# Patient Record
Sex: FEMALE | Race: WHITE | NOT HISPANIC OR LATINO | Employment: FULL TIME | URBAN - METROPOLITAN AREA
[De-identification: names, ages, dates, MRNs, and addresses within clinical notes are randomized per-mention and may not be internally consistent; named-entity substitution may affect disease eponyms.]

---

## 2017-01-11 ENCOUNTER — TELEPHONE (OUTPATIENT)
Dept: RHEUMATOLOGY | Facility: CLINIC | Age: 52
End: 2017-01-11

## 2017-01-11 ENCOUNTER — PATIENT MESSAGE (OUTPATIENT)
Dept: RHEUMATOLOGY | Facility: CLINIC | Age: 52
End: 2017-01-11

## 2017-01-11 DIAGNOSIS — R53.82 CHRONIC FATIGUE: ICD-10-CM

## 2017-01-11 DIAGNOSIS — M25.50 ARTHRALGIA, UNSPECIFIED JOINT: Primary | ICD-10-CM

## 2017-01-11 DIAGNOSIS — M79.10 MYALGIA: ICD-10-CM

## 2017-01-12 ENCOUNTER — LAB VISIT (OUTPATIENT)
Dept: LAB | Facility: HOSPITAL | Age: 52
End: 2017-01-12
Attending: INTERNAL MEDICINE
Payer: COMMERCIAL

## 2017-01-12 DIAGNOSIS — M25.50 ARTHRALGIA, UNSPECIFIED JOINT: ICD-10-CM

## 2017-01-12 DIAGNOSIS — R53.82 CHRONIC FATIGUE: ICD-10-CM

## 2017-01-12 DIAGNOSIS — M79.10 MYALGIA: ICD-10-CM

## 2017-01-12 LAB
CRP SERPL-MCNC: 0.6 MG/L
ERYTHROCYTE [SEDIMENTATION RATE] IN BLOOD BY WESTERGREN METHOD: 5 MM/HR

## 2017-01-12 PROCEDURE — 85651 RBC SED RATE NONAUTOMATED: CPT

## 2017-01-12 PROCEDURE — 36415 COLL VENOUS BLD VENIPUNCTURE: CPT

## 2017-01-12 PROCEDURE — 86140 C-REACTIVE PROTEIN: CPT

## 2017-03-02 ENCOUNTER — PATIENT MESSAGE (OUTPATIENT)
Dept: RHEUMATOLOGY | Facility: CLINIC | Age: 52
End: 2017-03-02

## 2017-03-02 ENCOUNTER — TELEPHONE (OUTPATIENT)
Dept: RHEUMATOLOGY | Facility: CLINIC | Age: 52
End: 2017-03-02

## 2017-03-02 DIAGNOSIS — M79.604 PAIN IN BOTH LOWER EXTREMITIES: Primary | ICD-10-CM

## 2017-03-02 DIAGNOSIS — M79.605 PAIN IN BOTH LOWER EXTREMITIES: Primary | ICD-10-CM

## 2017-03-02 NOTE — TELEPHONE ENCOUNTER
----- Message from Rosemary Mora MD sent at 3/2/2017 12:07 PM CST -----  Ordered EMG.  Please schedule  Thanks SS

## 2017-03-02 NOTE — TELEPHONE ENCOUNTER
Left message on [facility] Scheduling voicemail to contact pt to schedule. Contacted [facility], advised that Dr. Childers does the EMG. Will contact her nurse. Scheduled pt for 04/05/2017 and placed on cancellation list for anything sooner on one of her days off, a Wed or Friday. Pt will also contact billing to find out her copay cost. No further questions.

## 2017-03-16 ENCOUNTER — TELEPHONE (OUTPATIENT)
Dept: RHEUMATOLOGY | Facility: CLINIC | Age: 52
End: 2017-03-16

## 2017-03-16 ENCOUNTER — PATIENT MESSAGE (OUTPATIENT)
Dept: RHEUMATOLOGY | Facility: CLINIC | Age: 52
End: 2017-03-16

## 2017-03-16 NOTE — TELEPHONE ENCOUNTER
----- Message from Becka Wilson sent at 3/16/2017 11:58 AM CDT -----  Pl;ease call patient in regards to rescheduling [facility], [phone] (home)

## 2017-04-26 ENCOUNTER — PROCEDURE VISIT (OUTPATIENT)
Dept: PHYSICAL MEDICINE AND REHAB | Facility: CLINIC | Age: 52
End: 2017-04-26
Payer: COMMERCIAL

## 2017-04-26 DIAGNOSIS — M79.604 PAIN IN BOTH LOWER EXTREMITIES: ICD-10-CM

## 2017-04-26 DIAGNOSIS — M79.605 PAIN IN BOTH LOWER EXTREMITIES: ICD-10-CM

## 2017-04-26 PROCEDURE — 95886 MUSC TEST DONE W/N TEST COMP: CPT | Mod: S$GLB,,, | Performed by: PHYSICAL MEDICINE & REHABILITATION

## 2017-04-26 PROCEDURE — 95910 NRV CNDJ TEST 7-8 STUDIES: CPT | Mod: S$GLB,,, | Performed by: PHYSICAL MEDICINE & REHABILITATION

## 2017-04-26 NOTE — PROCEDURES
Procedures     [facility]  Joan Barney D.O  Physical Medicine and Rehab  Phone: [phone]   Fax: [phone]        Full Name: Lashell Valladares Gender: Female  Patient ID: [id number] YOB: 1965      Visit Date: 4/26/2017 14:15  Age: 51 Years 11 Months Old      Left sided hip pain. Radiates to above the knee.  Also, complains of aching of legs in back of calf.    PE  Excrcuciate tenderness left gluteus medius insertion/greater trochanter  MMT 5/5  Sensation to light touch intact  Reflexes symmetric 2+  No focal musc atrophy  Unlevelled iliac crests, left hip higher  +scoliotic curvature   Unlevlled shoulders  Sensory NCS      Nerve / Sites Rec. Site Onset Lat Peak Lat Ref. NP Amp Ref. PP Amp Ref. Segments Distance Velocity     ms ms ms µV µV µV µV  cm m/s   R Sural - Ankle (Calf)      Calf Ankle NR NR ?4.20 NR ?5.0 NR ?5.0 Calf - Ankle 14 NR   L Sural - Ankle (Calf)      Calf Ankle 2.92 3.39 ?4.20 2.3 ?5.0 5.5 ?5.0 Calf - Ankle 14 48   R Superficial peroneal - Ankle      Lat leg Ankle NR NR ?4.40 NR ?5.0 NR ?5.0 Lat leg - Ankle 14 NR   L Superficial peroneal - Ankle      Lat leg Ankle NR NR ?4.40 NR ?5.0 NR ?5.0 Lat leg - Ankle 14 NR       Motor NCS      Nerve / Sites Muscle Latency Ref. Amplitude Ref. Amp % Duration Segments Distance Lat Diff Velocity Ref.     ms ms mV mV % ms  cm ms m/s m/s   R Peroneal - EDB      Ankle EDB 4.38 ?6.20 4.2 ?2.0 100 6.41 Ankle - EDB 8         Fib head EDB 10.73  3.0  70.3 6.61 Fib head - Ankle 28 6.35 44 ?39      Pop fossa EDB 12.24  1.3  29.9 6.72 Pop fossa - Fib head 10 1.51 66 ?39   L Peroneal - EDB      Ankle EDB 3.85 ?6.20 6.8 ?2.0 100 6.35 Ankle - EDB 8         Fib head EDB 10.21  5.5  80.8 6.51 Fib head - Ankle 29 6.35 46 ?39      Pop fossa EDB 11.46  4.9  71.5 6.51 Pop fossa - Fib head 10 1.25 80 ?39   R Tibial - AH      Ankle AH 4.64 ?6.00 12.0 ?3.0 100 5.89 Ankle - AH 8         Pop fossa AH 12.71  0.7  5.76 6.30 Pop fossa - Ankle 34 8.07 42 ?39   L  Tibial - AH      Ankle AH 4.17 ?6.00 6.4 ?3.0 100 6.09 Ankle - AH 8         Pop fossa AH 12.50  0.6  9.82 5.89 Pop fossa - Ankle 32 8.33 38 ?39       F  Wave      Nerve Fmin Ref.    ms ms   R Tibial - AH 53.07 ?58.00   L Tibial - AH 51.56 ?58.00       EMG      EMG Summary Table     Spontaneous MUAP Recruitment   Muscle IA Fib PSW Fasc H.F. Amp Dur. PPP Pattern   R. Biceps femoris (short head) N None None None None N N N N   L. Biceps femoris (short head) N None None None None N N N N   L. Rectus femoris N None None None None N N N N   R. Rectus femoris N None None None None N N N N   L. Tensor fasciae latae N None None None None N N N N   R. Tensor fasciae latae N None None None None N N N N   L. Gastrocnemius (Lateral head) N None None None None N N N N   R. Gastrocnemius (Lateral head) N None None None None N N N N   L. Tibialis anterior N None None None None N N N N   R. Tibialis anterior N None None None None N N N N   L. Vastus lateralis N None None None None N N N N   R. Vastus lateralis N None None None None N N N N   L. Lumbar paraspinals (up) N None None None None N N N N   R. Lumbar paraspinals (up) N None None None None N N N N   L. Lumbar paraspinals (mid) N None None None None N N N N   R. Lumbar paraspinals (mid) N None None None None N N N N       Summary    The motor conduction test was performed on 4 nerve(s). The results were normal in 3 nerve(s): R Peroneal - EDB, L Peroneal - EDB, R Tibial - AH. Results outside the specified normal range were found in 1 nerve(s), as follows:   In the L Tibial - AH study  o the take off velocity result was reduced for Pop fossa - Ankle segment    The sensory conduction test had results outside of the specified normal range in all 4 of the tested nerves:   In the R Sural - Ankle (Calf) study  o the response was considered absent for Calf stimulation   In the L Sural - Ankle (Calf) study  o the peak amplitude result was reduced for Calf stimulation   In the R  Superficial peroneal - Ankle study  o the response was considered absent for Lat leg stimulation   In the L Superficial peroneal - Ankle study  o the response was considered absent for Lat leg stimulation    The F wave study was normal in all 2 of the tested nerves: R Tibial - AH, L Tibial - AH.    The needle EMG study was normal in all 16 tested muscles: R. Biceps femoris (short head), L. Biceps femoris (short head), L. Rectus femoris, R. Rectus femoris, L. Tensor fasciae latae, R. Tensor fasciae latae, L. Gastrocnemius (Lateral head), R. Gastrocnemius (Lateral head), L. Tibialis anterior, R. Tibialis anterior, L. Vastus lateralis, R. Vastus lateralis, L. Lumbar paraspinals (up), R. Lumbar paraspinals (up), L. Lumbar paraspinals (mid), R. Lumbar paraspinals (mid).          Conclusion:   Mild sensorimotor axonal neuropathy          Discussion:    No electrophysiologic evidence of a lumbar radiculopathy, myopathy or plexopathy.   She does exhibit left trochanteric pain syndrome. This is likely due to her scoliosis leading chronic pelvic misalignment. Recommend evaluation for orthotics with a possible heel lift as well as physical therapy specifically geared for pelvic realignment and strengthening of hip stabilizers and core.                ____________________________  Joan Barney D.O.

## 2017-05-01 ENCOUNTER — PATIENT MESSAGE (OUTPATIENT)
Dept: RHEUMATOLOGY | Facility: CLINIC | Age: 52
End: 2017-05-01

## 2017-05-08 ENCOUNTER — LAB VISIT (OUTPATIENT)
Dept: LAB | Facility: HOSPITAL | Age: 52
End: 2017-05-08
Attending: INTERNAL MEDICINE
Payer: COMMERCIAL

## 2017-05-08 ENCOUNTER — OFFICE VISIT (OUTPATIENT)
Dept: RHEUMATOLOGY | Facility: CLINIC | Age: 52
End: 2017-05-08
Payer: COMMERCIAL

## 2017-05-08 VITALS
HEIGHT: 64 IN | DIASTOLIC BLOOD PRESSURE: 75 MMHG | SYSTOLIC BLOOD PRESSURE: 120 MMHG | HEART RATE: 60 BPM | BODY MASS INDEX: 26.12 KG/M2 | WEIGHT: 153 LBS

## 2017-05-08 DIAGNOSIS — M15.9 PRIMARY OSTEOARTHRITIS INVOLVING MULTIPLE JOINTS: ICD-10-CM

## 2017-05-08 DIAGNOSIS — M19.90 INFLAMMATORY ARTHRITIS: ICD-10-CM

## 2017-05-08 DIAGNOSIS — Z79.1 NSAID LONG-TERM USE: Primary | ICD-10-CM

## 2017-05-08 DIAGNOSIS — Z91.199 NONCOMPLIANCE: ICD-10-CM

## 2017-05-08 DIAGNOSIS — M35.3 PMR (POLYMYALGIA RHEUMATICA): ICD-10-CM

## 2017-05-08 LAB
ALBUMIN SERPL BCP-MCNC: 3.9 G/DL
ALP SERPL-CCNC: 46 U/L
ALT SERPL W/O P-5'-P-CCNC: 22 U/L
ANION GAP SERPL CALC-SCNC: 9 MMOL/L
AST SERPL-CCNC: 29 U/L
BASOPHILS # BLD AUTO: 0 K/UL
BASOPHILS NFR BLD: 0.6 %
BILIRUB SERPL-MCNC: 0.7 MG/DL
BUN SERPL-MCNC: 15 MG/DL
CALCIUM SERPL-MCNC: 9.6 MG/DL
CHLORIDE SERPL-SCNC: 104 MMOL/L
CO2 SERPL-SCNC: 28 MMOL/L
CREAT SERPL-MCNC: 0.8 MG/DL
CRP SERPL-MCNC: 0.4 MG/L
DIFFERENTIAL METHOD: ABNORMAL
EOSINOPHIL # BLD AUTO: 0.2 K/UL
EOSINOPHIL NFR BLD: 3.9 %
ERYTHROCYTE [DISTWIDTH] IN BLOOD BY AUTOMATED COUNT: 13.5 %
ERYTHROCYTE [SEDIMENTATION RATE] IN BLOOD BY WESTERGREN METHOD: 5 MM/HR
EST. GFR  (AFRICAN AMERICAN): >60 ML/MIN/1.73 M^2
EST. GFR  (NON AFRICAN AMERICAN): >60 ML/MIN/1.73 M^2
GLUCOSE SERPL-MCNC: 83 MG/DL
HCT VFR BLD AUTO: 38.4 %
HGB BLD-MCNC: 12.7 G/DL
LYMPHOCYTES # BLD AUTO: 1.7 K/UL
LYMPHOCYTES NFR BLD: 40 %
MCH RBC QN AUTO: 29.5 PG
MCHC RBC AUTO-ENTMCNC: 33.1 %
MCV RBC AUTO: 89 FL
MONOCYTES # BLD AUTO: 0.3 K/UL
MONOCYTES NFR BLD: 7.3 %
NEUTROPHILS # BLD AUTO: 2 K/UL
NEUTROPHILS NFR BLD: 48.2 %
PLATELET # BLD AUTO: 235 K/UL
PMV BLD AUTO: 8 FL
POTASSIUM SERPL-SCNC: 4.5 MMOL/L
PROT SERPL-MCNC: 6.3 G/DL
RBC # BLD AUTO: 4.31 M/UL
SODIUM SERPL-SCNC: 141 MMOL/L
WBC # BLD AUTO: 4.2 K/UL

## 2017-05-08 PROCEDURE — 36415 COLL VENOUS BLD VENIPUNCTURE: CPT

## 2017-05-08 PROCEDURE — 85651 RBC SED RATE NONAUTOMATED: CPT

## 2017-05-08 PROCEDURE — 86140 C-REACTIVE PROTEIN: CPT

## 2017-05-08 PROCEDURE — 80053 COMPREHEN METABOLIC PANEL: CPT

## 2017-05-08 PROCEDURE — 99214 OFFICE O/P EST MOD 30 MIN: CPT | Mod: S$GLB,,, | Performed by: INTERNAL MEDICINE

## 2017-05-08 PROCEDURE — 85025 COMPLETE CBC W/AUTO DIFF WBC: CPT

## 2017-05-08 PROCEDURE — 99999 PR PBB SHADOW E&M-EST. PATIENT-LVL III: CPT | Mod: PBBFAC,,, | Performed by: INTERNAL MEDICINE

## 2017-05-08 PROCEDURE — 3078F DIAST BP <80 MM HG: CPT | Mod: S$GLB,,, | Performed by: INTERNAL MEDICINE

## 2017-05-08 PROCEDURE — 1160F RVW MEDS BY RX/DR IN RCRD: CPT | Mod: S$GLB,,, | Performed by: INTERNAL MEDICINE

## 2017-05-08 PROCEDURE — 3074F SYST BP LT 130 MM HG: CPT | Mod: S$GLB,,, | Performed by: INTERNAL MEDICINE

## 2017-05-08 ASSESSMENT — ROUTINE ASSESSMENT OF PATIENT INDEX DATA (RAPID3)
PATIENT GLOBAL ASSESSMENT SCORE: 3
MDHAQ FUNCTION SCORE: .4
TOTAL RAPID3 SCORE: 2.78
PSYCHOLOGICAL DISTRESS SCORE: 1.1
PAIN SCORE: 4

## 2017-05-08 NOTE — PROGRESS NOTES
"Subjective:       Patient ID: Lashell Valladares is a 52 y.o. female.    Chief Complaint: PMR and inflammatory arthritis  HPI 50y/o female is here for follow up for PMR. She has h/o seronegative RA diagnosed by her primary care physician.  In the past, she may have taken Methotrexate for 1 month(cannot remember the dose/she is not sure if this was the medication) that did not help.  Currently on hydroxychloroquine 200 mg twice daily-restarted in July 2016.  Patient canceled her last appointment with me.  When I asked about eye exam related to Plaquenil use, she got very upset because she could not tell me the name or location of the physician whom she saw for the eye exam.  No new PMR symptoms.  She denies headache, vision changes, jaw claudication  Also on, Gabapentin 100 mg at bedtime helps the back pain        Review of Systems   Constitutional: Negative for fever.   HENT: Negative for facial swelling and hearing loss.    Eyes: Negative for pain and redness.   Respiratory: Negative for cough and shortness of breath.    Cardiovascular:Negative for chest pain.   Gastrointestinal: Negative for blood in stool and abdominal distention.   Genitourinary: Negative for hematuria and genital sores.   Neurological: Negative for tremors and seizures.   Psychiatric/Behavioral: Negative for behavioral problems and agitation.         Objective:     /75 (BP Location: Left arm, Patient Position: Sitting, BP Method: Automatic)  Pulse 60  Ht 5' 4" (1.626 m)  Wt 69.4 kg (153 lb)  BMI 26.26 kg/m2     Physical Exam   Constitutional: She is oriented to person, place, and time and well-developed, well-nourished, and in no distress.   Head: Normocephalic and atraumatic.   Eyes: Pupils are equal, round, no redness of conjunctiva   Neurological: She is alert and oriented to person, place, and time.   Skin: Skin is warm and dry.     Psychiatric: Mood and affect normal.   Musculoskeletal:   No joint effusion      Lab Results "   Component Value Date    WBC 5.30 09/07/2016    HGB 13.8 09/07/2016    HCT 41.1 09/07/2016    MCV 90 09/07/2016     09/07/2016     CMP  Sodium   Date Value Ref Range Status   09/07/2016 139 136 - 145 mmol/L Final     Potassium   Date Value Ref Range Status   09/07/2016 4.3 3.5 - 5.1 mmol/L Final     Chloride   Date Value Ref Range Status   09/07/2016 105 95 - 110 mmol/L Final     CO2   Date Value Ref Range Status   09/07/2016 26 23 - 29 mmol/L Final     Glucose   Date Value Ref Range Status   09/07/2016 79 70 - 110 mg/dL Final     BUN, Bld   Date Value Ref Range Status   09/07/2016 11 6 - 20 mg/dL Final     Creatinine   Date Value Ref Range Status   09/07/2016 0.7 0.5 - 1.4 mg/dL Final     Calcium   Date Value Ref Range Status   09/07/2016 9.1 8.7 - 10.5 mg/dL Final     Total Protein   Date Value Ref Range Status   09/07/2016 6.1 6.0 - 8.4 g/dL Final     Albumin   Date Value Ref Range Status   09/07/2016 3.7 3.5 - 5.2 g/dL Final     Total Bilirubin   Date Value Ref Range Status   09/07/2016 0.8 0.1 - 1.0 mg/dL Final     Comment:     For infants and newborns, interpretation of results should be based  on gestational age, weight and in agreement with clinical  observations.  Premature Infant recommended reference ranges:  Up to 24 hours.............<8.0 mg/dL  Up to 48 hours............<12.0 mg/dL  3-5 days..................<15.0 mg/dL  6-29 days.................<15.0 mg/dL       Alkaline Phosphatase   Date Value Ref Range Status   09/07/2016 49 (L) 55 - 135 U/L Final     AST   Date Value Ref Range Status   09/07/2016 22 10 - 40 U/L Final     ALT   Date Value Ref Range Status   09/07/2016 20 10 - 44 U/L Final     Anion Gap   Date Value Ref Range Status   09/07/2016 8 8 - 16 mmol/L Final     eGFR if    Date Value Ref Range Status   09/07/2016 >60 >60 mL/min/1.73 m^2 Final     eGFR if non    Date Value Ref Range Status   09/07/2016 >60 >60 mL/min/1.73 m^2 Final     Comment:      Calculation used to obtain the estimated glomerular filtration  rate (eGFR) is the CKD-EPI equation. Since race is unknown   in our information system, the eGFR values for   -American and Non--American patients are given   for each creatinine result.       Lab Results   Component Value Date    SEDRATE 5 01/12/2017     Lab Results   Component Value Date    CRP 0.6 01/12/2017   Results for MARCELA SHAY (MRN [id number]) as of 12/20/2016 15:09   Ref. Range 11/17/2015 11:22   Anti-SSA Antibody Latest Ref Range: 0.00 - 19.99 EU 0.76   Anti-SSA Interpretation Latest Ref Range: Negative  Negative   CCP Antibodies Latest Ref Range: <5.0 U/mL <0.5   Rheumatoid Factor Latest Ref Range: 0.0 - 15.0 IU/mL <10.0       Arthritis survey  In the cervical spine there is mild degenerative change that is moderate disk space narrowing C5-C6 and to a lesser degree C4-C5.  There is no abnormal translational motion.    In the knees medial and lateral compartments of the knees appear maintained    Feet and hands and wrists; very slight degenerative change left first metatarsophalangeal joint.  Very slight degenerative change in the hand predominantly at distal interphalangeal joints.  No appreciable juxta-articular erosions  Assessment:   Polymyalgia rheumatica- resolved.  No signs of GCA  Non compliance   Inflammatory arthritis-stable  Long-term use of hydroxychloroquine -  Osteoarthritis of multiple joints  Long-term use of NSAIDs    Plan:    On asking about the last eye exam for Plaquenil toxicity monitoring, she got very upset as she could not remember the name of the ophthalmologist.  She also got upset that I asked if she was taking medications as prescribed (She admitted to not taking medications as prescribed).  I discussed side effects of medications in length and discussed the importance of regular monitoring of toxicity. I tried to explain the reason for not krysta able to refill Plaquenil until she has been  cleared by ophthalmology.  I also counseled her for medication compliance.  She did voice understanding and got very upset and decided to walk out of  the clinic.   RTC prn     -Patient seen face to face for 25 minutes and greater than 50% spent in counseling regarding noncompliance

## 2017-05-08 NOTE — MR AVS SNAPSHOT
[facility] [facility]  [address]  [place] [address]  Phone: [phone]  Fax: [phone]                  Lashell Valladares   2017 11:00 AM   Office Visit    Description:  Female : 1965   Provider:  Rosemary Mora MD   Department:  [facility]           Reason for Visit     Follow-up     Pain                To Do List           Future Appointments        Provider Department Dept Phone    2017 11:00 AM Rosemary Mora MD [facility] [phone]      Goals (5 Years of Data)     None      [facility]     [facility] Care Line -  Assistance  Unless otherwise directed by your provider, please contact [facility], our nurse care line that is available for  assistance.     Registered nurses in the [facility] provide: appointment scheduling, clinical advisement, health education, and other advisory services.  Call: [phone] (toll free)               Medications           Message regarding Medications     Verify the changes and/or additions to your medication regime listed below are the same as discussed with your clinician today.  If any of these changes or additions are incorrect, please notify your healthcare provider.        STOP taking these medications     predniSONE (DELTASONE) 1 MG tablet Take 1 mg by mouth once daily.           Verify that the below list of medications is an accurate representation of the medications you are currently taking.  If none reported, the list may be blank. If incorrect, please contact your healthcare provider. Carry this list with you in case of emergency.           Current Medications     gabapentin (NEURONTIN) 100 MG capsule Take 1 capsule (100 mg total) by mouth every evening.    hydroxychloroquine (PLAQUENIL) 200 mg tablet Take 1 tablet (200 mg total) by mouth 2 (two) times daily.    meloxicam (MOBIC) 15 MG tablet Take 1 tablet (15 mg total) by mouth once daily.    metoprolol  "succinate (TOPROL-XL) 25 MG 24 hr tablet Take 1 tablet (25 mg total) by mouth once daily.    tramadol (ULTRAM) 50 mg tablet Take 1 tablet (50 mg total) by mouth every 8 (eight) hours as needed for Pain.    vitamin D 1000 units Tab Take 2,000 Units by mouth once daily.           Clinical Reference Information           Your Vitals Were     BP Pulse Height Weight BMI    120/75 (BP Location: Left arm, Patient Position: Sitting, BP Method: Automatic) 60 5' 4" (1.626 m) 69.4 kg (153 lb) 26.26 kg/m2      Blood Pressure          Most Recent Value    BP  120/75      Allergies as of 5/8/2017     Aspirin    Penicillins      Immunizations Administered on Date of Encounter - 5/8/2017     None      Language Assistance Services     ATTENTION: Language assistance services are available, free of charge. Please call [phone].      ATENCIÓN: Si jamie chicas, tiene a white disposición servicios gratuitos de asistencia lingüística. Llame al [phone].     CHÚ Ý: N?u b?n nói Ti?ng Vi?t, có các d?ch v? h? tr? ngôn ng? mi?n phí dành cho b?n. G?i s? [phone].         [facility] complies with applicable Federal civil rights laws and does not discriminate on the basis of race, color, national origin, age, disability, or sex.        "

## 2017-05-12 DIAGNOSIS — M19.90 INFLAMMATORY ARTHRITIS: ICD-10-CM

## 2017-05-12 RX ORDER — MELOXICAM 15 MG/1
TABLET ORAL
Qty: 30 TABLET | Refills: 0 | Status: SHIPPED | OUTPATIENT
Start: 2017-05-12 | End: 2017-06-13 | Stop reason: SDUPTHER

## 2017-06-13 DIAGNOSIS — M19.90 INFLAMMATORY ARTHRITIS: ICD-10-CM

## 2017-06-13 RX ORDER — MELOXICAM 15 MG/1
15 TABLET ORAL DAILY
Qty: 30 TABLET | Refills: 2 | Status: SHIPPED | OUTPATIENT
Start: 2017-06-13 | End: 2017-09-01 | Stop reason: SDUPTHER

## 2017-07-10 ENCOUNTER — DOCUMENTATION ONLY (OUTPATIENT)
Dept: RHEUMATOLOGY | Facility: CLINIC | Age: 52
End: 2017-07-10

## 2017-07-13 DIAGNOSIS — M19.90 INFLAMMATORY ARTHRITIS: ICD-10-CM

## 2017-07-13 RX ORDER — HYDROXYCHLOROQUINE SULFATE 200 MG/1
200 TABLET, FILM COATED ORAL 2 TIMES DAILY
Qty: 60 TABLET | OUTPATIENT
Start: 2017-07-13

## 2017-09-01 DIAGNOSIS — M19.90 INFLAMMATORY ARTHRITIS: ICD-10-CM

## 2017-09-06 RX ORDER — MELOXICAM 15 MG/1
TABLET ORAL
Qty: 30 TABLET | Refills: 1 | Status: SHIPPED | OUTPATIENT
Start: 2017-09-06 | End: 2017-09-22 | Stop reason: SDUPTHER

## 2017-11-16 ENCOUNTER — TELEPHONE (OUTPATIENT)
Dept: RHEUMATOLOGY | Facility: CLINIC | Age: 52
End: 2017-11-16

## 2017-11-16 NOTE — TELEPHONE ENCOUNTER
----- Message from Malka Oh sent at 11/16/2017  1:52 PM CST -----  Patient has appointment on December 11th at 4:00/would like to come in earlier same day if possible/please call patient back at [phone] to advise.

## 2017-12-06 PROBLEM — K59.00 CONSTIPATION: Status: ACTIVE | Noted: 2017-12-06

## 2017-12-11 ENCOUNTER — OFFICE VISIT (OUTPATIENT)
Dept: RHEUMATOLOGY | Facility: CLINIC | Age: 52
End: 2017-12-11
Payer: COMMERCIAL

## 2017-12-11 VITALS
SYSTOLIC BLOOD PRESSURE: 140 MMHG | HEIGHT: 64 IN | DIASTOLIC BLOOD PRESSURE: 83 MMHG | WEIGHT: 149.5 LBS | HEART RATE: 67 BPM | BODY MASS INDEX: 25.52 KG/M2

## 2017-12-11 DIAGNOSIS — M35.3 PMR (POLYMYALGIA RHEUMATICA): ICD-10-CM

## 2017-12-11 DIAGNOSIS — M19.90 INFLAMMATORY ARTHRITIS: Primary | ICD-10-CM

## 2017-12-11 DIAGNOSIS — M15.9 PRIMARY OSTEOARTHRITIS INVOLVING MULTIPLE JOINTS: ICD-10-CM

## 2017-12-11 DIAGNOSIS — M25.511 PAIN IN JOINT OF RIGHT SHOULDER: ICD-10-CM

## 2017-12-11 PROCEDURE — 99215 OFFICE O/P EST HI 40 MIN: CPT | Mod: S$GLB,,, | Performed by: INTERNAL MEDICINE

## 2017-12-11 PROCEDURE — 99999 PR PBB SHADOW E&M-EST. PATIENT-LVL III: CPT | Mod: PBBFAC,,, | Performed by: INTERNAL MEDICINE

## 2017-12-11 RX ORDER — PREDNISONE 2.5 MG/1
5 TABLET ORAL DAILY
Qty: 60 TABLET | Refills: 5 | Status: SHIPPED | OUTPATIENT
Start: 2017-12-11 | End: 2018-01-10

## 2017-12-11 RX ORDER — BROMPHENIRAMINE MALEATE, DEXTROMETHORPHAN HBR, PHENYLEPHRINE HCL, DIPHENHYDRAMINE HCL, PHENYLEPHRINE HCL 0.52G
0.52 KIT ORAL DAILY
COMMUNITY
End: 2018-05-28

## 2017-12-11 RX ORDER — MELOXICAM 15 MG/1
15 TABLET ORAL DAILY
Qty: 30 TABLET | Refills: 7 | Status: SHIPPED | OUTPATIENT
Start: 2017-12-11 | End: 2018-01-10 | Stop reason: SDUPTHER

## 2017-12-11 RX ORDER — HYDROXYCHLOROQUINE SULFATE 200 MG/1
200 TABLET, FILM COATED ORAL 2 TIMES DAILY
Qty: 60 TABLET | Refills: 7 | Status: SHIPPED | OUTPATIENT
Start: 2017-12-11 | End: 2018-03-16 | Stop reason: SDUPTHER

## 2017-12-11 RX ORDER — TRAMADOL HYDROCHLORIDE 50 MG/1
50 TABLET ORAL EVERY 8 HOURS PRN
Qty: 90 TABLET | Refills: 3 | Status: SHIPPED | OUTPATIENT
Start: 2017-12-11 | End: 2018-01-25 | Stop reason: SDUPTHER

## 2017-12-11 NOTE — PROGRESS NOTES
Subjective:       Patient ID: Lashell Valladares is a 52 y.o. female.    Chief Complaint: Consult    Hpi: pt was dx Ra and tx with plaquenil, mobic,  And then PMR tx with started with prednisone 20 mg and then weaned.  Patient complains of arthralgias and myalgias for which has been present for a few years. Pain is located in multiple joints, both shoulder(s), both elbow(s), both wrist(s), both MCP(s): 1st, 2nd, 3rd, 4th and 5th, both PIP(s): 1st, 2nd, 3rd, 4th and 5th, both DIP(s): 1st and 2nd, both hip(s), both knee(s) and both MTP(s): 1st, 2nd, 3rd, 4th and 5th, is described as aching, pulsating, shooting and throbbing, and is constant, moderate .  Associated symptoms include: crepitation, decreased range of motion, edema, effusion, tenderness and warmth.   Mouth ulcers, hair loss,               She complains of pain, stiffness and joint swelling. Affected locations include the right wrist, right MCP, left PIP, left ankle, left foot, left toes, left MCP, left wrist, left elbow, left shoulder, right knee, right ankle, right foot, right toes, right hip, right DIP, right PIP, right elbow, right shoulder and neck. Associated symptoms include fatigue, pain at night, pain while resting, myalgias and dry eyes. Pertinent negatives include no dysuria, fever, trouble swallowing or headaches. She complains of morning stiffness lasting less than 30 minutes after awakening.        Review of Systems   Constitutional: Positive for activity change and fatigue. Negative for appetite change, chills, diaphoresis, fever and unexpected weight change.   HENT: Negative for congestion, dental problem, ear discharge, ear pain, facial swelling, mouth sores, nosebleeds, postnasal drip, rhinorrhea, sinus pressure, sneezing, sore throat, tinnitus, trouble swallowing and voice change.    Eyes: Negative for photophobia, pain, discharge, redness and itching.   Respiratory: Negative for apnea, cough, chest tightness, shortness of breath and  "wheezing.    Cardiovascular: Positive for leg swelling. Negative for chest pain and palpitations.   Gastrointestinal: Negative for abdominal distention, abdominal pain, constipation, diarrhea, nausea and vomiting.   Endocrine: Negative for cold intolerance, heat intolerance, polydipsia and polyuria.   Genitourinary: Negative for decreased urine volume, difficulty urinating, dysuria, flank pain, frequency, hematuria and urgency.   Musculoskeletal: Positive for arthralgias, gait problem, joint swelling, myalgias, neck pain, neck stiffness and stiffness. Negative for back pain.   Skin: Negative for pallor, rash and wound.   Allergic/Immunologic: Negative for immunocompromised state.   Neurological: Negative for dizziness, tremors, weakness, numbness and headaches.   Hematological: Negative for adenopathy. Does not bruise/bleed easily.   Psychiatric/Behavioral: Negative for sleep disturbance. The patient is not nervous/anxious.          Objective:   BP (!) 140/83   Pulse 67   Ht 5' 4" (1.626 m)   Wt 67.8 kg (149 lb 7.6 oz)   BMI 25.66 kg/m²      Physical Exam   Vitals reviewed.  Constitutional: She is oriented to person, place, and time. She appears distressed.   HENT:   Head: Normocephalic and atraumatic.   Eyes: EOM are normal. Pupils are equal, round, and reactive to light. Right eye exhibits no discharge. Left eye exhibits no discharge.   Neck: Neck supple. No thyromegaly present.   Cardiovascular: Normal rate, regular rhythm and normal heart sounds.  Exam reveals no gallop and no friction rub.    No murmur heard.  Pulmonary/Chest: Breath sounds normal. She has no wheezes. She has no rales. She exhibits no tenderness.   Abdominal: There is no tenderness. There is no rebound and no guarding.       Right Side Rheumatological Exam     Examination finds the elbow normal.    The patient is tender to palpation of the shoulder, wrist, knee, 1st PIP, 1st MCP, 2nd PIP, 2nd MCP, 3rd PIP, 3rd MCP, 4th PIP, 4th MCP, 5th PIP " and 5th MCP    She has swelling of the 1st PIP, 1st MCP, 2nd PIP, 2nd MCP, 3rd PIP, 3rd MCP, 4th PIP, 4th MCP, 5th PIP and 5th MCP    Shoulder Exam   Tenderness Location: no tenderness    Range of Motion   Active Abduction: abnormal   Adduction: abnormal  Sensation: normal    Knee Exam   Patellofemoral Crepitus: positive  Effusion: positive  Sensation: normal    Hip Exam   Tenderness Location: posterior  Sensation: normal    Elbow/Wrist Exam   Tenderness Location: no tenderness  Sensation: normal    Left Side Rheumatological Exam     The patient is tender to palpation of the shoulder, elbow, wrist, knee, 1st PIP, 1st MCP, 2nd PIP, 2nd MCP, 3rd PIP, 3rd MCP, 4th PIP, 4th MCP, 5th PIP and 5th MCP.    She has swelling of the 1st PIP, 1st MCP, 2nd PIP, 2nd MCP, 3rd PIP, 3rd MCP, 4th PIP, 4th MCP, 5th PIP and 5th MCP    Shoulder Exam   Tenderness Location: no tenderness    Range of Motion   Active Abduction: abnormal   Sensation: normal    Knee Exam     Patellofemoral Crepitus: positive  Effusion: positive  Sensation: normal    Hip Exam   Tenderness Location: posterior  Sensation: normal    Elbow/Wrist Exam   Sensation: normal      Back/Neck Exam   General Inspection   Gait: normal         Lymphadenopathy:     She has no cervical adenopathy.   Neurological: She is alert and oriented to person, place, and time. Gait normal.   Skin: Skin is dry. No rash noted. No erythema. There is pallor.     Psychiatric: Mood and affect normal.   Musculoskeletal: She exhibits tenderness.           Results for orders placed or performed during the hospital encounter of 12/06/17   POCT urine pregnancy   Result Value Ref Range    POC Preg Test, Ur Negative Negative     Acceptable Yes        Assessment:       1. Inflammatory arthritis    2. PMR (polymyalgia rheumatica)    3. Primary osteoarthritis involving multiple joints    4. Pain in joint of right shoulder            Plan:       Lashell was seen today for  consult.    Diagnoses and all orders for this visit:    Inflammatory arthritis  Comments:  seronegative RA  Orders:  -     NOÉ; Future  -     Anti Sm/RNP Antibody; Future  -     Anti-DNA antibody, double-stranded; Future  -     Anti-Histone Antibody; Future  -     Anti-scleroderma antibody; Future  -     Anti-Smith antibody; Future  -     Anti-thyroglobulin antibody; Future  -     TSH; Future  -     Thyroid peroxidase antibody; Future  -     T4, free; Future  -     T3, free; Future  -     Sedimentation rate, manual; Future  -     Rheumatoid factor; Future  -     Sjogrens syndrome-A extractable nuclear antibody; Future  -     Sjogrens syndrome-B extractable nuclear antibody; Future  -     IgE; Future  -     IgG; Future  -     IgG 1, 2, 3, and 4; Future  -     IgM; Future  -     Cyclic citrul peptide antibody, IgG; Future  -     Vitamin D; Future  -     HLA B27 Antigen; Future  -     traMADol (ULTRAM) 50 mg tablet; Take 1 tablet (50 mg total) by mouth every 8 (eight) hours as needed for Pain.  -     hydroxychloroquine (PLAQUENIL) 200 mg tablet; Take 1 tablet (200 mg total) by mouth 2 (two) times daily.  -     meloxicam (MOBIC) 15 MG tablet; Take 1 tablet (15 mg total) by mouth once daily.    PMR (polymyalgia rheumatica)  -     NOÉ; Future  -     Anti Sm/RNP Antibody; Future  -     Anti-DNA antibody, double-stranded; Future  -     Anti-Histone Antibody; Future  -     Anti-scleroderma antibody; Future  -     Anti-Smith antibody; Future  -     Anti-thyroglobulin antibody; Future  -     TSH; Future  -     Thyroid peroxidase antibody; Future  -     T4, free; Future  -     T3, free; Future  -     Sedimentation rate, manual; Future  -     Rheumatoid factor; Future  -     Sjogrens syndrome-A extractable nuclear antibody; Future  -     Sjogrens syndrome-B extractable nuclear antibody; Future  -     IgE; Future  -     IgG; Future  -     IgG 1, 2, 3, and 4; Future  -     IgM; Future  -     Cyclic citrul peptide antibody, IgG;  Future  -     Vitamin D; Future  -     HLA B27 Antigen; Future  -     traMADol (ULTRAM) 50 mg tablet; Take 1 tablet (50 mg total) by mouth every 8 (eight) hours as needed for Pain.    Primary osteoarthritis involving multiple joints  -     NOÉ; Future  -     Anti Sm/RNP Antibody; Future  -     Anti-DNA antibody, double-stranded; Future  -     Anti-Histone Antibody; Future  -     Anti-scleroderma antibody; Future  -     Anti-Smith antibody; Future  -     Anti-thyroglobulin antibody; Future  -     TSH; Future  -     Thyroid peroxidase antibody; Future  -     T4, free; Future  -     T3, free; Future  -     Sedimentation rate, manual; Future  -     Rheumatoid factor; Future  -     Sjogrens syndrome-A extractable nuclear antibody; Future  -     Sjogrens syndrome-B extractable nuclear antibody; Future  -     IgE; Future  -     IgG; Future  -     IgG 1, 2, 3, and 4; Future  -     IgM; Future  -     Cyclic citrul peptide antibody, IgG; Future  -     Vitamin D; Future  -     HLA B27 Antigen; Future  -     traMADol (ULTRAM) 50 mg tablet; Take 1 tablet (50 mg total) by mouth every 8 (eight) hours as needed for Pain.    Pain in joint of right shoulder  -     NOÉ; Future  -     Anti Sm/RNP Antibody; Future  -     Anti-DNA antibody, double-stranded; Future  -     Anti-Histone Antibody; Future  -     Anti-scleroderma antibody; Future  -     Anti-Smith antibody; Future  -     Anti-thyroglobulin antibody; Future  -     TSH; Future  -     Thyroid peroxidase antibody; Future  -     T4, free; Future  -     T3, free; Future  -     Sedimentation rate, manual; Future  -     Rheumatoid factor; Future  -     Sjogrens syndrome-A extractable nuclear antibody; Future  -     Sjogrens syndrome-B extractable nuclear antibody; Future  -     IgE; Future  -     IgG; Future  -     IgG 1, 2, 3, and 4; Future  -     IgM; Future  -     Cyclic citrul peptide antibody, IgG; Future  -     Vitamin D; Future  -     HLA B27 Antigen; Future  -     traMADol  (ULTRAM) 50 mg tablet; Take 1 tablet (50 mg total) by mouth every 8 (eight) hours as needed for Pain.    Other orders  -     predniSONE (DELTASONE) 2.5 MG tablet; Take 2 tablets (5 mg total) by mouth once daily.    We will take over Plaquenil, Mobic and tramadol

## 2017-12-11 NOTE — LETTER
December 11, 2017      [facility]  [address] [place] [address]  Phone: [phone]  Fax: [phone]       Patient: Lashell Valladares   YOB: 1965  Date of Visit: 12/11/2017    To Whom It May Concern:    Hayder Valladares  was at [facility] on 12/11/2017.SHE may return to work/school on 12-11-17 with no restrictions. If you have any questions or concerns, or if I can be of further assistance, please do not hesitate to contact my staff.    Sincerely,    Keyon Seals M.D.

## 2017-12-20 ENCOUNTER — LAB VISIT (OUTPATIENT)
Dept: LAB | Facility: HOSPITAL | Age: 52
End: 2017-12-20
Attending: INTERNAL MEDICINE
Payer: COMMERCIAL

## 2017-12-20 DIAGNOSIS — M15.9 PRIMARY OSTEOARTHRITIS INVOLVING MULTIPLE JOINTS: ICD-10-CM

## 2017-12-20 DIAGNOSIS — M35.3 PMR (POLYMYALGIA RHEUMATICA): ICD-10-CM

## 2017-12-20 DIAGNOSIS — M19.90 INFLAMMATORY ARTHRITIS: ICD-10-CM

## 2017-12-20 DIAGNOSIS — M25.511 PAIN IN JOINT OF RIGHT SHOULDER: ICD-10-CM

## 2017-12-20 LAB
25(OH)D3+25(OH)D2 SERPL-MCNC: 41 NG/ML
CCP AB SER IA-ACNC: <0.5 U/ML
ERYTHROCYTE [SEDIMENTATION RATE] IN BLOOD BY WESTERGREN METHOD: 8 MM/HR
IGE SERPL-ACNC: <35 IU/ML
IGG SERPL-MCNC: 525 MG/DL
IGM SERPL-MCNC: 36 MG/DL
RHEUMATOID FACT SERPL-ACNC: <10 IU/ML
T3FREE SERPL-MCNC: 2 PG/ML
T4 FREE SERPL-MCNC: 0.89 NG/DL
THYROGLOB AB SERPL IA-ACNC: <4 IU/ML
THYROPEROXIDASE IGG SERPL-ACNC: <6 IU/ML
TSH SERPL DL<=0.005 MIU/L-ACNC: 3.33 UIU/ML

## 2017-12-20 PROCEDURE — 86235 NUCLEAR ANTIGEN ANTIBODY: CPT | Mod: 59

## 2017-12-20 PROCEDURE — 82306 VITAMIN D 25 HYDROXY: CPT

## 2017-12-20 PROCEDURE — 86235 NUCLEAR ANTIGEN ANTIBODY: CPT

## 2017-12-20 PROCEDURE — 81374 HLA I TYPING 1 ANTIGEN LR: CPT

## 2017-12-20 PROCEDURE — 84439 ASSAY OF FREE THYROXINE: CPT

## 2017-12-20 PROCEDURE — 84481 FREE ASSAY (FT-3): CPT

## 2017-12-20 PROCEDURE — 82787 IGG 1 2 3 OR 4 EACH: CPT | Mod: 59

## 2017-12-20 PROCEDURE — 86376 MICROSOMAL ANTIBODY EACH: CPT

## 2017-12-20 PROCEDURE — 86200 CCP ANTIBODY: CPT

## 2017-12-20 PROCEDURE — 82785 ASSAY OF IGE: CPT

## 2017-12-20 PROCEDURE — 85651 RBC SED RATE NONAUTOMATED: CPT

## 2017-12-20 PROCEDURE — 86225 DNA ANTIBODY NATIVE: CPT

## 2017-12-20 PROCEDURE — 82784 ASSAY IGA/IGD/IGG/IGM EACH: CPT

## 2017-12-20 PROCEDURE — 36415 COLL VENOUS BLD VENIPUNCTURE: CPT

## 2017-12-20 PROCEDURE — 82784 ASSAY IGA/IGD/IGG/IGM EACH: CPT | Mod: 59

## 2017-12-20 PROCEDURE — 86431 RHEUMATOID FACTOR QUANT: CPT

## 2017-12-20 PROCEDURE — 84443 ASSAY THYROID STIM HORMONE: CPT

## 2017-12-20 PROCEDURE — 86038 ANTINUCLEAR ANTIBODIES: CPT

## 2017-12-20 PROCEDURE — 86800 THYROGLOBULIN ANTIBODY: CPT

## 2017-12-20 PROCEDURE — 83516 IMMUNOASSAY NONANTIBODY: CPT

## 2017-12-21 LAB
ANA SER QL IF: NORMAL
DSDNA AB SER-ACNC: NORMAL [IU]/ML
ENA SCL70 AB SER-ACNC: 8 UNITS
IGG1 SER-MCNC: 310 MG/DL
IGG2 SER-MCNC: 123 MG/DL
IGG3 SER-MCNC: 43 MG/DL
IGG4 SER-MCNC: 12 MG/DL

## 2017-12-23 LAB
ANTI SM ANTIBODY: 0.08 EU
ANTI SM/RNP ANTIBODY: 0 EU
ANTI-SM INTERPRETATION: NEGATIVE
ANTI-SM/RNP INTERPRETATION: NEGATIVE
ANTI-SSB ANTIBODY: 0 EU
ANTI-SSB INTERPRETATION: NEGATIVE
HISTONE IGG SER IA-ACNC: 0.1 UNITS (ref 0–0.9)

## 2017-12-26 LAB
ANTI-SSA ANTIBODY: 0.27 EU
ANTI-SSA INTERPRETATION: NEGATIVE

## 2017-12-27 ENCOUNTER — PATIENT MESSAGE (OUTPATIENT)
Dept: RHEUMATOLOGY | Facility: CLINIC | Age: 52
End: 2017-12-27

## 2017-12-27 LAB
HLA B27 INTERPRETATION: NORMAL
HLA-B27 RELATED AG QL: NEGATIVE
HLA-B27 RELATED AG QL: NORMAL

## 2018-01-10 DIAGNOSIS — M19.90 INFLAMMATORY ARTHRITIS: ICD-10-CM

## 2018-01-14 RX ORDER — MELOXICAM 15 MG/1
15 TABLET ORAL DAILY
Qty: 30 TABLET | Refills: 7 | Status: SHIPPED | OUTPATIENT
Start: 2018-01-14 | End: 2018-03-16 | Stop reason: SDUPTHER

## 2018-01-25 DIAGNOSIS — M35.3 PMR (POLYMYALGIA RHEUMATICA): ICD-10-CM

## 2018-01-25 DIAGNOSIS — M19.90 INFLAMMATORY ARTHRITIS: ICD-10-CM

## 2018-01-25 DIAGNOSIS — M25.511 PAIN IN JOINT OF RIGHT SHOULDER: ICD-10-CM

## 2018-01-25 DIAGNOSIS — M15.9 PRIMARY OSTEOARTHRITIS INVOLVING MULTIPLE JOINTS: ICD-10-CM

## 2018-01-28 ENCOUNTER — PATIENT MESSAGE (OUTPATIENT)
Dept: RHEUMATOLOGY | Facility: CLINIC | Age: 53
End: 2018-01-28

## 2018-01-29 ENCOUNTER — PATIENT MESSAGE (OUTPATIENT)
Dept: RHEUMATOLOGY | Facility: CLINIC | Age: 53
End: 2018-01-29

## 2018-01-30 ENCOUNTER — PATIENT MESSAGE (OUTPATIENT)
Dept: RHEUMATOLOGY | Facility: CLINIC | Age: 53
End: 2018-01-30

## 2018-01-30 RX ORDER — TRAMADOL HYDROCHLORIDE 50 MG/1
50 TABLET ORAL EVERY 8 HOURS PRN
Qty: 90 TABLET | Refills: 3 | Status: SHIPPED | OUTPATIENT
Start: 2018-01-30 | End: 2018-02-21 | Stop reason: SDUPTHER

## 2018-02-21 DIAGNOSIS — M35.3 PMR (POLYMYALGIA RHEUMATICA): ICD-10-CM

## 2018-02-21 DIAGNOSIS — M15.9 PRIMARY OSTEOARTHRITIS INVOLVING MULTIPLE JOINTS: ICD-10-CM

## 2018-02-21 DIAGNOSIS — M19.90 INFLAMMATORY ARTHRITIS: ICD-10-CM

## 2018-02-21 DIAGNOSIS — M25.511 PAIN IN JOINT OF RIGHT SHOULDER: ICD-10-CM

## 2018-02-22 RX ORDER — TRAMADOL HYDROCHLORIDE 50 MG/1
50 TABLET ORAL EVERY 8 HOURS PRN
Qty: 90 TABLET | Refills: 3 | Status: SHIPPED | OUTPATIENT
Start: 2018-02-28 | End: 2018-03-26 | Stop reason: SDUPTHER

## 2018-03-08 ENCOUNTER — PATIENT MESSAGE (OUTPATIENT)
Dept: RHEUMATOLOGY | Facility: CLINIC | Age: 53
End: 2018-03-08

## 2018-03-15 ENCOUNTER — PATIENT MESSAGE (OUTPATIENT)
Dept: RHEUMATOLOGY | Facility: CLINIC | Age: 53
End: 2018-03-15

## 2018-03-16 DIAGNOSIS — M19.90 INFLAMMATORY ARTHRITIS: ICD-10-CM

## 2018-03-19 RX ORDER — HYDROXYCHLOROQUINE SULFATE 200 MG/1
200 TABLET, FILM COATED ORAL 2 TIMES DAILY
Qty: 60 TABLET | Refills: 7 | Status: SHIPPED | OUTPATIENT
Start: 2018-03-19 | End: 2019-04-05 | Stop reason: SDUPTHER

## 2018-03-19 RX ORDER — MELOXICAM 15 MG/1
15 TABLET ORAL DAILY
Qty: 30 TABLET | Refills: 7 | Status: SHIPPED | OUTPATIENT
Start: 2018-03-19 | End: 2019-04-09 | Stop reason: SDUPTHER

## 2018-03-26 DIAGNOSIS — M35.3 PMR (POLYMYALGIA RHEUMATICA): ICD-10-CM

## 2018-03-26 DIAGNOSIS — M19.90 INFLAMMATORY ARTHRITIS: ICD-10-CM

## 2018-03-26 DIAGNOSIS — M25.511 PAIN IN JOINT OF RIGHT SHOULDER: ICD-10-CM

## 2018-03-26 DIAGNOSIS — M15.9 PRIMARY OSTEOARTHRITIS INVOLVING MULTIPLE JOINTS: ICD-10-CM

## 2018-03-26 RX ORDER — TRAMADOL HYDROCHLORIDE 50 MG/1
50 TABLET ORAL EVERY 8 HOURS PRN
Qty: 90 TABLET | Refills: 4 | Status: SHIPPED | OUTPATIENT
Start: 2018-03-26 | End: 2018-04-23 | Stop reason: SDUPTHER

## 2018-04-23 DIAGNOSIS — M25.511 PAIN IN JOINT OF RIGHT SHOULDER: ICD-10-CM

## 2018-04-23 DIAGNOSIS — M35.3 PMR (POLYMYALGIA RHEUMATICA): ICD-10-CM

## 2018-04-23 DIAGNOSIS — M19.90 INFLAMMATORY ARTHRITIS: ICD-10-CM

## 2018-04-23 DIAGNOSIS — M15.9 PRIMARY OSTEOARTHRITIS INVOLVING MULTIPLE JOINTS: ICD-10-CM

## 2018-04-24 RX ORDER — TRAMADOL HYDROCHLORIDE 50 MG/1
50 TABLET ORAL EVERY 8 HOURS PRN
Qty: 90 TABLET | Refills: 4 | Status: SHIPPED | OUTPATIENT
Start: 2018-04-24 | End: 2018-05-24

## 2018-05-28 ENCOUNTER — OFFICE VISIT (OUTPATIENT)
Dept: RHEUMATOLOGY | Facility: CLINIC | Age: 53
End: 2018-05-28
Payer: COMMERCIAL

## 2018-05-28 ENCOUNTER — LAB VISIT (OUTPATIENT)
Dept: LAB | Facility: HOSPITAL | Age: 53
End: 2018-05-28
Attending: INTERNAL MEDICINE
Payer: COMMERCIAL

## 2018-05-28 VITALS
BODY MASS INDEX: 25.43 KG/M2 | HEART RATE: 61 BPM | HEIGHT: 64 IN | DIASTOLIC BLOOD PRESSURE: 82 MMHG | WEIGHT: 148.94 LBS | SYSTOLIC BLOOD PRESSURE: 128 MMHG

## 2018-05-28 DIAGNOSIS — D83.9 CVID (COMMON VARIABLE IMMUNODEFICIENCY): ICD-10-CM

## 2018-05-28 DIAGNOSIS — Z79.899 LONG-TERM USE OF PLAQUENIL: ICD-10-CM

## 2018-05-28 DIAGNOSIS — M15.9 PRIMARY OSTEOARTHRITIS INVOLVING MULTIPLE JOINTS: ICD-10-CM

## 2018-05-28 DIAGNOSIS — M19.90 INFLAMMATORY ARTHRITIS: Primary | ICD-10-CM

## 2018-05-28 DIAGNOSIS — M19.90 INFLAMMATORY ARTHRITIS: ICD-10-CM

## 2018-05-28 PROCEDURE — 99999 PR PBB SHADOW E&M-EST. PATIENT-LVL III: CPT | Mod: PBBFAC,,, | Performed by: INTERNAL MEDICINE

## 2018-05-28 PROCEDURE — 86360 T CELL ABSOLUTE COUNT/RATIO: CPT

## 2018-05-28 PROCEDURE — 86359 T CELLS TOTAL COUNT: CPT

## 2018-05-28 PROCEDURE — 99214 OFFICE O/P EST MOD 30 MIN: CPT | Mod: S$GLB,,, | Performed by: INTERNAL MEDICINE

## 2018-05-28 PROCEDURE — 86355 B CELLS TOTAL COUNT: CPT

## 2018-05-28 PROCEDURE — 86357 NK CELLS TOTAL COUNT: CPT

## 2018-05-28 PROCEDURE — 36415 COLL VENOUS BLD VENIPUNCTURE: CPT | Mod: PO

## 2018-05-28 PROCEDURE — 86774 TETANUS ANTIBODY: CPT

## 2018-05-28 RX ORDER — TRAMADOL HYDROCHLORIDE 50 MG/1
50 TABLET ORAL 3 TIMES DAILY
Qty: 30 TABLET | Refills: 0 | Status: CANCELLED | OUTPATIENT
Start: 2018-05-28 | End: 2018-06-07

## 2018-05-28 ASSESSMENT — ROUTINE ASSESSMENT OF PATIENT INDEX DATA (RAPID3)
AM STIFFNESS SCORE: 1, YES
PAIN SCORE: 4
PATIENT GLOBAL ASSESSMENT SCORE: 2
TOTAL RAPID3 SCORE: 2.44
PSYCHOLOGICAL DISTRESS SCORE: 3.3
MDHAQ FUNCTION SCORE: .4

## 2018-05-28 NOTE — PROGRESS NOTES
Subjective:       Patient ID: Lashell Valladares is a 53 y.o. female.    Chief Complaint: Follow-up    Hpi: pt was dx Ra and tx with plaquenil, mobic,  And then PMR tx with started with prednisone 20 mg and then weaned.  Patient complains of arthralgias and myalgias for which has been present for a few years. Pain is located in multiple joints, both shoulder(s), both elbow(s), both wrist(s), both MCP(s): 1st, 2nd, 3rd, 4th and 5th, both PIP(s): 1st, 2nd, 3rd, 4th and 5th, both DIP(s): 1st and 2nd, both hip(s), both knee(s) and both MTP(s): 1st, 2nd, 3rd, 4th and 5th, is described as aching, pulsating, shooting and throbbing, and is constant, moderate .  Associated symptoms include: crepitation, decreased range of motion, edema, effusion, tenderness and warmth.   Mouth ulcers, hair loss,               She complains of pain, stiffness and joint swelling. Affected locations include the right wrist, right MCP, left PIP, left ankle, left foot, left toes, left MCP, left wrist, left elbow, left shoulder, right knee, right ankle, right foot, right toes, right hip, right DIP, right PIP, right elbow, right shoulder and neck. Associated symptoms include fatigue, pain at night, pain while resting, myalgias and dry eyes. Pertinent negatives include no dysuria, fever, trouble swallowing or headaches. She complains of morning stiffness lasting less than 30 minutes after awakening.        Review of Systems   Constitutional: Positive for activity change and fatigue. Negative for appetite change, chills, diaphoresis, fever and unexpected weight change.   HENT: Negative for congestion, dental problem, ear discharge, ear pain, facial swelling, mouth sores, nosebleeds, postnasal drip, rhinorrhea, sinus pressure, sneezing, sore throat, tinnitus, trouble swallowing and voice change.    Eyes: Negative for photophobia, pain, discharge, redness and itching.   Respiratory: Negative for apnea, cough, chest tightness, shortness of breath and  "wheezing.    Cardiovascular: Positive for leg swelling. Negative for chest pain and palpitations.   Gastrointestinal: Negative for abdominal distention, abdominal pain, constipation, diarrhea, nausea and vomiting.   Endocrine: Negative for cold intolerance, heat intolerance, polydipsia and polyuria.   Genitourinary: Negative for decreased urine volume, difficulty urinating, dysuria, flank pain, frequency, hematuria and urgency.   Musculoskeletal: Positive for arthralgias, gait problem, joint swelling, myalgias, neck pain, neck stiffness and stiffness. Negative for back pain.   Skin: Negative for pallor, rash and wound.   Allergic/Immunologic: Negative for immunocompromised state.   Neurological: Negative for dizziness, tremors, weakness, numbness and headaches.   Hematological: Negative for adenopathy. Does not bruise/bleed easily.   Psychiatric/Behavioral: Negative for sleep disturbance. The patient is not nervous/anxious.          Objective:   /82 (BP Location: Left arm, Patient Position: Sitting, BP Method: Medium (Automatic))   Pulse 61   Ht 5' 4" (1.626 m)   Wt 67.5 kg (148 lb 14.7 oz)   BMI 25.56 kg/m²      Physical Exam   Vitals reviewed.  Constitutional: She is oriented to person, place, and time. No distress.   HENT:   Head: Normocephalic and atraumatic.   Eyes: EOM are normal. Pupils are equal, round, and reactive to light. Right eye exhibits no discharge. Left eye exhibits no discharge.   Neck: Neck supple. No thyromegaly present.   Cardiovascular: Normal rate, regular rhythm and normal heart sounds.  Exam reveals no gallop and no friction rub.    No murmur heard.  Pulmonary/Chest: Breath sounds normal. She has no wheezes. She has no rales. She exhibits no tenderness.   Abdominal: There is no tenderness. There is no rebound and no guarding.       Right Side Rheumatological Exam     Examination finds the elbow normal.    The patient is tender to palpation of the shoulder, wrist, knee, 1st PIP, " 1st MCP, 2nd PIP, 2nd MCP, 3rd PIP, 3rd MCP, 4th PIP, 4th MCP, 5th PIP and 5th MCP    She has swelling of the 1st PIP, 1st MCP, 2nd PIP, 2nd MCP, 3rd PIP, 3rd MCP, 4th PIP, 4th MCP, 5th PIP and 5th MCP    Shoulder Exam   Tenderness Location: no tenderness    Range of Motion   Active Abduction: abnormal   Adduction: abnormal  Sensation: normal    Knee Exam   Patellofemoral Crepitus: positive  Effusion: positive  Sensation: normal    Hip Exam   Tenderness Location: posterior  Sensation: normal    Elbow/Wrist Exam   Tenderness Location: no tenderness  Sensation: normal    Left Side Rheumatological Exam     The patient is tender to palpation of the shoulder, elbow, wrist, knee, 1st PIP, 1st MCP, 2nd PIP, 2nd MCP, 3rd PIP, 3rd MCP, 4th PIP, 4th MCP, 5th PIP and 5th MCP.    She has swelling of the 1st PIP, 1st MCP, 2nd PIP, 2nd MCP, 3rd PIP, 3rd MCP, 4th PIP, 4th MCP, 5th PIP and 5th MCP    Shoulder Exam   Tenderness Location: no tenderness    Range of Motion   Active Abduction: abnormal   Sensation: normal    Knee Exam     Patellofemoral Crepitus: positive  Effusion: positive  Sensation: normal    Hip Exam   Tenderness Location: posterior  Sensation: normal    Elbow/Wrist Exam   Sensation: normal      Back/Neck Exam   General Inspection   Gait: normal         Lymphadenopathy:     She has no cervical adenopathy.   Neurological: She is alert and oriented to person, place, and time. Gait normal.   Skin: Skin is dry. No rash noted. No erythema. There is pallor.     Psychiatric: Mood and affect normal.   Musculoskeletal: She exhibits tenderness.           Results for orders placed or performed in visit on 12/20/17   NOÉ   Result Value Ref Range    NOÉ Screen Negative <1:160 Negative <1:160   Anti Sm/RNP Antibody   Result Value Ref Range    Anti Sm/RNP Antibody 0.00 0.00 - 19.99 EU    Anti-Sm/RNP Interpretation Negative Negative   Anti-DNA antibody, double-stranded   Result Value Ref Range    ds DNA Ab Negative 1:10 Negative  1:10   Anti-Histone Antibody   Result Value Ref Range    Anti-Histone Antibody 0.1 0.0 - 0.9 Units   Anti-scleroderma antibody   Result Value Ref Range    Scleroderma SCL- 8 <20 UNITS   Anti-Smith antibody   Result Value Ref Range    Anti Sm Antibody 0.08 0.00 - 19.99 EU    Anti-Sm Interpretation Negative Negative   Anti-thyroglobulin antibody   Result Value Ref Range    Thyroglobulin Ab Screen <4.0 0.0 - 3.9 IU/mL   TSH   Result Value Ref Range    TSH 3.335 0.400 - 4.000 uIU/mL   Thyroid peroxidase antibody   Result Value Ref Range    Thyroperoxidase Antibodies <6.0 <6.0 IU/mL   T4, free   Result Value Ref Range    Free T4 0.89 0.71 - 1.51 ng/dL   T3, free   Result Value Ref Range    T3, Free 2.0 (L) 2.3 - 4.2 pg/mL   Sedimentation rate, manual   Result Value Ref Range    Sed Rate 8 0 - 20 mm/Hr   Rheumatoid factor   Result Value Ref Range    Rheumatoid Factor <10.0 0.0 - 15.0 IU/mL   Sjogrens syndrome-A extractable nuclear antibody   Result Value Ref Range    Anti-SSA Antibody 0.27 0.00 - 19.99 EU    Anti-SSA Interpretation Negative Negative   Sjogrens syndrome-B extractable nuclear antibody   Result Value Ref Range    Anti-SSB Antibody 0.00 0.00 - 19.99 EU    Anti-SSB Interpretation Negative Negative   IgE   Result Value Ref Range    IgE <35 0 - 100 IU/mL   IgG   Result Value Ref Range    IgG - Serum 525 (L) 650 - 1600 mg/dL   IgG 1, 2, 3, and 4   Result Value Ref Range    IgG 1 310 (L) 382 - 929 mg/dL    IgG 2 123 (L) 242 - 700 mg/dL    IgG 3 43 22 - 176 mg/dL    IgG 4 12 4 - 86 mg/dL   IgM   Result Value Ref Range    IgM 36 (L) 50 - 300 mg/dL   Cyclic citrul peptide antibody, IgG   Result Value Ref Range    CCP Antibodies <0.5 <5.0 U/mL   Vitamin D   Result Value Ref Range    Vit D, 25-Hydroxy 41 30 - 96 ng/mL   HLA B27 Antigen   Result Value Ref Range    HLA B27 Interpretation [id number]     B27 Testing Date 12/27/2017 12:00 AM     HLA B27 Result Negative        Assessment:       1. Inflammatory arthritis     2. Primary osteoarthritis involving multiple joints    3. Long-term use of Plaquenil    4. CVID (common variable immunodeficiency)            Plan:       Lashell was seen today for follow-up.    Diagnoses and all orders for this visit:    Inflammatory arthritis  -     Ambulatory consult to Allergy  -     Humoral Immune Eval (Pneumo 14) with H. flu; Future  -     Lymphocyte Profile II; Future    Primary osteoarthritis involving multiple joints  -     Ambulatory consult to Allergy  -     Humoral Immune Eval (Pneumo 14) with H. flu; Future  -     Lymphocyte Profile II; Future    Long-term use of Plaquenil  -     Ambulatory consult to Allergy  -     Humoral Immune Eval (Pneumo 14) with H. flu; Future  -     Lymphocyte Profile II; Future    CVID (common variable immunodeficiency)  -     Ambulatory consult to Allergy  -     Humoral Immune Eval (Pneumo 14) with H. flu; Future  -     Lymphocyte Profile II; Future    Other orders  -     traMADol (ULTRAM) 50 mg tablet; Take 1 tablet (50 mg total) by mouth every 6 (six) hours as needed for Pain.    We will take over Plaquenil, Mobic and tramadol

## 2018-05-29 LAB
CD3+CD4+ CELLS # BLD: 1016 CELLS/UL (ref 300–1400)
CD3+CD4+ CELLS NFR BLD: 52.8 % (ref 28–57)
LYMPHOCYTES NFR CSF MANUAL: 12.8 % (ref 6–19)
LYMPHOCYTES NFR CSF MANUAL: 1369 CELLS/UL (ref 700–2100)
LYMPHOCYTES NFR CSF MANUAL: 15.5 % (ref 7–31)
LYMPHOCYTES NFR CSF MANUAL: 18.2 % (ref 10–39)
LYMPHOCYTES NFR CSF MANUAL: 2.9 % (ref 0.9–3.6)
LYMPHOCYTES NFR CSF MANUAL: 235 CELLS/UL (ref 100–500)
LYMPHOCYTES NFR CSF MANUAL: 285 CELLS/UL (ref 90–600)
LYMPHOCYTES NFR CSF MANUAL: 351 CELLS/UL (ref 200–900)
LYMPHOCYTES NFR CSF MANUAL: 71.1 % (ref 55–83)

## 2018-05-29 RX ORDER — TRAMADOL HYDROCHLORIDE 50 MG/1
50 TABLET ORAL EVERY 6 HOURS PRN
Qty: 120 TABLET | Refills: 4 | Status: SHIPPED | OUTPATIENT
Start: 2018-05-29 | End: 2018-06-06 | Stop reason: SDUPTHER

## 2018-06-01 ENCOUNTER — PATIENT MESSAGE (OUTPATIENT)
Dept: RHEUMATOLOGY | Facility: CLINIC | Age: 53
End: 2018-06-01

## 2018-06-01 ENCOUNTER — DOCUMENTATION ONLY (OUTPATIENT)
Dept: RHEUMATOLOGY | Facility: CLINIC | Age: 53
End: 2018-06-01

## 2018-06-01 LAB
C DIPHTHERIAE AB SER IA-ACNC: 0.27 IU/ML
C TETANI AB SER-ACNC: 1.17 IU/ML
DEPRECATED S PNEUM 1 IGG SER-MCNC: <0.3 MCG/ML
DEPRECATED S PNEUM12 IGG SER-MCNC: <0.3 MCG/ML
DEPRECATED S PNEUM14 IGG SER-MCNC: <0.3 MCG/ML
DEPRECATED S PNEUM19 IGG SER-MCNC: <0.3 MCG/ML
DEPRECATED S PNEUM23 IGG SER-MCNC: <0.3 MCG/ML
DEPRECATED S PNEUM3 IGG SER-MCNC: 0.3 MCG/ML
DEPRECATED S PNEUM4 IGG SER-MCNC: <0.3 MCG/ML
DEPRECATED S PNEUM5 IGG SER-MCNC: <0.3 MCG/ML
DEPRECATED S PNEUM8 IGG SER-MCNC: <0.3 MCG/ML
DEPRECATED S PNEUM9 IGG SER-MCNC: 1.1 MCG/ML
HAEM INFLU B IGG SER-MCNC: 0.12 MG/L
S PNEUM DA 18C IGG SER-MCNC: <0.3 MCG/ML
S PNEUM DA 6B IGG SER-MCNC: 0.7 MCG/ML
S PNEUM DA 7F IGG SER-MCNC: 0.3 MCG/ML
S PNEUM DA 9V IGG SER-MCNC: 0.5 MCG/ML

## 2018-06-04 ENCOUNTER — PATIENT MESSAGE (OUTPATIENT)
Dept: RHEUMATOLOGY | Facility: CLINIC | Age: 53
End: 2018-06-04

## 2018-06-05 ENCOUNTER — PATIENT MESSAGE (OUTPATIENT)
Dept: RHEUMATOLOGY | Facility: CLINIC | Age: 53
End: 2018-06-05

## 2018-06-06 ENCOUNTER — PATIENT MESSAGE (OUTPATIENT)
Dept: RHEUMATOLOGY | Facility: CLINIC | Age: 53
End: 2018-06-06

## 2018-06-11 ENCOUNTER — PATIENT MESSAGE (OUTPATIENT)
Dept: RHEUMATOLOGY | Facility: CLINIC | Age: 53
End: 2018-06-11

## 2018-06-12 RX ORDER — TRAMADOL HYDROCHLORIDE 50 MG/1
50 TABLET ORAL EVERY 6 HOURS PRN
Qty: 120 TABLET | Refills: 4 | Status: SHIPPED | OUTPATIENT
Start: 2018-06-12 | End: 2018-07-12

## 2018-06-12 RX ORDER — GABAPENTIN 100 MG/1
100 CAPSULE ORAL NIGHTLY
Qty: 30 CAPSULE | Refills: 2 | Status: SHIPPED | OUTPATIENT
Start: 2018-06-12 | End: 2019-04-03

## 2018-10-24 ENCOUNTER — OFFICE VISIT (OUTPATIENT)
Dept: RHEUMATOLOGY | Facility: CLINIC | Age: 53
End: 2018-10-24
Payer: COMMERCIAL

## 2018-10-24 VITALS
WEIGHT: 148.94 LBS | BODY MASS INDEX: 25.56 KG/M2 | HEART RATE: 66 BPM | DIASTOLIC BLOOD PRESSURE: 74 MMHG | SYSTOLIC BLOOD PRESSURE: 116 MMHG

## 2018-10-24 DIAGNOSIS — M19.90 INFLAMMATORY ARTHRITIS: ICD-10-CM

## 2018-10-24 DIAGNOSIS — M15.9 PRIMARY OSTEOARTHRITIS INVOLVING MULTIPLE JOINTS: Primary | ICD-10-CM

## 2018-10-24 DIAGNOSIS — D83.9 CVID (COMMON VARIABLE IMMUNODEFICIENCY): ICD-10-CM

## 2018-10-24 PROCEDURE — 99214 OFFICE O/P EST MOD 30 MIN: CPT | Mod: S$GLB,,, | Performed by: INTERNAL MEDICINE

## 2018-10-24 PROCEDURE — 99999 PR PBB SHADOW E&M-EST. PATIENT-LVL III: CPT | Mod: PBBFAC,,, | Performed by: INTERNAL MEDICINE

## 2018-10-24 RX ORDER — TRAMADOL HYDROCHLORIDE 50 MG/1
50 TABLET ORAL 3 TIMES DAILY
Qty: 90 TABLET | Refills: 3 | Status: SHIPPED | OUTPATIENT
Start: 2018-10-24 | End: 2018-11-23

## 2018-10-24 NOTE — PROGRESS NOTES
Subjective:       Patient ID: Lashell Valladares is a 53 y.o. female.    Chief Complaint: Follow-up    Hpi: pt was dx Ra and tx with plaquenil, mobic,  Prn prednisone Patient complains of arthralgias and myalgias for which has been present for a few years. Pain is located in multiple joints, both shoulder(s), both elbow(s), both wrist(s), both MCP(s): 1st, 2nd, 3rd, 4th and 5th, both PIP(s): 1st, 2nd, 3rd, 4th and 5th, both DIP(s): 1st and 2nd, both hip(s), both knee(s) and both MTP(s): 1st, 2nd, 3rd, 4th and 5th, is described as aching, pulsating, shooting and throbbing, and is constant, moderate .  Associated symptoms include: crepitation, decreased range of motion, edema, effusion, tenderness and warmth.   Mouth ulcers, hair loss,       Review of Systems   Constitutional: Positive for activity change. Negative for appetite change, chills, diaphoresis and unexpected weight change.   HENT: Negative for congestion, dental problem, ear discharge, ear pain, facial swelling, mouth sores, nosebleeds, postnasal drip, rhinorrhea, sinus pressure, sneezing, sore throat, tinnitus and voice change.    Eyes: Negative for photophobia, pain, discharge, redness and itching.   Respiratory: Negative for apnea, cough, chest tightness, shortness of breath and wheezing.    Cardiovascular: Positive for leg swelling. Negative for chest pain and palpitations.   Gastrointestinal: Negative for abdominal distention, abdominal pain, constipation, diarrhea, nausea and vomiting.   Endocrine: Negative for cold intolerance, heat intolerance, polydipsia and polyuria.   Genitourinary: Negative for decreased urine volume, difficulty urinating, flank pain, frequency, hematuria and urgency.   Musculoskeletal: Positive for arthralgias, gait problem, neck pain and neck stiffness. Negative for back pain.   Skin: Negative for pallor, rash and wound.   Allergic/Immunologic: Negative for immunocompromised state.   Neurological: Negative for dizziness,  tremors, weakness and numbness.   Hematological: Negative for adenopathy. Does not bruise/bleed easily.   Psychiatric/Behavioral: Negative for sleep disturbance. The patient is not nervous/anxious.          Objective:   /74 (BP Location: Right arm, Patient Position: Sitting, BP Method: Medium (Automatic))   Pulse 66   Wt 67.5 kg (148 lb 14.7 oz)   BMI 25.56 kg/m²      Physical Exam   Vitals reviewed.  Constitutional: She is oriented to person, place, and time. No distress.   HENT:   Head: Normocephalic and atraumatic.   Eyes: EOM are normal. Pupils are equal, round, and reactive to light. Right eye exhibits no discharge. Left eye exhibits no discharge.   Neck: Neck supple. No thyromegaly present.   Cardiovascular: Normal rate, regular rhythm and normal heart sounds.  Exam reveals no gallop and no friction rub.    No murmur heard.  Pulmonary/Chest: Breath sounds normal. She has no wheezes. She has no rales. She exhibits no tenderness.   Abdominal: There is no tenderness. There is no rebound and no guarding.       Right Side Rheumatological Exam     Examination finds the elbow normal.    The patient is tender to palpation of the shoulder, wrist, knee, 1st PIP, 1st MCP, 2nd PIP, 2nd MCP, 3rd PIP, 3rd MCP, 4th PIP, 4th MCP, 5th PIP and 5th MCP    She has swelling of the 1st PIP, 1st MCP, 2nd PIP, 2nd MCP, 3rd PIP, 3rd MCP, 4th PIP, 4th MCP, 5th PIP and 5th MCP    Shoulder Exam   Tenderness Location: no tenderness    Range of Motion   Active abduction: abnormal   Adduction: abnormal  Sensation: normal    Knee Exam   Patellofemoral Crepitus: positive  Effusion: positive  Sensation: normal    Hip Exam   Tenderness Location: posterior  Sensation: normal    Elbow/Wrist Exam   Tenderness Location: no tenderness  Sensation: normal    Left Side Rheumatological Exam     The patient is tender to palpation of the shoulder, elbow, wrist, knee, 1st PIP, 1st MCP, 2nd PIP, 2nd MCP, 3rd PIP, 3rd MCP, 4th PIP, 4th MCP, 5th PIP  and 5th MCP.    She has swelling of the 1st PIP, 1st MCP, 2nd PIP, 2nd MCP, 3rd PIP, 3rd MCP, 4th PIP, 4th MCP, 5th PIP and 5th MCP    Shoulder Exam   Tenderness Location: no tenderness    Range of Motion   Active abduction: abnormal   Sensation: normal    Knee Exam     Patellofemoral Crepitus: positive  Effusion: positive  Sensation: normal    Hip Exam   Tenderness Location: posterior  Sensation: normal    Elbow/Wrist Exam   Sensation: normal      Back/Neck Exam   General Inspection   Gait: normal         Lymphadenopathy:     She has no cervical adenopathy.   Neurological: She is alert and oriented to person, place, and time. Gait normal.   Skin: Skin is dry. No rash noted. No erythema. There is pallor.     Psychiatric: Mood and affect normal.   Musculoskeletal: She exhibits tenderness.           Results for orders placed or performed in visit on 05/28/18   Humoral Immune Eval (Pneumo 14) with H. flu   Result Value Ref Range    H influenza B Ab 0.12 (A) >0.15 mg/L    Diphtheria Toxoid Ab 0.265 >0.099 IU/mL    Tetanus Ab 1.174 >0.150 IU/mL    S.pneumoniae Type 1 <0.3 mcg/mL    S.pneumoniae Type 3 0.3 mcg/mL    Strep pneumo Type 4 <0.3 mcg/mL    S.pneumoniae Type 5 <0.3 mcg/mL    S.pneumoniae Type 8 <0.3 mcg/mL    S.pneumoniae Type 9N 1.1 mcg/mL    S.pneumoniae Type 12F <0.3 mcg/mL    Strep pneumo Type 14 <0.3 mcg/mL    S.pneumoniae Type 19F <0.3 mcg/mL    S.pneumoniae Type 23F <0.3 mcg/mL    S.pneumoniae Type 6B 0.7 mcg/mL    S.pneumoniae Type 7F 0.3 mcg/mL    S.pneumoniae Type 18C <0.3 mcg/mL    S.pneumoniae Type 9V Abs 0.5 mcg/mL   Lymphocyte Profile II   Result Value Ref Range    CD3 % Total T Cell 71.1 55 - 83 %    Absolute CD3 1369 700 - 2100 cells/ul    CD8 % Suppressor T Cell 18.2 10 - 39 %    Absolute CD8 351 200 - 900 cells/ul    CD4 % [place] T Cell 52.8 28 - 57 %    Absolute CD4 1016 300 - 1400 cells/ul    CD4/CD8 Ratio 2.90 0.9 - 3.6    CD56 + CD16 [place] 15.5 7 - 31 %    Absolute CD56 + CD16 285  90 - 600 cells/ul    CD19 B Cells 12.8 6 - 19 %    Absolute CD19 235 100 - 500 cells/ul       Assessment:       1. Primary osteoarthritis involving multiple joints    2. Inflammatory arthritis    3. CVID (common variable immunodeficiency)            Plan:       Lashell was seen today for follow-up.    Diagnoses and all orders for this visit:    Primary osteoarthritis involving multiple joints  -     traMADol (ULTRAM) 50 mg tablet; Take 1 tablet (50 mg total) by mouth 3 (three) times daily.  -     CBC auto differential; Future  -     Comprehensive metabolic panel; Future  -     C-reactive protein; Future  -     Sedimentation rate; Future    Inflammatory arthritis  -     traMADol (ULTRAM) 50 mg tablet; Take 1 tablet (50 mg total) by mouth 3 (three) times daily.  -     CBC auto differential; Future  -     Comprehensive metabolic panel; Future  -     C-reactive protein; Future  -     Sedimentation rate; Future    CVID (common variable immunodeficiency)  -     traMADol (ULTRAM) 50 mg tablet; Take 1 tablet (50 mg total) by mouth 3 (three) times daily.  -     CBC auto differential; Future  -     Comprehensive metabolic panel; Future  -     C-reactive protein; Future  -     Sedimentation rate; Future    We will take over Plaquenil, Mobic and tramadol

## 2019-03-06 ENCOUNTER — PATIENT MESSAGE (OUTPATIENT)
Dept: RHEUMATOLOGY | Facility: CLINIC | Age: 54
End: 2019-03-06

## 2019-04-01 PROBLEM — M76.12 PSOAS TENDINITIS OF BOTH SIDES: Status: ACTIVE | Noted: 2019-04-01

## 2019-04-01 PROBLEM — M76.11 PSOAS TENDINITIS OF BOTH SIDES: Status: ACTIVE | Noted: 2019-04-01

## 2019-04-01 PROBLEM — M54.42 CHRONIC BILATERAL LOW BACK PAIN WITH BILATERAL SCIATICA: Status: ACTIVE | Noted: 2019-04-01

## 2019-04-01 PROBLEM — G89.29 CHRONIC BILATERAL LOW BACK PAIN WITH BILATERAL SCIATICA: Status: ACTIVE | Noted: 2019-04-01

## 2019-04-01 PROBLEM — M54.41 CHRONIC BILATERAL LOW BACK PAIN WITH BILATERAL SCIATICA: Status: ACTIVE | Noted: 2019-04-01

## 2019-04-05 ENCOUNTER — HOSPITAL ENCOUNTER (OUTPATIENT)
Dept: RADIOLOGY | Facility: HOSPITAL | Age: 54
Discharge: HOME | End: 2019-04-05
Attending: CLINICAL NURSE SPECIALIST
Payer: COMMERCIAL

## 2019-04-05 DIAGNOSIS — M54.16 LUMBAR RADICULOPATHY: ICD-10-CM

## 2019-04-05 DIAGNOSIS — M43.16 SPONDYLOLISTHESIS OF LUMBAR REGION: ICD-10-CM

## 2019-04-05 DIAGNOSIS — M19.90 INFLAMMATORY ARTHRITIS: ICD-10-CM

## 2019-04-05 PROCEDURE — 72148 MRI LUMBAR SPINE W/O DYE: CPT | Mod: 26,,, | Performed by: RADIOLOGY

## 2019-04-05 PROCEDURE — 72148 MRI LUMBAR SPINE WITHOUT CONTRAST: ICD-10-PCS | Mod: 26,,, | Performed by: RADIOLOGY

## 2019-04-05 PROCEDURE — 72148 MRI LUMBAR SPINE W/O DYE: CPT | Mod: TC

## 2019-04-05 RX ORDER — HYDROXYCHLOROQUINE SULFATE 200 MG/1
TABLET, FILM COATED ORAL
Qty: 60 TABLET | Refills: 5 | Status: SHIPPED | OUTPATIENT
Start: 2019-04-05 | End: 2019-04-26 | Stop reason: SDUPTHER

## 2019-04-09 DIAGNOSIS — M19.90 INFLAMMATORY ARTHRITIS: ICD-10-CM

## 2019-04-11 ENCOUNTER — OFFICE VISIT (OUTPATIENT)
Dept: RHEUMATOLOGY | Facility: CLINIC | Age: 54
End: 2019-04-11
Payer: COMMERCIAL

## 2019-04-11 VITALS
HEIGHT: 64 IN | WEIGHT: 152 LBS | DIASTOLIC BLOOD PRESSURE: 74 MMHG | SYSTOLIC BLOOD PRESSURE: 117 MMHG | HEART RATE: 66 BPM | BODY MASS INDEX: 25.95 KG/M2

## 2019-04-11 DIAGNOSIS — M25.511 PAIN IN JOINT OF RIGHT SHOULDER: ICD-10-CM

## 2019-04-11 DIAGNOSIS — M54.16 SPINAL STENOSIS OF LUMBAR REGION WITH RADICULOPATHY: ICD-10-CM

## 2019-04-11 DIAGNOSIS — M48.061 SPINAL STENOSIS OF LUMBAR REGION WITH RADICULOPATHY: ICD-10-CM

## 2019-04-11 DIAGNOSIS — Z79.899 LONG-TERM USE OF PLAQUENIL: Primary | ICD-10-CM

## 2019-04-11 DIAGNOSIS — M19.90 INFLAMMATORY ARTHRITIS: ICD-10-CM

## 2019-04-11 DIAGNOSIS — M51.26 LUMBAR HERNIATED DISC: ICD-10-CM

## 2019-04-11 DIAGNOSIS — D83.9 CVID (COMMON VARIABLE IMMUNODEFICIENCY): ICD-10-CM

## 2019-04-11 PROCEDURE — 99999 PR PBB SHADOW E&M-EST. PATIENT-LVL III: CPT | Mod: PBBFAC,,, | Performed by: INTERNAL MEDICINE

## 2019-04-11 PROCEDURE — 99215 PR OFFICE/OUTPT VISIT, EST, LEVL V, 40-54 MIN: ICD-10-PCS | Mod: S$GLB,,, | Performed by: INTERNAL MEDICINE

## 2019-04-11 PROCEDURE — 99215 OFFICE O/P EST HI 40 MIN: CPT | Mod: S$GLB,,, | Performed by: INTERNAL MEDICINE

## 2019-04-11 PROCEDURE — 99999 PR PBB SHADOW E&M-EST. PATIENT-LVL III: ICD-10-PCS | Mod: PBBFAC,,, | Performed by: INTERNAL MEDICINE

## 2019-04-11 RX ORDER — TRAMADOL HYDROCHLORIDE 50 MG/1
50 TABLET ORAL EVERY 6 HOURS PRN
Qty: 120 TABLET | Refills: 3 | Status: SHIPPED | OUTPATIENT
Start: 2019-04-11 | End: 2019-05-11

## 2019-04-11 RX ORDER — TRAMADOL HYDROCHLORIDE 50 MG/1
50 TABLET ORAL 3 TIMES DAILY
Refills: 3 | COMMUNITY
Start: 2019-03-14 | End: 2019-04-11 | Stop reason: SDUPTHER

## 2019-04-11 RX ORDER — MELOXICAM 15 MG/1
15 TABLET ORAL DAILY
Qty: 30 TABLET | Refills: 6 | Status: ON HOLD | OUTPATIENT
Start: 2019-04-11 | End: 2019-11-20 | Stop reason: HOSPADM

## 2019-04-11 ASSESSMENT — ROUTINE ASSESSMENT OF PATIENT INDEX DATA (RAPID3)
PSYCHOLOGICAL DISTRESS SCORE: 1.1
MDHAQ FUNCTION SCORE: .3
PATIENT GLOBAL ASSESSMENT SCORE: 6
TOTAL RAPID3 SCORE: 3
PAIN SCORE: 2

## 2019-04-11 NOTE — PROGRESS NOTES
Subjective:       Patient ID: Lashell Valladares is a 53 y.o. female.    Chief Complaint: Arthritis (inflammatory arthritis ) and Osteoarthritis (involving multiple joints)    Hpi: pt was dx Ra and tx with plaquenil, mobic, severe  Spinal stenosis, with L3-L4 herniation and L4-L5 neuropathic pain radiating down legs, no loss of bowel or bladder but +4 reflexes patella and  Neuropathic pain. Prn prednisone Patient complains of arthralgias and myalgias for which has been present for a few years. Pain is located in multiple joints, both shoulder(s), both elbow(s), both wrist(s), both MCP(s): 1st, 2nd, 3rd, 4th and 5th, both PIP(s): 1st, 2nd, 3rd, 4th and 5th, both DIP(s): 1st and 2nd, both hip(s), both knee(s) and both MTP(s): 1st, 2nd, 3rd, 4th and 5th, is described as aching, pulsating, shooting and throbbing, and is constant, moderate .  Associated symptoms include: crepitation, decreased range of motion, edema, effusion, tenderness and warmth.        Arthritis   Associated symptoms include arthralgias and neck pain. Pertinent negatives include no abdominal pain, chest pain, chills, congestion, coughing, diaphoresis, nausea, numbness, rash, sore throat, vomiting or weakness.   Osteoarthritis   Associated symptoms include arthralgias and neck pain. Pertinent negatives include no abdominal pain, chest pain, chills, congestion, coughing, diaphoresis, nausea, numbness, rash, sore throat, vomiting or weakness.     Review of Systems   Constitutional: Positive for activity change. Negative for appetite change, chills, diaphoresis and unexpected weight change.   HENT: Negative for congestion, dental problem, ear discharge, ear pain, facial swelling, mouth sores, nosebleeds, postnasal drip, rhinorrhea, sinus pressure, sneezing, sore throat, tinnitus and voice change.    Eyes: Negative for photophobia, pain, discharge, redness and itching.   Respiratory: Negative for apnea, cough, chest tightness, shortness of breath and  "wheezing.    Cardiovascular: Positive for leg swelling. Negative for chest pain and palpitations.   Gastrointestinal: Negative for abdominal distention, abdominal pain, constipation, diarrhea, nausea and vomiting.   Endocrine: Negative for cold intolerance, heat intolerance, polydipsia and polyuria.   Genitourinary: Negative for decreased urine volume, difficulty urinating, flank pain, frequency, hematuria and urgency.   Musculoskeletal: Positive for arthralgias, arthritis, gait problem, neck pain and neck stiffness. Negative for back pain.   Skin: Negative for pallor, rash and wound.   Allergic/Immunologic: Negative for immunocompromised state.   Neurological: Negative for dizziness, tremors, weakness and numbness.   Hematological: Negative for adenopathy. Does not bruise/bleed easily.   Psychiatric/Behavioral: Negative for sleep disturbance. The patient is not nervous/anxious.          Objective:   /74 (BP Location: Left arm, Patient Position: Sitting, BP Method: Medium (Automatic))   Pulse 66   Ht 5' 4" (1.626 m)   Wt 68.9 kg (152 lb)   LMP 10/02/2015   BMI 26.09 kg/m²      Physical Exam   Vitals reviewed.  Constitutional: She is oriented to person, place, and time. No distress.   HENT:   Head: Normocephalic and atraumatic.   Eyes: EOM are normal. Pupils are equal, round, and reactive to light. Right eye exhibits no discharge. Left eye exhibits no discharge.   Neck: Neck supple. No thyromegaly present.   Cardiovascular: Normal rate, regular rhythm and normal heart sounds.  Exam reveals no gallop and no friction rub.    No murmur heard.  Pulmonary/Chest: Breath sounds normal. She has no wheezes. She has no rales. She exhibits no tenderness.   Abdominal: There is no tenderness. There is no rebound and no guarding.       Right Side Rheumatological Exam     Examination finds the elbow normal.    The patient is tender to palpation of the shoulder, wrist, knee, 1st PIP, 1st MCP, 2nd PIP, 2nd MCP, 3rd PIP, " 3rd MCP, 4th PIP, 4th MCP, 5th PIP and 5th MCP    She has swelling of the 1st PIP, 1st MCP, 2nd PIP, 2nd MCP, 3rd PIP, 3rd MCP, 4th PIP, 4th MCP, 5th PIP and 5th MCP    Shoulder Exam   Tenderness Location: no tenderness    Range of Motion   Active abduction: abnormal   Adduction: abnormal  Sensation: normal    Knee Exam   Patellofemoral Crepitus: positive  Effusion: positive  Sensation: normal    Hip Exam   Tenderness Location: posterior  Sensation: normal    Elbow/Wrist Exam   Tenderness Location: no tenderness  Sensation: normal    Left Side Rheumatological Exam     The patient is tender to palpation of the shoulder, elbow, wrist, knee, 1st PIP, 1st MCP, 2nd PIP, 2nd MCP, 3rd PIP, 3rd MCP, 4th PIP, 4th MCP, 5th PIP and 5th MCP.    She has swelling of the 1st PIP, 1st MCP, 2nd PIP, 2nd MCP, 3rd PIP, 3rd MCP, 4th PIP, 4th MCP, 5th PIP and 5th MCP    Shoulder Exam   Tenderness Location: no tenderness    Range of Motion   Active abduction: abnormal   Sensation: normal    Knee Exam     Patellofemoral Crepitus: positive  Effusion: positive  Sensation: normal    Hip Exam   Tenderness Location: posterior  Sensation: normal    Elbow/Wrist Exam   Sensation: normal      Back/Neck Exam   General Inspection   Gait: normal       Tenderness Right paramedian tenderness of the Upper L-Spine and Lower L-Spine.Left paramedian tenderness of the Lower L-Spine and Upper L-Spine.    Back Range of Motion   Extension: abnormal  Flexion: abnormal  Lateral Bend Right: abnormal  Lateral Bend Left: abnormal      Lymphadenopathy:     She has no cervical adenopathy.   Neurological: She is alert and oriented to person, place, and time.   Reflex Scores:       Patellar reflexes are 4+ on the right side and 4+ on the left side.  Skin: Skin is dry. No rash noted. No erythema. There is pallor.     Psychiatric: Mood and affect normal.   Musculoskeletal: She exhibits edema, tenderness and deformity.           Results for orders placed or performed in  visit on 03/27/19   CBC auto differential   Result Value Ref Range    WBC 4.64 3.90 - 12.70 K/uL    RBC 4.49 4.00 - 5.40 M/uL    Hemoglobin 13.2 12.0 - 16.0 g/dL    Hematocrit 41.1 37.0 - 48.5 %    MCV 92 82 - 98 fL    MCH 29.4 27.0 - 31.0 pg    MCHC 32.1 32.0 - 36.0 g/dL    RDW 12.7 11.5 - 14.5 %    Platelets 221 150 - 350 K/uL    MPV 10.1 9.2 - 12.9 fL    Immature Granulocytes 0.2 0.0 - 0.5 %    Gran # (ANC) 2.4 1.8 - 7.7 K/uL    Immature Grans (Abs) 0.01 0.00 - 0.04 K/uL    Lymph # 1.2 1.0 - 4.8 K/uL    Mono # 0.5 0.3 - 1.0 K/uL    Eos # 0.5 0.0 - 0.5 K/uL    Baso # 0.07 0.00 - 0.20 K/uL    nRBC 0 0 /100 WBC    Gran% 51.8 38.0 - 73.0 %    Lymph% 25.6 18.0 - 48.0 %    Mono% 11.0 4.0 - 15.0 %    Eosinophil% 9.9 (H) 0.0 - 8.0 %    Basophil% 1.5 0.0 - 1.9 %    Differential Method Automated    Comprehensive metabolic panel   Result Value Ref Range    Sodium 140 136 - 145 mmol/L    Potassium 4.7 3.5 - 5.1 mmol/L    Chloride 104 95 - 110 mmol/L    CO2 32 (H) 22 - 31 mmol/L    Glucose 66 (L) 70 - 110 mg/dL    BUN, Bld 20 (H) 7 - 18 mg/dL    Creatinine 0.63 0.50 - 1.40 mg/dL    Calcium 9.5 8.4 - 10.2 mg/dL    Total Protein 6.2 6.0 - 8.4 g/dL    Albumin 4.0 3.5 - 5.2 g/dL    Total Bilirubin 0.5 0.2 - 1.3 mg/dL    Alkaline Phosphatase 55 38 - 145 U/L    AST 35 14 - 36 U/L    ALT 25 10 - 44 U/L    Anion Gap 4 (L) 8 - 16 mmol/L    eGFR if African American >60 >60 mL/min/1.73 m^2    eGFR if non African American >60 >60 mL/min/1.73 m^2   C-reactive protein   Result Value Ref Range    CRP <0.50 0.00 - 0.90 mg/dL   Sedimentation rate   Result Value Ref Range    Sed Rate 9 0 - 29 mm/Hr     Narrative     EXAMINATION:  MRI LUMBAR SPINE WITHOUT CONTRAST    CLINICAL HISTORY:  Spondylolisthesis; Radiculopathy, lumbar region    TECHNIQUE:  Multiplanar, multisequence MR images were acquired from the thoracolumbar junction to the sacrum without the administration of contrast.    COMPARISON:  Plain films of the lumbar spine dated  05/24/2016    FINDINGS:  Vertebral column: As seen on comparison plain films, there is mild grade 1 spondylolisthesis at the L3-4 level with approximately 3 mm anterolisthesis of L3 on L4.  Otherwise, vertebral body alignment is normal.  There is no fracture.  There is mild-to-moderate disc space narrowing at the L3-4 level with mild disc space narrowing at the L4-5 level.  These discs are desiccated.  The remainder of the disc spaces are preserved.  Baseline marrow signal is normal.  There is no fracture.    Spinal canal, conus, epidural space: The spinal canal is developmentally normal.  The conus is normal in location, contour and signal intensity, terminating at the level of T12.  There is no abnormal epidural mass or fluid collection.    Findings by level:    On the sagittal images, there is mild ligamentum flavum thickening on the right at the T11-12 level but there is no spinal canal or foraminal stenosis.    T12-L1: Unremarkable.  There is no spinal canal or significant foraminal stenosis.    L1-2: There is mild facet joint arthropathy.  There is no spinal canal or significant foraminal stenosis.    L2-3: There is mild-to-moderate facet joint arthropathy.  There is no spinal canal or significant foraminal stenosis.    L3-4: There is 3 mm anterolisthesis of L3 on L4.  There is unroofing of a moderate to marked diffuse disc bulge.  There is a superimposed central disc extrusion with 5 mm cephalad extension posterior to the L3 vertebral body.  Additionally, there is marked facet joint arthropathy with ligamentum flavum thickening.  There is severe spinal stenosis.  AP measurement of the dural sac is 3.9 mm.  There is mild-to-moderate bilateral foraminal stenosis due to these changes as well.    L4-5: There is a diffuse disc bulge with osteophytic ridging eccentric to the right with small right foraminal disc protrusion with subtle annular fissure.  There is moderate facet joint arthropathy with ligamentum  flavum thickening.  There is flattening of the ventral dural sac.  There is borderline spinal stenosis but there is marked bilateral lateral recess stenosis.  There is moderate right but only mild left foraminal stenosis.    L5-S1: There is mild-to-moderate facet joint arthropathy.  There is no spinal canal or foraminal stenosis.    Soft tissues, other: The prevertebral soft tissues are normal.  The aorta is normal in caliber.      Impression       1. There is grade 1 spondylolisthesis without spondylolysis at the L3-4 level with 3 mm anterolisthesis of L3 on L4.  There is unroofing of a diffuse disc bulge with superimposed central disc extrusion.  Additionally, there is marked facet joint arthropathy.  These factors contribute to severe spinal stenosis with mild-to-moderate bilateral foraminal stenosis.  2. At the L4-5 level there is only borderline spinal stenosis but there is marked bilateral lateral recess stenosis.  There is moderate right and mild left foraminal stenosis.  3. There is no spinal canal or foraminal stenosis at the T11-12, T12-L1, L1-2, L2-3 or L5-S1 levels.  There is facet joint arthropathy at most of these levels.  Please see above discussion.      Electronically signed by: Stan Rick MD  Date: 04/05/2019  Time: 12:04                Assessment:       1. Long-term use of Plaquenil    2. CVID (common variable immunodeficiency)    3. Inflammatory arthritis    4. Pain in joint of right shoulder    5. Spinal stenosis of lumbar region with radiculopathy    6. Lumbar herniated disc            Plan:       Lashell was seen today for arthritis and osteoarthritis.    Diagnoses and all orders for this visit:    Long-term use of Plaquenil  -     traMADol (ULTRAM) 50 mg tablet; Take 1 tablet (50 mg total) by mouth every 6 (six) hours as needed for Pain.  -     Ambulatory consult to Pain Clinic  -     Ambulatory consult to Neurosurgery  -     CBC auto differential; Future  -     Comprehensive metabolic  panel; Future  -     C-reactive protein; Future  -     Sedimentation rate; Future    CVID (common variable immunodeficiency)  -     traMADol (ULTRAM) 50 mg tablet; Take 1 tablet (50 mg total) by mouth every 6 (six) hours as needed for Pain.  -     Ambulatory consult to Pain Clinic  -     Ambulatory consult to Neurosurgery  -     CBC auto differential; Future  -     Comprehensive metabolic panel; Future  -     C-reactive protein; Future  -     Sedimentation rate; Future    Inflammatory arthritis  -     traMADol (ULTRAM) 50 mg tablet; Take 1 tablet (50 mg total) by mouth every 6 (six) hours as needed for Pain.  -     Ambulatory consult to Pain Clinic  -     Ambulatory consult to Neurosurgery  -     CBC auto differential; Future  -     Comprehensive metabolic panel; Future  -     C-reactive protein; Future  -     Sedimentation rate; Future    Pain in joint of right shoulder  -     traMADol (ULTRAM) 50 mg tablet; Take 1 tablet (50 mg total) by mouth every 6 (six) hours as needed for Pain.  -     Ambulatory consult to Pain Clinic  -     Ambulatory consult to Neurosurgery  -     CBC auto differential; Future  -     Comprehensive metabolic panel; Future  -     C-reactive protein; Future  -     Sedimentation rate; Future    Spinal stenosis of lumbar region with radiculopathy  Comments:  MRI proven  Orders:  -     traMADol (ULTRAM) 50 mg tablet; Take 1 tablet (50 mg total) by mouth every 6 (six) hours as needed for Pain.  -     Ambulatory consult to Pain Clinic  -     Ambulatory consult to Neurosurgery  -     CBC auto differential; Future  -     Comprehensive metabolic panel; Future  -     C-reactive protein; Future  -     Sedimentation rate; Future    Lumbar herniated disc  -     traMADol (ULTRAM) 50 mg tablet; Take 1 tablet (50 mg total) by mouth every 6 (six) hours as needed for Pain.  -     Ambulatory consult to Pain Clinic  -     Ambulatory consult to Neurosurgery  -     CBC auto differential; Future  -      Comprehensive metabolic panel; Future  -     C-reactive protein; Future  -     Sedimentation rate; Future    pt  Is in pt and has worsened dry needling made her pain severe, mri is abnormal and  In pain.

## 2019-04-12 ENCOUNTER — TELEPHONE (OUTPATIENT)
Dept: RHEUMATOLOGY | Facility: CLINIC | Age: 54
End: 2019-04-12

## 2019-04-12 NOTE — TELEPHONE ENCOUNTER
Returned patient call regarding questions about working. Nurse informed patient that there was nothing in the office visit yesterday saying she should not work. Patient stated Dr Seals reviewed MRI results at visit and said that patient should not work. Nurse informed patient that she can obtain FMLA paper work from her employer to be completed if needs to take time of. Patient stated she really did not want to take FMLA too early. Nurse advised it was her decision if she needed paper work completed for FMLA or if she chooses to continue working. Patient sees pain management 5-2-19 for evaluation

## 2019-04-12 NOTE — TELEPHONE ENCOUNTER
----- Message from Sachi Yan sent at 4/12/2019  8:55 AM CDT -----  Contact: Lashell  Type: Needs Medical Advice    Who Called:  patient  Best Call Back Number: [phone]  Additional Information: patient has appt with Dr. Zuniga on 5/2 but wanted to speak with you regarding if she should be working at this time or not--she is a  @ [employer] & doesn't want to make the issue worse--please advise--thank you

## 2019-04-26 DIAGNOSIS — M19.90 INFLAMMATORY ARTHRITIS: ICD-10-CM

## 2019-04-26 RX ORDER — HYDROXYCHLOROQUINE SULFATE 200 MG/1
200 TABLET, FILM COATED ORAL 2 TIMES DAILY
Qty: 60 TABLET | Refills: 5 | Status: SHIPPED | OUTPATIENT
Start: 2019-04-26 | End: 2019-09-12 | Stop reason: SDUPTHER

## 2019-05-02 ENCOUNTER — OFFICE VISIT (OUTPATIENT)
Dept: PAIN MEDICINE | Facility: CLINIC | Age: 54
End: 2019-05-02
Payer: COMMERCIAL

## 2019-05-02 VITALS
DIASTOLIC BLOOD PRESSURE: 64 MMHG | HEIGHT: 64 IN | SYSTOLIC BLOOD PRESSURE: 124 MMHG | BODY MASS INDEX: 26.49 KG/M2 | TEMPERATURE: 98 F | WEIGHT: 155.19 LBS | OXYGEN SATURATION: 100 % | RESPIRATION RATE: 18 BRPM | HEART RATE: 69 BPM

## 2019-05-02 DIAGNOSIS — M54.16 LUMBAR RADICULOPATHY: Primary | ICD-10-CM

## 2019-05-02 DIAGNOSIS — M47.816 LUMBAR SPONDYLOSIS: ICD-10-CM

## 2019-05-02 DIAGNOSIS — M48.062 SPINAL STENOSIS OF LUMBAR REGION WITH NEUROGENIC CLAUDICATION: ICD-10-CM

## 2019-05-02 PROCEDURE — 99999 PR PBB SHADOW E&M-EST. PATIENT-LVL V: CPT | Mod: PBBFAC,,, | Performed by: ANESTHESIOLOGY

## 2019-05-02 PROCEDURE — 99204 OFFICE O/P NEW MOD 45 MIN: CPT | Mod: S$GLB,,, | Performed by: ANESTHESIOLOGY

## 2019-05-02 PROCEDURE — 99999 PR PBB SHADOW E&M-EST. PATIENT-LVL V: ICD-10-PCS | Mod: PBBFAC,,, | Performed by: ANESTHESIOLOGY

## 2019-05-02 PROCEDURE — 99204 PR OFFICE/OUTPT VISIT, NEW, LEVL IV, 45-59 MIN: ICD-10-PCS | Mod: S$GLB,,, | Performed by: ANESTHESIOLOGY

## 2019-05-02 RX ORDER — ALPRAZOLAM 0.5 MG/1
1 TABLET, ORALLY DISINTEGRATING ORAL ONCE AS NEEDED
Status: CANCELLED | OUTPATIENT
Start: 2019-05-22 | End: 2030-10-17

## 2019-05-02 NOTE — H&P (VIEW-ONLY)
This note was completed with dictation software and grammatical errors may exist.    CC:  Back pain, bilateral buttock pain and leg pain    HPI:  Patient is a 54-year-old woman with a history of osteoarthritis, CVID who presents in referral from Dr. Seals for two months of low back pain radiating into the legs.  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  However, beginning about two months ago she noticed back pain radiating into the bilateral buttock and posterior thighs that seems to be worsening.  The pain is located in the midline low back, tailbone, buttocks and posterior thighs, gets some cramping in her calves.  She describes it as sharp, electric, shooting.  Is worse with walking or moving from flexed to neutral position, extension worse with coughing or sneezing or trying to get out of a bed or a chair.  She denies any merary weakness but reports that at times her left leg feels like it is going to give out.     Pain intervention history:  She was initially sent for some physical therapy but not improving, doing some exercises on her own.  She has been taking gabapentin without any relief.  She does take tramadol for pain issues which seems to help somewhat, rarely takes Aleve.      ROS:  She reports fatigability, weight gain, constipation, joint stiffness, joint swelling, back pain, difficulty sleeping and loss of balance.  Balance of review of systems is negative.    Past Medical History:   Diagnosis Date    History of chicken pox     Hypertension     Palindromic rheumatism involving tarsus        Past Surgical History:   Procedure Laterality Date     SECTION      CHOLECYSTECTOMY      COLONOSCOPY N/A 2017    [facility]    COLONOSCOPY N/A 2016    [facility]    TUBAL LIGATION N/A        Social History  "    Socioeconomic History    Marital status:      Spouse name: Not on file    Number of children: Not on file    Years of education: Not on file    Highest education level: Not on file   Occupational History    Not on file   Social Needs    Financial resource strain: Not on file    Food insecurity:     Worry: Not on file     Inability: Not on file    Transportation needs:     Medical: Not on file     Non-medical: Not on file   Tobacco Use    Smoking status: Never Smoker    Smokeless tobacco: Never Used   Substance and Sexual Activity    Alcohol use: Yes     Comment: occassional    Drug use: No    Sexual activity: Yes     Birth control/protection: Surgical     Comment: tubal ligation   Lifestyle    Physical activity:     Days per week: Not on file     Minutes per session: Not on file    Stress: Not on file   Relationships    Social connections:     Talks on phone: Not on file     Gets together: Not on file     Attends Orthodox service: Not on file     Active member of club or organization: Not on file     Attends meetings of clubs or organizations: Not on file     Relationship status: Not on file   Other Topics Concern    Not on file   Social History Narrative    Not on file         Medications/Allergies: See med card    Vitals:    05/02/19 1055   BP: 124/64   Pulse: 69   Resp: 18   Temp: 98.3 °F (36.8 °C)   TempSrc: Oral   SpO2: 100%   Weight: 70.4 kg (155 lb 3.3 oz)   Height: 5' 4" (1.626 m)   PainSc:   6   PainLoc: Back         Physical exam:  Gen: A and O x3, pleasant, well-groomed  Skin: No rashes or obvious lesions  HEENT: PERRLA, no obvious deformities on ears or in canals. Trachea midline.  CVS: Regular rate and rhythm, normal palpable pulses.  Resp:No increased work of breathing, symmetrical chest rise.  Abdomen: Soft, NT/ND.  Musculoskeletal:  Slow to move from sitting to standing, walks with a forward leaning gait.    Neuro:  Lower extremities: 5/5 strength bilaterally  Reflexes: " Patellar 1+, Achilles 1+ bilaterally.  Sensory:  Intact and symmetrical to light touch and pinprick in L2-S1 dermatomes bilaterally.    Lumbar spine:  Lumbar spine:  Range of motion is full with flexion without increased pain, does have significant increased pain moving from flexed to a neutral position, has increased pain in the back and buttock with oblique extension to either side causing ipsilateral pain.  Jesus's test causes no increased pain on either side.    Supine straight leg raise is negative bilaterally.    Internal and external rotation of the hip causes no increased pain on either side.  Myofascial exam: No tenderness to palpation across lumbar paraspinous muscles.    Imagin19 MRI L-spine  T12-L1: Unremarkable.  There is no spinal canal or significant foraminal stenosis.  L1-2: There is mild facet joint arthropathy.  There is no spinal canal or significant foraminal stenosis.  L2-3: There is mild-to-moderate facet joint arthropathy.  There is no spinal canal or significant foraminal stenosis.  L3-4: There is 3 mm anterolisthesis of L3 on L4.  There is unroofing of a moderate to marked diffuse disc bulge.  There is a superimposed central disc extrusion with 5 mm cephalad extension posterior to the L3 vertebral body.  Additionally, there is marked facet joint arthropathy with ligamentum flavum thickening.  There is severe spinal stenosis.  AP measurement of the dural sac is 3.9 mm.  There is mild-to-moderate bilateral foraminal stenosis due to these changes as well.  L4-5: There is a diffuse disc bulge with osteophytic ridging eccentric to the right with small right foraminal disc protrusion with subtle annular fissure.  There is moderate facet joint arthropathy with ligamentum flavum thickening.  There is flattening of the ventral dural sac.  There is borderline spinal stenosis but there is marked bilateral lateral recess stenosis.  There is moderate right but only mild left foraminal  stenosis.  L5-S1: There is mild-to-moderate facet joint arthropathy.  There is no spinal canal or foraminal stenosis.    Assessment:  Patient is a 54-year-old woman with a history of osteoarthritis, CVID who presents in referral from Dr. Seals for two months of low back pain radiating into the legs.    1. Lumbar radiculopathy  Vital signs    Verify informed consent    Notify physician     Notify physician     Notify physician (specify)    Diet NPO    Case Request Operating Room: Injection-steroid-epidural-lumbar L4/5    Place in Outpatient    alprazolam ODT dissolvable tablet 1 mg   2. Spinal stenosis of lumbar region with neurogenic claudication     3. Lumbar spondylosis         Plan:  1.  We reviewed her lumbar spine MRI which seems to be most significant for severe canal stenosis at L3/4 caused by a listhesis in addition to facet arthropathy and disc extrusion.  We discussed continuing with gabapentin, she is only to able to take this at night due to the side effects.  We discussed the role of epidural steroid injections and she would like to proceed.  We discussed that if she has relief of her buttock and leg pain but not her back pain, we would consider medial branch blocks at L3/4 and L4/5 facet joints.  I will have her follow up in several weeks or sooner as needed.  I told her that it was okay to continue exercises especially with core strengthening.    Thank you for referring this interesting patient, and I look forward to continuing to collaborate in her care.

## 2019-05-02 NOTE — LETTER
May 2, 2019      Keyon Seals MD  [address] [facility] [address] [place] [address]           [facility]  [address] [facility] [address] [place] [address]  Phone: [phone]  Fax: [phone]          Patient: Lashell Valladares   MR Number: [id number]   YOB: 1965   Date of Visit: 5/2/2019       Dear Dr. Keyon Seals:    Thank you for referring Lashell Valladares to me for evaluation. Attached you will find relevant portions of my assessment and plan of care.    If you have questions, please do not hesitate to call me. I look forward to following Lashell Valladares along with you.    Sincerely,    Nigel Zuniga MD    Enclosure  CC:  No Recipients    If you would like to receive this communication electronically, please contact [email or web address] or [phone] to request more information on [employer] access.    For providers and/or their staff who would like to refer a patient to [facility], please contact us through our one-stop-shop provider referral line, [facility], at [phone].    If you feel you have received this communication in error or would no longer like to receive these types of communications, please e-mail [email or web address]

## 2019-05-02 NOTE — PROGRESS NOTES
This note was completed with dictation software and grammatical errors may exist.    CC:  Back pain, bilateral buttock pain and leg pain    HPI:  Patient is a 54-year-old woman with a history of osteoarthritis, CVID who presents in referral from Dr. Seals for two months of low back pain radiating into the legs.  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  However, beginning about two months ago she noticed back pain radiating into the bilateral buttock and posterior thighs that seems to be worsening.  The pain is located in the midline low back, tailbone, buttocks and posterior thighs, gets some cramping in her calves.  She describes it as sharp, electric, shooting.  Is worse with walking or moving from flexed to neutral position, extension worse with coughing or sneezing or trying to get out of a bed or a chair.  She denies any merary weakness but reports that at times her left leg feels like it is going to give out.     Pain intervention history:  She was initially sent for some physical therapy but not improving, doing some exercises on her own.  She has been taking gabapentin without any relief.  She does take tramadol for pain issues which seems to help somewhat, rarely takes Aleve.      ROS:  She reports fatigability, weight gain, constipation, joint stiffness, joint swelling, back pain, difficulty sleeping and loss of balance.  Balance of review of systems is negative.    Past Medical History:   Diagnosis Date    History of chicken pox     Hypertension     Palindromic rheumatism involving tarsus        Past Surgical History:   Procedure Laterality Date     SECTION      CHOLECYSTECTOMY      COLONOSCOPY N/A 2017    [facility]    COLONOSCOPY N/A 2016    [facility] ENDO    TUBAL LIGATION N/A        Social History  "    Socioeconomic History    Marital status:      Spouse name: Not on file    Number of children: Not on file    Years of education: Not on file    Highest education level: Not on file   Occupational History    Not on file   Social Needs    Financial resource strain: Not on file    Food insecurity:     Worry: Not on file     Inability: Not on file    Transportation needs:     Medical: Not on file     Non-medical: Not on file   Tobacco Use    Smoking status: Never Smoker    Smokeless tobacco: Never Used   Substance and Sexual Activity    Alcohol use: Yes     Comment: occassional    Drug use: No    Sexual activity: Yes     Birth control/protection: Surgical     Comment: tubal ligation   Lifestyle    Physical activity:     Days per week: Not on file     Minutes per session: Not on file    Stress: Not on file   Relationships    Social connections:     Talks on phone: Not on file     Gets together: Not on file     Attends Yazidism service: Not on file     Active member of club or organization: Not on file     Attends meetings of clubs or organizations: Not on file     Relationship status: Not on file   Other Topics Concern    Not on file   Social History Narrative    Not on file         Medications/Allergies: See med card    Vitals:    05/02/19 1055   BP: 124/64   Pulse: 69   Resp: 18   Temp: 98.3 °F (36.8 °C)   TempSrc: Oral   SpO2: 100%   Weight: 70.4 kg (155 lb 3.3 oz)   Height: 5' 4" (1.626 m)   PainSc:   6   PainLoc: Back         Physical exam:  Gen: A and O x3, pleasant, well-groomed  Skin: No rashes or obvious lesions  HEENT: PERRLA, no obvious deformities on ears or in canals. Trachea midline.  CVS: Regular rate and rhythm, normal palpable pulses.  Resp:No increased work of breathing, symmetrical chest rise.  Abdomen: Soft, NT/ND.  Musculoskeletal:  Slow to move from sitting to standing, walks with a forward leaning gait.    Neuro:  Lower extremities: 5/5 strength bilaterally  Reflexes: " Patellar 1+, Achilles 1+ bilaterally.  Sensory:  Intact and symmetrical to light touch and pinprick in L2-S1 dermatomes bilaterally.    Lumbar spine:  Lumbar spine:  Range of motion is full with flexion without increased pain, does have significant increased pain moving from flexed to a neutral position, has increased pain in the back and buttock with oblique extension to either side causing ipsilateral pain.  Jesus's test causes no increased pain on either side.    Supine straight leg raise is negative bilaterally.    Internal and external rotation of the hip causes no increased pain on either side.  Myofascial exam: No tenderness to palpation across lumbar paraspinous muscles.    Imagin19 MRI L-spine  T12-L1: Unremarkable.  There is no spinal canal or significant foraminal stenosis.  L1-2: There is mild facet joint arthropathy.  There is no spinal canal or significant foraminal stenosis.  L2-3: There is mild-to-moderate facet joint arthropathy.  There is no spinal canal or significant foraminal stenosis.  L3-4: There is 3 mm anterolisthesis of L3 on L4.  There is unroofing of a moderate to marked diffuse disc bulge.  There is a superimposed central disc extrusion with 5 mm cephalad extension posterior to the L3 vertebral body.  Additionally, there is marked facet joint arthropathy with ligamentum flavum thickening.  There is severe spinal stenosis.  AP measurement of the dural sac is 3.9 mm.  There is mild-to-moderate bilateral foraminal stenosis due to these changes as well.  L4-5: There is a diffuse disc bulge with osteophytic ridging eccentric to the right with small right foraminal disc protrusion with subtle annular fissure.  There is moderate facet joint arthropathy with ligamentum flavum thickening.  There is flattening of the ventral dural sac.  There is borderline spinal stenosis but there is marked bilateral lateral recess stenosis.  There is moderate right but only mild left foraminal  stenosis.  L5-S1: There is mild-to-moderate facet joint arthropathy.  There is no spinal canal or foraminal stenosis.    Assessment:  Patient is a 54-year-old woman with a history of osteoarthritis, CVID who presents in referral from Dr. Seals for two months of low back pain radiating into the legs.    1. Lumbar radiculopathy  Vital signs    Verify informed consent    Notify physician     Notify physician     Notify physician (specify)    Diet NPO    Case Request Operating Room: Injection-steroid-epidural-lumbar L4/5    Place in Outpatient    alprazolam ODT dissolvable tablet 1 mg   2. Spinal stenosis of lumbar region with neurogenic claudication     3. Lumbar spondylosis         Plan:  1.  We reviewed her lumbar spine MRI which seems to be most significant for severe canal stenosis at L3/4 caused by a listhesis in addition to facet arthropathy and disc extrusion.  We discussed continuing with gabapentin, she is only to able to take this at night due to the side effects.  We discussed the role of epidural steroid injections and she would like to proceed.  We discussed that if she has relief of her buttock and leg pain but not her back pain, we would consider medial branch blocks at L3/4 and L4/5 facet joints.  I will have her follow up in several weeks or sooner as needed.  I told her that it was okay to continue exercises especially with core strengthening.    Thank you for referring this interesting patient, and I look forward to continuing to collaborate in her care.

## 2019-05-20 ENCOUNTER — TELEPHONE (OUTPATIENT)
Dept: PAIN MEDICINE | Facility: CLINIC | Age: 54
End: 2019-05-20

## 2019-05-20 NOTE — TELEPHONE ENCOUNTER
----- Message from Eddie Acuña sent at 5/20/2019 12:22 PM CDT -----  Contact: Patient  Type: Needs Medical Advice    Who Called:  Patient    Best Call Back Number:[phone]    Additional Information: Advised needs to know time of arrival for her steroid shot on 05-22-19. Please call.

## 2019-05-21 DIAGNOSIS — M51.36 DDD (DEGENERATIVE DISC DISEASE), LUMBAR: Primary | ICD-10-CM

## 2019-05-21 RX ORDER — TRAMADOL HYDROCHLORIDE 50 MG/1
50 TABLET ORAL EVERY 6 HOURS
COMMUNITY
End: 2019-09-12 | Stop reason: SDUPTHER

## 2019-05-22 ENCOUNTER — HOSPITAL ENCOUNTER (OUTPATIENT)
Dept: RADIOLOGY | Facility: HOSPITAL | Age: 54
Discharge: HOME | End: 2019-05-22
Attending: ANESTHESIOLOGY | Admitting: ANESTHESIOLOGY
Payer: COMMERCIAL

## 2019-05-22 ENCOUNTER — HOSPITAL ENCOUNTER (OUTPATIENT)
Facility: HOSPITAL | Age: 54
Discharge: HOME | End: 2019-05-22
Attending: ANESTHESIOLOGY | Admitting: ANESTHESIOLOGY
Payer: COMMERCIAL

## 2019-05-22 VITALS
BODY MASS INDEX: 25.61 KG/M2 | RESPIRATION RATE: 15 BRPM | DIASTOLIC BLOOD PRESSURE: 71 MMHG | WEIGHT: 150 LBS | OXYGEN SATURATION: 97 % | HEART RATE: 58 BPM | TEMPERATURE: 97 F | SYSTOLIC BLOOD PRESSURE: 135 MMHG | HEIGHT: 64 IN

## 2019-05-22 DIAGNOSIS — M54.16 LUMBAR RADICULOPATHY: Primary | ICD-10-CM

## 2019-05-22 DIAGNOSIS — M51.36 DDD (DEGENERATIVE DISC DISEASE), LUMBAR: ICD-10-CM

## 2019-05-22 PROCEDURE — 63600175 PHARM REV CODE 636 W HCPCS: Mod: PO | Performed by: ANESTHESIOLOGY

## 2019-05-22 PROCEDURE — 76000 FLUOROSCOPY <1 HR PHYS/QHP: CPT | Mod: TC,PO

## 2019-05-22 PROCEDURE — 25000003 PHARM REV CODE 250: Mod: PO | Performed by: ANESTHESIOLOGY

## 2019-05-22 PROCEDURE — 25500020 PHARM REV CODE 255: Mod: PO | Performed by: ANESTHESIOLOGY

## 2019-05-22 PROCEDURE — 62323 NJX INTERLAMINAR LMBR/SAC: CPT | Mod: ,,, | Performed by: ANESTHESIOLOGY

## 2019-05-22 PROCEDURE — 62323 PR INJ LUMBAR/SACRAL, W/IMAGING GUIDANCE: ICD-10-PCS | Mod: ,,, | Performed by: ANESTHESIOLOGY

## 2019-05-22 PROCEDURE — A4216 STERILE WATER/SALINE, 10 ML: HCPCS | Mod: PO | Performed by: ANESTHESIOLOGY

## 2019-05-22 PROCEDURE — 62323 NJX INTERLAMINAR LMBR/SAC: CPT | Mod: PO | Performed by: ANESTHESIOLOGY

## 2019-05-22 RX ORDER — SODIUM CHLORIDE 9 MG/ML
INJECTION, SOLUTION INTRAMUSCULAR; INTRAVENOUS; SUBCUTANEOUS
Status: DISCONTINUED | OUTPATIENT
Start: 2019-05-22 | End: 2019-05-22 | Stop reason: HOSPADM

## 2019-05-22 RX ORDER — LIDOCAINE HYDROCHLORIDE 10 MG/ML
INJECTION, SOLUTION EPIDURAL; INFILTRATION; INTRACAUDAL; PERINEURAL
Status: DISCONTINUED | OUTPATIENT
Start: 2019-05-22 | End: 2019-05-22 | Stop reason: HOSPADM

## 2019-05-22 RX ORDER — ALPRAZOLAM 0.5 MG/1
1 TABLET, ORALLY DISINTEGRATING ORAL ONCE AS NEEDED
Status: COMPLETED | OUTPATIENT
Start: 2019-05-22 | End: 2019-05-22

## 2019-05-22 RX ORDER — TRIAMCINOLONE ACETONIDE 40 MG/ML
INJECTION, SUSPENSION INTRA-ARTICULAR; INTRAMUSCULAR
Status: DISCONTINUED | OUTPATIENT
Start: 2019-05-22 | End: 2019-05-22 | Stop reason: HOSPADM

## 2019-05-22 RX ADMIN — ALPRAZOLAM 1 MG: 0.5 TABLET, ORALLY DISINTEGRATING ORAL at 12:05

## 2019-05-22 NOTE — INTERVAL H&P NOTE
The patient has been examined and the H&P has been reviewed:    I concur with the findings and no changes have occurred since H&P was written.    Anesthesia/Surgery risks, benefits and alternative options discussed and understood by patient/family.      Active Hospital Problems    Diagnosis  POA    Lumbar radiculopathy [M54.16]  Yes      Resolved Hospital Problems   No resolved problems to display.

## 2019-05-22 NOTE — OP NOTE
"Procedure Note    Procedure Date: 5/22/2019    Procedure Performed:  L4/5 lumbar interlaminar epidural steroid injection under fluoroscopy.    Indications: Patient has failed conservative therapy.      Pre-op diagnosis: Lumbar Radiculopathy    Post-op diagnosis: same    Physician: Cristhian Gee MD    Sedation medications: none    Medications injected: depomedrol 80mg, 1% Lidocaine 1ml, 3.5 mL sterile, preservative-free normal saline.    Local anesthetic used: 1% Lidocaine, 1 ml, 8.4% sodium bicarbonate 0.25ml    Estimated Blood Loss: none    Complications:  none    Technique:  The patient was interviewed in the holding area and Risks/Benefits were discussed, including, but not limited to, the possibility of new or different pain, bleeding or infection.   All questions were answered.  The patient understood and accepted risks.  Consent was verfied.  A time-out was taken to identify patient and procedure prior to starting the procedure. The patient was placed in the prone position on the fluoroscopy table. The area of the lumbar spine was prepped with Chloraprep and draped in a sterile manner. The L4/5 interspace was identified and marked under AP fluoroscopy. The skin and subcutaneous tissues overlying the targeted interspace were anesthetized with 3-5 mL of 1% lidocaine using a 25G, 1.5" needle.  A 17G, 3.5" Tuohy epidural needle was directed toward the interspace under fluoroscopic guidance until the ligamentum flavum was engaged. From this point, a loss of resistance technique with a glass syringe and saline was used to identify entrance of the needle into the epidural space. Once loss of resistance was observed 1 mL of contrast solution was injected. An appropriate epidurogram was noted.  A 6 mL mixture consisting of saline, 1 mL 1% Lidocaine and 80 mg of depomedrol was injected slowly and without resistance.  The needle was flushed with normal saline and removed. The contrast was seen to be displaced after " injection. Patient was awake/responsive during all injections.  The patient tolerated the procedure well and was transferred to the [facility] in stable condition.  The patient was monitored after the procedure and was given post-procedure and discharge instructions to follow at home. The patient was discharged in a stable condition.

## 2019-05-22 NOTE — DISCHARGE SUMMARY
[facility]  Discharge Note  Short Stay    Admit Date: 5/22/2019    Discharge Date: 5/22/2019    Attending Physician: Cristhian Gee     Discharge Provider: Cristhian Gee    Diagnoses:  Active Hospital Problems    Diagnosis  POA    Lumbar radiculopathy [M54.16]  Yes      Resolved Hospital Problems   No resolved problems to display.       Discharged Condition: Good    Final Diagnoses: Lumbar radiculopathy [M54.16]    Disposition: Home or Self Care    Hospital Course: No complications, uneventful    Outcome of Hospitalization, Treatment, Procedure, or Surgery:  Patient was admitted for outpatient interventional pain management procedure. The patient tolerated the procedure well with no complications.    Follow up/Patient Instructions:  Follow up as scheduled in Pain Management office in 3-4 weeks.  Patient has received instructions and follow up date and time.    Medications:  Continue previous medications    Discharge Procedure Orders   Notify your health care provider if you experience any of the following:  temperature >100.4     Notify your health care provider if you experience any of the following:  persistent nausea and vomiting or diarrhea     Notify your health care provider if you experience any of the following:  severe uncontrolled pain     Notify your health care provider if you experience any of the following:  redness, tenderness, or signs of infection (pain, swelling, redness, odor or green/yellow discharge around incision site)     Notify your health care provider if you experience any of the following:  difficulty breathing or increased cough     Notify your health care provider if you experience any of the following:  severe persistent headache     Notify your health care provider if you experience any of the following:  worsening rash     Notify your health care provider if you experience any of the following:  persistent dizziness, light-headedness, or visual disturbances     Notify your  health care provider if you experience any of the following:  increased confusion or weakness     Activity as tolerated         Discharge Procedure Orders (must include Diet, Follow-up, Activity):   Discharge Procedure Orders (must include Diet, Follow-up, Activity)   Notify your health care provider if you experience any of the following:  temperature >100.4     Notify your health care provider if you experience any of the following:  persistent nausea and vomiting or diarrhea     Notify your health care provider if you experience any of the following:  severe uncontrolled pain     Notify your health care provider if you experience any of the following:  redness, tenderness, or signs of infection (pain, swelling, redness, odor or green/yellow discharge around incision site)     Notify your health care provider if you experience any of the following:  difficulty breathing or increased cough     Notify your health care provider if you experience any of the following:  severe persistent headache     Notify your health care provider if you experience any of the following:  worsening rash     Notify your health care provider if you experience any of the following:  persistent dizziness, light-headedness, or visual disturbances     Notify your health care provider if you experience any of the following:  increased confusion or weakness     Activity as tolerated

## 2019-05-22 NOTE — DISCHARGE INSTRUCTIONS
Home care instructions  Apply ice pack to the injection site for 20 minutes periods for the first 24 hrs for soreness/discomfort at injection site DO NOT USE HEAT FOR 24 HOURS  Keep site clean and dry for 24 hours, remove bandaid when desired  Do not drive until tomorrow  Take care when walking after a lumbar injection  Avoid strenuous activities for 2 days  Make take 2 weeks to feel the full effects   Resume home medication as prescribed today  Resume Aspirin, Plavix, or Coumadin the day after the procedure unless otherwise instructed.    SEE IMMEDIATE MEDICAL HELP FOR:  Severe increase in your usual pain or appearance of new pain  Prolonged or increasing weakness or numbness in the legs or arms  Drainage, redness, active bleeding, or increased swelling at the injection site  Temperature over 100.0 degrees F.  Headache that increases when your head is upright and decreases when you lie flat    CALL 911 OR GO DIRECTLY TO EMERGENCY DEPARTMENT FOR:  Shortness of breath, chest pain, or problems breathing   VIEW ALL Respiratory

## 2019-06-19 ENCOUNTER — OFFICE VISIT (OUTPATIENT)
Dept: PAIN MEDICINE | Facility: CLINIC | Age: 54
End: 2019-06-19
Payer: COMMERCIAL

## 2019-06-19 VITALS
DIASTOLIC BLOOD PRESSURE: 55 MMHG | BODY MASS INDEX: 26.98 KG/M2 | SYSTOLIC BLOOD PRESSURE: 112 MMHG | OXYGEN SATURATION: 99 % | RESPIRATION RATE: 18 BRPM | HEART RATE: 63 BPM | WEIGHT: 157.19 LBS | TEMPERATURE: 97 F

## 2019-06-19 DIAGNOSIS — M47.816 LUMBAR SPONDYLOSIS: ICD-10-CM

## 2019-06-19 DIAGNOSIS — M54.16 LUMBAR RADICULOPATHY: Primary | ICD-10-CM

## 2019-06-19 DIAGNOSIS — M43.16 SPONDYLOLISTHESIS OF LUMBAR REGION: ICD-10-CM

## 2019-06-19 DIAGNOSIS — M48.062 SPINAL STENOSIS OF LUMBAR REGION WITH NEUROGENIC CLAUDICATION: ICD-10-CM

## 2019-06-19 PROCEDURE — 99213 OFFICE O/P EST LOW 20 MIN: CPT | Mod: S$GLB,,, | Performed by: PHYSICIAN ASSISTANT

## 2019-06-19 PROCEDURE — 99999 PR PBB SHADOW E&M-EST. PATIENT-LVL IV: CPT | Mod: PBBFAC,,, | Performed by: PHYSICIAN ASSISTANT

## 2019-06-19 PROCEDURE — 99999 PR PBB SHADOW E&M-EST. PATIENT-LVL IV: ICD-10-PCS | Mod: PBBFAC,,, | Performed by: PHYSICIAN ASSISTANT

## 2019-06-19 PROCEDURE — 99213 PR OFFICE/OUTPT VISIT, EST, LEVL III, 20-29 MIN: ICD-10-PCS | Mod: S$GLB,,, | Performed by: PHYSICIAN ASSISTANT

## 2019-06-19 NOTE — PROGRESS NOTES
This note was completed with dictation software and grammatical errors may exist.    CC:  Back pain, bilateral buttock pain and leg pain    HPI:  Patient is a 54-year-old woman with a history of osteoarthritis, CVID who presents in referral from Dr. Seals for two months of low back pain radiating into the legs.  She is status post L4/5 interlaminar epidural steroid injection on 2019 with Dr. Gee with 80-85% relief.  Patient is new to me.  She complains of left low back pain much greater than right low back pain but is now tolerable.  She no longer has pain radiating into her legs, denies any more numbness or weakness.  Prior to procedure she was participating in a home exercise program but has not been doing this since the injection. She does report some radiation to the lateral hips but this is currently tolerable.  She denies having any bladder or bowel incontinence.    Pain intervention history:  She was initially sent for some physical therapy but not improving, doing some exercises on her own.  She has been taking gabapentin without any relief.  She does take tramadol for pain issues which seems to help somewhat, rarely takes Aleve.  She is status post L4/5 interlaminar epidural steroid injection on 2019 with Dr. Gee with 80-85% relief.    ROS:  She reports fatigability, weight gain, constipation, joint stiffness, joint swelling, back pain, difficulty sleeping and loss of balance.  Balance of review of systems is negative.    Past Medical History:   Diagnosis Date    History of chicken pox     Hypertension     Palindromic rheumatism involving tarsus        Past Surgical History:   Procedure Laterality Date     SECTION      CHOLECYSTECTOMY      COLONOSCOPY N/A 2017    [facility]    COLONOSCOPY N/A 2016    [facility]    Injection-steroid-epidural-lumbar L4/5 N/A 2019    [facility]hian [facility]AMPBELL  [facility]    TUBAL LIGATION N/A 1986       Social History     Socioeconomic History    Marital status:      Spouse name: Not on file    Number of children: Not on file    Years of education: Not on file    Highest education level: Not on file   Occupational History    Not on file   Social Needs    Financial resource strain: Not on file    Food insecurity:     Worry: Not on file     Inability: Not on file    Transportation needs:     Medical: Not on file     Non-medical: Not on file   Tobacco Use    Smoking status: Never Smoker    Smokeless tobacco: Never Used   Substance and Sexual Activity    Alcohol use: Yes     Comment: occassional    Drug use: No    Sexual activity: Yes     Birth control/protection: Surgical     Comment: tubal ligation   Lifestyle    Physical activity:     Days per week: Not on file     Minutes per session: Not on file    Stress: Not on file   Relationships    Social connections:     Talks on phone: Not on file     Gets together: Not on file     Attends Church service: Not on file     Active member of club or organization: Not on file     Attends meetings of clubs or organizations: Not on file     Relationship status: Not on file   Other Topics Concern    Not on file   Social History Narrative    Not on file         Medications/Allergies: See med card    Vitals:    06/19/19 1124   BP: (!) 112/55   Pulse: 63   Resp: 18   Temp: 96.5 °F (35.8 °C)   TempSrc: Oral   SpO2: 99%   Weight: 71.3 kg (157 lb 3 oz)   PainSc:   2   PainLoc: Back         Physical exam:  Gen: A and O x3, pleasant, well-groomed  Skin: No rashes or obvious lesions  HEENT: PERRLA, no obvious deformities on ears or in canals. Trachea midline.  CVS: Regular rate and rhythm, normal palpable pulses.  Resp:No increased work of breathing, symmetrical chest rise.  Abdomen: Soft, NT/ND.  Musculoskeletal:  Slow to move from sitting to standing, walks with a forward leaning gait.    Neuro:  Lower  extremities: 5/5 strength bilaterally  Reflexes: Patellar 2+, Achilles 2+ left side, 0+ right side.  Sensory:  Intact and symmetrical to light touch and pinprick in L2-S1 dermatomes bilaterally.    Lumbar spine:  Lumbar spine:  Range of motion is full with flexion and mildly reduced with extension without increased pain.  Jesus's test causes no increased pain on either side.    Supine straight leg raise is negative bilaterally.    Internal and external rotation of the hip causes no increased pain on either side.  Myofascial exam: No tenderness to palpation across lumbar paraspinous muscles.    Imagin19 MRI L-spine  T12-L1: Unremarkable.  There is no spinal canal or significant foraminal stenosis.  L1-2: There is mild facet joint arthropathy.  There is no spinal canal or significant foraminal stenosis.  L2-3: There is mild-to-moderate facet joint arthropathy.  There is no spinal canal or significant foraminal stenosis.  L3-4: There is 3 mm anterolisthesis of L3 on L4.  There is unroofing of a moderate to marked diffuse disc bulge.  There is a superimposed central disc extrusion with 5 mm cephalad extension posterior to the L3 vertebral body.  Additionally, there is marked facet joint arthropathy with ligamentum flavum thickening.  There is severe spinal stenosis.  AP measurement of the dural sac is 3.9 mm.  There is mild-to-moderate bilateral foraminal stenosis due to these changes as well.  L4-5: There is a diffuse disc bulge with osteophytic ridging eccentric to the right with small right foraminal disc protrusion with subtle annular fissure.  There is moderate facet joint arthropathy with ligamentum flavum thickening.  There is flattening of the ventral dural sac.  There is borderline spinal stenosis but there is marked bilateral lateral recess stenosis.  There is moderate right but only mild left foraminal stenosis.  L5-S1: There is mild-to-moderate facet joint arthropathy.  There is no spinal canal or  foraminal stenosis.    Assessment:  Patient is a 54-year-old woman with a history of osteoarthritis, CVID who presents in referral from Dr. Seals for two months of low back pain radiating into the legs.    1. Lumbar radiculopathy     2. Spinal stenosis of lumbar region with neurogenic claudication     3. Lumbar spondylosis     4. Spondylolisthesis of lumbar region         Plan:  1.  The patient did well following the L4/5 interlaminar epidural steroid injection and this can be repeated in the future if necessary.  2.  We discussed a home exercise program as well as proper lifting technique.  3.  Follow-up as needed.     Greater than 50% of this 15 min visit was spent counseling the patient.

## 2019-07-13 ENCOUNTER — PATIENT MESSAGE (OUTPATIENT)
Dept: PAIN MEDICINE | Facility: CLINIC | Age: 54
End: 2019-07-13

## 2019-07-16 DIAGNOSIS — M54.16 LUMBAR RADICULOPATHY: Primary | ICD-10-CM

## 2019-07-16 RX ORDER — ALPRAZOLAM 0.5 MG/1
1 TABLET, ORALLY DISINTEGRATING ORAL ONCE AS NEEDED
Status: CANCELLED | OUTPATIENT
Start: 2019-07-31 | End: 2030-12-26

## 2019-07-30 ENCOUNTER — TELEPHONE (OUTPATIENT)
Dept: PAIN MEDICINE | Facility: CLINIC | Age: 54
End: 2019-07-30

## 2019-07-30 DIAGNOSIS — M51.36 DDD (DEGENERATIVE DISC DISEASE), LUMBAR: Primary | ICD-10-CM

## 2019-07-30 NOTE — TELEPHONE ENCOUNTER
----- Message from Milvia Lerma sent at 7/30/2019 11:36 AM CDT -----  Contact: self  Patient requesting to speak to nurse regarding patient calling to get time on injection appt tomorrow 07/31 per patient       Patient contact [phone] (home)

## 2019-07-31 ENCOUNTER — HOSPITAL ENCOUNTER (OUTPATIENT)
Dept: RADIOLOGY | Facility: HOSPITAL | Age: 54
Discharge: HOME | End: 2019-07-31
Attending: ANESTHESIOLOGY | Admitting: ANESTHESIOLOGY
Payer: COMMERCIAL

## 2019-07-31 ENCOUNTER — HOSPITAL ENCOUNTER (OUTPATIENT)
Facility: HOSPITAL | Age: 54
Discharge: HOME | End: 2019-07-31
Attending: ANESTHESIOLOGY | Admitting: ANESTHESIOLOGY
Payer: COMMERCIAL

## 2019-07-31 VITALS
OXYGEN SATURATION: 99 % | DIASTOLIC BLOOD PRESSURE: 71 MMHG | SYSTOLIC BLOOD PRESSURE: 144 MMHG | TEMPERATURE: 98 F | HEIGHT: 64 IN | BODY MASS INDEX: 25.61 KG/M2 | WEIGHT: 150 LBS | RESPIRATION RATE: 17 BRPM | HEART RATE: 61 BPM

## 2019-07-31 DIAGNOSIS — M54.16 LUMBAR RADICULOPATHY: Primary | ICD-10-CM

## 2019-07-31 DIAGNOSIS — M51.36 DDD (DEGENERATIVE DISC DISEASE), LUMBAR: ICD-10-CM

## 2019-07-31 PROCEDURE — 25000003 PHARM REV CODE 250: Mod: PO | Performed by: ANESTHESIOLOGY

## 2019-07-31 PROCEDURE — 76000 FLUOROSCOPY <1 HR PHYS/QHP: CPT | Mod: TC,PO

## 2019-07-31 PROCEDURE — 62323 NJX INTERLAMINAR LMBR/SAC: CPT | Mod: PO | Performed by: ANESTHESIOLOGY

## 2019-07-31 PROCEDURE — 62323 PR INJ LUMBAR/SACRAL, W/IMAGING GUIDANCE: ICD-10-PCS | Mod: ,,, | Performed by: ANESTHESIOLOGY

## 2019-07-31 PROCEDURE — 25500020 PHARM REV CODE 255: Mod: PO | Performed by: ANESTHESIOLOGY

## 2019-07-31 PROCEDURE — 63600175 PHARM REV CODE 636 W HCPCS: Mod: PO | Performed by: ANESTHESIOLOGY

## 2019-07-31 PROCEDURE — 62323 NJX INTERLAMINAR LMBR/SAC: CPT | Mod: ,,, | Performed by: ANESTHESIOLOGY

## 2019-07-31 RX ORDER — LIDOCAINE HYDROCHLORIDE 10 MG/ML
INJECTION, SOLUTION EPIDURAL; INFILTRATION; INTRACAUDAL; PERINEURAL
Status: DISCONTINUED | OUTPATIENT
Start: 2019-07-31 | End: 2019-07-31 | Stop reason: HOSPADM

## 2019-07-31 RX ORDER — METHYLPREDNISOLONE ACETATE 80 MG/ML
INJECTION, SUSPENSION INTRA-ARTICULAR; INTRALESIONAL; INTRAMUSCULAR; SOFT TISSUE
Status: DISCONTINUED | OUTPATIENT
Start: 2019-07-31 | End: 2019-07-31 | Stop reason: HOSPADM

## 2019-07-31 RX ORDER — ALPRAZOLAM 0.5 MG/1
1 TABLET, ORALLY DISINTEGRATING ORAL ONCE AS NEEDED
Status: COMPLETED | OUTPATIENT
Start: 2019-07-31 | End: 2019-07-31

## 2019-07-31 RX ADMIN — ALPRAZOLAM 0.5 MG: 0.5 TABLET, ORALLY DISINTEGRATING ORAL at 01:07

## 2019-07-31 NOTE — H&P
CC: Back pain    HPI: The patient is a 53yo woman with a history of lumbar radiculopathy here for L4/5 KATHRYN. There are no major changes in history and physical from 19.    Past Medical History:   Diagnosis Date    History of chicken pox     Hypertension     Palindromic rheumatism involving tarsus        Past Surgical History:   Procedure Laterality Date     SECTION      CHOLECYSTECTOMY      COLONOSCOPY N/A 2017    [facility]    COLONOSCOPY N/A 2016    [facility]    Injection-steroid-epidural-lumbar L4/5 N/A 2019    [facility]    TUBAL LIGATION N/A        Family History   Problem Relation Age of Onset    Cancer Mother     Hypertension Mother     Heart disease Father     Hypertension Father     Diabetes Father        Social History     Socioeconomic History    Marital status:      Spouse name: Not on file    Number of children: Not on file    Years of education: Not on file    Highest education level: Not on file   Occupational History    Not on file   Social Needs    Financial resource strain: Not on file    Food insecurity:     Worry: Not on file     Inability: Not on file    Transportation needs:     Medical: Not on file     Non-medical: Not on file   Tobacco Use    Smoking status: Never Smoker    Smokeless tobacco: Never Used   Substance and Sexual Activity    Alcohol use: Yes     Comment: occassional    Drug use: No    Sexual activity: Yes     Birth control/protection: Surgical     Comment: tubal ligation   Lifestyle    Physical activity:     Days per week: Not on file     Minutes per session: Not on file    Stress: Not on file   Relationships    Social connections:     Talks on phone: Not on file     Gets together: Not on file     Attends Oriental orthodox service: Not on file     Active member of club or organization: Not on file     Attends  "meetings of clubs or organizations: Not on file     Relationship status: Not on file   Other Topics Concern    Not on file   Social History Narrative    Not on file       No current facility-administered medications for this encounter.        Review of patient's allergies indicates:   Allergen Reactions    Aspirin Itching    Penicillins Hives       Vitals:    07/31/19 1348   BP: 123/74   Pulse: 60   Resp: 16   Temp: 97.5 °F (36.4 °C)   TempSrc: Skin   SpO2: 100%   Weight: 68 kg (150 lb)   Height: 5' 4" (1.626 m)       REVIEW OF SYSTEMS:     GENERAL: No weight loss, malaise or fevers.  HEENT:  No recent changes in vision or hearing  NECK: Negative for lumps, no difficulty with swallowing.  RESPIRATORY: Negative for cough, wheezing or shortness of breath, patient denies any recent URI.  CARDIOVASCULAR: Negative for chest pain, leg swelling or palpitations.  GI: Negative for abdominal discomfort, blood in stools or black stools or change in bowel habits.  MUSCULOSKELETAL: See HPI.  SKIN: Negative for lesions, rash, and itching.  PSYCH: No suicidal or homicidal ideations, no current mood disturbances.  HEMATOLOGY/LYMPHOLOGY: Negative for prolonged bleeding, bruising easily or swollen nodes. Patient is not currently taking any anti-coagulants  ENDO: No history of diabetes or thyroid dysfunction  NEURO: No history of syncope, paralysis, seizures or tremors.All other reviewed and negative other than HPI.    Physical exam:  Gen: A and O x3, pleasant, well-groomed  Skin: No rashes or obvious lesions  HEENT: PERRLA, no obvious deformities on ears or in canals. No thyroid masses, trachea midline, no palpable lymph nodes in neck, axilla.  CVS: Regular rate and rhythm, normal S1 and S2, no murmurs.  Resp: Clear to auscultation bilaterally.  Abdomen: Soft, NT/ND, normal bowel sounds present.  Musculoskeletal/Neuro: Moving all extremities    Assessment:  Lumbar radiculopathy  -     Case Request Operating Room: " Injection-steroid-epidural-lumbar  -     Place in Outpatient; Standing  -     Diet NPO; Standing  -     alprazolam ODT dissolvable tablet 1 mg  -     Notify physician ; Standing  -     Notify physician ; Standing  -     Notify physician (specify); Standing  -     Verify informed consent; Standing  -     Vital signs; Standing    Other orders  -     IP VTE LOW RISK PATIENT; Standing

## 2019-07-31 NOTE — DISCHARGE SUMMARY
[facility]  Discharge Note  Short Stay    Admit Date: 7/31/2019    Discharge Date: 7/31/2019    Attending Physician: Nigel Zuniga MD     Discharge Provider: Nigel Zuniga    Diagnoses:  Active Hospital Problems    Diagnosis  POA    *Lumbar radiculopathy [M54.16]  Yes      Resolved Hospital Problems   No resolved problems to display.       Discharged Condition: good    Final Diagnoses: Lumbar radiculopathy [M54.16]    Disposition: Home or Self Care    Hospital Course: no complications, uneventful    Outcome of Hospitalization, Treatment, Procedure, or Surgery:  Patient was admitted for outpatient procedure. The patient underwent procedure without complications and are discharged home    Follow up/Patient Instructions:  Follow up as scheduled in Pain Management clinic in 3-4 weeks/Patient has received instructions and follow up date and time    Medications:  Continue previous medications    Discharge Procedure Orders   Call MD for:  temperature >100.4     Call MD for:  severe uncontrolled pain     Call MD for:  redness, tenderness, or signs of infection (pain, swelling, redness, odor or green/yellow discharge around incision site)     Call MD for:  severe persistent headache     No dressing needed         Discharge Procedure Orders (must include Diet, Follow-up, Activity):   Discharge Procedure Orders (must include Diet, Follow-up, Activity)   Call MD for:  temperature >100.4     Call MD for:  severe uncontrolled pain     Call MD for:  redness, tenderness, or signs of infection (pain, swelling, redness, odor or green/yellow discharge around incision site)     Call MD for:  severe persistent headache     No dressing needed

## 2019-07-31 NOTE — DISCHARGE INSTRUCTIONS
Recovery After Procedural Sedation (Adult)  You have been given medicine by vein to make you sleep during your surgery. This may have included both a pain medicine and sleeping medicine. Most of the effects have worn off. But you may still have some drowsiness for the next 6 to 8 hours.  Home care  Follow these guidelines when you get home:  · For the next 8 hours, you should be watched by a responsible adult. This person should make sure your condition is not getting worse.  · Don't drink any alcohol for the next 24 hours.  · Don't drive, operate dangerous machinery, or make important business or personal decisions during the next 24 hours.  Note: Your healthcare provider may tell you not to take any medicine by mouth for pain or sleep in the next 4 hours. These medicines may react with the medicines you were given in the hospital. This could cause a much stronger response than usual.  Follow-up care  Follow up with your healthcare provider if you are not alert and back to your usual level of activity within 12 hours.  When to seek medical advice  Call your healthcare provider right away if any of these occur:  · Drowsiness gets worse  · Weakness or dizziness gets worse  · Repeated vomiting  · You can't be awakened   Date Last Reviewed: 10/18/2016  © 9521-6406 [employer]. [address] [place] [address], [place] [address]. All rights reserved. This information is not intended as a substitute for professional medical care. Always follow your healthcare professional's instructions.    Home care instructions   Apply ice pack to the injection site for 20 minutes periods for the first 24 hrs for soreness/discomfort at injection site DO NOT USE HEAT FOR 24 HOURS  Keep site clean and dry for 24 hours, remove bandaid when desired  Do not drive until tomorrow  Take care when walking after a lumbar injection  STEROIDS or RADIOFREQUENCY   May take 10-14 days for full affects.  Avoid strenuous exercises for 2  days    Resume home medication as prescribed today  Resume Aspirin, Plavix, or Coumadin the day after the procedure unless otherwise instructed.    SEE IMMEDIATE MEDICAL HELP FOR:  Severe increase in your usual pain or appearance of new pain  Prolonged or increasing weakness or numbness in the legs or arms  Drainage, redness, active bleeding, or increased swelling at the injection site  Temperature over 100.0 degrees F.  Headache that increases when your head is upright and decreases when you lie flat    CALL 911 OR GO DIRECTLY TO EMERGENCY DEPARTMENT FOR:  Shortness of breath, chest pain, or problems breathing

## 2019-07-31 NOTE — OP NOTE
PROCEDURE DATE: 7/31/2019    Lumbar Interlaminar Epidural Steroid Injection under Fluoroscopic Guidance, AP Approach.    Procedure:   Interlaminar epidural steroid injection at L4/5 under fluoroscopic guidance.    Diagnosis: lUMBAR Radiculopathy    pOSTOP DIAGNOSIS: sAME    Physician: Nigel Zuniga M.D.    Medications injected:80 mg methylprednisone with 4 ml of preservative free NaCl    Local anesthetic injected:    Lidocaine 1% 4 ml total    Sedation Medications: none    Estimated blood loss:  none    Complications:  none    Technique:  Time-out taken to identify patient and procedure prior to starting the procedure.  With the patient laying in a prone position, the area was prepped and draped in the usual sterile fashion using ChloraPrep and a fenestrated drape.  After determining the target level with an AP fluoroscopic view, local anesthetic was given using a 25-gauge 1.5 inch needle by raising a wheal and then infiltrating toward the interlaminar entry space.  A 3.5inch 20-gauge Touhy needle was introduced under AP fluoroscopic guidance to the interlaminar space of L4/5. Once the trajectory was established, the needle was visualized in the lateral view and advanced using loss of resistance technique. Once in the desired position, omnipaque contrast was injected to confirm placement and there was no vascular uptake nor intrathecal spread.  The medication was then injected slowly. The patient tolerated the procedure well.      The patient was monitored after the procedure.   They were given post-procedure and discharge instructions to follow at home.  The patient was discharged in a stable condition.

## 2019-09-12 ENCOUNTER — OFFICE VISIT (OUTPATIENT)
Dept: RHEUMATOLOGY | Facility: CLINIC | Age: 54
End: 2019-09-12
Payer: COMMERCIAL

## 2019-09-12 VITALS
SYSTOLIC BLOOD PRESSURE: 119 MMHG | HEART RATE: 67 BPM | HEIGHT: 64 IN | BODY MASS INDEX: 26.12 KG/M2 | WEIGHT: 153 LBS | DIASTOLIC BLOOD PRESSURE: 77 MMHG

## 2019-09-12 DIAGNOSIS — M19.90 INFLAMMATORY ARTHRITIS: ICD-10-CM

## 2019-09-12 DIAGNOSIS — D83.9 CVID (COMMON VARIABLE IMMUNODEFICIENCY): Primary | ICD-10-CM

## 2019-09-12 DIAGNOSIS — M15.9 PRIMARY OSTEOARTHRITIS INVOLVING MULTIPLE JOINTS: ICD-10-CM

## 2019-09-12 DIAGNOSIS — M35.3 PMR (POLYMYALGIA RHEUMATICA): ICD-10-CM

## 2019-09-12 DIAGNOSIS — Z79.899 LONG-TERM USE OF PLAQUENIL: ICD-10-CM

## 2019-09-12 DIAGNOSIS — G89.4 CHRONIC PAIN SYNDROME: ICD-10-CM

## 2019-09-12 PROCEDURE — 99214 OFFICE O/P EST MOD 30 MIN: CPT | Mod: S$GLB,,, | Performed by: INTERNAL MEDICINE

## 2019-09-12 PROCEDURE — 99214 PR OFFICE/OUTPT VISIT, EST, LEVL IV, 30-39 MIN: ICD-10-PCS | Mod: S$GLB,,, | Performed by: INTERNAL MEDICINE

## 2019-09-12 PROCEDURE — 99999 PR PBB SHADOW E&M-EST. PATIENT-LVL III: ICD-10-PCS | Mod: PBBFAC,,, | Performed by: INTERNAL MEDICINE

## 2019-09-12 PROCEDURE — 99999 PR PBB SHADOW E&M-EST. PATIENT-LVL III: CPT | Mod: PBBFAC,,, | Performed by: INTERNAL MEDICINE

## 2019-09-12 RX ORDER — HYDROXYCHLOROQUINE SULFATE 200 MG/1
200 TABLET, FILM COATED ORAL 2 TIMES DAILY
Qty: 60 TABLET | Refills: 5 | Status: SHIPPED | OUTPATIENT
Start: 2019-09-12 | End: 2020-07-10

## 2019-09-12 RX ORDER — TRAMADOL HYDROCHLORIDE 50 MG/1
50 TABLET ORAL EVERY 6 HOURS PRN
Qty: 120 TABLET | Refills: 4 | Status: SHIPPED | OUTPATIENT
Start: 2019-09-12 | End: 2019-10-12

## 2019-09-12 ASSESSMENT — ROUTINE ASSESSMENT OF PATIENT INDEX DATA (RAPID3)
PSYCHOLOGICAL DISTRESS SCORE: 0
MDHAQ FUNCTION SCORE: 1.1
PATIENT GLOBAL ASSESSMENT SCORE: 7
PAIN SCORE: 6
TOTAL RAPID3 SCORE: 5.55

## 2019-09-12 NOTE — PROGRESS NOTES
Subjective:       Patient ID: Lashell Stroud is a 54 y.o. female.    Chief Complaint: Disease Management and Osteoarthritis (primary osteoarthritis involving multiple joints)    Hpi: pt was dx Ra and tx with plaquenil, mobic, severe  Spinal stenosis, with L3-L4 herniation and L4-L5 neuropathic pain radiating down legs, pt has had 2 injections some relief. Patient complains of arthralgias and myalgias for which has been present for a few years. Pain is located in multiple joints, both shoulder(s), both elbow(s), both wrist(s), both MCP(s): 1st, 2nd, 3rd, 4th and 5th, both PIP(s): 1st, 2nd, 3rd, 4th and 5th, both DIP(s): 1st and 2nd, both hip(s), both knee(s) and both MTP(s): 1st, 2nd, 3rd, 4th and 5th, is described as aching, pulsating, shooting and throbbing, and is constant, moderate .    Review of Systems   Constitutional: Positive for activity change. Negative for appetite change and unexpected weight change.   HENT: Negative for dental problem, ear discharge, ear pain, facial swelling, mouth sores, nosebleeds, postnasal drip, rhinorrhea, sinus pressure, sneezing, tinnitus and voice change.    Eyes: Negative for photophobia, pain, discharge, redness and itching.   Respiratory: Negative for apnea, chest tightness, shortness of breath and wheezing.    Cardiovascular: Positive for leg swelling. Negative for palpitations.   Gastrointestinal: Negative for abdominal distention, constipation and diarrhea.   Endocrine: Negative for cold intolerance, heat intolerance, polydipsia and polyuria.   Genitourinary: Negative for decreased urine volume, difficulty urinating, flank pain, frequency, hematuria and urgency.   Musculoskeletal: Positive for gait problem and neck stiffness. Negative for back pain.   Skin: Negative for pallor and wound.   Allergic/Immunologic: Negative for immunocompromised state.   Neurological: Negative for dizziness and tremors.   Hematological: Negative for adenopathy. Does not bruise/bleed  "easily.   Psychiatric/Behavioral: Negative for sleep disturbance. The patient is not nervous/anxious.          Objective:   /77 (BP Location: Left arm, Patient Position: Sitting, BP Method: Medium (Automatic))   Pulse 67   Ht 5' 4" (1.626 m)   Wt 69.4 kg (153 lb)   LMP 10/02/2015   BMI 26.26 kg/m²      Physical Exam   Vitals reviewed.  Constitutional: She is oriented to person, place, and time. No distress.   HENT:   Head: Normocephalic and atraumatic.   Eyes: EOM are normal. Pupils are equal, round, and reactive to light. Right eye exhibits no discharge. Left eye exhibits no discharge.   Neck: Neck supple. No thyromegaly present.   Cardiovascular: Normal rate, regular rhythm and normal heart sounds.  Exam reveals no gallop and no friction rub.    No murmur heard.  Pulmonary/Chest: Breath sounds normal. She has no wheezes. She has no rales. She exhibits no tenderness.   Abdominal: There is no tenderness. There is no rebound and no guarding.       Right Side Rheumatological Exam     Examination finds the elbow normal.    The patient is tender to palpation of the shoulder, wrist, knee, 1st PIP, 1st MCP, 2nd PIP, 2nd MCP, 3rd PIP, 3rd MCP, 4th PIP, 4th MCP, 5th PIP and 5th MCP    She has swelling of the 1st PIP, 1st MCP, 2nd PIP, 2nd MCP, 3rd PIP, 3rd MCP, 4th PIP, 4th MCP, 5th PIP and 5th MCP    Shoulder Exam   Tenderness Location: no tenderness    Range of Motion   Active abduction: abnormal   Adduction: abnormal  Sensation: normal    Knee Exam   Patellofemoral Crepitus: positive  Effusion: positive  Sensation: normal    Hip Exam   Tenderness Location: posterior  Sensation: normal    Elbow/Wrist Exam   Tenderness Location: no tenderness  Sensation: normal    Left Side Rheumatological Exam     The patient is tender to palpation of the shoulder, elbow, wrist, knee, 1st PIP, 1st MCP, 2nd PIP, 2nd MCP, 3rd PIP, 3rd MCP, 4th PIP, 4th MCP, 5th PIP and 5th MCP.    She has swelling of the 1st PIP, 1st MCP, 2nd " PIP, 2nd MCP, 3rd PIP, 3rd MCP, 4th PIP, 4th MCP, 5th PIP and 5th MCP    Shoulder Exam   Tenderness Location: no tenderness    Range of Motion   Active abduction: abnormal   Sensation: normal    Knee Exam     Patellofemoral Crepitus: positive  Effusion: positive  Sensation: normal    Hip Exam   Tenderness Location: posterior  Sensation: normal    Elbow/Wrist Exam   Sensation: normal      Back/Neck Exam   General Inspection   Gait: normal       Tenderness Right paramedian tenderness of the Upper L-Spine and Lower L-Spine.Left paramedian tenderness of the Lower L-Spine and Upper L-Spine.    Back Range of Motion   Extension: abnormal  Flexion: abnormal  Lateral Bend Right: abnormal  Lateral Bend Left: abnormal      Lymphadenopathy:     She has no cervical adenopathy.   Neurological: She is alert and oriented to person, place, and time.   Reflex Scores:       Patellar reflexes are 4+ on the right side and 4+ on the left side.  Skin: Skin is dry. No rash noted. No erythema. There is pallor.     Psychiatric: Mood and affect normal.   Musculoskeletal: She exhibits edema, tenderness and deformity.           Results for orders placed or performed in visit on 03/27/19   CBC auto differential   Result Value Ref Range    WBC 4.64 3.90 - 12.70 K/uL    RBC 4.49 4.00 - 5.40 M/uL    Hemoglobin 13.2 12.0 - 16.0 g/dL    Hematocrit 41.1 37.0 - 48.5 %    Mean Corpuscular Volume 92 82 - 98 fL    Mean Corpuscular Hemoglobin 29.4 27.0 - 31.0 pg    Mean Corpuscular Hemoglobin Conc 32.1 32.0 - 36.0 g/dL    RDW 12.7 11.5 - 14.5 %    Platelets 221 150 - 350 K/uL    MPV 10.1 9.2 - 12.9 fL    Immature Granulocytes 0.2 0.0 - 0.5 %    Gran # (ANC) 2.4 1.8 - 7.7 K/uL    Immature Grans (Abs) 0.01 0.00 - 0.04 K/uL    Lymph # 1.2 1.0 - 4.8 K/uL    Mono # 0.5 0.3 - 1.0 K/uL    Eos # 0.5 0.0 - 0.5 K/uL    Baso # 0.07 0.00 - 0.20 K/uL    nRBC 0 0 /100 WBC    Gran% 51.8 38.0 - 73.0 %    Lymph% 25.6 18.0 - 48.0 %    Mono% 11.0 4.0 - 15.0 %    Eosinophil%  9.9 (H) 0.0 - 8.0 %    Basophil% 1.5 0.0 - 1.9 %    Differential Method Automated    Comprehensive metabolic panel   Result Value Ref Range    Sodium 140 136 - 145 mmol/L    Potassium 4.7 3.5 - 5.1 mmol/L    Chloride 104 95 - 110 mmol/L    CO2 32 (H) 22 - 31 mmol/L    Glucose 66 (L) 70 - 110 mg/dL    BUN, Bld 20 (H) 7 - 18 mg/dL    Creatinine 0.63 0.50 - 1.40 mg/dL    Calcium 9.5 8.4 - 10.2 mg/dL    Total Protein 6.2 6.0 - 8.4 g/dL    Albumin 4.0 3.5 - 5.2 g/dL    Total Bilirubin 0.5 0.2 - 1.3 mg/dL    Alkaline Phosphatase 55 38 - 145 U/L    AST 35 14 - 36 U/L    ALT 25 10 - 44 U/L    Anion Gap 4 (L) 8 - 16 mmol/L    eGFR if African American >60 >60 mL/min/1.73 m^2    eGFR if non African American >60 >60 mL/min/1.73 m^2   C-reactive protein   Result Value Ref Range    CRP <0.50 0.00 - 0.90 mg/dL   Sedimentation rate   Result Value Ref Range    Sed Rate 9 0 - 29 mm/Hr                Impression       1. There is grade 1 spondylolisthesis without spondylolysis at the L3-4 level with 3 mm anterolisthesis of L3 on L4.  There is unroofing of a diffuse disc bulge with superimposed central disc extrusion.  Additionally, there is marked facet joint arthropathy.  These factors contribute to severe spinal stenosis with mild-to-moderate bilateral foraminal stenosis.  2. At the L4-5 level there is only borderline spinal stenosis but there is marked bilateral lateral recess stenosis.  There is moderate right and mild left foraminal stenosis.  3. There is no spinal canal or foraminal stenosis at the T11-12, T12-L1, L1-2, L2-3 or L5-S1 levels.  There is facet joint arthropathy at most of these levels.  Please see above discussion.      Electronically signed by: Stan Rick MD  Date: 04/05/2019  Time: 12:04                Assessment:       1. CVID (common variable immunodeficiency)    2. Inflammatory arthritis    3. PMR (polymyalgia rheumatica)    4. Long-term use of Plaquenil    5. Primary osteoarthritis involving multiple  joints    6. Inflammatory arthritis    7. Chronic pain syndrome            Plan:       Lashell was seen today for disease management and osteoarthritis.    Diagnoses and all orders for this visit:    CVID (common variable immunodeficiency)  -     CBC auto differential; Future  -     Comprehensive metabolic panel; Future  -     C-reactive protein; Future  -     Sedimentation rate; Future  -     TSH; Future  -     T4, free; Future  -     Vitamin D; Future  -     PTH, intact; Future  -     Ambulatory consult to Ophthalmology    Inflammatory arthritis  -     hydroxychloroquine (PLAQUENIL) 200 mg tablet; Take 1 tablet (200 mg total) by mouth 2 (two) times daily.  -     CBC auto differential; Future  -     Comprehensive metabolic panel; Future  -     C-reactive protein; Future  -     Sedimentation rate; Future  -     TSH; Future  -     T4, free; Future  -     Vitamin D; Future  -     PTH, intact; Future  -     Ambulatory consult to Ophthalmology    PMR (polymyalgia rheumatica)  -     CBC auto differential; Future  -     Comprehensive metabolic panel; Future  -     C-reactive protein; Future  -     Sedimentation rate; Future  -     TSH; Future  -     T4, free; Future  -     Vitamin D; Future  -     PTH, intact; Future  -     Ambulatory consult to Ophthalmology    Long-term use of Plaquenil  -     CBC auto differential; Future  -     Comprehensive metabolic panel; Future  -     C-reactive protein; Future  -     Sedimentation rate; Future  -     TSH; Future  -     T4, free; Future  -     Vitamin D; Future  -     PTH, intact; Future  -     Ambulatory consult to Ophthalmology    Primary osteoarthritis involving multiple joints  -     CBC auto differential; Future  -     Comprehensive metabolic panel; Future  -     C-reactive protein; Future  -     Sedimentation rate; Future  -     TSH; Future  -     T4, free; Future  -     Vitamin D; Future  -     PTH, intact; Future  -     Ambulatory consult to Ophthalmology    Inflammatory  arthritis  Comments:  seronegative RA  Orders:  -     hydroxychloroquine (PLAQUENIL) 200 mg tablet; Take 1 tablet (200 mg total) by mouth 2 (two) times daily.  -     CBC auto differential; Future  -     Comprehensive metabolic panel; Future  -     C-reactive protein; Future  -     Sedimentation rate; Future  -     TSH; Future  -     T4, free; Future  -     Vitamin D; Future  -     PTH, intact; Future  -     Ambulatory consult to Ophthalmology    Chronic pain syndrome  -     hydroxychloroquine (PLAQUENIL) 200 mg tablet; Take 1 tablet (200 mg total) by mouth 2 (two) times daily.  -     traMADol (ULTRAM) 50 mg tablet; Take 1 tablet (50 mg total) by mouth every 6 (six) hours as needed for Pain.  -     CBC auto differential; Future  -     Comprehensive metabolic panel; Future  -     C-reactive protein; Future  -     Sedimentation rate; Future  -     TSH; Future  -     T4, free; Future  -     Vitamin D; Future  -     PTH, intact; Future  -     Ambulatory consult to Ophthalmology    pt  Is in pt and has worsened dry needling made her pain severe, mri is abnormal and  In pain.

## 2019-09-13 DIAGNOSIS — M48.061 SPINAL STENOSIS OF LUMBAR REGION WITH RADICULOPATHY: ICD-10-CM

## 2019-09-13 DIAGNOSIS — M51.26 LUMBAR HERNIATED DISC: ICD-10-CM

## 2019-09-13 DIAGNOSIS — M25.511 PAIN IN JOINT OF RIGHT SHOULDER: ICD-10-CM

## 2019-09-13 DIAGNOSIS — Z79.899 LONG-TERM USE OF PLAQUENIL: ICD-10-CM

## 2019-09-13 DIAGNOSIS — D83.9 CVID (COMMON VARIABLE IMMUNODEFICIENCY): ICD-10-CM

## 2019-09-13 DIAGNOSIS — M19.90 INFLAMMATORY ARTHRITIS: ICD-10-CM

## 2019-09-13 DIAGNOSIS — M54.16 SPINAL STENOSIS OF LUMBAR REGION WITH RADICULOPATHY: ICD-10-CM

## 2019-09-17 RX ORDER — TRAMADOL HYDROCHLORIDE 50 MG/1
TABLET ORAL
Qty: 120 TABLET | OUTPATIENT
Start: 2019-09-17

## 2019-09-26 ENCOUNTER — PATIENT MESSAGE (OUTPATIENT)
Dept: PAIN MEDICINE | Facility: CLINIC | Age: 54
End: 2019-09-26

## 2019-10-08 ENCOUNTER — PATIENT MESSAGE (OUTPATIENT)
Dept: PAIN MEDICINE | Facility: CLINIC | Age: 54
End: 2019-10-08

## 2019-10-08 DIAGNOSIS — M48.061 SPINAL STENOSIS OF LUMBAR REGION, UNSPECIFIED WHETHER NEUROGENIC CLAUDICATION PRESENT: Primary | ICD-10-CM

## 2019-10-09 NOTE — TELEPHONE ENCOUNTER
I suggest that we have her see Neurosurgery, she can discuss if she is a candidate for surgery within them.  From there, they could make considerations for what costs may be involved.

## 2019-10-10 ENCOUNTER — PATIENT MESSAGE (OUTPATIENT)
Dept: PAIN MEDICINE | Facility: CLINIC | Age: 54
End: 2019-10-10

## 2019-10-14 ENCOUNTER — TELEPHONE (OUTPATIENT)
Dept: NEUROSURGERY | Facility: CLINIC | Age: 54
End: 2019-10-14

## 2019-11-18 VITALS
HEART RATE: 77 BPM | DIASTOLIC BLOOD PRESSURE: 61 MMHG | RESPIRATION RATE: 14 BRPM | OXYGEN SATURATION: 99 % | SYSTOLIC BLOOD PRESSURE: 108 MMHG | TEMPERATURE: 99 F

## 2019-11-18 PROBLEM — M48.061 SPINAL STENOSIS, LUMBAR REGION, WITHOUT NEUROGENIC CLAUDICATION: Status: ACTIVE | Noted: 2019-11-18

## 2019-11-18 PROBLEM — M48.061 SPINAL STENOSIS OF LUMBAR REGION: Status: ACTIVE | Noted: 2019-11-18

## 2019-11-19 VITALS
HEART RATE: 71 BPM | OXYGEN SATURATION: 97 % | TEMPERATURE: 98 F | RESPIRATION RATE: 14 BRPM | SYSTOLIC BLOOD PRESSURE: 110 MMHG | DIASTOLIC BLOOD PRESSURE: 61 MMHG

## 2019-11-19 VITALS
HEART RATE: 72 BPM | DIASTOLIC BLOOD PRESSURE: 58 MMHG | TEMPERATURE: 98 F | SYSTOLIC BLOOD PRESSURE: 106 MMHG | RESPIRATION RATE: 14 BRPM | OXYGEN SATURATION: 98 %

## 2019-11-19 VITALS
TEMPERATURE: 98 F | DIASTOLIC BLOOD PRESSURE: 59 MMHG | RESPIRATION RATE: 14 BRPM | OXYGEN SATURATION: 99 % | HEART RATE: 81 BPM | SYSTOLIC BLOOD PRESSURE: 106 MMHG

## 2019-11-19 PROBLEM — R79.89 LFTS ABNORMAL: Status: ACTIVE | Noted: 2019-11-19

## 2019-11-20 VITALS
DIASTOLIC BLOOD PRESSURE: 57 MMHG | RESPIRATION RATE: 18 BRPM | OXYGEN SATURATION: 100 % | SYSTOLIC BLOOD PRESSURE: 112 MMHG | HEART RATE: 84 BPM | TEMPERATURE: 97 F

## 2019-11-20 VITALS
SYSTOLIC BLOOD PRESSURE: 107 MMHG | DIASTOLIC BLOOD PRESSURE: 61 MMHG | HEART RATE: 69 BPM | RESPIRATION RATE: 16 BRPM | TEMPERATURE: 98 F | OXYGEN SATURATION: 98 %

## 2019-12-03 RX ORDER — MELOXICAM 15 MG/1
15 TABLET ORAL DAILY
Qty: 30 TABLET | Refills: 3 | Status: SHIPPED | OUTPATIENT
Start: 2019-12-03 | End: 2020-04-13 | Stop reason: SDUPTHER

## 2020-03-06 DIAGNOSIS — G89.4 CHRONIC PAIN SYNDROME: Primary | ICD-10-CM

## 2020-03-06 DIAGNOSIS — G89.4 CHRONIC PAIN SYNDROME: ICD-10-CM

## 2020-03-06 RX ORDER — TRAMADOL HYDROCHLORIDE 50 MG/1
50 TABLET ORAL EVERY 6 HOURS PRN
Qty: 120 TABLET | Refills: 0 | Status: SHIPPED | OUTPATIENT
Start: 2020-03-06 | End: 2020-04-13 | Stop reason: SDUPTHER

## 2020-03-06 RX ORDER — TRAMADOL HYDROCHLORIDE 50 MG/1
TABLET ORAL
Qty: 120 TABLET | Refills: 0 | Status: SHIPPED | OUTPATIENT
Start: 2020-03-06 | End: 2020-03-06 | Stop reason: SDUPTHER

## 2020-03-06 NOTE — TELEPHONE ENCOUNTER
----- Message from Eric Kaur sent at 3/6/2020 11:07 AM CST -----  Contact: pedro  Type: Needs Medical Advice    Who Called:  Pedro  Symptoms (please be specific):    How long has patient had these symptoms:    Pharmacy name and phone #:   [facility] [phone]  Best Call Back Number:   Additional Information: called to advise that script needs to reflet medical necessary/greater than 7 day supply traMADol (ULTRAM) 50 mg tablet

## 2020-04-13 DIAGNOSIS — G89.4 CHRONIC PAIN SYNDROME: ICD-10-CM

## 2020-04-18 RX ORDER — TRAMADOL HYDROCHLORIDE 50 MG/1
50 TABLET ORAL EVERY 6 HOURS PRN
Qty: 120 TABLET | Refills: 0 | Status: SHIPPED | OUTPATIENT
Start: 2020-04-18 | End: 2020-05-22 | Stop reason: SDUPTHER

## 2020-04-18 RX ORDER — MELOXICAM 15 MG/1
15 TABLET ORAL DAILY
Qty: 30 TABLET | Refills: 0 | Status: SHIPPED | OUTPATIENT
Start: 2020-04-18 | End: 2020-05-29

## 2020-05-12 ENCOUNTER — DOCUMENTATION ONLY (OUTPATIENT)
Dept: RHEUMATOLOGY | Facility: CLINIC | Age: 55
End: 2020-05-12

## 2020-05-20 ENCOUNTER — PATIENT MESSAGE (OUTPATIENT)
Dept: RHEUMATOLOGY | Facility: CLINIC | Age: 55
End: 2020-05-20

## 2020-05-22 ENCOUNTER — PATIENT MESSAGE (OUTPATIENT)
Dept: RHEUMATOLOGY | Facility: CLINIC | Age: 55
End: 2020-05-22

## 2020-05-22 DIAGNOSIS — G89.4 CHRONIC PAIN SYNDROME: ICD-10-CM

## 2020-05-22 RX ORDER — TRAMADOL HYDROCHLORIDE 50 MG/1
50 TABLET ORAL EVERY 6 HOURS PRN
Qty: 120 TABLET | Refills: 0 | Status: SHIPPED | OUTPATIENT
Start: 2020-05-22 | End: 2020-09-23 | Stop reason: SDUPTHER

## 2020-05-26 DIAGNOSIS — G89.4 CHRONIC PAIN SYNDROME: ICD-10-CM

## 2020-05-29 RX ORDER — MELOXICAM 15 MG/1
TABLET ORAL
Qty: 30 TABLET | Refills: 3 | Status: SHIPPED | OUTPATIENT
Start: 2020-05-29 | End: 2020-09-20

## 2020-07-29 PROBLEM — U07.1 COVID-19 VIRUS INFECTION: Status: ACTIVE | Noted: 2020-07-20

## 2020-08-14 PROBLEM — M75.102 ROTATOR CUFF SYNDROME, LEFT: Status: ACTIVE | Noted: 2020-08-14

## 2020-09-11 DIAGNOSIS — G89.4 CHRONIC PAIN SYNDROME: ICD-10-CM

## 2020-09-16 ENCOUNTER — PATIENT MESSAGE (OUTPATIENT)
Dept: RHEUMATOLOGY | Facility: CLINIC | Age: 55
End: 2020-09-16

## 2020-09-16 RX ORDER — TRAMADOL HYDROCHLORIDE 50 MG/1
TABLET ORAL
Qty: 120 TABLET | Refills: 0 | OUTPATIENT
Start: 2020-09-16

## 2020-09-23 ENCOUNTER — OFFICE VISIT (OUTPATIENT)
Dept: RHEUMATOLOGY | Facility: CLINIC | Age: 55
End: 2020-09-23
Payer: COMMERCIAL

## 2020-09-23 VITALS
DIASTOLIC BLOOD PRESSURE: 77 MMHG | BODY MASS INDEX: 25.6 KG/M2 | SYSTOLIC BLOOD PRESSURE: 112 MMHG | WEIGHT: 149.94 LBS | HEIGHT: 64 IN | HEART RATE: 64 BPM

## 2020-09-23 DIAGNOSIS — M35.00 SICCA SYNDROME: ICD-10-CM

## 2020-09-23 DIAGNOSIS — D83.9 CVID (COMMON VARIABLE IMMUNODEFICIENCY): Primary | ICD-10-CM

## 2020-09-23 DIAGNOSIS — G89.4 CHRONIC PAIN SYNDROME: ICD-10-CM

## 2020-09-23 DIAGNOSIS — M35.3 PMR (POLYMYALGIA RHEUMATICA): ICD-10-CM

## 2020-09-23 DIAGNOSIS — Z79.899 LONG-TERM USE OF PLAQUENIL: ICD-10-CM

## 2020-09-23 DIAGNOSIS — M54.16 SPINAL STENOSIS OF LUMBAR REGION WITH RADICULOPATHY: ICD-10-CM

## 2020-09-23 DIAGNOSIS — M19.90 INFLAMMATORY ARTHRITIS: ICD-10-CM

## 2020-09-23 DIAGNOSIS — M48.061 SPINAL STENOSIS OF LUMBAR REGION WITH RADICULOPATHY: ICD-10-CM

## 2020-09-23 PROCEDURE — 99215 PR OFFICE/OUTPT VISIT, EST, LEVL V, 40-54 MIN: ICD-10-PCS | Mod: S$GLB,,, | Performed by: INTERNAL MEDICINE

## 2020-09-23 PROCEDURE — 99215 OFFICE O/P EST HI 40 MIN: CPT | Mod: S$GLB,,, | Performed by: INTERNAL MEDICINE

## 2020-09-23 PROCEDURE — 99999 PR PBB SHADOW E&M-EST. PATIENT-LVL III: ICD-10-PCS | Mod: PBBFAC,,, | Performed by: INTERNAL MEDICINE

## 2020-09-23 PROCEDURE — 99999 PR PBB SHADOW E&M-EST. PATIENT-LVL III: CPT | Mod: PBBFAC,,, | Performed by: INTERNAL MEDICINE

## 2020-09-23 RX ORDER — TRAMADOL HYDROCHLORIDE 50 MG/1
50 TABLET ORAL EVERY 6 HOURS PRN
Qty: 120 TABLET | Refills: 5 | Status: ON HOLD | OUTPATIENT
Start: 2020-09-23 | End: 2020-11-27 | Stop reason: HOSPADM

## 2020-09-23 RX ORDER — PREDNISONE 1 MG/1
5 TABLET ORAL DAILY PRN
Qty: 150 TABLET | Refills: 0 | Status: SHIPPED | OUTPATIENT
Start: 2020-09-23 | End: 2020-10-23

## 2020-09-23 RX ORDER — HYDROXYCHLOROQUINE SULFATE 200 MG/1
200 TABLET, FILM COATED ORAL 2 TIMES DAILY
Qty: 180 TABLET | Refills: 3 | Status: SHIPPED | OUTPATIENT
Start: 2020-09-23 | End: 2020-12-22

## 2020-09-23 ASSESSMENT — ROUTINE ASSESSMENT OF PATIENT INDEX DATA (RAPID3)
TOTAL RAPID3 SCORE: 4.06
PATIENT GLOBAL ASSESSMENT SCORE: 7
FATIGUE SCORE: 2.2
MDHAQ FUNCTION SCORE: 0.5
PSYCHOLOGICAL DISTRESS SCORE: 0
PAIN SCORE: 3.5

## 2020-09-23 NOTE — PROGRESS NOTES
Subjective:       Patient ID: Lashell Stroud is a 55 y.o. female.    Chief Complaint: Disease Management (CVID)    Follow up: pt was dx Ra and tx with plaquenil, mobic, severe  Spinal stenosis, with L3-L4 herniation and L4-L5 neuropathic pain radiating down legs,  She had covid 19 July, 2020.. Patient complains of arthralgias and myalgias for which has been present for a few years. Pain is located in multiple joints, both shoulder(s), both elbow(s), both wrist(s), both MCP(s): 1st, 2nd, 3rd, 4th and 5th, both PIP(s): 1st, 2nd, 3rd, 4th and 5th, both DIP(s): 1st and 2nd, both hip(s), both knee(s) and both MTP(s): 1st, 2nd, 3rd, 4th and 5th, is described as aching, pulsating, shooting and throbbing, and is constant, moderate .    Review of Systems   Constitutional: Positive for activity change. Negative for appetite change and unexpected weight change.   HENT: Negative for dental problem, ear discharge, ear pain, facial swelling, mouth sores, nosebleeds, postnasal drip, rhinorrhea, sinus pressure, sneezing, tinnitus and voice change.    Eyes: Negative for photophobia, pain, discharge, redness and itching.   Respiratory: Negative for apnea, chest tightness, shortness of breath and wheezing.    Cardiovascular: Positive for leg swelling. Negative for palpitations.   Gastrointestinal: Negative for abdominal distention, constipation and diarrhea.   Endocrine: Negative for cold intolerance, heat intolerance, polydipsia and polyuria.   Genitourinary: Negative for decreased urine volume, difficulty urinating, flank pain, frequency, hematuria and urgency.   Musculoskeletal: Positive for gait problem and neck stiffness. Negative for back pain.   Skin: Negative for pallor and wound.   Allergic/Immunologic: Negative for immunocompromised state.   Neurological: Negative for dizziness and tremors.   Hematological: Negative for adenopathy. Does not bruise/bleed easily.   Psychiatric/Behavioral: Negative for sleep  "disturbance. The patient is not nervous/anxious.          Objective:   /77 (BP Location: Left arm, Patient Position: Sitting, BP Method: Medium (Automatic))   Pulse 64   Ht 5' 4" (1.626 m)   Wt 68 kg (149 lb 14.6 oz)   LMP 10/02/2015   BMI 25.73 kg/m²      Physical Exam   Vitals reviewed.  Constitutional: She is oriented to person, place, and time. No distress.   HENT:   Head: Normocephalic and atraumatic.   Eyes: EOM are normal. Pupils are equal, round, and reactive to light. Right eye exhibits no discharge. Left eye exhibits no discharge.   Neck: Neck supple. No thyromegaly present.   Cardiovascular: Normal rate, regular rhythm and normal heart sounds.  Exam reveals no gallop and no friction rub.    No murmur heard.  Pulmonary/Chest: Breath sounds normal. She has no wheezes. She has no rales. She exhibits no tenderness.   Abdominal: There is no abdominal tenderness. There is no rebound and no guarding.       Right Side Rheumatological Exam     Examination finds the elbow normal.    The patient is tender to palpation of the 1st PIP, 1st MCP, 2nd PIP, 2nd MCP, 3rd PIP, 3rd MCP, 4th PIP, 4th MCP, 5th PIP and 5th MCP    She has swelling of the 1st PIP, 1st MCP, 2nd PIP, 2nd MCP, 3rd PIP, 3rd MCP, 4th PIP, 4th MCP, 5th PIP and 5th MCP    Shoulder Exam   Tenderness Location: no tenderness    Range of Motion   Active abduction: abnormal   Adduction: abnormal  Sensation: normal    Knee Exam   Patellofemoral Crepitus: positive  Effusion: positive  Sensation: normal    Hip Exam   Tenderness Location: posterior  Sensation: normal    Elbow/Wrist Exam   Tenderness Location: no tenderness  Sensation: normal    Left Side Rheumatological Exam     The patient is tender to palpation of the 1st PIP, 1st MCP, 2nd PIP, 2nd MCP, 3rd PIP, 3rd MCP, 4th PIP, 4th MCP, 5th PIP and 5th MCP.    She has swelling of the 1st PIP, 1st MCP, 2nd PIP, 2nd MCP, 3rd PIP, 3rd MCP, 4th PIP, 4th MCP, 5th PIP and 5th MCP    Shoulder Exam "   Tenderness Location: no tenderness    Range of Motion   Active abduction: abnormal   Sensation: normal    Knee Exam     Patellofemoral Crepitus: positive  Effusion: positive  Sensation: normal    Hip Exam   Tenderness Location: posterior  Sensation: normal    Elbow/Wrist Exam   Sensation: normal      Back/Neck Exam   General Inspection   Gait: normal       Tenderness Right paramedian tenderness of the Upper L-Spine and Lower L-Spine.Left paramedian tenderness of the Lower L-Spine and Upper L-Spine.    Back Range of Motion   Extension: abnormal  Flexion: abnormal  Lateral Bend Right: abnormal  Lateral Bend Left: abnormal      Lymphadenopathy:     She has no cervical adenopathy.   Neurological: She is alert and oriented to person, place, and time.   Reflex Scores:       Patellar reflexes are 4+ on the right side and 4+ on the left side.  Skin: Skin is dry. No rash noted. No erythema. There is pallor.     Psychiatric: Mood and affect normal.   Musculoskeletal: Deformity present. No tenderness or edema.           Results for orders placed or performed in visit on 07/17/20   Misc Sendout Test, Non-Blood   Result Value Ref Range    Miscellaneous Test Name COVID-19     Specimen Type NASAL SWAB     Test Result See result image under hyperlink     [facility]          Assessment:       1. CVID (common variable immunodeficiency)    2. Chronic pain syndrome    3. Inflammatory arthritis    4. PMR (polymyalgia rheumatica)    5. Long-term use of Plaquenil    6. Spinal stenosis of lumbar region with radiculopathy    7. Sicca syndrome            Plan:       Lashell was seen today for disease management.    Diagnoses and all orders for this visit:    CVID (common variable immunodeficiency)  -     traMADoL (ULTRAM) 50 mg tablet; Take 1 tablet (50 mg total) by mouth every 6 (six) hours as needed.  -     CBC auto differential; Future  -     Comprehensive metabolic panel; Future  -     hydrOXYchloroQUINE (PLAQUENIL) 200 mg  tablet; Take 1 tablet (200 mg total) by mouth 2 (two) times daily.  -     predniSONE (DELTASONE) 1 MG tablet; Take 5 tablets (5 mg total) by mouth daily as needed.    Chronic pain syndrome  -     traMADoL (ULTRAM) 50 mg tablet; Take 1 tablet (50 mg total) by mouth every 6 (six) hours as needed.  -     CBC auto differential; Future  -     Comprehensive metabolic panel; Future  -     hydrOXYchloroQUINE (PLAQUENIL) 200 mg tablet; Take 1 tablet (200 mg total) by mouth 2 (two) times daily.  -     predniSONE (DELTASONE) 1 MG tablet; Take 5 tablets (5 mg total) by mouth daily as needed.    Inflammatory arthritis  -     traMADoL (ULTRAM) 50 mg tablet; Take 1 tablet (50 mg total) by mouth every 6 (six) hours as needed.  -     CBC auto differential; Future  -     Comprehensive metabolic panel; Future  -     hydrOXYchloroQUINE (PLAQUENIL) 200 mg tablet; Take 1 tablet (200 mg total) by mouth 2 (two) times daily.  -     predniSONE (DELTASONE) 1 MG tablet; Take 5 tablets (5 mg total) by mouth daily as needed.    PMR (polymyalgia rheumatica)  -     traMADoL (ULTRAM) 50 mg tablet; Take 1 tablet (50 mg total) by mouth every 6 (six) hours as needed.  -     CBC auto differential; Future  -     Comprehensive metabolic panel; Future  -     hydrOXYchloroQUINE (PLAQUENIL) 200 mg tablet; Take 1 tablet (200 mg total) by mouth 2 (two) times daily.  -     predniSONE (DELTASONE) 1 MG tablet; Take 5 tablets (5 mg total) by mouth daily as needed.    Long-term use of Plaquenil  -     traMADoL (ULTRAM) 50 mg tablet; Take 1 tablet (50 mg total) by mouth every 6 (six) hours as needed.  -     CBC auto differential; Future  -     Comprehensive metabolic panel; Future  -     hydrOXYchloroQUINE (PLAQUENIL) 200 mg tablet; Take 1 tablet (200 mg total) by mouth 2 (two) times daily.  -     predniSONE (DELTASONE) 1 MG tablet; Take 5 tablets (5 mg total) by mouth daily as needed.    Spinal stenosis of lumbar region with radiculopathy  -     traMADoL (ULTRAM)  50 mg tablet; Take 1 tablet (50 mg total) by mouth every 6 (six) hours as needed.  -     CBC auto differential; Future  -     Comprehensive metabolic panel; Future  -     hydrOXYchloroQUINE (PLAQUENIL) 200 mg tablet; Take 1 tablet (200 mg total) by mouth 2 (two) times daily.  -     predniSONE (DELTASONE) 1 MG tablet; Take 5 tablets (5 mg total) by mouth daily as needed.    Sicca syndrome  -     traMADoL (ULTRAM) 50 mg tablet; Take 1 tablet (50 mg total) by mouth every 6 (six) hours as needed.  -     CBC auto differential; Future  -     Comprehensive metabolic panel; Future  -     hydrOXYchloroQUINE (PLAQUENIL) 200 mg tablet; Take 1 tablet (200 mg total) by mouth 2 (two) times daily.  -     predniSONE (DELTASONE) 1 MG tablet; Take 5 tablets (5 mg total) by mouth daily as needed.      I have  check [place] prescription monitoring program site and no unusual or abnormal behavior has occurred pt understand the risk and benefits of taking opioid medications and has decided to continue the  medication

## 2020-11-06 PROBLEM — M75.122 NONTRAUMATIC COMPLETE TEAR OF LEFT ROTATOR CUFF: Status: ACTIVE | Noted: 2020-11-06

## 2020-11-18 PROBLEM — M48.061 SPINAL STENOSIS OF LUMBAR REGION: Status: RESOLVED | Noted: 2019-11-18 | Resolved: 2020-11-18

## 2020-11-18 PROBLEM — M76.12 PSOAS TENDINITIS OF BOTH SIDES: Status: RESOLVED | Noted: 2019-04-01 | Resolved: 2020-11-18

## 2020-11-18 PROBLEM — M75.122 NONTRAUMATIC COMPLETE TEAR OF LEFT ROTATOR CUFF: Status: RESOLVED | Noted: 2020-11-06 | Resolved: 2020-11-18

## 2020-11-18 PROBLEM — K59.00 CONSTIPATION: Status: RESOLVED | Noted: 2017-12-06 | Resolved: 2020-11-18

## 2020-11-18 PROBLEM — M76.11 PSOAS TENDINITIS OF BOTH SIDES: Status: RESOLVED | Noted: 2019-04-01 | Resolved: 2020-11-18

## 2020-11-27 PROBLEM — M75.121: Status: RESOLVED | Noted: 2020-11-27 | Resolved: 2020-11-27

## 2020-11-27 PROBLEM — M75.121: Status: ACTIVE | Noted: 2020-11-27

## 2020-11-27 PROBLEM — M75.122: Status: RESOLVED | Noted: 2020-11-27 | Resolved: 2020-11-27

## 2020-11-27 PROBLEM — M75.122: Status: ACTIVE | Noted: 2020-11-27

## 2020-12-08 DIAGNOSIS — G89.4 CHRONIC PAIN SYNDROME: ICD-10-CM

## 2020-12-09 ENCOUNTER — PATIENT MESSAGE (OUTPATIENT)
Dept: RHEUMATOLOGY | Facility: CLINIC | Age: 55
End: 2020-12-09

## 2020-12-10 NOTE — TELEPHONE ENCOUNTER
Patient is asking why her tramadol refill was canceled. Forwarding for advisement. Last office visit was 9/23/20. Patient Opiods Refill Protocol failed.

## 2020-12-11 ENCOUNTER — PATIENT MESSAGE (OUTPATIENT)
Dept: RHEUMATOLOGY | Facility: CLINIC | Age: 55
End: 2020-12-11

## 2020-12-11 DIAGNOSIS — G89.4 CHRONIC PAIN SYNDROME: Primary | ICD-10-CM

## 2020-12-13 RX ORDER — TRAMADOL HYDROCHLORIDE 50 MG/1
50 TABLET ORAL EVERY 6 HOURS PRN
Qty: 120 TABLET | Refills: 2 | Status: SHIPPED | OUTPATIENT
Start: 2020-12-13 | End: 2021-01-08 | Stop reason: SDUPTHER

## 2020-12-13 RX ORDER — TRAMADOL HYDROCHLORIDE 50 MG/1
TABLET ORAL
Qty: 120 TABLET | Refills: 3 | Status: SHIPPED | OUTPATIENT
Start: 2020-12-13 | End: 2021-03-10 | Stop reason: SDUPTHER

## 2020-12-14 ENCOUNTER — PATIENT MESSAGE (OUTPATIENT)
Dept: RHEUMATOLOGY | Facility: CLINIC | Age: 55
End: 2020-12-14

## 2021-01-04 PROBLEM — Z98.890 STATUS POST LEFT ROTATOR CUFF REPAIR: Status: ACTIVE | Noted: 2021-01-04

## 2021-03-10 ENCOUNTER — OFFICE VISIT (OUTPATIENT)
Dept: RHEUMATOLOGY | Facility: CLINIC | Age: 56
End: 2021-03-10
Payer: COMMERCIAL

## 2021-03-10 VITALS
DIASTOLIC BLOOD PRESSURE: 68 MMHG | BODY MASS INDEX: 25.99 KG/M2 | WEIGHT: 156 LBS | SYSTOLIC BLOOD PRESSURE: 117 MMHG | HEIGHT: 65 IN | HEART RATE: 66 BPM

## 2021-03-10 DIAGNOSIS — M19.90 INFLAMMATORY ARTHRITIS: ICD-10-CM

## 2021-03-10 DIAGNOSIS — D83.9 CVID (COMMON VARIABLE IMMUNODEFICIENCY): Primary | ICD-10-CM

## 2021-03-10 DIAGNOSIS — Z79.899 LONG-TERM USE OF PLAQUENIL: ICD-10-CM

## 2021-03-10 DIAGNOSIS — G89.4 CHRONIC PAIN SYNDROME: ICD-10-CM

## 2021-03-10 DIAGNOSIS — M35.3 PMR (POLYMYALGIA RHEUMATICA): ICD-10-CM

## 2021-03-10 PROCEDURE — 99215 PR OFFICE/OUTPT VISIT, EST, LEVL V, 40-54 MIN: ICD-10-PCS | Mod: S$GLB,,, | Performed by: INTERNAL MEDICINE

## 2021-03-10 PROCEDURE — 99215 OFFICE O/P EST HI 40 MIN: CPT | Mod: S$GLB,,, | Performed by: INTERNAL MEDICINE

## 2021-03-10 PROCEDURE — 99999 PR PBB SHADOW E&M-EST. PATIENT-LVL III: ICD-10-PCS | Mod: PBBFAC,,, | Performed by: INTERNAL MEDICINE

## 2021-03-10 PROCEDURE — 99999 PR PBB SHADOW E&M-EST. PATIENT-LVL III: CPT | Mod: PBBFAC,,, | Performed by: INTERNAL MEDICINE

## 2021-03-10 RX ORDER — MELOXICAM 15 MG/1
15 TABLET ORAL DAILY
Qty: 30 TABLET | Refills: 12 | Status: SHIPPED | OUTPATIENT
Start: 2021-03-10 | End: 2021-06-04

## 2021-03-10 RX ORDER — HYDROXYCHLOROQUINE SULFATE 200 MG/1
200 TABLET, FILM COATED ORAL 2 TIMES DAILY
Qty: 60 TABLET | Refills: 12 | Status: SHIPPED | OUTPATIENT
Start: 2021-03-10 | End: 2021-04-09

## 2021-03-10 RX ORDER — TRAMADOL HYDROCHLORIDE 50 MG/1
50 TABLET ORAL EVERY 6 HOURS PRN
Qty: 120 TABLET | Refills: 5 | Status: SHIPPED | OUTPATIENT
Start: 2021-03-10 | End: 2021-04-07

## 2021-03-10 ASSESSMENT — ROUTINE ASSESSMENT OF PATIENT INDEX DATA (RAPID3)
TOTAL RAPID3 SCORE: 1.39
MDHAQ FUNCTION SCORE: 0.2
FATIGUE SCORE: 1.1
PATIENT GLOBAL ASSESSMENT SCORE: 1
PAIN SCORE: 2.5
PSYCHOLOGICAL DISTRESS SCORE: 0

## 2021-03-28 ENCOUNTER — PATIENT MESSAGE (OUTPATIENT)
Dept: RHEUMATOLOGY | Facility: CLINIC | Age: 56
End: 2021-03-28

## 2021-04-01 ENCOUNTER — PATIENT MESSAGE (OUTPATIENT)
Dept: RHEUMATOLOGY | Facility: CLINIC | Age: 56
End: 2021-04-01

## 2021-04-01 ENCOUNTER — TELEPHONE (OUTPATIENT)
Dept: RHEUMATOLOGY | Facility: CLINIC | Age: 56
End: 2021-04-01

## 2021-04-01 DIAGNOSIS — E03.9 HYPOTHYROIDISM, UNSPECIFIED TYPE: Primary | ICD-10-CM

## 2021-04-01 RX ORDER — LEVOTHYROXINE SODIUM 25 UG/1
25 TABLET ORAL
Qty: 30 TABLET | Refills: 3 | Status: SHIPPED | OUTPATIENT
Start: 2021-04-01 | End: 2021-08-06 | Stop reason: SDUPTHER

## 2021-04-03 DIAGNOSIS — G89.4 CHRONIC PAIN SYNDROME: ICD-10-CM

## 2021-04-07 RX ORDER — TRAMADOL HYDROCHLORIDE 50 MG/1
TABLET ORAL
Qty: 120 TABLET | Refills: 2 | Status: SHIPPED | OUTPATIENT
Start: 2021-04-07 | End: 2021-07-06

## 2021-05-27 PROBLEM — Z98.890 STATUS POST LEFT ROTATOR CUFF REPAIR: Status: RESOLVED | Noted: 2021-01-04 | Resolved: 2021-05-27

## 2021-05-27 PROBLEM — M75.122 NONTRAUMATIC COMPLETE TEAR OF LEFT ROTATOR CUFF: Status: RESOLVED | Noted: 2020-11-06 | Resolved: 2021-05-27

## 2021-07-05 DIAGNOSIS — G89.4 CHRONIC PAIN SYNDROME: ICD-10-CM

## 2021-07-06 RX ORDER — TRAMADOL HYDROCHLORIDE 50 MG/1
TABLET ORAL
Qty: 120 TABLET | Refills: 2 | Status: SHIPPED | OUTPATIENT
Start: 2021-07-06 | End: 2021-08-06 | Stop reason: SDUPTHER

## 2021-07-13 PROBLEM — Z98.1 HISTORY OF LUMBAR SPINAL FUSION: Status: ACTIVE | Noted: 2019-07-11

## 2021-07-31 ENCOUNTER — PATIENT MESSAGE (OUTPATIENT)
Dept: RHEUMATOLOGY | Facility: CLINIC | Age: 56
End: 2021-07-31

## 2021-08-05 ENCOUNTER — OFFICE VISIT (OUTPATIENT)
Dept: RHEUMATOLOGY | Facility: CLINIC | Age: 56
End: 2021-08-05
Payer: COMMERCIAL

## 2021-08-05 DIAGNOSIS — G89.4 CHRONIC PAIN SYNDROME: Primary | ICD-10-CM

## 2021-08-05 DIAGNOSIS — B35.1 FUNGAL NAIL INFECTION: ICD-10-CM

## 2021-08-05 DIAGNOSIS — M35.3 PMR (POLYMYALGIA RHEUMATICA): ICD-10-CM

## 2021-08-05 DIAGNOSIS — Z79.899 LONG-TERM USE OF PLAQUENIL: ICD-10-CM

## 2021-08-05 DIAGNOSIS — E03.9 HYPOTHYROIDISM, UNSPECIFIED TYPE: ICD-10-CM

## 2021-08-05 DIAGNOSIS — D83.9 CVID (COMMON VARIABLE IMMUNODEFICIENCY): ICD-10-CM

## 2021-08-05 DIAGNOSIS — I10 ESSENTIAL HYPERTENSION: ICD-10-CM

## 2021-08-05 DIAGNOSIS — M19.90 INFLAMMATORY ARTHRITIS: ICD-10-CM

## 2021-08-05 PROCEDURE — 99214 PR OFFICE/OUTPT VISIT, EST, LEVL IV, 30-39 MIN: ICD-10-PCS | Mod: 95,,, | Performed by: INTERNAL MEDICINE

## 2021-08-05 PROCEDURE — 99214 OFFICE O/P EST MOD 30 MIN: CPT | Mod: 95,,, | Performed by: INTERNAL MEDICINE

## 2021-08-06 RX ORDER — LEVOTHYROXINE SODIUM 25 UG/1
25 TABLET ORAL
Qty: 30 TABLET | Refills: 6 | Status: SHIPPED | OUTPATIENT
Start: 2021-08-06 | End: 2022-02-15

## 2021-08-06 RX ORDER — TRAMADOL HYDROCHLORIDE 50 MG/1
50 TABLET ORAL EVERY 6 HOURS PRN
Qty: 120 TABLET | Refills: 5 | Status: SHIPPED | OUTPATIENT
Start: 2021-08-06 | End: 2022-02-13

## 2021-08-06 RX ORDER — METOPROLOL SUCCINATE 25 MG/1
25 TABLET, EXTENDED RELEASE ORAL DAILY
Qty: 90 TABLET | Refills: 3 | Status: SHIPPED | OUTPATIENT
Start: 2021-08-06 | End: 2021-09-03

## 2021-08-06 RX ORDER — TERBINAFINE HYDROCHLORIDE 250 MG/1
250 TABLET ORAL DAILY
Qty: 30 TABLET | Refills: 3 | Status: SHIPPED | OUTPATIENT
Start: 2021-08-06 | End: 2021-09-05

## 2021-08-25 ENCOUNTER — PATIENT MESSAGE (OUTPATIENT)
Dept: RHEUMATOLOGY | Facility: CLINIC | Age: 56
End: 2021-08-25

## 2021-08-26 ENCOUNTER — PATIENT MESSAGE (OUTPATIENT)
Dept: RHEUMATOLOGY | Facility: CLINIC | Age: 56
End: 2021-08-26

## 2021-10-15 PROBLEM — Z80.0 FAMILY HISTORY OF COLON CANCER: Status: ACTIVE | Noted: 2021-10-15

## 2021-12-03 ENCOUNTER — TELEPHONE (OUTPATIENT)
Dept: RHEUMATOLOGY | Facility: CLINIC | Age: 56
End: 2021-12-03
Payer: COMMERCIAL

## 2022-02-08 DIAGNOSIS — E03.9 HYPOTHYROIDISM, UNSPECIFIED TYPE: ICD-10-CM

## 2022-02-08 DIAGNOSIS — G89.4 CHRONIC PAIN SYNDROME: ICD-10-CM

## 2022-02-08 DIAGNOSIS — M19.90 INFLAMMATORY ARTHRITIS: ICD-10-CM

## 2022-02-08 DIAGNOSIS — I10 ESSENTIAL HYPERTENSION: ICD-10-CM

## 2022-02-08 DIAGNOSIS — M35.3 PMR (POLYMYALGIA RHEUMATICA): ICD-10-CM

## 2022-02-08 DIAGNOSIS — Z79.899 LONG-TERM USE OF PLAQUENIL: ICD-10-CM

## 2022-02-13 RX ORDER — MELOXICAM 15 MG/1
TABLET ORAL
Qty: 30 TABLET | Refills: 3 | Status: SHIPPED | OUTPATIENT
Start: 2022-02-13 | End: 2022-07-23

## 2022-02-13 RX ORDER — TRAMADOL HYDROCHLORIDE 50 MG/1
50 TABLET ORAL EVERY 6 HOURS PRN
Qty: 120 TABLET | Refills: 5 | Status: SHIPPED | OUTPATIENT
Start: 2022-02-13 | End: 2022-08-19

## 2022-02-15 ENCOUNTER — TELEPHONE (OUTPATIENT)
Dept: ORTHOPEDICS | Facility: CLINIC | Age: 57
End: 2022-02-15
Payer: COMMERCIAL

## 2022-02-15 ENCOUNTER — OFFICE VISIT (OUTPATIENT)
Dept: RHEUMATOLOGY | Facility: CLINIC | Age: 57
End: 2022-02-15
Payer: COMMERCIAL

## 2022-02-15 VITALS
DIASTOLIC BLOOD PRESSURE: 79 MMHG | BODY MASS INDEX: 26.29 KG/M2 | WEIGHT: 154 LBS | SYSTOLIC BLOOD PRESSURE: 126 MMHG | HEART RATE: 64 BPM | HEIGHT: 64 IN

## 2022-02-15 DIAGNOSIS — M35.3 PMR (POLYMYALGIA RHEUMATICA): Primary | ICD-10-CM

## 2022-02-15 DIAGNOSIS — M19.041 OSTEOARTHRITIS OF BOTH HANDS, UNSPECIFIED OSTEOARTHRITIS TYPE: ICD-10-CM

## 2022-02-15 DIAGNOSIS — M19.042 OSTEOARTHRITIS OF BOTH HANDS, UNSPECIFIED OSTEOARTHRITIS TYPE: ICD-10-CM

## 2022-02-15 DIAGNOSIS — G89.4 CHRONIC PAIN SYNDROME: ICD-10-CM

## 2022-02-15 DIAGNOSIS — Z79.899 LONG-TERM USE OF PLAQUENIL: ICD-10-CM

## 2022-02-15 DIAGNOSIS — E03.9 HYPOTHYROIDISM, UNSPECIFIED TYPE: ICD-10-CM

## 2022-02-15 PROCEDURE — 99999 PR PBB SHADOW E&M-EST. PATIENT-LVL V: ICD-10-PCS | Mod: PBBFAC,,, | Performed by: PHYSICIAN ASSISTANT

## 2022-02-15 PROCEDURE — 3008F BODY MASS INDEX DOCD: CPT | Mod: CPTII,S$GLB,, | Performed by: PHYSICIAN ASSISTANT

## 2022-02-15 PROCEDURE — 99214 PR OFFICE/OUTPT VISIT, EST, LEVL IV, 30-39 MIN: ICD-10-PCS | Mod: S$GLB,,, | Performed by: PHYSICIAN ASSISTANT

## 2022-02-15 PROCEDURE — 1159F MED LIST DOCD IN RCRD: CPT | Mod: CPTII,S$GLB,, | Performed by: PHYSICIAN ASSISTANT

## 2022-02-15 PROCEDURE — 3078F DIAST BP <80 MM HG: CPT | Mod: CPTII,S$GLB,, | Performed by: PHYSICIAN ASSISTANT

## 2022-02-15 PROCEDURE — 3078F PR MOST RECENT DIASTOLIC BLOOD PRESSURE < 80 MM HG: ICD-10-PCS | Mod: CPTII,S$GLB,, | Performed by: PHYSICIAN ASSISTANT

## 2022-02-15 PROCEDURE — 1160F PR REVIEW ALL MEDS BY PRESCRIBER/CLIN PHARMACIST DOCUMENTED: ICD-10-PCS | Mod: CPTII,S$GLB,, | Performed by: PHYSICIAN ASSISTANT

## 2022-02-15 PROCEDURE — 3008F PR BODY MASS INDEX (BMI) DOCUMENTED: ICD-10-PCS | Mod: CPTII,S$GLB,, | Performed by: PHYSICIAN ASSISTANT

## 2022-02-15 PROCEDURE — 3074F SYST BP LT 130 MM HG: CPT | Mod: CPTII,S$GLB,, | Performed by: PHYSICIAN ASSISTANT

## 2022-02-15 PROCEDURE — 1160F RVW MEDS BY RX/DR IN RCRD: CPT | Mod: CPTII,S$GLB,, | Performed by: PHYSICIAN ASSISTANT

## 2022-02-15 PROCEDURE — 1159F PR MEDICATION LIST DOCUMENTED IN MEDICAL RECORD: ICD-10-PCS | Mod: CPTII,S$GLB,, | Performed by: PHYSICIAN ASSISTANT

## 2022-02-15 PROCEDURE — 3074F PR MOST RECENT SYSTOLIC BLOOD PRESSURE < 130 MM HG: ICD-10-PCS | Mod: CPTII,S$GLB,, | Performed by: PHYSICIAN ASSISTANT

## 2022-02-15 PROCEDURE — 99214 OFFICE O/P EST MOD 30 MIN: CPT | Mod: S$GLB,,, | Performed by: PHYSICIAN ASSISTANT

## 2022-02-15 PROCEDURE — 99999 PR PBB SHADOW E&M-EST. PATIENT-LVL V: CPT | Mod: PBBFAC,,, | Performed by: PHYSICIAN ASSISTANT

## 2022-02-15 RX ORDER — LEVOTHYROXINE SODIUM 50 UG/1
50 TABLET ORAL
Qty: 30 TABLET | Refills: 5 | Status: SHIPPED | OUTPATIENT
Start: 2022-02-15 | End: 2022-08-24 | Stop reason: SDUPTHER

## 2022-02-15 RX ORDER — TERBINAFINE HYDROCHLORIDE 250 MG/1
250 TABLET ORAL DAILY
COMMUNITY
Start: 2022-02-14 | End: 2022-02-15

## 2022-02-15 NOTE — PATIENT INSTRUCTIONS
[facility] Surgeon  Stanley Rivero MD      Phone: [phone]      Please check thyroid labs in MAY 2022

## 2022-02-15 NOTE — PROGRESS NOTES
Subjective:       Patient ID: Lashell Stroud is a 56 y.o. female.    Chief Complaint: Disease Management    Mrs. Stroud is a 56 year old female who presents to clinic for follow up on PMR and osteoarthritis. She is a new patient to me. She is doing fair overall. She has noticed increased pain in her bilateral CMC joints and having difficulty with gripping and opening objects. No previous joint injections or occupational therapy. She has constant aching/stiffness in her PIP and DIP joints as well, which is unchanged. She is a  and does a lot of repetitive movements with her hands. No shoulder/hip pain/stiffness. She has been complaint with plaquenil, mobic, and tramadol tid PRN for severe pain with adequate control of her symptoms. She could not tolerate lamisil so she d/deejay this. She has chronic fatigue for many years.    No serious infections since her last visit.     We reviewed her recent labs: TSH remains elevated.     Current tx:  1. Plaquenil  2. mobic  3. Tramadol      Review of Systems   Constitutional: Negative for fever and unexpected weight change.   HENT: Negative for mouth sores and trouble swallowing.    Eyes: Negative for redness.   Respiratory: Negative for cough and shortness of breath.    Cardiovascular: Negative for chest pain.   Gastrointestinal: Positive for constipation. Negative for diarrhea.   Genitourinary: Negative for dysuria and genital sores.   Musculoskeletal: Positive for arthralgias.   Skin: Negative for rash.   Neurological: Negative for headaches.   Hematological: Does not bruise/bleed easily.         Objective:     Vitals:    02/15/22 0953   BP: 126/79   Pulse: 64       Past Medical History:   Diagnosis Date    Constipation 12/6/2017    COVID-19 virus infection     pos 7/20/20    Family history of colon cancer in mother 8/17/2016    History of chicken pox     Hypertension     Palindromic rheumatism involving tarsus     Psoas tendinitis of both sides  2019    Shoulder pain, bilateral 3/8/2016    Spinal stenosis of lumbar region 2019     Past Surgical History:   Procedure Laterality Date    ARTHROSCOPIC REPAIR OF ROTATOR CUFF OF SHOULDER Left 2020    Procedure: REPAIR, ROTATOR CUFF, ARTHROSCOPIC;  Surgeon: Sb Reis II, MD;  Location: [facility] OR;  Service: Orthopedics;  Laterality: Left;    ARTHROSCOPY OF SHOULDER WITH DECOMPRESSION OF SUBACROMIAL SPACE Left 2020    Procedure: ARTHROSCOPY, SHOULDER, WITH SUBACROMIAL SPACE DECOMPRESSION;  Surgeon: Sb Reis II, MD;  Location: [facility] OR;  Service: Orthopedics;  Laterality: Left;     SECTION      CHOLECYSTECTOMY      COLONOSCOPY N/A 2016    Procedure: COLONOSCOPY;  Surgeon: Hesham Enriquez MD;  Location: [facility] ENDO;  Service: Endoscopy;  Laterality: N/A;    COLONOSCOPY N/A 2017    Procedure: COLONOSCOPY;  Surgeon: Hesham Enriquez MD;  Location: [facility] ENDO;  Service: Endoscopy;  Laterality: N/A;    COLONOSCOPY N/A 10/15/2021    Procedure: COLONOSCOPY;  Surgeon: Hesham Enriquez MD;  Location: [facility] ENDO;  Service: Endoscopy;  Laterality: N/A;    EPIDURAL STEROID INJECTION INTO LUMBAR SPINE N/A 2019    Procedure: Injection-steroid-epidural-lumbar L4/5;  Surgeon: Cristhian Gee MD;  Location: [facility] OR;  Service: Pain Management;  Laterality: N/A;    EPIDURAL STEROID INJECTION INTO LUMBAR SPINE N/A 2019    Procedure: Injection-steroid-epidural-lumbar;  Surgeon: Nigel Zuniga MD;  Location: [facility] OR;  Service: Pain Management;  Laterality: N/A;  L4/5 interlaminar    EXTREME LATERAL INTERBODY FUSION (XLIF) OF SPINE Left 2019    Procedure: FUSION, SPINE, XLIF L3-4;  Surgeon: Cleve Bowers MD;  Location: [facility] OR;  Service: Orthopedics;  Laterality: Left;    LAMINECTOMY USING MINIMALLY INVASIVE TECHNIQUE N/A 2019    Procedure: LAMINECTOMY, SPINE, MINIMALLY INVASIVE L3-L4;  Surgeon: Cleve Bowers MD;  Location: [facility] OR;   Service: Orthopedics;  Laterality: N/A;    MINIMALLY INVASIVE FUSION OF SPINE N/A 11/18/2019    Procedure: FUSION, SPINE, MINIMALLY INVASIVE L3-L4;  Surgeon: Cleve Bowers MD;  Location: [facility];  Service: Orthopedics;  Laterality: N/A;    TUBAL LIGATION N/A 1986          Physical Exam   Constitutional: She is well-developed, well-nourished, and in no distress.   Eyes: Right conjunctiva is not injected. Left conjunctiva is not injected.   Neck: No JVD present. No thyromegaly present.   Cardiovascular: Normal rate.   Pulmonary/Chest: Effort normal.   Abdominal: There is no hepatosplenomegaly.   No abdominal exam completed. Unable to delete statement above.   Musculoskeletal:      Right shoulder: Normal.      Left shoulder: Normal.      Right elbow: Normal.      Left elbow: Normal.      Right wrist: Normal.      Left wrist: Normal.      Right knee: Normal.      Left knee: Normal.   Lymphadenopathy:     She has no cervical adenopathy.   Neurological: Gait normal.   Skin: No rash noted. No cyanosis.   Psychiatric: Mood and affect normal.       Right Side Rheumatological Exam     Examination finds the shoulder, elbow, wrist, knee, 1st MCP, 2nd MCP, 3rd MCP, 4th MCP and 5th MCP normal.    The patient is tender to palpation of the 1st PIP, 2nd PIP, 3rd PIP, 4th PIP, 5th PIP, 1st CMC, 2nd DIP, 3rd DIP, 4th DIP and 5th DIP    The patient has an enlarged 1st PIP, 2nd PIP, 3rd PIP, 4th PIP, 5th PIP, 1st CMC, 2nd DIP, 3rd DIP, 4th DIP and 5th DIP    Left Side Rheumatological Exam     Examination finds the shoulder, elbow, wrist, knee, 1st MCP, 2nd MCP, 3rd MCP, 4th MCP and 5th MCP normal.    The patient is tender to palpation of the 1st PIP, 2nd PIP, 3rd PIP, 4th PIP, 5th PIP, 1st CMC, 2nd DIP, 3rd DIP, 4th DIP and 5th DIP.    The patient has an enlarged 1st PIP, 2nd PIP, 3rd PIP, 4th PIP, 5th PIP, 1st CMC, 2nd DIP, 3rd DIP, 4th DIP and 5th DIP.       Labs reviewed:  Component      Latest Ref Rng & Units 1/7/2022   WBC       3.90 - 12.70 K/uL 4.52   RBC      4.00 - 5.40 M/uL 4.46   Hemoglobin      12.0 - 16.0 g/dL 12.8   Hematocrit      37.0 - 48.5 % 41.4   MCV      82 - 98 fL 93   MCH      27.0 - 31.0 pg 28.7   MCHC      32.0 - 36.0 g/dL 30.9 (L)   RDW      11.5 - 14.5 % 13.1   Platelets      150 - 450 K/uL 241   MPV      9.2 - 12.9 fL 10.3   Immature Granulocytes      0.0 - 0.5 % 0.2   Gran # (ANC)      1.8 - 7.7 K/uL 2.3   Immature Grans (Abs)      0.00 - 0.04 K/uL 0.01   Lymph #      1.0 - 4.8 K/uL 1.5   Mono #      0.3 - 1.0 K/uL 0.4   Eos #      0.0 - 0.5 K/uL 0.3   Baso #      0.00 - 0.20 K/uL 0.07   nRBC      0 /100 WBC 0   Gran %      38.0 - 73.0 % 49.8   Lymph %      18.0 - 48.0 % 34.1   Mono %      4.0 - 15.0 % 8.4   Eosinophil %      0.0 - 8.0 % 6.0   Basophil %      0.0 - 1.9 % 1.5   Differential Method       Automated   Sodium      136 - 145 mmol/L 139   Potassium      3.5 - 5.1 mmol/L 4.6   Chloride      95 - 110 mmol/L 102   CO2      22 - 31 mmol/L 31   Glucose      70 - 110 mg/dL 80   BUN      7 - 18 mg/dL 19 (H)   Creatinine      0.50 - 1.40 mg/dL 0.71   Calcium      8.4 - 10.2 mg/dL 9.5   PROTEIN TOTAL      6.0 - 8.4 g/dL 5.8 (L)   Albumin      3.5 - 5.2 g/dL 3.9   BILIRUBIN TOTAL      0.2 - 1.3 mg/dL 0.7   Alkaline Phosphatase      38 - 145 U/L 54   AST      14 - 36 U/L 41 (H)   ALT      0 - 35 U/L 27   Anion Gap      8 - 16 mmol/L 6 (L)   eGFR if African American      >60 mL/min/1.73 m:2 >60   eGFR if non African American      >60 mL/min/1.73 m:2 >60   SARS-CoV2 (COVID-19) Qualitative PCR      Not Detected    TSH      0.400 - 4.000 uIU/mL 4.480 (H)   Free T4      0.78 - 2.19 ng/dL 0.97   T3, Free      2.77 - 5.27 pg/mL 3.61   CRP      0.00 - 0.90 mg/dL <0.50        Assessment:       1. PMR (polymyalgia rheumatica)    2. Osteoarthritis of both hands, unspecified osteoarthritis type    3. Hypothyroidism, unspecified type    4. Chronic pain syndrome    5. Long-term use of Plaquenil            Plan:       PMR  (polymyalgia rheumatica)    Osteoarthritis of both hands, unspecified osteoarthritis type  -     Ambulatory referral/consult to Orthopedics; Future; Expected date: 02/22/2022    Hypothyroidism, unspecified type  -     levothyroxine (SYNTHROID) 50 MCG tablet; Take 1 tablet (50 mcg total) by mouth before breakfast.  Dispense: 30 tablet; Refill: 5  -     TSH; Future; Expected date: 02/15/2022  -     T4, Free; Future; Expected date: 02/15/2022    Chronic pain syndrome    Long-term use of Plaquenil        Assessment:  56 year old female with  PMR on plaquenil  --osteoarthritis  --hypothyroidism  --chronic fatigue  --chronic pain syndrome on tramadol  --lumbar spinal stenosis    Plan:  1. Cont plaquenil 200 mg bid, mobic 15 mg daily  2. Cont tramadol  I have checked [place] prescription monitoring program site and no unusual or abnormal behavior has occurred pt understand the risk and benefits of taking opioid medications and has decided to continue the medication.  3. Increase levothyroxine 50 mcg daily and repeat labs in 12 weeks  4. Hand Ortho referral for cmc eval, consider OT referral as well    Follow up:  6 mo Dr. Seals with repeat TFTs in 3 months

## 2022-03-09 ENCOUNTER — OFFICE VISIT (OUTPATIENT)
Dept: ORTHOPEDICS | Facility: CLINIC | Age: 57
End: 2022-03-09
Payer: COMMERCIAL

## 2022-03-09 ENCOUNTER — HOSPITAL ENCOUNTER (OUTPATIENT)
Dept: RADIOLOGY | Facility: HOSPITAL | Age: 57
Discharge: HOME | End: 2022-03-09
Attending: PHYSICIAN ASSISTANT
Payer: COMMERCIAL

## 2022-03-09 VITALS
BODY MASS INDEX: 26.29 KG/M2 | SYSTOLIC BLOOD PRESSURE: 118 MMHG | HEART RATE: 62 BPM | WEIGHT: 154 LBS | HEIGHT: 64 IN | DIASTOLIC BLOOD PRESSURE: 72 MMHG

## 2022-03-09 DIAGNOSIS — M18.12 ARTHRITIS OF CARPOMETACARPAL (CMC) JOINT OF LEFT THUMB: Primary | ICD-10-CM

## 2022-03-09 DIAGNOSIS — M18.12 ARTHRITIS OF CARPOMETACARPAL (CMC) JOINT OF LEFT THUMB: ICD-10-CM

## 2022-03-09 DIAGNOSIS — M65.341 TRIGGER FINGER, RIGHT RING FINGER: ICD-10-CM

## 2022-03-09 DIAGNOSIS — M18.11 ARTHRITIS OF CARPOMETACARPAL (CMC) JOINT OF RIGHT THUMB: ICD-10-CM

## 2022-03-09 PROCEDURE — 73130 X-RAY EXAM OF HAND: CPT | Mod: TC,PO,LT

## 2022-03-09 PROCEDURE — 99204 PR OFFICE/OUTPT VISIT, NEW, LEVL IV, 45-59 MIN: ICD-10-PCS | Mod: 25,S$GLB,, | Performed by: PHYSICIAN ASSISTANT

## 2022-03-09 PROCEDURE — 1160F RVW MEDS BY RX/DR IN RCRD: CPT | Mod: CPTII,S$GLB,, | Performed by: PHYSICIAN ASSISTANT

## 2022-03-09 PROCEDURE — 1160F PR REVIEW ALL MEDS BY PRESCRIBER/CLIN PHARMACIST DOCUMENTED: ICD-10-PCS | Mod: CPTII,S$GLB,, | Performed by: PHYSICIAN ASSISTANT

## 2022-03-09 PROCEDURE — 3074F SYST BP LT 130 MM HG: CPT | Mod: CPTII,S$GLB,, | Performed by: PHYSICIAN ASSISTANT

## 2022-03-09 PROCEDURE — 3074F PR MOST RECENT SYSTOLIC BLOOD PRESSURE < 130 MM HG: ICD-10-PCS | Mod: CPTII,S$GLB,, | Performed by: PHYSICIAN ASSISTANT

## 2022-03-09 PROCEDURE — 1159F MED LIST DOCD IN RCRD: CPT | Mod: CPTII,S$GLB,, | Performed by: PHYSICIAN ASSISTANT

## 2022-03-09 PROCEDURE — 73130 XR HAND COMPLETE 3 VIEW LEFT: ICD-10-PCS | Mod: 26,LT,, | Performed by: RADIOLOGY

## 2022-03-09 PROCEDURE — 20550 NJX 1 TENDON SHEATH/LIGAMENT: CPT | Mod: RT,S$GLB,, | Performed by: PHYSICIAN ASSISTANT

## 2022-03-09 PROCEDURE — 3078F DIAST BP <80 MM HG: CPT | Mod: CPTII,S$GLB,, | Performed by: PHYSICIAN ASSISTANT

## 2022-03-09 PROCEDURE — 99999 PR PBB SHADOW E&M-EST. PATIENT-LVL IV: ICD-10-PCS | Mod: PBBFAC,,, | Performed by: PHYSICIAN ASSISTANT

## 2022-03-09 PROCEDURE — 20550 TENDON SHEATH: ICD-10-PCS | Mod: RT,S$GLB,, | Performed by: PHYSICIAN ASSISTANT

## 2022-03-09 PROCEDURE — 99999 PR PBB SHADOW E&M-EST. PATIENT-LVL IV: CPT | Mod: PBBFAC,,, | Performed by: PHYSICIAN ASSISTANT

## 2022-03-09 PROCEDURE — 73130 X-RAY EXAM OF HAND: CPT | Mod: 26,LT,, | Performed by: RADIOLOGY

## 2022-03-09 PROCEDURE — 3008F PR BODY MASS INDEX (BMI) DOCUMENTED: ICD-10-PCS | Mod: CPTII,S$GLB,, | Performed by: PHYSICIAN ASSISTANT

## 2022-03-09 PROCEDURE — 3078F PR MOST RECENT DIASTOLIC BLOOD PRESSURE < 80 MM HG: ICD-10-PCS | Mod: CPTII,S$GLB,, | Performed by: PHYSICIAN ASSISTANT

## 2022-03-09 PROCEDURE — 1159F PR MEDICATION LIST DOCUMENTED IN MEDICAL RECORD: ICD-10-PCS | Mod: CPTII,S$GLB,, | Performed by: PHYSICIAN ASSISTANT

## 2022-03-09 PROCEDURE — 99204 OFFICE O/P NEW MOD 45 MIN: CPT | Mod: 25,S$GLB,, | Performed by: PHYSICIAN ASSISTANT

## 2022-03-09 PROCEDURE — 73130 X-RAY EXAM OF HAND: CPT | Mod: TC,PO,RT

## 2022-03-09 PROCEDURE — 3008F BODY MASS INDEX DOCD: CPT | Mod: CPTII,S$GLB,, | Performed by: PHYSICIAN ASSISTANT

## 2022-03-09 PROCEDURE — 73130 XR HAND COMPLETE 3 VIEW RIGHT: ICD-10-PCS | Mod: 26,RT,, | Performed by: RADIOLOGY

## 2022-03-09 PROCEDURE — 73130 X-RAY EXAM OF HAND: CPT | Mod: 26,RT,, | Performed by: RADIOLOGY

## 2022-03-09 RX ORDER — TRIAMCINOLONE ACETONIDE 40 MG/ML
40 INJECTION, SUSPENSION INTRA-ARTICULAR; INTRAMUSCULAR
Status: DISCONTINUED | OUTPATIENT
Start: 2022-03-09 | End: 2022-03-09 | Stop reason: HOSPADM

## 2022-03-09 RX ADMIN — TRIAMCINOLONE ACETONIDE 40 MG: 40 INJECTION, SUSPENSION INTRA-ARTICULAR; INTRAMUSCULAR at 11:03

## 2022-03-09 NOTE — PROGRESS NOTES
3/9/2022    Chief Complaint:  Chief Complaint   Patient presents with    Left Hand - Pain    Right Hand - Pain       HPI:  Lashell Stroud is a 56 y.o. female, who presents to clinic today for evaluation of bilateral hand pain.  States hand pain is located at the base of the thumbs.  States pain is worse with activity.  States pain is better with rest.  States pain is currently 2/10.  States pain can become severe with activity.  Denies any previous medical treatment.  Denies any paresthesias.  Additionally, states she does have triggering of her right ring finger.  States finger will be locked in flexion in the morning and she has to manually reduce the finger.  Denies any other complaints this time.    PMHX:  Past Medical History:   Diagnosis Date    Constipation 2017    COVID-19 virus infection     pos 20    Family history of colon cancer in mother 2016    History of chicken pox     Hypertension     Palindromic rheumatism involving tarsus     Psoas tendinitis of both sides 2019    Shoulder pain, bilateral 3/8/2016    Spinal stenosis of lumbar region 2019       PSHX:  Past Surgical History:   Procedure Laterality Date    ARTHROSCOPIC REPAIR OF ROTATOR CUFF OF SHOULDER Left 2020    Procedure: REPAIR, ROTATOR CUFF, ARTHROSCOPIC;  Surgeon: Sb Reis II, MD;  Location: [facility] OR;  Service: Orthopedics;  Laterality: Left;    ARTHROSCOPY OF SHOULDER WITH DECOMPRESSION OF SUBACROMIAL SPACE Left 2020    Procedure: ARTHROSCOPY, SHOULDER, WITH SUBACROMIAL SPACE DECOMPRESSION;  Surgeon: Sb Reis II, MD;  Location: [facility] OR;  Service: Orthopedics;  Laterality: Left;     SECTION      CHOLECYSTECTOMY      COLONOSCOPY N/A 2016    Procedure: COLONOSCOPY;  Surgeon: Hesham Enriquez MD;  Location: [facility];  Service: Endoscopy;  Laterality: N/A;    COLONOSCOPY N/A 2017    Procedure: COLONOSCOPY;  Surgeon: Hesham Enriquez MD;   Location: [facility] ENDO;  Service: Endoscopy;  Laterality: N/A;    COLONOSCOPY N/A 10/15/2021    Procedure: COLONOSCOPY;  Surgeon: Hesham Enriquez MD;  Location: [facility] ENDO;  Service: Endoscopy;  Laterality: N/A;    EPIDURAL STEROID INJECTION INTO LUMBAR SPINE N/A 5/22/2019    Procedure: Injection-steroid-epidural-lumbar L4/5;  Surgeon: Cristhian Gee MD;  Location: [facility] OR;  Service: Pain Management;  Laterality: N/A;    EPIDURAL STEROID INJECTION INTO LUMBAR SPINE N/A 7/31/2019    Procedure: Injection-steroid-epidural-lumbar;  Surgeon: Nigel Zuniga MD;  Location: [facility] OR;  Service: Pain Management;  Laterality: N/A;  L4/5 interlaminar    EXTREME LATERAL INTERBODY FUSION (XLIF) OF SPINE Left 11/18/2019    Procedure: FUSION, SPINE, XLIF L3-4;  Surgeon: Cleve Bowers MD;  Location: [facility] OR;  Service: Orthopedics;  Laterality: Left;    LAMINECTOMY USING MINIMALLY INVASIVE TECHNIQUE N/A 11/18/2019    Procedure: LAMINECTOMY, SPINE, MINIMALLY INVASIVE L3-L4;  Surgeon: Cleve Bowers MD;  Location: [facility] OR;  Service: Orthopedics;  Laterality: N/A;    MINIMALLY INVASIVE FUSION OF SPINE N/A 11/18/2019    Procedure: FUSION, SPINE, MINIMALLY INVASIVE L3-L4;  Surgeon: Cleve Bowers MD;  Location: [facility];  Service: Orthopedics;  Laterality: N/A;    TUBAL LIGATION N/A 1986       FMHX:  Family History   Problem Relation Age of Onset    Cancer Mother     Hypertension Mother     Heart disease Father     Hypertension Father     Diabetes Father     No Known Problems Brother        SOCHX:  Social History     Tobacco Use    Smoking status: Never Smoker    Smokeless tobacco: Never Used   Substance Use Topics    Alcohol use: Yes     Comment: occassional       ALLERGIES:  Aspirin, Penicillins, and Adhesive    CURRENT MEDICATIONS:  Current Outpatient Medications on File Prior to Visit   Medication Sig Dispense Refill    cholecalciferol, vitamin D3, (VITAMIN D3) 50 mcg (2,000 unit) Cap Take 1 capsule by  "mouth once daily.      docusate sodium 100 mg capsule Take 100 mg by mouth 2 (two) times daily. 30 tablet 0    hydrOXYchloroQUINE (PLAQUENIL) 200 mg tablet TAKE 1 TABLET BY MOUTH TWICE DAILY 180 tablet 3    levothyroxine (SYNTHROID) 50 MCG tablet Take 1 tablet (50 mcg total) by mouth before breakfast. 30 tablet 5    meloxicam (MOBIC) 15 MG tablet TAKE 1 TABLET BY MOUTH ONCE DAILY 30 tablet 3    metoprolol succinate (TOPROL-XL) 25 MG 24 hr tablet TAKE 1 TABLET BY MOUTH once daily 90 tablet 1    RESTASIS 0.05 % ophthalmic emulsion Place 1 drop into both eyes 2 (two) times daily.      traMADoL (ULTRAM) 50 mg tablet Take 1 tablet (50 mg total) by mouth every 6 (six) hours as needed for Pain. 120 tablet 5     No current facility-administered medications on file prior to visit.       REVIEW OF SYSTEMS:  Review of Systems   Constitutional: Negative for diaphoresis and fever.   HENT: Negative for ear pain, hearing loss, nosebleeds and tinnitus.    Eyes: Negative for pain and redness.   Respiratory: Negative for cough and shortness of breath.    Cardiovascular: Negative for chest pain and palpitations.   Gastrointestinal: Positive for constipation. Negative for blood in stool, diarrhea, nausea and vomiting.   Genitourinary: Negative for dysuria, frequency and hematuria.   Musculoskeletal: Positive for back pain. Negative for myalgias.   Skin: Negative for itching and rash.   Neurological: Positive for weakness. Negative for dizziness, tingling, seizures and headaches.   Endo/Heme/Allergies: Negative for environmental allergies.   Psychiatric/Behavioral: The patient is not nervous/anxious.        GENERAL PHYSICAL EXAM:   /72 (BP Location: Right arm, Patient Position: Sitting)   Pulse 62   Ht 5' 4" (1.626 m)   Wt 69.9 kg (154 lb)   LMP 10/02/2015   BMI 26.43 kg/m²    GEN: well developed, well nourished, no acute distress   HENT: Normocephalic, atraumatic   EYES: No discharge, conjunctiva normal   NECK: " Supple, non-tender   PULM: No wheezing, no respiratory distress   CV: RRR   ABD: Soft, non-tender    ORTHO EXAM:   Examination of the bilateral hands reveals no edema, erythema, ecchymosis, or skin breakdown tenderness to palpation of the 1st CMC joints of the bilateral hands.  Positive grind test of the 1st CMC joints of the bilateral hands.  Tenderness with palpation overlying the area of the A1 pulley of the right ring finger.  Active triggering noted of the right ring finger.  Full intact range of motion of the bilateral wrist.  Able to make composite fists and fully extend all fingers bilaterally.  5/5 /intrinsic strength.  Normal sensation in the radial, ulnar, and median nerve distributions bilaterally.  Capillary refill less than 2 seconds in all fingers bilaterally.    RADIOLOGY:   X-rays of the right hand were taken today in clinic.  X-rays reviewed by myself.  Imaging showed no acute fracture or dislocation.  No subluxation.  Presence of severe degenerative changes of the 1st CMC joint with associated osteophyte formation.  No other significant bony abnormalities noted.   X-rays of the left hand were taken today in clinic.  X-rays were reviewed by myself.  Imaging showed no acute fracture or dislocation.  Presence of severe degenerative changes of the 1st CMC joint with associated radial subluxation of the 1st metacarpal.  Presence of osteophyte formation of the 1st CMC joint.  No other significant bony abnormalities noted.    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 I discussed with Lashell Stroud the osteoarthritis and trigger finger pathology and treatment options in detail during today's visit.  We discussed the best course of action this time is to begin intermittent splinting and oral prescription NSAID therapy.  We discussed that since she is already taking an NSAID, she should continue taking the NSAID as instructed.   We discussed referring her to be fitted for bilateral meta  splints to be worn for activity only.  We also discussed performing a steroid injection into the flexor tendon sheath of the right ring finger to treat her trigger finger.  She verbally agreed with the treatment plan.    2. Informed consent was obtained.  After an alcohol prep followed by a chlorhexidine prep, a steroid injection was placed into the flexor tendon sheath at the level of the A1 pulley of the right ring finger.  She tolerated the procedure well.    3. She was referred to of all accelerated hand therapy to be fitted for bilateral Metaglip splints.    4. I would like to have her follow up in clinic in 6 weeks for repeat evaluation.  She was instructed to contact clinic for any problems or concerns in the interim.        Answers for HPI/ROS submitted by the patient on 3/9/2022  unexpected weight change: No  appetite change : No  sleep disturbance: No  IMMUNOCOMPROMISED: Yes  dysphoric mood: No  visual disturbance: No  sinus pressure : No  food allergies: Yes  difficulty urinating: No  painful intercourse: No  numbness: No  joint swelling: Yes  Pain Chronicity: new  History of trauma: No  Onset: more than 1 month ago  Frequency: constantly  Progression since onset: rapidly worsening  injury location: at home  pain- numeric: 6/10  pain location: other  pain quality: dull  Radiating Pain: Yes  If your pain is radiating, to what part of the body?: left hand, right hand  Aggravating factors: activity, bearing weight  inability to bear weight: No  joint locking: No  limited range of motion: No  stiffness: No  Treatments tried: cold, NSAIDs, OTC ointments, OTC pain meds  physical therapy: not tried  Improvement on treatment: mild

## 2022-03-09 NOTE — PROCEDURES
Tendon Sheath    Date/Time: 3/9/2022 11:20 AM  Performed by: Loki Linda PA-C  Authorized by: Loki Linda PA-C     Consent Done?:  Yes (Verbal)  Indications:  Pain  Site marked: the procedure site was marked    Timeout: prior to procedure the correct patient, procedure, and site was verified    Prep: patient was prepped and draped in usual sterile fashion      Local anesthesia used?: Yes    Local anesthetic:  Lidocaine 1% without epinephrine  Anesthetic total (ml):  0.5    Location:  Ring finger  Site:  R ring flexor tendon sheath  Ultrasonic guidance for needle placement?: No    Needle size:  25 G  Approach:  Volar  Medications:  40 mg triamcinolone acetonide 40 mg/mL (20mg injected)  Patient tolerance:  Patient tolerated the procedure well with no immediate complications

## 2022-03-10 ENCOUNTER — PATIENT MESSAGE (OUTPATIENT)
Dept: RHEUMATOLOGY | Facility: CLINIC | Age: 57
End: 2022-03-10
Payer: COMMERCIAL

## 2022-03-18 ENCOUNTER — PATIENT MESSAGE (OUTPATIENT)
Dept: ORTHOPEDICS | Facility: CLINIC | Age: 57
End: 2022-03-18
Payer: COMMERCIAL

## 2022-04-06 ENCOUNTER — OFFICE VISIT (OUTPATIENT)
Dept: ORTHOPEDICS | Facility: CLINIC | Age: 57
End: 2022-04-06
Payer: COMMERCIAL

## 2022-04-06 VITALS — HEIGHT: 64 IN | WEIGHT: 154 LBS | BODY MASS INDEX: 26.29 KG/M2

## 2022-04-06 DIAGNOSIS — M18.11 ARTHRITIS OF CARPOMETACARPAL (CMC) JOINT OF RIGHT THUMB: ICD-10-CM

## 2022-04-06 DIAGNOSIS — M18.12 ARTHRITIS OF CARPOMETACARPAL (CMC) JOINT OF LEFT THUMB: Primary | ICD-10-CM

## 2022-04-06 PROCEDURE — 99999 PR PBB SHADOW E&M-EST. PATIENT-LVL III: CPT | Mod: PBBFAC,,, | Performed by: PHYSICIAN ASSISTANT

## 2022-04-06 PROCEDURE — 1159F PR MEDICATION LIST DOCUMENTED IN MEDICAL RECORD: ICD-10-PCS | Mod: CPTII,S$GLB,, | Performed by: PHYSICIAN ASSISTANT

## 2022-04-06 PROCEDURE — 3008F BODY MASS INDEX DOCD: CPT | Mod: CPTII,S$GLB,, | Performed by: PHYSICIAN ASSISTANT

## 2022-04-06 PROCEDURE — 1159F MED LIST DOCD IN RCRD: CPT | Mod: CPTII,S$GLB,, | Performed by: PHYSICIAN ASSISTANT

## 2022-04-06 PROCEDURE — 20600 DRAIN/INJ JOINT/BURSA W/O US: CPT | Mod: 50,S$GLB,, | Performed by: PHYSICIAN ASSISTANT

## 2022-04-06 PROCEDURE — 99214 PR OFFICE/OUTPT VISIT, EST, LEVL IV, 30-39 MIN: ICD-10-PCS | Mod: 25,S$GLB,, | Performed by: PHYSICIAN ASSISTANT

## 2022-04-06 PROCEDURE — 1160F PR REVIEW ALL MEDS BY PRESCRIBER/CLIN PHARMACIST DOCUMENTED: ICD-10-PCS | Mod: CPTII,S$GLB,, | Performed by: PHYSICIAN ASSISTANT

## 2022-04-06 PROCEDURE — 1160F RVW MEDS BY RX/DR IN RCRD: CPT | Mod: CPTII,S$GLB,, | Performed by: PHYSICIAN ASSISTANT

## 2022-04-06 PROCEDURE — 99214 OFFICE O/P EST MOD 30 MIN: CPT | Mod: 25,S$GLB,, | Performed by: PHYSICIAN ASSISTANT

## 2022-04-06 PROCEDURE — 3008F PR BODY MASS INDEX (BMI) DOCUMENTED: ICD-10-PCS | Mod: CPTII,S$GLB,, | Performed by: PHYSICIAN ASSISTANT

## 2022-04-06 PROCEDURE — 99999 PR PBB SHADOW E&M-EST. PATIENT-LVL III: ICD-10-PCS | Mod: PBBFAC,,, | Performed by: PHYSICIAN ASSISTANT

## 2022-04-06 PROCEDURE — 20600 SMALL JOINT ASPIRATION/INJECTION: L THUMB CMC: ICD-10-PCS | Mod: 50,S$GLB,, | Performed by: PHYSICIAN ASSISTANT

## 2022-04-06 RX ORDER — TRIAMCINOLONE ACETONIDE 40 MG/ML
40 INJECTION, SUSPENSION INTRA-ARTICULAR; INTRAMUSCULAR
Status: DISCONTINUED | OUTPATIENT
Start: 2022-04-06 | End: 2022-04-06 | Stop reason: HOSPADM

## 2022-04-06 RX ADMIN — TRIAMCINOLONE ACETONIDE 40 MG: 40 INJECTION, SUSPENSION INTRA-ARTICULAR; INTRAMUSCULAR at 09:04

## 2022-04-06 NOTE — PROCEDURES
Small Joint Aspiration/Injection: L thumb CMC    Date/Time: 4/6/2022 9:20 AM  Performed by: Loki Linda PA-C  Authorized by: Loki Linda PA-C     Consent Done?:  Yes (Verbal)  Indications:  Pain and arthritis  Site marked: the procedure site was marked    Timeout: prior to procedure the correct patient, procedure, and site was verified    Prep: patient was prepped and draped in usual sterile fashion      Local anesthetic:  Topical anesthetic  Location:  Thumb  Site:  L thumb CMC (Left thumb CMC joint)  Ultrasonic guidance for needle placement?: No    Needle size:  25 G  Approach:  Radial  Medications:  40 mg triamcinolone acetonide 40 mg/mL (40 mg injected)  Patient tolerance:  Patient tolerated the procedure well with no immediate complications

## 2022-04-06 NOTE — PROGRESS NOTES
2022    HPI:  Lashell Stroud is a 56 y.o. female, who presents to clinic today for evaluation of bilateral 1st CMC joint osteoarthritis.  States the Mobic in the meta  splints did not significantly improve her pain.  States no significant change in symptoms her last visit.  States pain can reach up to 8/10.  Denies any acute injuries.  Denies any paresthesias.  States she is here to discuss further treatment options.  Denies any other complaints this time.    PMHX:  Past Medical History:   Diagnosis Date    Constipation 2017    COVID-19 virus infection     pos 20    Family history of colon cancer in mother 2016    History of chicken pox     Hypertension     Palindromic rheumatism involving tarsus     Psoas tendinitis of both sides 2019    Shoulder pain, bilateral 3/8/2016    Spinal stenosis of lumbar region 2019       PSHX:  Past Surgical History:   Procedure Laterality Date    ARTHROSCOPIC REPAIR OF ROTATOR CUFF OF SHOULDER Left 2020    Procedure: REPAIR, ROTATOR CUFF, ARTHROSCOPIC;  Surgeon: Sb Reis II, MD;  Location: [facility] OR;  Service: Orthopedics;  Laterality: Left;    ARTHROSCOPY OF SHOULDER WITH DECOMPRESSION OF SUBACROMIAL SPACE Left 2020    Procedure: ARTHROSCOPY, SHOULDER, WITH SUBACROMIAL SPACE DECOMPRESSION;  Surgeon: Sb Reis II, MD;  Location: [facility] OR;  Service: Orthopedics;  Laterality: Left;     SECTION      CHOLECYSTECTOMY      COLONOSCOPY N/A 2016    Procedure: COLONOSCOPY;  Surgeon: Hesham Enriquez MD;  Location: [facility] ENDO;  Service: Endoscopy;  Laterality: N/A;    COLONOSCOPY N/A 2017    Procedure: COLONOSCOPY;  Surgeon: Hesham Enriquez MD;  Location: [facility] ENDO;  Service: Endoscopy;  Laterality: N/A;    COLONOSCOPY N/A 10/15/2021    Procedure: COLONOSCOPY;  Surgeon: Hesham Enriquez MD;  Location: [facility] ENDO;  Service: Endoscopy;  Laterality: N/A;    EPIDURAL STEROID INJECTION INTO  LUMBAR SPINE N/A 5/22/2019    Procedure: Injection-steroid-epidural-lumbar L4/5;  Surgeon: Cristhian Gee MD;  Location: [facility] OR;  Service: Pain Management;  Laterality: N/A;    EPIDURAL STEROID INJECTION INTO LUMBAR SPINE N/A 7/31/2019    Procedure: Injection-steroid-epidural-lumbar;  Surgeon: Nigel Zuniga MD;  Location: [facility] OR;  Service: Pain Management;  Laterality: N/A;  L4/5 interlaminar    EXTREME LATERAL INTERBODY FUSION (XLIF) OF SPINE Left 11/18/2019    Procedure: FUSION, SPINE, XLIF L3-4;  Surgeon: Cleve Bowers MD;  Location: [facility] OR;  Service: Orthopedics;  Laterality: Left;    LAMINECTOMY USING MINIMALLY INVASIVE TECHNIQUE N/A 11/18/2019    Procedure: LAMINECTOMY, SPINE, MINIMALLY INVASIVE L3-L4;  Surgeon: Cleve Bowers MD;  Location: [facility] OR;  Service: Orthopedics;  Laterality: N/A;    MINIMALLY INVASIVE FUSION OF SPINE N/A 11/18/2019    Procedure: FUSION, SPINE, MINIMALLY INVASIVE L3-L4;  Surgeon: Cleve Bowers MD;  Location: [facility] OR;  Service: Orthopedics;  Laterality: N/A;    TUBAL LIGATION N/A 1986       FMHX:  Family History   Problem Relation Age of Onset    Cancer Mother     Hypertension Mother     Heart disease Father     Hypertension Father     Diabetes Father     No Known Problems Brother        SOCHX:  Social History     Tobacco Use    Smoking status: Never Smoker    Smokeless tobacco: Never Used   Substance Use Topics    Alcohol use: Yes     Comment: occassional       ALLERGIES:  Aspirin, Penicillins, and Adhesive    CURRENT MEDICATIONS:  Current Outpatient Medications on File Prior to Visit   Medication Sig Dispense Refill    cholecalciferol, vitamin D3, (VITAMIN D3) 50 mcg (2,000 unit) Cap Take 1 capsule by mouth once daily.      docusate sodium 100 mg capsule Take 100 mg by mouth 2 (two) times daily. 30 tablet 0    hydrOXYchloroQUINE (PLAQUENIL) 200 mg tablet TAKE 1 TABLET BY MOUTH TWICE DAILY 180 tablet 3    levothyroxine (SYNTHROID) 50 MCG tablet  "Take 1 tablet (50 mcg total) by mouth before breakfast. 30 tablet 5    meloxicam (MOBIC) 15 MG tablet TAKE 1 TABLET BY MOUTH ONCE DAILY 30 tablet 3    metoprolol succinate (TOPROL-XL) 25 MG 24 hr tablet TAKE 1 TABLET BY MOUTH once daily 90 tablet 1    RESTASIS 0.05 % ophthalmic emulsion Place 1 drop into both eyes 2 (two) times daily.      traMADoL (ULTRAM) 50 mg tablet Take 1 tablet (50 mg total) by mouth every 6 (six) hours as needed for Pain. 120 tablet 5     No current facility-administered medications on file prior to visit.       REVIEW OF SYSTEMS:  Review of Systems Complete; Negative, unless noted above.    GENERAL PHYSICAL EXAM:   Ht 5' 4" (1.626 m)   Wt 69.9 kg (154 lb)   LMP 10/02/2015   BMI 26.43 kg/m²    GEN: well developed, well nourished, no acute distress   PULM: No wheezing, no respiratory distress   CV: RRR    ORTHO EXAM:   Examination of the bilateral hands reveals mild edema overlying the left 1st CMC joint.  No edema of the right 1st CMC joint.  No erythema, ecchymosis, or skin breakdown.  Able make composite fist and fully extend all fingers.  Tenderness with palpation of the 1st CMC joint bilaterally.  Positive grind test of the 1st CMC joint bilaterally.  Normal sensation in radial, ulnar, and median nerve distributions.  Capillary refill less than 2 seconds in all fingers.    RADIOLOGY:   None.    ASSESSMENT:   Bilateral 1st CMC joint osteoarthritis    PLAN:  1. I discussed with Lashell Poppy Maximus that considering she has failed conservative treatment, the best course of action this time is to proceed with steroid injections into the 1st CMC joints of the bilateral hands.  She verbally agreed with the treatment plan.    2. Informed consent was obtained.  Attention was turned to the right 1st CMC joint.  After an alcohol prep followed by a chlorhexidine prep, an attempt was made to place a steroid injection into the 1st CMC joint of the right hand, which was unsuccessful.  A new 25 " gauge needle was used for a 2nd attempt.  The area was once again prepped with alcohol followed by chlorhexidine prep, and a 2nd attempt was made to place a steroid injection into the 1st CMC joint of the right hand, which was successful.  She tolerated procedure well.  Attention was then turned to the 1st CMC joint of the left hand.  After an alcohol prep followed by chlorhexidine prep, a steroid injection was placed into the 1st CMC joint of the left hand, which was successful.  She tolerated the procedure well.    3. I would like her to follow up in clinic on a p.r.n. basis for any worsening/returning of her symptoms or for any hand, wrist, or elbow problems/concerns.  She was instructed to contact the clinic for any problems/concerns in the interim.

## 2022-04-06 NOTE — PROCEDURES
Small Joint Aspiration/Injection: R thumb CMC    Date/Time: 4/6/2022 9:20 AM  Performed by: Loki Linda PA-C  Authorized by: Loki Linda PA-C     Consent Done?:  Yes (Verbal)  Indications:  Pain and arthritis  Site marked: the procedure site was marked    Timeout: prior to procedure the correct patient, procedure, and site was verified    Prep: patient was prepped and draped in usual sterile fashion      Local anesthetic:  Topical anesthetic  Location:  Thumb  Site:  R thumb CMC (Right thumb CMC joint)  Ultrasonic guidance for needle placement?: No    Needle size:  25 G  Approach:  Radial  Medications:  40 mg triamcinolone acetonide 40 mg/mL (40 mg injected)  Patient tolerance:  Patient tolerated the procedure well with no immediate complications

## 2022-07-06 ENCOUNTER — OFFICE VISIT (OUTPATIENT)
Dept: ORTHOPEDICS | Facility: CLINIC | Age: 57
End: 2022-07-06
Payer: COMMERCIAL

## 2022-07-06 VITALS — WEIGHT: 154 LBS | BODY MASS INDEX: 26.29 KG/M2 | HEIGHT: 64 IN

## 2022-07-06 DIAGNOSIS — M18.12 ARTHRITIS OF CARPOMETACARPAL (CMC) JOINT OF LEFT THUMB: Primary | ICD-10-CM

## 2022-07-06 DIAGNOSIS — M18.11 ARTHRITIS OF CARPOMETACARPAL (CMC) JOINT OF RIGHT THUMB: ICD-10-CM

## 2022-07-06 PROCEDURE — 3008F PR BODY MASS INDEX (BMI) DOCUMENTED: ICD-10-PCS | Mod: CPTII,S$GLB,, | Performed by: PHYSICIAN ASSISTANT

## 2022-07-06 PROCEDURE — 99213 OFFICE O/P EST LOW 20 MIN: CPT | Mod: S$GLB,,, | Performed by: PHYSICIAN ASSISTANT

## 2022-07-06 PROCEDURE — 99213 PR OFFICE/OUTPT VISIT, EST, LEVL III, 20-29 MIN: ICD-10-PCS | Mod: S$GLB,,, | Performed by: PHYSICIAN ASSISTANT

## 2022-07-06 PROCEDURE — 1160F PR REVIEW ALL MEDS BY PRESCRIBER/CLIN PHARMACIST DOCUMENTED: ICD-10-PCS | Mod: CPTII,S$GLB,, | Performed by: PHYSICIAN ASSISTANT

## 2022-07-06 PROCEDURE — 1160F RVW MEDS BY RX/DR IN RCRD: CPT | Mod: CPTII,S$GLB,, | Performed by: PHYSICIAN ASSISTANT

## 2022-07-06 PROCEDURE — 1159F MED LIST DOCD IN RCRD: CPT | Mod: CPTII,S$GLB,, | Performed by: PHYSICIAN ASSISTANT

## 2022-07-06 PROCEDURE — 1159F PR MEDICATION LIST DOCUMENTED IN MEDICAL RECORD: ICD-10-PCS | Mod: CPTII,S$GLB,, | Performed by: PHYSICIAN ASSISTANT

## 2022-07-06 PROCEDURE — 99999 PR PBB SHADOW E&M-EST. PATIENT-LVL III: ICD-10-PCS | Mod: PBBFAC,,, | Performed by: PHYSICIAN ASSISTANT

## 2022-07-06 PROCEDURE — 3008F BODY MASS INDEX DOCD: CPT | Mod: CPTII,S$GLB,, | Performed by: PHYSICIAN ASSISTANT

## 2022-07-06 PROCEDURE — 99999 PR PBB SHADOW E&M-EST. PATIENT-LVL III: CPT | Mod: PBBFAC,,, | Performed by: PHYSICIAN ASSISTANT

## 2022-07-06 NOTE — PROGRESS NOTES
2022    HPI:  Lashell Stroud is a 57 y.o. female, who presents to clinic today for continued evaluation of her bilateral 1st CMC joint osteoarthritis.  States the injections have provided significant relief to her symptoms.  States her pain is only very mild at this point.  States at its worst it reach up to 2/10.  Denies any acute injuries.  Denies any paresthesias.  Denies any other complaints this time.    PMHX:  Past Medical History:   Diagnosis Date    Constipation 2017    COVID-19 virus infection     pos 20    Family history of colon cancer in mother 2016    History of chicken pox     Hypertension     Palindromic rheumatism involving tarsus     Psoas tendinitis of both sides 2019    Shoulder pain, bilateral 3/8/2016    Spinal stenosis of lumbar region 2019       PSHX:  Past Surgical History:   Procedure Laterality Date    ARTHROSCOPIC REPAIR OF ROTATOR CUFF OF SHOULDER Left 2020    Procedure: REPAIR, ROTATOR CUFF, ARTHROSCOPIC;  Surgeon: Sb Reis II, MD;  Location: [facility];  Service: Orthopedics;  Laterality: Left;    ARTHROSCOPY OF SHOULDER WITH DECOMPRESSION OF SUBACROMIAL SPACE Left 2020    Procedure: ARTHROSCOPY, SHOULDER, WITH SUBACROMIAL SPACE DECOMPRESSION;  Surgeon: Sb Reis II, MD;  Location: [facility] OR;  Service: Orthopedics;  Laterality: Left;     SECTION      CHOLECYSTECTOMY      COLONOSCOPY N/A 2016    Procedure: COLONOSCOPY;  Surgeon: Hesham Enriquez MD;  Location: [facility] ENDO;  Service: Endoscopy;  Laterality: N/A;    COLONOSCOPY N/A 2017    Procedure: COLONOSCOPY;  Surgeon: Hesham Enriquez MD;  Location: [facility] ENDO;  Service: Endoscopy;  Laterality: N/A;    COLONOSCOPY N/A 10/15/2021    Procedure: COLONOSCOPY;  Surgeon: Hesham Enriquez MD;  Location: [facility] ENDO;  Service: Endoscopy;  Laterality: N/A;    EPIDURAL STEROID INJECTION INTO LUMBAR SPINE N/A 2019    Procedure:  Injection-steroid-epidural-lumbar L4/5;  Surgeon: Cristhian Gee MD;  Location: [facility] OR;  Service: Pain Management;  Laterality: N/A;    EPIDURAL STEROID INJECTION INTO LUMBAR SPINE N/A 7/31/2019    Procedure: Injection-steroid-epidural-lumbar;  Surgeon: Nigel Zuniga MD;  Location: [facility] OR;  Service: Pain Management;  Laterality: N/A;  L4/5 interlaminar    EXTREME LATERAL INTERBODY FUSION (XLIF) OF SPINE Left 11/18/2019    Procedure: FUSION, SPINE, XLIF L3-4;  Surgeon: Cleve Bowers MD;  Location: [facility] OR;  Service: Orthopedics;  Laterality: Left;    LAMINECTOMY USING MINIMALLY INVASIVE TECHNIQUE N/A 11/18/2019    Procedure: LAMINECTOMY, SPINE, MINIMALLY INVASIVE L3-L4;  Surgeon: Cleve Bowers MD;  Location: [facility] OR;  Service: Orthopedics;  Laterality: N/A;    MINIMALLY INVASIVE FUSION OF SPINE N/A 11/18/2019    Procedure: FUSION, SPINE, MINIMALLY INVASIVE L3-L4;  Surgeon: Cleve Bowers MD;  Location: [facility] OR;  Service: Orthopedics;  Laterality: N/A;    TUBAL LIGATION N/A 1986       FMHX:  Family History   Problem Relation Age of Onset    Cancer Mother     Hypertension Mother     Heart disease Father     Hypertension Father     Diabetes Father     No Known Problems Brother        SOCHX:  Social History     Tobacco Use    Smoking status: Never Smoker    Smokeless tobacco: Never Used   Substance Use Topics    Alcohol use: Yes     Comment: occassional       ALLERGIES:  Aspirin, Penicillins, and Adhesive    CURRENT MEDICATIONS:  Current Outpatient Medications on File Prior to Visit   Medication Sig Dispense Refill    cholecalciferol, vitamin D3, (VITAMIN D3) 50 mcg (2,000 unit) Cap Take 1 capsule by mouth once daily.      docusate sodium 100 mg capsule Take 100 mg by mouth 2 (two) times daily. 30 tablet 0    hydrOXYchloroQUINE (PLAQUENIL) 200 mg tablet TAKE 1 TABLET BY MOUTH TWICE DAILY 180 tablet 3    levothyroxine (SYNTHROID) 50 MCG tablet Take 1 tablet (50 mcg total) by mouth before  "breakfast. 30 tablet 5    meloxicam (MOBIC) 15 MG tablet TAKE 1 TABLET BY MOUTH ONCE DAILY 30 tablet 3    metoprolol succinate (TOPROL-XL) 25 MG 24 hr tablet TAKE 1 TABLET BY MOUTH once daily 90 tablet 1    RESTASIS 0.05 % ophthalmic emulsion Place 1 drop into both eyes 2 (two) times daily.      traMADoL (ULTRAM) 50 mg tablet Take 1 tablet (50 mg total) by mouth every 6 (six) hours as needed for Pain. 120 tablet 5     No current facility-administered medications on file prior to visit.       REVIEW OF SYSTEMS:  Review of Systems Complete; Negative, unless noted above.    GENERAL PHYSICAL EXAM:   Ht 5' 4" (1.626 m)   Wt 69.9 kg (154 lb)   LMP 10/02/2015   BMI 26.43 kg/m²    GEN: well developed, well nourished, no acute distress   PULM: No wheezing, no respiratory distress   CV: RRR    ORTHO EXAM:   Examination of bilateral hands reveals no edema, erythema, ecchymosis, or skin breakdown.  Able make composite fist and fully extend all fingers.  Very mild tenderness to palpation of the 1st CMC joint.  Normal sensation in the radial, ulnar, median nerve distributions.  Capillary refill less than 2 seconds in all fingers.    RADIOLOGY:   None.    ASSESSMENT:   Bilateral 1st CMC joint osteoarthritis    PLAN:  1. I discussed with Lashell Stroud and her  that her CMC joints are not causing her lot of pain, we should wait on performing the injections.  She verbally agreed with the treatment plan.    2. I would like her follow up in clinic on a p.r.n. basis for any worsening of her symptoms or for any hand, wrist, or elbow problems/concerns.  She was instructed to contact the clinic for any problems/concerns in the interim.      "

## 2022-08-17 DIAGNOSIS — Z79.899 LONG-TERM USE OF PLAQUENIL: ICD-10-CM

## 2022-08-17 DIAGNOSIS — E03.9 HYPOTHYROIDISM, UNSPECIFIED TYPE: ICD-10-CM

## 2022-08-17 DIAGNOSIS — I10 ESSENTIAL HYPERTENSION: ICD-10-CM

## 2022-08-17 DIAGNOSIS — M35.3 PMR (POLYMYALGIA RHEUMATICA): ICD-10-CM

## 2022-08-17 DIAGNOSIS — M19.90 INFLAMMATORY ARTHRITIS: ICD-10-CM

## 2022-08-17 DIAGNOSIS — G89.4 CHRONIC PAIN SYNDROME: ICD-10-CM

## 2022-08-19 DIAGNOSIS — M35.3 PMR (POLYMYALGIA RHEUMATICA): ICD-10-CM

## 2022-08-19 DIAGNOSIS — Z79.899 LONG-TERM USE OF PLAQUENIL: ICD-10-CM

## 2022-08-19 DIAGNOSIS — M19.90 INFLAMMATORY ARTHRITIS: ICD-10-CM

## 2022-08-19 DIAGNOSIS — E03.9 HYPOTHYROIDISM, UNSPECIFIED TYPE: ICD-10-CM

## 2022-08-19 DIAGNOSIS — I10 ESSENTIAL HYPERTENSION: ICD-10-CM

## 2022-08-19 DIAGNOSIS — G89.4 CHRONIC PAIN SYNDROME: ICD-10-CM

## 2022-08-19 RX ORDER — TRAMADOL HYDROCHLORIDE 50 MG/1
TABLET ORAL
Qty: 120 TABLET | Refills: 5 | Status: SHIPPED | OUTPATIENT
Start: 2022-08-18 | End: 2022-08-19 | Stop reason: SDUPTHER

## 2022-08-19 NOTE — TELEPHONE ENCOUNTER
----- Message from Miguel May sent at 8/19/2022  7:55 AM CDT -----  Contact: Luz Marina from [facility] at  [phone]  Type:  Pharmacy Calling to Clarify an RX    Name of Caller:  Luz Marina  Pharmacy Name:  [facility]  Prescription Name:  Tramadol 50 mg  What do they need to clarify?:  it needs to say more than 7 days medically necessary giagnosis code is invalid  Best Call Back Number: [phone]  Additional Information:  Luz Marina from [facility] is calling the office to le t them know the pt's Tramadol 50 mg needs to say more than 7 days medically necessary diagnosis code is invalid

## 2022-08-24 ENCOUNTER — OFFICE VISIT (OUTPATIENT)
Dept: RHEUMATOLOGY | Facility: CLINIC | Age: 57
End: 2022-08-24
Payer: COMMERCIAL

## 2022-08-24 ENCOUNTER — TELEPHONE (OUTPATIENT)
Dept: RHEUMATOLOGY | Facility: CLINIC | Age: 57
End: 2022-08-24
Payer: COMMERCIAL

## 2022-08-24 VITALS
WEIGHT: 157.63 LBS | HEART RATE: 66 BPM | DIASTOLIC BLOOD PRESSURE: 80 MMHG | BODY MASS INDEX: 26.91 KG/M2 | SYSTOLIC BLOOD PRESSURE: 133 MMHG | HEIGHT: 64 IN

## 2022-08-24 DIAGNOSIS — D83.9 CVID (COMMON VARIABLE IMMUNODEFICIENCY): ICD-10-CM

## 2022-08-24 DIAGNOSIS — E03.9 HYPOTHYROIDISM, UNSPECIFIED TYPE: ICD-10-CM

## 2022-08-24 DIAGNOSIS — G89.4 CHRONIC PAIN SYNDROME: ICD-10-CM

## 2022-08-24 DIAGNOSIS — M19.042 OSTEOARTHRITIS OF BOTH HANDS, UNSPECIFIED OSTEOARTHRITIS TYPE: ICD-10-CM

## 2022-08-24 DIAGNOSIS — M35.3 PMR (POLYMYALGIA RHEUMATICA): Primary | ICD-10-CM

## 2022-08-24 DIAGNOSIS — M19.041 OSTEOARTHRITIS OF BOTH HANDS, UNSPECIFIED OSTEOARTHRITIS TYPE: ICD-10-CM

## 2022-08-24 PROCEDURE — 3075F PR MOST RECENT SYSTOLIC BLOOD PRESS GE 130-139MM HG: ICD-10-PCS | Mod: CPTII,S$GLB,, | Performed by: INTERNAL MEDICINE

## 2022-08-24 PROCEDURE — 1159F PR MEDICATION LIST DOCUMENTED IN MEDICAL RECORD: ICD-10-PCS | Mod: CPTII,S$GLB,, | Performed by: INTERNAL MEDICINE

## 2022-08-24 PROCEDURE — 3008F PR BODY MASS INDEX (BMI) DOCUMENTED: ICD-10-PCS | Mod: CPTII,S$GLB,, | Performed by: INTERNAL MEDICINE

## 2022-08-24 PROCEDURE — 99999 PR PBB SHADOW E&M-EST. PATIENT-LVL III: CPT | Mod: PBBFAC,,, | Performed by: INTERNAL MEDICINE

## 2022-08-24 PROCEDURE — 3008F BODY MASS INDEX DOCD: CPT | Mod: CPTII,S$GLB,, | Performed by: INTERNAL MEDICINE

## 2022-08-24 PROCEDURE — 3079F DIAST BP 80-89 MM HG: CPT | Mod: CPTII,S$GLB,, | Performed by: INTERNAL MEDICINE

## 2022-08-24 PROCEDURE — 1159F MED LIST DOCD IN RCRD: CPT | Mod: CPTII,S$GLB,, | Performed by: INTERNAL MEDICINE

## 2022-08-24 PROCEDURE — 3075F SYST BP GE 130 - 139MM HG: CPT | Mod: CPTII,S$GLB,, | Performed by: INTERNAL MEDICINE

## 2022-08-24 PROCEDURE — 3079F PR MOST RECENT DIASTOLIC BLOOD PRESSURE 80-89 MM HG: ICD-10-PCS | Mod: CPTII,S$GLB,, | Performed by: INTERNAL MEDICINE

## 2022-08-24 PROCEDURE — 3044F HG A1C LEVEL LT 7.0%: CPT | Mod: CPTII,S$GLB,, | Performed by: INTERNAL MEDICINE

## 2022-08-24 PROCEDURE — 99999 PR PBB SHADOW E&M-EST. PATIENT-LVL III: ICD-10-PCS | Mod: PBBFAC,,, | Performed by: INTERNAL MEDICINE

## 2022-08-24 PROCEDURE — 3044F PR MOST RECENT HEMOGLOBIN A1C LEVEL <7.0%: ICD-10-PCS | Mod: CPTII,S$GLB,, | Performed by: INTERNAL MEDICINE

## 2022-08-24 PROCEDURE — 99214 PR OFFICE/OUTPT VISIT, EST, LEVL IV, 30-39 MIN: ICD-10-PCS | Mod: S$GLB,,, | Performed by: INTERNAL MEDICINE

## 2022-08-24 PROCEDURE — 99214 OFFICE O/P EST MOD 30 MIN: CPT | Mod: S$GLB,,, | Performed by: INTERNAL MEDICINE

## 2022-08-24 RX ORDER — LEVOTHYROXINE SODIUM 50 UG/1
50 TABLET ORAL
Qty: 90 TABLET | Refills: 3 | Status: SHIPPED | OUTPATIENT
Start: 2022-08-24 | End: 2023-03-10 | Stop reason: SDUPTHER

## 2022-08-24 RX ORDER — MELOXICAM 15 MG/1
15 TABLET ORAL DAILY
Qty: 90 TABLET | Refills: 3 | Status: SHIPPED | OUTPATIENT
Start: 2022-08-24 | End: 2023-03-10 | Stop reason: SDUPTHER

## 2022-08-24 RX ORDER — TRAMADOL HYDROCHLORIDE 50 MG/1
50 TABLET ORAL EVERY 6 HOURS PRN
Qty: 120 TABLET | Refills: 5 | Status: SHIPPED | OUTPATIENT
Start: 2022-08-24 | End: 2023-03-10 | Stop reason: SDUPTHER

## 2022-08-24 NOTE — TELEPHONE ENCOUNTER
Message sent to provider  ----- Message from Vinicio Mendoza sent at 8/24/2022  9:51 AM CDT -----  Regarding: [facility] called  Name of Who is Calling: MARCELA ROSE [[phone]]      What is the request in detail:Claudio from [facility] called requesting for medication traMADoL (ULTRAM) 50 mg tablet be resubmitted with quality medical necessity put on the prescription and DX code to be changed.Please advise      Can the clinic reply by [facility]: No      What Number to Call Back if not in [facility]: [phone] Claudio ([facility])

## 2022-08-24 NOTE — PROGRESS NOTES
Subjective:       Patient ID: Lashell Stroud is a 57 y.o. female.    Chief Complaint: Disease Management    Follow  Up: 57 year old female who presents to clinic for follow up on PMR and osteoarthritis.  She is doing fair overall. She has noticed increased pain in her bilateral CMC joints and having difficulty with gripping and opening objects. No previous joint injections or occupational therapy. She has constant aching/stiffness in her PIP and DIP joints as well, which is unchanged. She is a  and does a lot of repetitive movements with her hands. No shoulder/hip pain/stiffness. She has been complaint with plaquenil, mobic, and tramadol tid PRN for severe pain with adequate control of her symptoms. She could not tolerate lamisil so she d/deejay this. She has chronic fatigue for many years.    No serious infections since her last visit.     Her TSH normalized    Current tx:  1. Plaquenil stopped tx no change  2. mobic  3. Tramadol              Pertinent negatives include no dysuria, fever, trouble swallowing or headaches.         Review of Systems   Constitutional: Positive for activity change. Negative for fever and unexpected weight change.   HENT: Negative for mouth sores and trouble swallowing.    Eyes: Negative for redness.   Respiratory: Negative for cough and shortness of breath.    Cardiovascular: Negative for chest pain.   Gastrointestinal: Positive for constipation. Negative for diarrhea.   Genitourinary: Negative for dysuria and genital sores.   Musculoskeletal: Positive for arthralgias.   Skin: Negative for rash.   Neurological: Negative for headaches.   Hematological: Does not bruise/bleed easily.         Objective:     Vitals:    08/24/22 1408   BP: 133/80   Pulse: 66       Past Medical History:   Diagnosis Date    Constipation 12/6/2017    COVID-19 virus infection     pos 7/20/20    Family history of colon cancer in mother 8/17/2016    History of chicken pox     Hypertension      Palindromic rheumatism involving tarsus     Psoas tendinitis of both sides 2019    Shoulder pain, bilateral 3/8/2016    Spinal stenosis of lumbar region 2019     Past Surgical History:   Procedure Laterality Date    ARTHROSCOPIC REPAIR OF ROTATOR CUFF OF SHOULDER Left 2020    Procedure: REPAIR, ROTATOR CUFF, ARTHROSCOPIC;  Surgeon: Sb Reis II, MD;  Location: [facility];  Service: Orthopedics;  Laterality: Left;    ARTHROSCOPY OF SHOULDER WITH DECOMPRESSION OF SUBACROMIAL SPACE Left 2020    Procedure: ARTHROSCOPY, SHOULDER, WITH SUBACROMIAL SPACE DECOMPRESSION;  Surgeon: Sb Reis II, MD;  Location: [facility] OR;  Service: Orthopedics;  Laterality: Left;     SECTION      CHOLECYSTECTOMY      COLONOSCOPY N/A 2016    Procedure: COLONOSCOPY;  Surgeon: Hesham Enriquez MD;  Location: [facility] ENDO;  Service: Endoscopy;  Laterality: N/A;    COLONOSCOPY N/A 2017    Procedure: COLONOSCOPY;  Surgeon: Hesham Enriquez MD;  Location: [facility] ENDO;  Service: Endoscopy;  Laterality: N/A;    COLONOSCOPY N/A 10/15/2021    Procedure: COLONOSCOPY;  Surgeon: Hesham Enriquez MD;  Location: [facility] ENDO;  Service: Endoscopy;  Laterality: N/A;    EPIDURAL STEROID INJECTION INTO LUMBAR SPINE N/A 2019    Procedure: Injection-steroid-epidural-lumbar L4/5;  Surgeon: Cristhian Gee MD;  Location: [facility] OR;  Service: Pain Management;  Laterality: N/A;    EPIDURAL STEROID INJECTION INTO LUMBAR SPINE N/A 2019    Procedure: Injection-steroid-epidural-lumbar;  Surgeon: Nigel Zuniga MD;  Location: [facility] OR;  Service: Pain Management;  Laterality: N/A;  L4/5 interlaminar    EXTREME LATERAL INTERBODY FUSION (XLIF) OF SPINE Left 2019    Procedure: FUSION, SPINE, XLIF L3-4;  Surgeon: Cleve Bowers MD;  Location: [facility];  Service: Orthopedics;  Laterality: Left;    LAMINECTOMY USING MINIMALLY INVASIVE TECHNIQUE N/A 2019    Procedure: LAMINECTOMY, SPINE, MINIMALLY INVASIVE  L3-L4;  Surgeon: Cleve Bowers MD;  Location: [facility];  Service: Orthopedics;  Laterality: N/A;    MINIMALLY INVASIVE FUSION OF SPINE N/A 11/18/2019    Procedure: FUSION, SPINE, MINIMALLY INVASIVE L3-L4;  Surgeon: Cleve Bowers MD;  Location: [facility] OR;  Service: Orthopedics;  Laterality: N/A;    TUBAL LIGATION N/A 1986          Physical Exam   Eyes: Right conjunctiva is not injected. Left conjunctiva is not injected.   Neck: No JVD present. No thyromegaly present.   Cardiovascular: Normal rate.   Pulmonary/Chest: Effort normal.   Abdominal:   No abdominal exam completed. Unable to delete statement above.   Musculoskeletal:         General: Tenderness and deformity present.      Right shoulder: Normal.      Left shoulder: Normal.      Right elbow: Normal.      Left elbow: Normal.      Right wrist: Normal.      Left wrist: Normal.      Right knee: Normal.      Left knee: Normal.   Lymphadenopathy:     She has no cervical adenopathy.   Neurological: Gait normal.   Skin: No rash noted.   Psychiatric: Mood and affect normal.       Right Side Rheumatological Exam     Examination finds the shoulder, elbow, wrist, knee, 1st MCP, 2nd MCP, 3rd MCP, 4th MCP and 5th MCP normal.    The patient is tender to palpation of the 1st PIP, 2nd PIP, 3rd PIP, 4th PIP, 5th PIP, 1st CMC, 2nd DIP, 3rd DIP, 4th DIP and 5th DIP    The patient has an enlarged 1st PIP, 2nd PIP, 3rd PIP, 4th PIP, 5th PIP, 1st CMC, 2nd DIP, 3rd DIP, 4th DIP and 5th DIP    Left Side Rheumatological Exam     Examination finds the shoulder, elbow, wrist, knee, 1st MCP, 2nd MCP, 3rd MCP, 4th MCP and 5th MCP normal.    The patient is tender to palpation of the 1st PIP, 2nd PIP, 3rd PIP, 4th PIP, 5th PIP, 1st CMC, 2nd DIP, 3rd DIP, 4th DIP and 5th DIP.    The patient has an enlarged 1st PIP, 2nd PIP, 3rd PIP, 4th PIP, 5th PIP, 1st CMC, 2nd DIP, 3rd DIP, 4th DIP and 5th DIP.          Results for orders placed or performed in visit on 07/06/22   TSH   Result Value  Ref Range    TSH 3.880 0.400 - 4.000 uIU/mL   T4, Free   Result Value Ref Range    Free T4 1.27 0.78 - 2.19 ng/dL        Assessment:       1. PMR (polymyalgia rheumatica)    2. Osteoarthritis of both hands, unspecified osteoarthritis type    3. Hypothyroidism, unspecified type    4. Chronic pain syndrome    5. Long-term use of Plaquenil    6. CVID (common variable immunodeficiency)            Plan:       PMR (polymyalgia rheumatica)    Osteoarthritis of both hands, unspecified osteoarthritis type    Hypothyroidism, unspecified type    Chronic pain syndrome    Long-term use of Plaquenil    CVID (common variable immunodeficiency)        Assessment:  56 year old female with  PMR on plaquenil  --osteoarthritis  --hypothyroidism  --chronic fatigue  --chronic pain syndrome on tramadol  --lumbar spinal stenosis    Plan:  1.  mobic 15 mg daily  2. Cont tramadol  I have checked [place] prescription monitoring program site and no unusual or abnormal behavior has occurred pt understand the risk and benefits of taking opioid medications and has decided to continue the medication.  3. Increase levothyroxine 50 mcg daily and repeat labs in 12 weeks  4. Hand Ortho referral for cmc eval, consider OT referral as well

## 2022-09-02 ENCOUNTER — PATIENT MESSAGE (OUTPATIENT)
Dept: ORTHOPEDICS | Facility: CLINIC | Age: 57
End: 2022-09-02
Payer: COMMERCIAL

## 2022-09-02 ENCOUNTER — PATIENT MESSAGE (OUTPATIENT)
Dept: PODIATRY | Facility: CLINIC | Age: 57
End: 2022-09-02
Payer: COMMERCIAL

## 2022-09-19 ENCOUNTER — PATIENT MESSAGE (OUTPATIENT)
Dept: PODIATRY | Facility: CLINIC | Age: 57
End: 2022-09-19
Payer: COMMERCIAL

## 2022-09-21 ENCOUNTER — OFFICE VISIT (OUTPATIENT)
Dept: ORTHOPEDICS | Facility: CLINIC | Age: 57
End: 2022-09-21
Payer: COMMERCIAL

## 2022-09-21 ENCOUNTER — PATIENT MESSAGE (OUTPATIENT)
Dept: ORTHOPEDICS | Facility: CLINIC | Age: 57
End: 2022-09-21

## 2022-09-21 DIAGNOSIS — M18.12 ARTHRITIS OF CARPOMETACARPAL (CMC) JOINT OF LEFT THUMB: Primary | ICD-10-CM

## 2022-09-21 PROCEDURE — 3044F HG A1C LEVEL LT 7.0%: CPT | Mod: CPTII,S$GLB,, | Performed by: ORTHOPAEDIC SURGERY

## 2022-09-21 PROCEDURE — 99214 PR OFFICE/OUTPT VISIT, EST, LEVL IV, 30-39 MIN: ICD-10-PCS | Mod: S$GLB,,, | Performed by: ORTHOPAEDIC SURGERY

## 2022-09-21 PROCEDURE — 99214 OFFICE O/P EST MOD 30 MIN: CPT | Mod: S$GLB,,, | Performed by: ORTHOPAEDIC SURGERY

## 2022-09-21 PROCEDURE — 99999 PR PBB SHADOW E&M-EST. PATIENT-LVL III: CPT | Mod: PBBFAC,,, | Performed by: ORTHOPAEDIC SURGERY

## 2022-09-21 PROCEDURE — 3044F PR MOST RECENT HEMOGLOBIN A1C LEVEL <7.0%: ICD-10-PCS | Mod: CPTII,S$GLB,, | Performed by: ORTHOPAEDIC SURGERY

## 2022-09-21 PROCEDURE — 1159F PR MEDICATION LIST DOCUMENTED IN MEDICAL RECORD: ICD-10-PCS | Mod: CPTII,S$GLB,, | Performed by: ORTHOPAEDIC SURGERY

## 2022-09-21 PROCEDURE — 1159F MED LIST DOCD IN RCRD: CPT | Mod: CPTII,S$GLB,, | Performed by: ORTHOPAEDIC SURGERY

## 2022-09-21 PROCEDURE — 99999 PR PBB SHADOW E&M-EST. PATIENT-LVL III: ICD-10-PCS | Mod: PBBFAC,,, | Performed by: ORTHOPAEDIC SURGERY

## 2022-09-21 NOTE — PATIENT INSTRUCTIONS
Surgery Instructions:     Your surgery is scheduled on 10/04/22  at the surgery center: [address] [facility] [address], 1st floor, second entrance.    The pre-op department will be in contact with you prior to your procedure to review medications and instructions.       Nothing to eat or drink after midnight prior to day of surgery.    The surgery center will contact you the day prior to surgery to advise you of your arrival time for surgery.     Your post op appointment is scheduled on 10/17/22 @ 1:40pm.    You will be contacted by an automated text message every morning for for 14 days after your surgery inquiring if you have any COVID symptoms.  If you have any concerns regarding COVID please reply to the text message and then an [facility] On Call Registered Nurse will contact you later that day.

## 2022-09-21 NOTE — H&P (VIEW-ONLY)
2022    Chief Complaint:  Chief Complaint   Patient presents with    Left Hand - Pain     Thumb cmc arthritis    Right Hand - Pain     Thumb cmc arthritis       HPI:  Lashell Stroud is a 57 y.o. female, who presents to clinic today she has a history of bilateral thumb CMC arthritis.  She has been injected on multiple occasions.  She continues to have return of the symptoms.  She states that her left hand is worse than her right hip.  She is here today to discuss further treatment options    PMHX:  Past Medical History:   Diagnosis Date    Constipation 2017    COVID-19 virus infection     pos 20    Family history of colon cancer in mother 2016    History of chicken pox     Hypertension     Palindromic rheumatism involving tarsus     Psoas tendinitis of both sides 2019    Shoulder pain, bilateral 3/8/2016    Spinal stenosis of lumbar region 2019       PSHX:  Past Surgical History:   Procedure Laterality Date    ARTHROSCOPIC REPAIR OF ROTATOR CUFF OF SHOULDER Left 2020    Procedure: REPAIR, ROTATOR CUFF, ARTHROSCOPIC;  Surgeon: Sb Reis II, MD;  Location: [facility];  Service: Orthopedics;  Laterality: Left;    ARTHROSCOPY OF SHOULDER WITH DECOMPRESSION OF SUBACROMIAL SPACE Left 2020    Procedure: ARTHROSCOPY, SHOULDER, WITH SUBACROMIAL SPACE DECOMPRESSION;  Surgeon: Sb Reis II, MD;  Location: [facility] OR;  Service: Orthopedics;  Laterality: Left;     SECTION      CHOLECYSTECTOMY      COLONOSCOPY N/A 2016    Procedure: COLONOSCOPY;  Surgeon: Hesham Enriquez MD;  Location: [facility] ENDO;  Service: Endoscopy;  Laterality: N/A;    COLONOSCOPY N/A 2017    Procedure: COLONOSCOPY;  Surgeon: Hesham Enriquze MD;  Location: [facility] ENDO;  Service: Endoscopy;  Laterality: N/A;    COLONOSCOPY N/A 10/15/2021    Procedure: COLONOSCOPY;  Surgeon: Hesham Enriquez MD;  Location: [facility] ENDO;  Service: Endoscopy;  Laterality: N/A;    EPIDURAL STEROID  INJECTION INTO LUMBAR SPINE N/A 5/22/2019    Procedure: Injection-steroid-epidural-lumbar L4/5;  Surgeon: Cristhian Gee MD;  Location: [facility] OR;  Service: Pain Management;  Laterality: N/A;    EPIDURAL STEROID INJECTION INTO LUMBAR SPINE N/A 7/31/2019    Procedure: Injection-steroid-epidural-lumbar;  Surgeon: Nigel Zuniga MD;  Location: [facility] OR;  Service: Pain Management;  Laterality: N/A;  L4/5 interlaminar    EXTREME LATERAL INTERBODY FUSION (XLIF) OF SPINE Left 11/18/2019    Procedure: FUSION, SPINE, XLIF L3-4;  Surgeon: Cleve Bowers MD;  Location: [facility] OR;  Service: Orthopedics;  Laterality: Left;    LAMINECTOMY USING MINIMALLY INVASIVE TECHNIQUE N/A 11/18/2019    Procedure: LAMINECTOMY, SPINE, MINIMALLY INVASIVE L3-L4;  Surgeon: Cleve Bowers MD;  Location: [facility] OR;  Service: Orthopedics;  Laterality: N/A;    MINIMALLY INVASIVE FUSION OF SPINE N/A 11/18/2019    Procedure: FUSION, SPINE, MINIMALLY INVASIVE L3-L4;  Surgeon: Cleve Bowers MD;  Location: [facility] OR;  Service: Orthopedics;  Laterality: N/A;    TUBAL LIGATION N/A 1986       FMHX:  Family History   Problem Relation Age of Onset    Cancer Mother     Hypertension Mother     Heart disease Father     Hypertension Father     Diabetes Father     No Known Problems Brother        SOCHX:  Social History     Tobacco Use    Smoking status: Never    Smokeless tobacco: Never   Substance Use Topics    Alcohol use: Yes     Comment: occassional       ALLERGIES:  Aspirin, Penicillins, and Adhesive    CURRENT MEDICATIONS:  Current Outpatient Medications on File Prior to Visit   Medication Sig Dispense Refill    cholecalciferol, vitamin D3, (VITAMIN D3) 50 mcg (2,000 unit) Cap Take 1 capsule by mouth once daily.      levothyroxine (SYNTHROID) 50 MCG tablet Take 1 tablet (50 mcg total) by mouth before breakfast. 90 tablet 3    meloxicam (MOBIC) 15 MG tablet Take 1 tablet (15 mg total) by mouth once daily. 90 tablet 3    metoprolol succinate (TOPROL-XL) 25  MG 24 hr tablet TAKE 1 TABLET BY MOUTH once daily 90 tablet 1    RESTASIS 0.05 % ophthalmic emulsion Place 1 drop into both eyes 2 (two) times daily.      traMADoL (ULTRAM) 50 mg tablet Take 1 tablet (50 mg total) by mouth every 6 (six) hours as needed. 120 tablet 5     No current facility-administered medications on file prior to visit.       REVIEW OF SYSTEMS:  ROS    GENERAL PHYSICAL EXAM:   [facility] 10/02/2015    GEN: well developed, well nourished, no acute distress   HENT: Normocephalic, atraumatic   EYES: No discharge, conjunctiva normal   NECK: Supple, non-tender   PULM: No wheezing, no respiratory distress   CV: RRR   ABD: Soft, non-tender    ORTHO EXAM:   Examination of the left hand and wrist reveals that there are no major skin changes.  There is no edema.  She does have prominence of the CMC joint.  Palpation over the joint does produce significant tenderness.  She has a markedly positive grind test.  She has a 2+ radial pulse.  Sensation is intact.    RADIOLOGY:   X-rays of the left thumb from 03/09/2022 have been reviewed.  There is noted to be moderate to severe arthritis of the thumb CMC joint.  There are mild degenerative changes otherwise noted    ASSESSMENT:   Left thumb CMC arthritis    PLAN:  1. I have discussed treatment options with the patient.  I did discuss continuing steroid injection versus CMC arthroplasty.  After discussion of the risks and benefits of the procedure informed consent has been obtained    2.  Will proceed with left thumb CMC arthritis under general anesthesia with a single shot supraclavicular block    3. Patient does have a set of blood work ordered and she will get that prior to surgery    4.  She will follow up with me 2 weeks postoperatively

## 2022-09-21 NOTE — PROGRESS NOTES
2022    Chief Complaint:  Chief Complaint   Patient presents with    Left Hand - Pain     Thumb cmc arthritis    Right Hand - Pain     Thumb cmc arthritis       HPI:  Lashell Stroud is a 57 y.o. female, who presents to clinic today she has a history of bilateral thumb CMC arthritis.  She has been injected on multiple occasions.  She continues to have return of the symptoms.  She states that her left hand is worse than her right hip.  She is here today to discuss further treatment options    PMHX:  Past Medical History:   Diagnosis Date    Constipation 2017    COVID-19 virus infection     pos 20    Family history of colon cancer in mother 2016    History of chicken pox     Hypertension     Palindromic rheumatism involving tarsus     Psoas tendinitis of both sides 2019    Shoulder pain, bilateral 3/8/2016    Spinal stenosis of lumbar region 2019       PSHX:  Past Surgical History:   Procedure Laterality Date    ARTHROSCOPIC REPAIR OF ROTATOR CUFF OF SHOULDER Left 2020    Procedure: REPAIR, ROTATOR CUFF, ARTHROSCOPIC;  Surgeon: Sb Reis II, MD;  Location: [facility];  Service: Orthopedics;  Laterality: Left;    ARTHROSCOPY OF SHOULDER WITH DECOMPRESSION OF SUBACROMIAL SPACE Left 2020    Procedure: ARTHROSCOPY, SHOULDER, WITH SUBACROMIAL SPACE DECOMPRESSION;  Surgeon: Sb Reis II, MD;  Location: [facility] OR;  Service: Orthopedics;  Laterality: Left;     SECTION      CHOLECYSTECTOMY      COLONOSCOPY N/A 2016    Procedure: COLONOSCOPY;  Surgeon: Hesham Enriquez MD;  Location: [facility] ENDO;  Service: Endoscopy;  Laterality: N/A;    COLONOSCOPY N/A 2017    Procedure: COLONOSCOPY;  Surgeon: Hesham Enriquez MD;  Location: [facility] ENDO;  Service: Endoscopy;  Laterality: N/A;    COLONOSCOPY N/A 10/15/2021    Procedure: COLONOSCOPY;  Surgeon: Hesham Enriquez MD;  Location: [facility] ENDO;  Service: Endoscopy;  Laterality: N/A;    EPIDURAL STEROID  INJECTION INTO LUMBAR SPINE N/A 5/22/2019    Procedure: Injection-steroid-epidural-lumbar L4/5;  Surgeon: Cristhian Gee MD;  Location: [facility] OR;  Service: Pain Management;  Laterality: N/A;    EPIDURAL STEROID INJECTION INTO LUMBAR SPINE N/A 7/31/2019    Procedure: Injection-steroid-epidural-lumbar;  Surgeon: Nigel Zuniga MD;  Location: [facility] OR;  Service: Pain Management;  Laterality: N/A;  L4/5 interlaminar    EXTREME LATERAL INTERBODY FUSION (XLIF) OF SPINE Left 11/18/2019    Procedure: FUSION, SPINE, XLIF L3-4;  Surgeon: Cleve Bowers MD;  Location: [facility] OR;  Service: Orthopedics;  Laterality: Left;    LAMINECTOMY USING MINIMALLY INVASIVE TECHNIQUE N/A 11/18/2019    Procedure: LAMINECTOMY, SPINE, MINIMALLY INVASIVE L3-L4;  Surgeon: Cleve Bowers MD;  Location: [facility] OR;  Service: Orthopedics;  Laterality: N/A;    MINIMALLY INVASIVE FUSION OF SPINE N/A 11/18/2019    Procedure: FUSION, SPINE, MINIMALLY INVASIVE L3-L4;  Surgeon: Cleve Bowers MD;  Location: [facility] OR;  Service: Orthopedics;  Laterality: N/A;    TUBAL LIGATION N/A 1986       FMHX:  Family History   Problem Relation Age of Onset    Cancer Mother     Hypertension Mother     Heart disease Father     Hypertension Father     Diabetes Father     No Known Problems Brother        SOCHX:  Social History     Tobacco Use    Smoking status: Never    Smokeless tobacco: Never   Substance Use Topics    Alcohol use: Yes     Comment: occassional       ALLERGIES:  Aspirin, Penicillins, and Adhesive    CURRENT MEDICATIONS:  Current Outpatient Medications on File Prior to Visit   Medication Sig Dispense Refill    cholecalciferol, vitamin D3, (VITAMIN D3) 50 mcg (2,000 unit) Cap Take 1 capsule by mouth once daily.      levothyroxine (SYNTHROID) 50 MCG tablet Take 1 tablet (50 mcg total) by mouth before breakfast. 90 tablet 3    meloxicam (MOBIC) 15 MG tablet Take 1 tablet (15 mg total) by mouth once daily. 90 tablet 3    metoprolol succinate (TOPROL-XL) 25  MG 24 hr tablet TAKE 1 TABLET BY MOUTH once daily 90 tablet 1    RESTASIS 0.05 % ophthalmic emulsion Place 1 drop into both eyes 2 (two) times daily.      traMADoL (ULTRAM) 50 mg tablet Take 1 tablet (50 mg total) by mouth every 6 (six) hours as needed. 120 tablet 5     No current facility-administered medications on file prior to visit.       REVIEW OF SYSTEMS:  ROS    GENERAL PHYSICAL EXAM:   [facility] 10/02/2015    GEN: well developed, well nourished, no acute distress   HENT: Normocephalic, atraumatic   EYES: No discharge, conjunctiva normal   NECK: Supple, non-tender   PULM: No wheezing, no respiratory distress   CV: RRR   ABD: Soft, non-tender    ORTHO EXAM:   Examination of the left hand and wrist reveals that there are no major skin changes.  There is no edema.  She does have prominence of the CMC joint.  Palpation over the joint does produce significant tenderness.  She has a markedly positive grind test.  She has a 2+ radial pulse.  Sensation is intact.    RADIOLOGY:   X-rays of the left thumb from 03/09/2022 have been reviewed.  There is noted to be moderate to severe arthritis of the thumb CMC joint.  There are mild degenerative changes otherwise noted    ASSESSMENT:   Left thumb CMC arthritis    PLAN:  1. I have discussed treatment options with the patient.  I did discuss continuing steroid injection versus CMC arthroplasty.  After discussion of the risks and benefits of the procedure informed consent has been obtained    2.  Will proceed with left thumb CMC arthritis under general anesthesia with a single shot supraclavicular block    3. Patient does have a set of blood work ordered and she will get that prior to surgery    4.  She will follow up with me 2 weeks postoperatively

## 2022-09-22 DIAGNOSIS — Z01.818 PRE-OP TESTING: Primary | ICD-10-CM

## 2022-09-23 DIAGNOSIS — M18.12 ARTHRITIS OF CARPOMETACARPAL (CMC) JOINT OF LEFT THUMB: Primary | ICD-10-CM

## 2022-09-23 RX ORDER — MUPIROCIN 20 MG/G
OINTMENT TOPICAL
Status: CANCELLED | OUTPATIENT
Start: 2022-09-23

## 2022-10-03 ENCOUNTER — ANESTHESIA EVENT (OUTPATIENT)
Dept: SURGERY | Facility: HOSPITAL | Age: 57
End: 2022-10-03
Payer: COMMERCIAL

## 2022-10-04 ENCOUNTER — ANESTHESIA (OUTPATIENT)
Dept: SURGERY | Facility: HOSPITAL | Age: 57
End: 2022-10-04
Payer: COMMERCIAL

## 2022-10-04 ENCOUNTER — HOSPITAL ENCOUNTER (OUTPATIENT)
Dept: RADIOLOGY | Facility: HOSPITAL | Age: 57
Discharge: HOME | End: 2022-10-04
Attending: ORTHOPAEDIC SURGERY | Admitting: ORTHOPAEDIC SURGERY
Payer: COMMERCIAL

## 2022-10-04 ENCOUNTER — HOSPITAL ENCOUNTER (OUTPATIENT)
Facility: HOSPITAL | Age: 57
Discharge: HOME | End: 2022-10-04
Attending: ORTHOPAEDIC SURGERY | Admitting: ORTHOPAEDIC SURGERY
Payer: COMMERCIAL

## 2022-10-04 DIAGNOSIS — G89.29 CHRONIC PAIN OF LEFT THUMB: ICD-10-CM

## 2022-10-04 DIAGNOSIS — M18.12 ARTHRITIS OF CARPOMETACARPAL (CMC) JOINT OF LEFT THUMB: ICD-10-CM

## 2022-10-04 DIAGNOSIS — M79.645 CHRONIC PAIN OF LEFT THUMB: ICD-10-CM

## 2022-10-04 PROCEDURE — 25447 PR REPAIR INTERCARP/CARP-METACARP JT: ICD-10-PCS | Mod: LT,,, | Performed by: ORTHOPAEDIC SURGERY

## 2022-10-04 PROCEDURE — 63600175 PHARM REV CODE 636 W HCPCS: Mod: PO | Performed by: NURSE ANESTHETIST, CERTIFIED REGISTERED

## 2022-10-04 PROCEDURE — 37000008 HC ANESTHESIA 1ST 15 MINUTES: Mod: PO | Performed by: ORTHOPAEDIC SURGERY

## 2022-10-04 PROCEDURE — D9220A PRA ANESTHESIA: ICD-10-PCS | Mod: ANES,,, | Performed by: ANESTHESIOLOGY

## 2022-10-04 PROCEDURE — D9220A PRA ANESTHESIA: Mod: ANES,,, | Performed by: ANESTHESIOLOGY

## 2022-10-04 PROCEDURE — 76942 PR U/S GUIDANCE FOR NEEDLE GUIDANCE: ICD-10-PCS | Mod: 26,,, | Performed by: ANESTHESIOLOGY

## 2022-10-04 PROCEDURE — 26480 PR TRANSPLANT HAND TENDON: ICD-10-PCS | Mod: 51,LT,, | Performed by: ORTHOPAEDIC SURGERY

## 2022-10-04 PROCEDURE — 26480 TRANSPLANT HAND TENDON: CPT | Mod: 51,LT,, | Performed by: ORTHOPAEDIC SURGERY

## 2022-10-04 PROCEDURE — 63600175 PHARM REV CODE 636 W HCPCS: Mod: PO | Performed by: ANESTHESIOLOGY

## 2022-10-04 PROCEDURE — 36000708 HC OR TIME LEV III 1ST 15 MIN: Mod: PO | Performed by: ORTHOPAEDIC SURGERY

## 2022-10-04 PROCEDURE — 76942 ECHO GUIDE FOR BIOPSY: CPT | Mod: 26,,, | Performed by: ANESTHESIOLOGY

## 2022-10-04 PROCEDURE — 25000003 PHARM REV CODE 250: Mod: PO | Performed by: NURSE ANESTHETIST, CERTIFIED REGISTERED

## 2022-10-04 PROCEDURE — 64415 PR NERVE BLOCK INJ, ANES/STEROID, BRACHIAL PLEXUS, INCL IMAG GUIDANCE: ICD-10-PCS | Mod: 59,LT,, | Performed by: ANESTHESIOLOGY

## 2022-10-04 PROCEDURE — 25000003 PHARM REV CODE 250: Mod: PO | Performed by: ANESTHESIOLOGY

## 2022-10-04 PROCEDURE — 27201423 OPTIME MED/SURG SUP & DEVICES STERILE SUPPLY: Mod: PO | Performed by: ORTHOPAEDIC SURGERY

## 2022-10-04 PROCEDURE — D9220A PRA ANESTHESIA: ICD-10-PCS | Mod: CRNA,,, | Performed by: NURSE ANESTHETIST, CERTIFIED REGISTERED

## 2022-10-04 PROCEDURE — 25447 ARTHRP NTRCRP/CRP/MTCR NTRPS: CPT | Mod: LT,,, | Performed by: ORTHOPAEDIC SURGERY

## 2022-10-04 PROCEDURE — 36000709 HC OR TIME LEV III EA ADD 15 MIN: Mod: PO | Performed by: ORTHOPAEDIC SURGERY

## 2022-10-04 PROCEDURE — 71000033 HC RECOVERY, INTIAL HOUR: Mod: PO | Performed by: ORTHOPAEDIC SURGERY

## 2022-10-04 PROCEDURE — 37000009 HC ANESTHESIA EA ADD 15 MINS: Mod: PO | Performed by: ORTHOPAEDIC SURGERY

## 2022-10-04 PROCEDURE — D9220A PRA ANESTHESIA: Mod: CRNA,,, | Performed by: NURSE ANESTHETIST, CERTIFIED REGISTERED

## 2022-10-04 PROCEDURE — C9290 INJ, BUPIVACAINE LIPOSOME: HCPCS | Mod: PO | Performed by: ANESTHESIOLOGY

## 2022-10-04 PROCEDURE — 76000 FLUOROSCOPY <1 HR PHYS/QHP: CPT | Mod: TC,PO

## 2022-10-04 PROCEDURE — 64415 NJX AA&/STRD BRCH PLXS IMG: CPT | Mod: 59,LT,, | Performed by: ANESTHESIOLOGY

## 2022-10-04 PROCEDURE — 76942 ECHO GUIDE FOR BIOPSY: CPT | Mod: PO | Performed by: ANESTHESIOLOGY

## 2022-10-04 PROCEDURE — 27200750 HC INSULATED NEEDLE/ STIMUPLEX: Mod: PO | Performed by: ANESTHESIOLOGY

## 2022-10-04 PROCEDURE — 71000015 HC POSTOP RECOV 1ST HR: Mod: PO | Performed by: ORTHOPAEDIC SURGERY

## 2022-10-04 PROCEDURE — 25000003 PHARM REV CODE 250: Mod: PO | Performed by: PHYSICIAN ASSISTANT

## 2022-10-04 RX ORDER — MUPIROCIN 20 MG/G
OINTMENT TOPICAL
Status: DISCONTINUED | OUTPATIENT
Start: 2022-10-04 | End: 2022-10-04 | Stop reason: HOSPADM

## 2022-10-04 RX ORDER — FENTANYL CITRATE 50 UG/ML
25 INJECTION, SOLUTION INTRAMUSCULAR; INTRAVENOUS EVERY 5 MIN PRN
Status: DISCONTINUED | OUTPATIENT
Start: 2022-10-04 | End: 2022-10-04 | Stop reason: HOSPADM

## 2022-10-04 RX ORDER — BUPIVACAINE HYDROCHLORIDE 5 MG/ML
INJECTION, SOLUTION EPIDURAL; INTRACAUDAL
Status: COMPLETED | OUTPATIENT
Start: 2022-10-04 | End: 2022-10-04

## 2022-10-04 RX ORDER — CLINDAMYCIN PHOSPHATE 600 MG/50ML
600 INJECTION, SOLUTION INTRAVENOUS
Status: COMPLETED | OUTPATIENT
Start: 2022-10-04 | End: 2022-10-04

## 2022-10-04 RX ORDER — OXYCODONE HYDROCHLORIDE 5 MG/1
5 TABLET ORAL
Status: DISCONTINUED | OUTPATIENT
Start: 2022-10-04 | End: 2022-10-04 | Stop reason: HOSPADM

## 2022-10-04 RX ORDER — LIDOCAINE HYDROCHLORIDE 20 MG/ML
INJECTION INTRAVENOUS
Status: DISCONTINUED | OUTPATIENT
Start: 2022-10-04 | End: 2022-10-04

## 2022-10-04 RX ORDER — HYDROMORPHONE HYDROCHLORIDE 2 MG/ML
0.2 INJECTION, SOLUTION INTRAMUSCULAR; INTRAVENOUS; SUBCUTANEOUS EVERY 5 MIN PRN
Status: DISCONTINUED | OUTPATIENT
Start: 2022-10-04 | End: 2022-10-04 | Stop reason: HOSPADM

## 2022-10-04 RX ORDER — OXYCODONE HYDROCHLORIDE 10 MG/1
10 TABLET ORAL EVERY 4 HOURS PRN
Qty: 14 TABLET | Refills: 0 | Status: SHIPPED | OUTPATIENT
Start: 2022-10-04 | End: 2023-02-28 | Stop reason: ALTCHOICE

## 2022-10-04 RX ORDER — ONDANSETRON 8 MG/1
8 TABLET, ORALLY DISINTEGRATING ORAL EVERY 8 HOURS PRN
Qty: 3 TABLET | Refills: 0 | Status: SHIPPED | OUTPATIENT
Start: 2022-10-04 | End: 2023-02-28 | Stop reason: ALTCHOICE

## 2022-10-04 RX ORDER — MIDAZOLAM HYDROCHLORIDE 1 MG/ML
0.5 INJECTION INTRAMUSCULAR; INTRAVENOUS
Status: DISCONTINUED | OUTPATIENT
Start: 2022-10-04 | End: 2022-10-04 | Stop reason: HOSPADM

## 2022-10-04 RX ORDER — MIDAZOLAM HYDROCHLORIDE 1 MG/ML
INJECTION, SOLUTION INTRAMUSCULAR; INTRAVENOUS
Status: DISCONTINUED | OUTPATIENT
Start: 2022-10-04 | End: 2022-10-04

## 2022-10-04 RX ORDER — ONDANSETRON 2 MG/ML
INJECTION INTRAMUSCULAR; INTRAVENOUS
Status: DISCONTINUED | OUTPATIENT
Start: 2022-10-04 | End: 2022-10-04

## 2022-10-04 RX ORDER — FENTANYL CITRATE 50 UG/ML
INJECTION, SOLUTION INTRAMUSCULAR; INTRAVENOUS
Status: DISCONTINUED | OUTPATIENT
Start: 2022-10-04 | End: 2022-10-04

## 2022-10-04 RX ORDER — KETOROLAC TROMETHAMINE 30 MG/ML
INJECTION, SOLUTION INTRAMUSCULAR; INTRAVENOUS
Status: DISCONTINUED | OUTPATIENT
Start: 2022-10-04 | End: 2022-10-04

## 2022-10-04 RX ORDER — PROPOFOL 10 MG/ML
VIAL (ML) INTRAVENOUS
Status: DISCONTINUED | OUTPATIENT
Start: 2022-10-04 | End: 2022-10-04

## 2022-10-04 RX ORDER — SODIUM CHLORIDE, SODIUM LACTATE, POTASSIUM CHLORIDE, CALCIUM CHLORIDE 600; 310; 30; 20 MG/100ML; MG/100ML; MG/100ML; MG/100ML
INJECTION, SOLUTION INTRAVENOUS CONTINUOUS
Status: DISCONTINUED | OUTPATIENT
Start: 2022-10-04 | End: 2022-10-04 | Stop reason: HOSPADM

## 2022-10-04 RX ORDER — LIDOCAINE HYDROCHLORIDE 10 MG/ML
1 INJECTION, SOLUTION EPIDURAL; INFILTRATION; INTRACAUDAL; PERINEURAL ONCE
Status: DISCONTINUED | OUTPATIENT
Start: 2022-10-04 | End: 2022-10-04 | Stop reason: HOSPADM

## 2022-10-04 RX ORDER — ACETAMINOPHEN 10 MG/ML
INJECTION, SOLUTION INTRAVENOUS
Status: DISCONTINUED | OUTPATIENT
Start: 2022-10-04 | End: 2022-10-04

## 2022-10-04 RX ORDER — DEXAMETHASONE SODIUM PHOSPHATE 4 MG/ML
8 INJECTION, SOLUTION INTRA-ARTICULAR; INTRALESIONAL; INTRAMUSCULAR; INTRAVENOUS; SOFT TISSUE
Status: DISCONTINUED | OUTPATIENT
Start: 2022-10-04 | End: 2022-10-04

## 2022-10-04 RX ADMIN — PROPOFOL 180 MG: 10 INJECTION, EMULSION INTRAVENOUS at 12:10

## 2022-10-04 RX ADMIN — FENTANYL CITRATE 50 MCG: 50 INJECTION, SOLUTION INTRAMUSCULAR; INTRAVENOUS at 12:10

## 2022-10-04 RX ADMIN — MUPIROCIN: 20 OINTMENT TOPICAL at 10:10

## 2022-10-04 RX ADMIN — MIDAZOLAM 2 MG: 1 INJECTION INTRAMUSCULAR; INTRAVENOUS at 10:10

## 2022-10-04 RX ADMIN — BUPIVACAINE 10 ML: 13.3 INJECTION, SUSPENSION, LIPOSOMAL INFILTRATION at 10:10

## 2022-10-04 RX ADMIN — FENTANYL CITRATE 25 MCG: 50 INJECTION, SOLUTION INTRAMUSCULAR; INTRAVENOUS at 02:10

## 2022-10-04 RX ADMIN — MIDAZOLAM HYDROCHLORIDE 2 MG: 1 INJECTION, SOLUTION INTRAMUSCULAR; INTRAVENOUS at 12:10

## 2022-10-04 RX ADMIN — FENTANYL CITRATE 50 MCG: 50 INJECTION, SOLUTION INTRAMUSCULAR; INTRAVENOUS at 10:10

## 2022-10-04 RX ADMIN — SODIUM CHLORIDE, SODIUM LACTATE, POTASSIUM CHLORIDE, AND CALCIUM CHLORIDE: .6; .31; .03; .02 INJECTION, SOLUTION INTRAVENOUS at 02:10

## 2022-10-04 RX ADMIN — SODIUM CHLORIDE, SODIUM LACTATE, POTASSIUM CHLORIDE, AND CALCIUM CHLORIDE: .6; .31; .03; .02 INJECTION, SOLUTION INTRAVENOUS at 10:10

## 2022-10-04 RX ADMIN — CLINDAMYCIN IN 5 PERCENT DEXTROSE 600 MG: 12 INJECTION, SOLUTION INTRAVENOUS at 11:10

## 2022-10-04 RX ADMIN — ACETAMINOPHEN 1000 MG: 10 INJECTION, SOLUTION INTRAVENOUS at 01:10

## 2022-10-04 RX ADMIN — ONDANSETRON 4 MG: 2 INJECTION, SOLUTION INTRAMUSCULAR; INTRAVENOUS at 01:10

## 2022-10-04 RX ADMIN — KETOROLAC TROMETHAMINE 30 MG: 30 INJECTION, SOLUTION INTRAMUSCULAR at 01:10

## 2022-10-04 RX ADMIN — LIDOCAINE HYDROCHLORIDE 75 MG: 20 INJECTION INTRAVENOUS at 12:10

## 2022-10-04 RX ADMIN — BUPIVACAINE HYDROCHLORIDE 10 ML: 5 INJECTION, SOLUTION EPIDURAL; INTRACAUDAL; PERINEURAL at 11:10

## 2022-10-04 NOTE — DISCHARGE SUMMARY
[facility] - Surgery  Discharge Note  Short Stay    Procedure(s) (LRB):  Left thumb CMC arthroplasty (Left)      OUTCOME: Patient tolerated treatment/procedure well without complication and is now ready for discharge.    DISPOSITION: Home or Self Care    FINAL DIAGNOSIS:  Arthritis of carpometacarpal (CMC) joint of left thumb    FOLLOWUP: In clinic    DISCHARGE INSTRUCTIONS:    Discharge Procedure Orders   SLING ORTHOPEDIC LARGE FOR HOME USE     Diet general     Activity as tolerated     Keep surgical extremity elevated     Lifting restrictions   Order Comments: Please do not lift or push off with the left arm or hand     Leave dressing on - Keep it clean, dry, and intact until clinic visit     Call MD for:  temperature >100.4     Call MD for:  persistent nausea and vomiting     Call MD for:  severe uncontrolled pain     Call MD for:  difficulty breathing, headache or visual disturbances     Call MD for:  redness, tenderness, or signs of infection (pain, swelling, redness, odor or green/yellow discharge around incision site)     Call MD for:  hives     Call MD for:  persistent dizziness or light-headedness     Call MD for:  extreme fatigue        TIME SPENT ON DISCHARGE: 15 minutes

## 2022-10-04 NOTE — ANESTHESIA POSTPROCEDURE EVALUATION
Anesthesia Post Evaluation    Patient: Lashell Stroud    Procedure(s) Performed: Procedure(s) (LRB):  Left thumb CMC arthroplasty (Left)    Final Anesthesia Type: general      Patient location during evaluation: PACU  Patient participation: Yes- Able to Participate  Level of consciousness: awake and alert and oriented  Post-procedure vital signs: reviewed and stable  Pain management: adequate  Airway patency: patent    PONV status at discharge: No PONV  Anesthetic complications: no      Cardiovascular status: blood pressure returned to baseline and stable  Respiratory status: unassisted and spontaneous ventilation  Hydration status: euvolemic  Follow-up not needed.          Vitals Value Taken Time   /64 10/04/22 1513   Temp 36.1 °C (97 °F) 10/04/22 1445   Pulse 56 10/04/22 1513   Resp 18 10/04/22 1513   SpO2 98 % 10/04/22 1513         No case tracking events are documented in the log.      Pain/Maverick Score: Maverick Score: 10 (10/4/2022  3:00 PM)

## 2022-10-04 NOTE — ANESTHESIA PREPROCEDURE EVALUATION
10/04/2022  Lashell Stroud is a 57 y.o., female.      Pre-op Assessment    I have reviewed the Patient Summary Reports.     I have reviewed the Nursing Notes. I have reviewed the NPO Status.   I have reviewed the Medications.     Review of Systems  Anesthesia Hx:  No problems with previous Anesthesia    Social:  Non-Smoker    Cardiovascular:   Hypertension, well controlled    Pulmonary:  Pulmonary Normal    Renal/:  Renal/ Normal     Musculoskeletal:   Arthritis  Lumbar spinal stenosis   Neurological:   Neuromuscular Disease, Chronic fatigue  Myalgia  Polymyalgia Rheumatica  Lumbar radiculopathy     Endocrine:  Endocrine Normal        Physical Exam  General: Well nourished, Cooperative, Alert and Oriented    Airway:  Mallampati: II   Mouth Opening: Normal  TM Distance: Normal  Neck ROM: Normal ROM        Anesthesia Plan  Type of Anesthesia, risks & benefits discussed:    Anesthesia Type: Gen ETT, Gen Supraglottic Airway, Gen Natural Airway, MAC  Intra-op Monitoring Plan: Standard ASA Monitors  Post Op Pain Control Plan: multimodal analgesia  Induction:  IV  Airway Plan: Direct, Video and Fiberoptic, Post-Induction  Informed Consent: Informed consent signed with the Patient and all parties understand the risks and agree with anesthesia plan.  All questions answered.   ASA Score: 2    Ready For Surgery From Anesthesia Perspective.     .

## 2022-10-04 NOTE — OP NOTE
NAME@  1965    DATE OF SURGERY: 10/4/2022     PRE-OPERATIVE DIAGNOSIS: left Thumb Carpometacarpal Arthritis    POST-OPERATIVE DIAGNOSIS: left Thumb Carpometacarpal Arthritis    ANESTHESIA TYPE:  General with a single-shot supraclavicular block    BLOOD LOSS:  10-15 cc    TOURNIQUET TIME:  Approximately 65 minutes    SURGEON: Dr Rivero    ASSISTANT: Dana Donahue    PROCEDURE: left Thumb CMC suspension arthroplasty    IMPLANTS: None     SPECIMENS: None    INDICATION:     Ms. Stroud with a long history of left thumb CMC arthritis. She underwent a long course of nonoperative treatment including anti-inflammatories and steroid injections. These treatments initially provided temporary relief but the patient has continued to have pain at the CMC joint and limitation of strength. I've discussed treatment options including continued conservative treatment as well as the possibility of CMC arthroplasty. After a discussion of the risks and benefits of the surgical procedure, informed consent has been obtained.    PROCEDURE IN DETAIL:     Ms. Stroud was transported to the operating room and was placed supine on the operating room table. All appropriate points were padded. The left arm and hand was prepped and draped in the normal sterile fashion. Time out was called. The correct patient, correct operative site, correct procedure, antibiotic administration which consisted of 600 mg Cleocin, and allergies to medications which are to Aspirin, Penicillins, and Adhesive  were reviewed. Time in was then called.     Attention was turned to he left thumb. An S-shaped incision was made directly over the thumb metacarpal and CMC joint. This was carried through the skin. Subcutaneous tissues were dissected with tenotomy scissors. The thumb extensor tendons as well as branches of the superficial radial nerve were identified and were protected throughout the case. An incision was then made through the carpometacarpal joint  capsule. The tissues were elevated radially and ulnarly. The trapezium was identified. Fluoroscopic imaging was used to confirm the location of the trapezium. Using a combination of sharp and blunt dissection around the trapezium, it was removed in a piecemeal fashion. With complete excision of the trapezium the scapho-trapezoid joint was visualized and was noted to have no significant arthropathy. the flexor carpi radialis tendon was identified at the base of the wound. The wound was then copiously irrigated.    Attention was turned to the forearm where a 2 cm incision was made over the proximal FCR tendon. The FCR tendon was cut and was dissected distally. The FCR tendon was then delivered into the distal wound. The FCR tendon was split into 2 branches. 2 drill holes were made through the thumb metacarpal base. This was performed with a 2.7 mm drill bit. A curette was used to connect the 2 holes for tendon passage. At that point a Hewson suture passer was placed across the drill hole tunnels. One branch of the FCR tendon was delivered through the thumb metacarpal. The tendon was then tied on itself and was secured with 4-0 nylon suture. With good suspension performed the remainder of the tendon limbs were rolled up and were fashioned into an anchovy. the anchovy was then stuffed into the void from removal of the trapezium. The wound was again irrigated. The capsule was then closed using 3-0 Ethibond suture. The skin wounds were closed using 3-0 Vicryl and 4-0 nylon superficially.    The wounds were dressed with Xeroform, gauze padding, cast padding and a short arm thumb spica splint was placed.    The patient was awakened from anesthesia and was transported to the recovery room in stable condition. All lap, needle, sponge, and equipment counts were correct at the end of the case.    POST-OPERATIVE PLAN:    Patient will keep the splint in place for 2 weeks at which time  she  will followup with me. She will be  changed over to a cast at the 2 week natalya. She will remain immobilized for a total of 6 weeks prior to beginning range of motion.

## 2022-10-04 NOTE — TRANSFER OF CARE
"Anesthesia Transfer of Care Note    Patient: Lashell Stroud    Procedure(s) Performed: Procedure(s) (LRB):  Left thumb CMC arthroplasty (Left)    Patient location: PACU    Anesthesia Type: general    Transport from OR: Transported from OR on room air with adequate spontaneous ventilation    Post pain: adequate analgesia    Post assessment: no apparent anesthetic complications and tolerated procedure well    Post vital signs: stable    Level of consciousness: sedated and awake    Nausea/Vomiting: no nausea/vomiting    Complications: none    Transfer of care protocol was followed      Last vitals:   Visit Vitals  /77 (BP Location: Right arm, Patient Position: Lying)   Pulse (!) 52   Temp 36.2 °C (97.1 °F) (Skin)   Resp 16   Ht 5' 4" (1.626 m)   Wt 70.3 kg (155 lb)   LMP 10/02/2015   SpO2 100%   Breastfeeding No   BMI 26.61 kg/m²     "

## 2022-10-04 NOTE — PATIENT INSTRUCTIONS
Procedure:  Left thumb carpometacarpal joint arthroplasty    1.  Please keep the splint clean, dry, and in place. Do not take it off and do not get it wet.    2.  Please keep the sling to the left arm in place until you have regained full control over the arm and hand    3.  The pain block to the left arm will last between 1 and 3 days.  As soon as the pain block begins to wear off you can begin taking the prescribed pain medication    4.  Nausea medication has been prescribed in the case that you have any postoperative stomach upset    5.  Flexion and extension of the exposed fingers is encouraged but do NOT attempt to lift or push off with the left arm or hand.    6.  If there are any questions or concerns please call Dr. Rivero office at [phone]    7.  Follow up with Dr. Rivero in 2 weeks

## 2022-10-04 NOTE — ANESTHESIA PROCEDURE NOTES
Left Supraclavicular Block    Patient location during procedure: pre-op   Block not for primary anesthetic.  Reason for block: at surgeon's request and post-op pain management   Post-op Pain Location: Left Wrist Pain   Start time: 10/4/2022 10:55 AM  Timeout: 10/4/2022 10:55 AM   End time: 10/4/2022 11:03 AM    Staffing  Authorizing Provider: Chepe Valencia MD  Performing Provider: Chepe Valencia MD    Preanesthetic Checklist  Completed: patient identified, IV checked, site marked, risks and benefits discussed, surgical consent, monitors and equipment checked, pre-op evaluation and timeout performed  Peripheral Block  Patient position: supine  Prep: ChloraPrep  Patient monitoring: heart rate, cardiac monitor, continuous pulse ox, continuous capnometry and frequent blood pressure checks  Block type: supraclavicular  Laterality: left  Injection technique: single shot  Needle  Needle type: Stimuplex   Needle gauge: 22 G  Needle length: 2 in  Needle localization: anatomical landmarks and ultrasound guidance  Needle insertion depth: 4 cm   -ultrasound image captured on disc.  Assessment  Injection assessment: negative aspiration, negative parasthesia and local visualized surrounding nerve  Paresthesia pain: none  Heart rate change: no  Slow fractionated injection: yes  Pain Tolerance: comfortable throughout block and no complaints  Medications:    Medications: bupivacaine (pf) (MARCAINE) injection 0.5% - Perineural   10 mL - 10/4/2022 11:03:00 AM    Additional Notes  VSS.  [facility] RN monitoring vitals throughout procedure.  Patient tolerated procedure well.     Exparel 10ml + Marcaine 10ml 0.5% dosed under direct Ultrasound guidance

## 2022-10-05 VITALS
TEMPERATURE: 97 F | SYSTOLIC BLOOD PRESSURE: 132 MMHG | DIASTOLIC BLOOD PRESSURE: 64 MMHG | BODY MASS INDEX: 26.46 KG/M2 | OXYGEN SATURATION: 98 % | WEIGHT: 155 LBS | RESPIRATION RATE: 18 BRPM | HEIGHT: 64 IN | HEART RATE: 56 BPM

## 2022-10-11 DIAGNOSIS — M18.12 ARTHRITIS OF CARPOMETACARPAL (CMC) JOINT OF LEFT THUMB: Primary | ICD-10-CM

## 2022-10-17 ENCOUNTER — OFFICE VISIT (OUTPATIENT)
Dept: ORTHOPEDICS | Facility: CLINIC | Age: 57
End: 2022-10-17
Payer: COMMERCIAL

## 2022-10-17 ENCOUNTER — HOSPITAL ENCOUNTER (OUTPATIENT)
Dept: RADIOLOGY | Facility: HOSPITAL | Age: 57
Discharge: HOME | End: 2022-10-17
Attending: ORTHOPAEDIC SURGERY
Payer: COMMERCIAL

## 2022-10-17 DIAGNOSIS — M18.12 ARTHRITIS OF CARPOMETACARPAL (CMC) JOINT OF LEFT THUMB: Primary | ICD-10-CM

## 2022-10-17 DIAGNOSIS — M18.12 ARTHRITIS OF CARPOMETACARPAL (CMC) JOINT OF LEFT THUMB: ICD-10-CM

## 2022-10-17 PROCEDURE — 99999 PR PBB SHADOW E&M-EST. PATIENT-LVL II: CPT | Mod: PBBFAC,,, | Performed by: ORTHOPAEDIC SURGERY

## 2022-10-17 PROCEDURE — 29075 APPL CST ELBW FNGR SHORT ARM: CPT | Mod: 58,LT,S$GLB, | Performed by: ORTHOPAEDIC SURGERY

## 2022-10-17 PROCEDURE — 3044F HG A1C LEVEL LT 7.0%: CPT | Mod: CPTII,S$GLB,, | Performed by: ORTHOPAEDIC SURGERY

## 2022-10-17 PROCEDURE — 1159F PR MEDICATION LIST DOCUMENTED IN MEDICAL RECORD: ICD-10-PCS | Mod: CPTII,S$GLB,, | Performed by: ORTHOPAEDIC SURGERY

## 2022-10-17 PROCEDURE — 99024 POSTOP FOLLOW-UP VISIT: CPT | Mod: S$GLB,,, | Performed by: ORTHOPAEDIC SURGERY

## 2022-10-17 PROCEDURE — 3044F PR MOST RECENT HEMOGLOBIN A1C LEVEL <7.0%: ICD-10-PCS | Mod: CPTII,S$GLB,, | Performed by: ORTHOPAEDIC SURGERY

## 2022-10-17 PROCEDURE — 73130 X-RAY EXAM OF HAND: CPT | Mod: TC,PO,LT

## 2022-10-17 PROCEDURE — 99999 PR PBB SHADOW E&M-EST. PATIENT-LVL II: ICD-10-PCS | Mod: PBBFAC,,, | Performed by: ORTHOPAEDIC SURGERY

## 2022-10-17 PROCEDURE — 73130 X-RAY EXAM OF HAND: CPT | Mod: 26,LT,, | Performed by: RADIOLOGY

## 2022-10-17 PROCEDURE — 29075 PR APPLY FOREARM CAST: ICD-10-PCS | Mod: 58,LT,S$GLB, | Performed by: ORTHOPAEDIC SURGERY

## 2022-10-17 PROCEDURE — 73130 XR HAND COMPLETE 3 VIEW LEFT: ICD-10-PCS | Mod: 26,LT,, | Performed by: RADIOLOGY

## 2022-10-17 PROCEDURE — 99024 PR POST-OP FOLLOW-UP VISIT: ICD-10-PCS | Mod: S$GLB,,, | Performed by: ORTHOPAEDIC SURGERY

## 2022-10-17 PROCEDURE — 1159F MED LIST DOCD IN RCRD: CPT | Mod: CPTII,S$GLB,, | Performed by: ORTHOPAEDIC SURGERY

## 2022-10-17 NOTE — PROGRESS NOTES
Ms. Francia haque returns to clinic today.  She is status post left thumb CMC arthroplasty.  She is overall doing well.  She is 2 weeks postop.      Physical exam:  Examination of the left forearm wrist and hand reveals that the incisions are healing well.  There is mild ecchymosis.  There is no significant edema.  There is no erythema or drainage.  She is neurovascularly intact.      Radiology:  X-rays of the left hand were taken in clinic today.  There is noted to be evidence of the CMC arthroplasty.  The suspension remains well aligned.      Assessment: Status post left thumb CMC arthroplasty     Plan:    1.  Sutures were removed today and Steri-Strips are placed    2. She will be placed into a short-arm fiberglass thumb spica cast    3.  Follow-up in 3-4 weeks with repeat x-ray of the left hand out of the cast which point I will go to a Velcro brace and begin the therapy protocol

## 2022-10-28 DIAGNOSIS — M18.12 ARTHRITIS OF CARPOMETACARPAL (CMC) JOINT OF LEFT THUMB: Primary | ICD-10-CM

## 2022-11-02 ENCOUNTER — HOSPITAL ENCOUNTER (OUTPATIENT)
Dept: RADIOLOGY | Facility: HOSPITAL | Age: 57
Discharge: HOME | End: 2022-11-02
Attending: ORTHOPAEDIC SURGERY
Payer: COMMERCIAL

## 2022-11-02 ENCOUNTER — OFFICE VISIT (OUTPATIENT)
Dept: ORTHOPEDICS | Facility: CLINIC | Age: 57
End: 2022-11-02
Payer: COMMERCIAL

## 2022-11-02 VITALS — WEIGHT: 155 LBS | HEIGHT: 64 IN | BODY MASS INDEX: 26.46 KG/M2

## 2022-11-02 DIAGNOSIS — M18.12 ARTHRITIS OF CARPOMETACARPAL (CMC) JOINT OF LEFT THUMB: Primary | ICD-10-CM

## 2022-11-02 DIAGNOSIS — M18.12 ARTHRITIS OF CARPOMETACARPAL (CMC) JOINT OF LEFT THUMB: ICD-10-CM

## 2022-11-02 PROCEDURE — 99999 PR PBB SHADOW E&M-EST. PATIENT-LVL III: CPT | Mod: PBBFAC,,, | Performed by: ORTHOPAEDIC SURGERY

## 2022-11-02 PROCEDURE — 73130 X-RAY EXAM OF HAND: CPT | Mod: 26,LT,, | Performed by: RADIOLOGY

## 2022-11-02 PROCEDURE — 3044F HG A1C LEVEL LT 7.0%: CPT | Mod: CPTII,S$GLB,, | Performed by: ORTHOPAEDIC SURGERY

## 2022-11-02 PROCEDURE — 99024 PR POST-OP FOLLOW-UP VISIT: ICD-10-PCS | Mod: S$GLB,,, | Performed by: ORTHOPAEDIC SURGERY

## 2022-11-02 PROCEDURE — 3008F PR BODY MASS INDEX (BMI) DOCUMENTED: ICD-10-PCS | Mod: CPTII,S$GLB,, | Performed by: ORTHOPAEDIC SURGERY

## 2022-11-02 PROCEDURE — 1159F PR MEDICATION LIST DOCUMENTED IN MEDICAL RECORD: ICD-10-PCS | Mod: CPTII,S$GLB,, | Performed by: ORTHOPAEDIC SURGERY

## 2022-11-02 PROCEDURE — 1159F MED LIST DOCD IN RCRD: CPT | Mod: CPTII,S$GLB,, | Performed by: ORTHOPAEDIC SURGERY

## 2022-11-02 PROCEDURE — 99024 POSTOP FOLLOW-UP VISIT: CPT | Mod: S$GLB,,, | Performed by: ORTHOPAEDIC SURGERY

## 2022-11-02 PROCEDURE — 73130 XR HAND COMPLETE 3 VIEW LEFT: ICD-10-PCS | Mod: 26,LT,, | Performed by: RADIOLOGY

## 2022-11-02 PROCEDURE — 99999 PR PBB SHADOW E&M-EST. PATIENT-LVL III: ICD-10-PCS | Mod: PBBFAC,,, | Performed by: ORTHOPAEDIC SURGERY

## 2022-11-02 PROCEDURE — 73130 X-RAY EXAM OF HAND: CPT | Mod: TC,PO,LT

## 2022-11-02 PROCEDURE — 3044F PR MOST RECENT HEMOGLOBIN A1C LEVEL <7.0%: ICD-10-PCS | Mod: CPTII,S$GLB,, | Performed by: ORTHOPAEDIC SURGERY

## 2022-11-02 PROCEDURE — 3008F BODY MASS INDEX DOCD: CPT | Mod: CPTII,S$GLB,, | Performed by: ORTHOPAEDIC SURGERY

## 2022-11-02 NOTE — PROGRESS NOTES
Ms Stroud  returns to clinic today.  She has a history of left thumb CMC arthroplasty.  She is overall doing well.  She has been in a cast.  She has no new complaints.      Physical exam: Examination left hand and thumb reveals that the incisions are healing very well.  There is no edema.  Palpation produces minimal tenderness.  She does have mild paresthesias over the entire thumb.  Since she has capillary refill less than 2 seconds     Radiology:  X-rays of the left hand were taken in clinic today.  The films have be  There is evidence of the CMC arthroplasty.  The suspension appears well maintained     Assessment: Status post left thumb CMC arthroplasty     Plan:     I will place her into a Velcro thumb spica splint  2.  Will set her up with occupational therapy to begin the post CMC arthroplasty protocol starting next week    3.  Follow-up with me in 3-4 weeks for repeat evaluation

## 2022-11-07 ENCOUNTER — CLINICAL SUPPORT (OUTPATIENT)
Dept: REHABILITATION | Facility: HOSPITAL | Age: 57
End: 2022-11-07
Attending: ORTHOPAEDIC SURGERY
Payer: COMMERCIAL

## 2022-11-07 DIAGNOSIS — M79.642 LEFT HAND PAIN: Primary | ICD-10-CM

## 2022-11-07 DIAGNOSIS — M25.642 DECREASED RANGE OF MOTION OF LEFT THUMB: ICD-10-CM

## 2022-11-07 DIAGNOSIS — M18.12 ARTHRITIS OF CARPOMETACARPAL (CMC) JOINT OF LEFT THUMB: ICD-10-CM

## 2022-11-07 DIAGNOSIS — Z78.9 DECREASED ACTIVITIES OF DAILY LIVING (ADL): ICD-10-CM

## 2022-11-07 DIAGNOSIS — R29.898 DECREASED GRIP STRENGTH OF LEFT HAND: ICD-10-CM

## 2022-11-07 DIAGNOSIS — R29.898 DECREASED PINCH STRENGTH: ICD-10-CM

## 2022-11-07 PROCEDURE — 97110 THERAPEUTIC EXERCISES: CPT | Mod: PO

## 2022-11-07 PROCEDURE — 97166 OT EVAL MOD COMPLEX 45 MIN: CPT | Mod: PO

## 2022-11-08 PROBLEM — M25.642 DECREASED RANGE OF MOTION OF LEFT THUMB: Status: ACTIVE | Noted: 2022-11-08

## 2022-11-08 PROBLEM — R29.898 DECREASED GRIP STRENGTH OF LEFT HAND: Status: ACTIVE | Noted: 2022-11-08

## 2022-11-08 PROBLEM — M79.642 LEFT HAND PAIN: Status: ACTIVE | Noted: 2022-11-08

## 2022-11-08 PROBLEM — R29.898 DECREASED PINCH STRENGTH: Status: ACTIVE | Noted: 2022-11-08

## 2022-11-08 PROBLEM — Z78.9 DECREASED ACTIVITIES OF DAILY LIVING (ADL): Status: ACTIVE | Noted: 2022-11-08

## 2022-11-08 NOTE — PLAN OF CARE
[facility] Occupational Therapy  Initial Evaluation     Date: 11/7/2022  Patient: Lashell Stroud  Chart Number: [id number]    Therapy Diagnosis: L hand pain, decreased ROM L thumb, decreased /pinch/ADL  Physician: Stanley Rivero MD    Physician Orders: Please begin therapy to the left hand.  Status post thumb CMC arthroplasty protocol    Frequency:  3 times per week  Duration:  4 weeks        Medical Diagnosis: M18.12 (ICD-10-CM) - Arthritis of carpometacarpal (CMC) joint of left thumb  Evaluation Date: 11/7/2022  Insurance Authorization period Expiration: 11/2/23  Plan of Care Expiration Period: 1/3/23    Visit # / Visits Authorized: 1 / 1  Time In:2:30 pm  Time Out: 3:15 pm  Total Billable Time: 20 minutes    Surgical Procedure:   L thumb CMC suspension arthroplasty    Precautions: Standard and Weightbearing and post op precautions    Date of  Surgery: 10/4/22   S/P: 4 weeks 6 days  on 11/7/22     Subjective     Involved Side: Left   Dominant Side: Right     Mechanism of Injury/ History of Current Condition: Pain began in 1st quarter of 2022. Pt received CSI and used arthritis creams and Ibu but symptoms progressed. Sx recommended.     Imaging: X rays: Postoperative changes of thumb CMC arthroplasty.  No radiographically evident acute fracture or dislocation.  Triscaphe joint space narrowing.  Scattered interphalangeal joint space narrowing, most pronounced at the DIP joints.   Previous Therapy: none     Patient's Goals for Therapy: to use hand like normal and RTW     Pain:  Functional Pain Scale Rating 0-10:   0/10 now  0/10 at best  2/10 at worst  Location: L wrist, over donor incision, and over area of CMC-1   Description: Shooting  Aggravating Factors: when brace is removed.   Easing Factors: rest    Occupation:  Housekeeping   Working presently: employed; off duty   Duties: pushing carts, lifting bags of trash in ER, mopping     Functional Limitations/Social  History:    Previous functional status includes: Independent with all ADLs.     Current FunctionalStatus   Home/Living environment : lives with their spouse      Limitation of Functional Status as follows:   ADLs/IADLs: Difficulty with laundry, meal prep, housework, grocery shopping,     - Feeding: Difficulty cutting food     - Bathing: Difficulty washing hair, washing R UE     - Dressing/Grooming: using R; difficulty pulling clothing up, getting A to do hair from      - Driving: using R hand      Leisure: walking-not limited       Past Medical History/Physical Systems Review:   Lashell Stroud  has a past medical history of Constipation, COVID-19 virus infection, Family history of colon cancer in mother, History of chicken pox, Hypertension, Palindromic rheumatism involving tarsus, Psoas tendinitis of both sides, Shoulder pain, bilateral, and Spinal stenosis of lumbar region.    Lashell Stroud  has a past surgical history that includes Cholecystectomy;  section; Tubal ligation (N/A, ); Colonoscopy (N/A, 2016); Colonoscopy (N/A, 2017); Epidural steroid injection into lumbar spine (N/A, 2019); Epidural steroid injection into lumbar spine (N/A, 2019); Extreme lateral interbody fusion (XLIF) of spine (Left, 2019); Minimally invasive fusion of spine (N/A, 2019); Laminectomy using minimally invasive technique (N/A, 2019); Arthroscopy of shoulder with decompression of subacromial space (Left, 2020); Arthroscopic repair of rotator cuff of shoulder (Left, 2020); Colonoscopy (N/A, 10/15/2021); and Hand Arthroplasty (Left, 10/4/2022).    Lasehll has a current medication list which includes the following prescription(s): cholecalciferol (vitamin d3), levothyroxine, meloxicam, metoprolol succinate, ondansetron, oxycodone, restasis, and tramadol.    Review of patient's allergies indicates:   Allergen Reactions    Aspirin Itching    Penicillins  Hives    Adhesive Rash          Objective     Observation/Inspection:  mottled erythema at volar forearm. Pt reports she has noticed the redness recently. Appears to be irritation from stockinette, but pt does not attribute it to this. There is no increase in warmth vs, no pain, or edema. Will continue to monitor.     Sensation:  Intact to light touch. Paresthesias reported yesterday in thumb for the first time. Has not re-occurred.      Scar Minimal tenderness to palpation. Scar is  mildly thickened at thumb incision and moderately thickened and raised at forearm incision, with mild adherence at thumb incision and mild to mod adherence at forearm incisoin. Proximal end of thumb incision appears thin and fragile.          Edema:  Wrist crease visibility decreased on L vs R.           (in cm) L R   Proximal Wrist Crease 16.5 15.4   MPs 19.4 19.0   Thumb Proximal Phalanx 6.5 6.3              Range of Motion:            Left/ Right   Wrist E/F 65/40 70/63   Wrist RD/UD 17/28 20/35   Thumb R/P Abd 58/40 43/50   Thumb MP flex 0/40 0/60   Thumb IP flex 0/29 0/45   Thumb Opp 3 cm  0 cm                            Manual Muscle Test:  Not tested                                       and Pinch Strength: not tested at this time due to post operative status.      Special Tests: none performed         FOTO Score: 27  Predicted Result at 12th visit: 54      Treatment     Treatment Time In: 2:55 pm  Treatment Time Out: 3:15 pm  Total Treatment time separate from Evaluation time:20 min     Lashell received the following manual therapy techniques for 5 minutes:   -Retrograde massage to L digits/hand/wrist x 3 min to stimulate lymphatics to decrease pain,  edema and increase AROM and functional use.    -Performed scar massage to incisions for 2 minutes to decrease adhesions and improve tensile glide. Pt cautioned against scar mob directly over proximal end of thumb incision as it appears delicate and prone to blistering. Pt to go  "around this area and begin scar mob when skin integrity has improved.       Lashell received therapeutic exercises for 15 minutes including:  -AROM 10 reps ea wrist E/F and RD/UD, Thumb IPJ blocks, composite flexion, circumduction, palmar and radial abduction, retroposition, opposition, wave, hook, fist, straight fist, lifts,     Home Exercise Program/Education:  Issued HEP (see patient instructions in EMR) and educated on modality use for pain management . Exercises were reviewed and Lashell was able to demonstrate them prior to the end of the session.   Pt received a written copy of exercises to perform at home. Lashell demonstrated good  understanding of the education provided.  Pt was advised to perform these exercises free of pain, and to stop performing them if pain occurs.    Patient/Family Education: role of OT, goals for OT,- pt verbalized understanding.   Additional Education provided: use of heat and cold     Assessment     Lashell Stroud is a 57 y.o. female referred to outpatient occupational/hand therapy and presents with a medical diagnosis of 5 weeks s/p L thumb CMC arthroplasty, resulting In  pain, edema, decreased A/PROM, strength, and functional use of Left  UE and demonstrates limitations as described in the chart below. Pt reported her hand felt "good" after performing HEP.     The patient's rehab potential is Good.     Anticipated barriers to occupational therapy: none   Pt has no cultural, educational or language barriers to learning provided.    Profile and History Assessment of Occupational Performance Level of Clinical Decision Making Complexity Score   Occupational Profile:   Lashell Stroud is a 57 y.o. female who lives with their spouse and is currently employed as  in ED. Lashell Stroud has difficulty with  feeding, bathing, grooming, and dressing  driving/transportation management, shopping, phone/computer use, housework/household chores, and work " tasks  affecting his/her daily functional abilities. His/her main goal for therapy is return to normal use and RTW.     Comorbidities:    has a past medical history of Constipation, COVID-19 virus infection, Family history of colon cancer in mother, History of chicken pox, Hypertension, Palindromic rheumatism involving tarsus, Psoas tendinitis of both sides, Shoulder pain, bilateral, and Spinal stenosis of lumbar region.    Medical and Therapy History Review:   Expanded               Performance Deficits    Physical:  Joint Mobility  Muscle Power/Strength  Muscle Endurance  Skin Integrity/Scar Formation  Edema   Strength  Pinch Strength  Fine Motor Coordination  Pain    Cognitive:  No Deficits    Psychosocial:    No Deficits     Clinical Decision Making:  low    Assessment Process:  Detailed Assessments    Modification/Need for Assistance:  Not Necessary    Intervention Selection:  Multiple Treatment Options       moderate  Based on PMHX, co morbidities , data from assessments and functional level of assistance required with task and clinical presentation directly impacting function.       The following goals were discussed with the patient and patient is in agreement with them as to be addressed in the treatment plan.     Goals:   Short Term Goals: (4 weeks)  1. Pt will be independent with HEP in 2 visits.  2. Pt will report pain to a 5-6/10 at worst once therapy has been initiated   3. Pt will increase L wrist flexion AROM by 5-10 degrees to enable dressing, grooming activities.  4. Pt will increase L thumb MANN by 15-20 degrees to enable in hand manipulation.      Long Term Goals: (by discharge)  1. Pt will report decreased pain to 1-2/10 with ADLs.   2. Pt will increase L thumbTAM to 99 degrees to enable independence with self-care and meal preparation.  3. Pt will increase R wrist Flexion  to 55-60 degrees to enable self care.   4. Pt will exhibit opposition to within 1 cm of DPC to allow manipulation of  change and small objects.   5. Pt will exhibit L  strength of 60-75% of R hand to allow a firm grasp on cooking utensils, steering wheel, etc.  6. Pt will exhibit 7-9# of L functional pinch strength to allow writing,opening containers, and turning keys.  7. Pt will exhibit a significant increase (30+ points) in the FOTO assessment.  8. Pt will return to OF.  Plan     Certification Period/Plan of care expiration: 11/7/2022 to 1/3/23.    Outpatient Occupational Therapy 3 times weekly for 4 weeks to include the following interventions:     Modalities to decrease pain (moist heat, paraffin, fluidotherapy, US ), edema control, scar mobilization, soft tissue mobilization, manual therapy/joint mobilizations,A/PROM, therapeutic exercises/activities, strengthening, orthotic fitting/fabrication/training PRN, joint protections/energry conservation/adaptive equipment/activity modification  HEP/education as well as any other treatments deemed necessary based on the patient's needs or progress.    Updates Next Visit: review HEP, initiate light functional exercises,     SAILAJA Brian, BAHMANT

## 2022-11-08 NOTE — PROGRESS NOTES
Occupational Therapy Daily Treatment Note     Date: 11/9/2022  Name: Lashell Stroud  Clinic Number: [id number]    Therapy Diagnosis: L hand pain, decreased ROM L thumb, decreased /pinch/ADL  Physician: Stanley Rivero MD     Physician Orders: Please begin therapy to the left hand.  Status post thumb CMC arthroplasty protocol    Frequency:  3 times per week  Duration:  4 weeks         Medical Diagnosis: M18.12 (ICD-10-CM) - Arthritis of carpometacarpal (CMC) joint of left thumb  Evaluation Date: 11/7/2022  Insurance Authorization period Expiration: 12/31/23  Plan of Care Expiration Period: 1/3/23     Visit # / Visits Authorized: 2 / 20  Time In:2:45 pm  Time Out: 3:30 pm  Total Billable Time: 40 minutes     Surgical Procedure:   L thumb CMC suspension arthroplasty     Precautions: Standard and Weightbearing and post op precautions     Date of  Surgery: 10/4/22                              S/P: 5 weeks on 11/8/22        SUBJECTIVE     Pt reports: her wrist feels better with the exercises. The redness has gone away from forearm.     She was compliant with home exercise program given last session.   Response to previous treatment:Good   Functional change: none allowed     Pain:   Functional Pain Scale Rating 0-10:   0/10 now  0/10 at best  2/10 at worst  Location: L wrist, over donor incision, and over area of CMC-1     OBJECTIVE     Not measured this date  Range of Motion:               Left/ Right   Wrist E/F 65/40 70/63   Wrist RD/UD 17/28 20/35   Thumb R/P Abd 58/40 43/50   Thumb MP flex 0/40 0/60   Thumb IP flex 0/29 0/45   Thumb Opp 3 cm  0 cm             Treatment     Lashell received the treatments listed below:     Supervised modalities after being cleared for contradictions for 5 minutes:   -Moist Heat applied to L wrist/hand x 5 min to decrease pain and  increase blood flow and tissue elasticity prior to treatment.     Lashell received the following manual therapy techniques for 5 minutes:    -Retrograde massage to L digits/hand/wrist x 3 min to stimulate lymphatics to decrease pain,  edema and increase AROM and functional use.    -Performed scar massage to incisions for 2 minutes to decrease adhesions and improve tensile glide.      Lashell received therapeutic exercises for 20  minutes including:  -gentle PROM thumb IP/MPJ and composite with CMC supported   -AROM 10 reps ea wrist E/F and RD/UD, Thumb IPJ blocks, composite flexion, circumduction, palmar and radial abduction, retroposition, opposition, wave, hook, fist, straight fist, lifts,   -Wrist AROM with dowel over wedge, 4 ways    Therapeutic activities to improve functional performance for 15  minutes, including:  -in hand manipulation with medium pom poms, 3 containers  -isospheres 3 min  -pen walks and rotations 10x      Patient Education and Home Exercises      Education provided:   - continue same     Written Home Exercises Provided: Patient instructed to cont prior HEP.  Exercises were reviewed and Lashell was able to demonstrate them prior to the end of the session.  Ele demonstrated good  understanding of the HEP provided. See EMR under Patient Instructions for exercises provided during therapy sessions.       Assessment     Pt would continue to benefit from skilled OT. Initiated light functional exercises, which pt tolerated well.  Progressed opposition to opposition with slide.     - Progress towards goals: Good ; STG 1 met    Lashell is progressing well towards her goals and there are no updates to goals at this time. Pt prognosis is Good.     Pt will continue to benefit from skilled outpatient occupational therapy to address the deficits listed in the problem list on initial evaluation provide pt/family education and to maximize pt's level of independence in the home and community environment.     Pt's spiritual, cultural and educational needs considered and pt agreeable to plan of care and goals.    Anticipated barriers to occupational  therapy: none    Goals:  Short Term Goals: (4 weeks)  1. Pt will be independent with HEP in 2 visits. (Met 11/9/22)  2. Pt will report pain to a 5-6/10 at worst once therapy has been initiated   3. Pt will increase L wrist flexion AROM by 5-10 degrees to enable dressing, grooming activities.  4. Pt will increase L thumb MANN by 15-20 degrees to enable in hand manipulation.        Long Term Goals: (by discharge)  1. Pt will report decreased pain to 1-2/10 with ADLs.   2. Pt will increase L thumbTAM to 99 degrees to enable independence with self-care and meal preparation.  3. Pt will increase R wrist Flexion  to 55-60 degrees to enable self care.   4. Pt will exhibit opposition to within 1 cm of DPC to allow manipulation of change and small objects.   5. Pt will exhibit L  strength of 60-75% of R hand to allow a firm grasp on cooking utensils, steering wheel, etc.  6. Pt will exhibit 7-9# of L functional pinch strength to allow writing,opening containers, and turning keys.  7. Pt will exhibit a significant increase (30+ points) in the FOTO assessment.  8. Pt will return to PLOF.    PLAN     Certification Period/Plan of care expiration: 11/7/2022 to 1/3/23.    Continue Occupational Therapy 3 times per week x 4 weeks to decrease pain and edema, and increase A/PROM, strength, and functional use of L upper extremity.     Updates/Grading for next session: progress per protocol        SAILAJA Brian, BAHMANT

## 2022-11-09 ENCOUNTER — CLINICAL SUPPORT (OUTPATIENT)
Dept: REHABILITATION | Facility: HOSPITAL | Age: 57
End: 2022-11-09
Attending: ORTHOPAEDIC SURGERY
Payer: COMMERCIAL

## 2022-11-09 DIAGNOSIS — R29.898 DECREASED GRIP STRENGTH OF LEFT HAND: ICD-10-CM

## 2022-11-09 DIAGNOSIS — M25.642 DECREASED RANGE OF MOTION OF LEFT THUMB: ICD-10-CM

## 2022-11-09 DIAGNOSIS — M79.642 LEFT HAND PAIN: Primary | ICD-10-CM

## 2022-11-09 DIAGNOSIS — R29.898 DECREASED PINCH STRENGTH: ICD-10-CM

## 2022-11-09 DIAGNOSIS — Z78.9 DECREASED ACTIVITIES OF DAILY LIVING (ADL): ICD-10-CM

## 2022-11-09 PROCEDURE — 97110 THERAPEUTIC EXERCISES: CPT | Mod: PO

## 2022-11-09 PROCEDURE — 97530 THERAPEUTIC ACTIVITIES: CPT | Mod: PO

## 2022-11-09 NOTE — PROGRESS NOTES
Occupational Therapy Daily Treatment Note     Date: 11/11/2022  Name: Lashell Stroud  Clinic Number: [id number]    Therapy Diagnosis: L hand pain, decreased ROM L thumb, decreased /pinch/ADL  Physician: Stanley Rivero MD     Physician Orders: Please begin therapy to the left hand.  Status post thumb CMC arthroplasty protocol    Frequency:  3 times per week  Duration:  4 weeks         Medical Diagnosis: M18.12 (ICD-10-CM) - Arthritis of carpometacarpal (CMC) joint of left thumb  Evaluation Date: 11/7/2022  Insurance Authorization period Expiration: 12/31/23  Plan of Care Expiration Period: 1/3/23     Visit # / Visits Authorized: 3  / 20  Time In: 1500  Time Out: 1540  Total Billable Time: 40 minutes     Surgical Procedure:   L thumb CMC suspension arthroplasty     Precautions: Standard and Weightbearing and post op precautions     Date of  Surgery: 10/4/22                              S/P: 5 weeks on 11/8/22        SUBJECTIVE     Pt reports: Now new complaints, scar at thumb remains somewhat sensitive     She was compliant with home exercise program given last session.   Response to previous treatment:Good   Functional change: none allowed     Pain:   Functional Pain Scale Rating 0-10:   0/10 now  0/10 at best  2/10 at worst  Location: L wrist, over donor incision, and over area of CMC-1     OBJECTIVE     Not measured this date  Range of Motion:               Left/ Right   Wrist E/F 70/60 70/63  Post heat and AROM on 11/11/2022     Wrist RD/UD 17/28 20/35   Thumb R/P Abd 58/40 43/50   Thumb MP flex 0/40 0/60   Thumb IP flex 0/29 0/45   Thumb Opp 2.7 cm  0 cm   Post heat and AROM on 11/11/2022          Treatment     Lashell received the treatments listed below:     Supervised modalities after being cleared for contradictions for 5 minutes:   -Moist Heat applied to L wrist/hand x 5 min to decrease pain and  increase blood flow and tissue elasticity prior to treatment.     Lashell received the following  manual therapy techniques for 8 minutes:   -Retrograde massage to L digits/hand/wrist x 4 min to stimulate lymphatics to decrease pain,  edema and increase AROM and functional use.    -Performed scar massage to incisions for 4 minutes to decrease adhesions and improve tensile glide.      Lashell received therapeutic exercises for 20  minutes including:  -gentle PROM thumb IP/MPJ and composite with CMC supported   -AROM wrist E/F (with small unweighted dowel 2x10)  and x10 each Thumb IPJ blocks, composite flexion, circumduction, palmar and radial abduction, retroposition, opposition, wave, hook, fist, straight fist, lifts,   -Wrist AROM with dowel over wedge, 4 ways    Therapeutic activities to improve functional performance for 10 minutes, including:  - isospheres 3 min  - 9 hole pegs with tip to palm and palm to tip x 3 sets with L hand  -in hand manipulation with medium pom poms, 3 containers  -isospheres 3 min  -pen walks and rotations 10x      Patient Education and Home Exercises      Education provided:   - continue same     Written Home Exercises Provided: Patient instructed to cont prior HEP.  Exercises were reviewed and Lashell was able to demonstrate them prior to the end of the session.  Lashell demonstrated good  understanding of the HEP provided. See EMR under Patient Instructions for exercises provided during therapy sessions.       Assessment     Pt would continue to benefit from skilled OT.  Pt tolerated progression with Tx well this date. Excellent AROM gains at L wrist and L thumb. Cont per current POC.  - Progress towards goals: Good ; STG 1 met    Lashell is progressing well towards her goals and there are no updates to goals at this time. Pt prognosis is Good.     Pt will continue to benefit from skilled outpatient occupational therapy to address the deficits listed in the problem list on initial evaluation provide pt/family education and to maximize pt's level of independence in the home and community  environment.     Pt's spiritual, cultural and educational needs considered and pt agreeable to plan of care and goals.    Anticipated barriers to occupational therapy: none    Goals:  Short Term Goals: (4 weeks)  1. Pt will be independent with HEP in 2 visits. (Met 11/9/22)  2. Pt will report pain to a 5-6/10 at worst once therapy has been initiated   3. Pt will increase L wrist flexion AROM by 5-10 degrees to enable dressing, grooming activities.  4. Pt will increase L thumb MANN by 15-20 degrees to enable in hand manipulation.        Long Term Goals: (by discharge)  1. Pt will report decreased pain to 1-2/10 with ADLs.   2. Pt will increase L thumbTAM to 99 degrees to enable independence with self-care and meal preparation.  3. Pt will increase R wrist Flexion  to 55-60 degrees to enable self care.   4. Pt will exhibit opposition to within 1 cm of DPC to allow manipulation of change and small objects.   5. Pt will exhibit L  strength of 60-75% of R hand to allow a firm grasp on cooking utensils, steering wheel, etc.  6. Pt will exhibit 7-9# of L functional pinch strength to allow writing,opening containers, and turning keys.  7. Pt will exhibit a significant increase (30+ points) in the FOTO assessment.  8. Pt will return to PLOF.    PLAN     Certification Period/Plan of care expiration: 11/7/2022 to 1/3/23.    Continue Occupational Therapy 3 times per week x 4 weeks to decrease pain and edema, and increase A/PROM, strength, and functional use of L upper extremity.     Updates/Grading for next session: progress per protocol        SAILAJA Lombardi/MICAH

## 2022-11-11 ENCOUNTER — CLINICAL SUPPORT (OUTPATIENT)
Dept: REHABILITATION | Facility: HOSPITAL | Age: 57
End: 2022-11-11
Attending: ORTHOPAEDIC SURGERY
Payer: COMMERCIAL

## 2022-11-11 DIAGNOSIS — Z78.9 DECREASED ACTIVITIES OF DAILY LIVING (ADL): Primary | ICD-10-CM

## 2022-11-11 DIAGNOSIS — M79.642 LEFT HAND PAIN: ICD-10-CM

## 2022-11-11 DIAGNOSIS — R29.898 DECREASED GRIP STRENGTH OF LEFT HAND: ICD-10-CM

## 2022-11-11 DIAGNOSIS — R29.898 DECREASED PINCH STRENGTH: ICD-10-CM

## 2022-11-11 DIAGNOSIS — M25.642 DECREASED RANGE OF MOTION OF LEFT THUMB: ICD-10-CM

## 2022-11-11 PROCEDURE — 97140 MANUAL THERAPY 1/> REGIONS: CPT | Mod: PO

## 2022-11-11 PROCEDURE — 97530 THERAPEUTIC ACTIVITIES: CPT | Mod: PO

## 2022-11-11 PROCEDURE — 97110 THERAPEUTIC EXERCISES: CPT | Mod: PO

## 2022-11-14 ENCOUNTER — CLINICAL SUPPORT (OUTPATIENT)
Dept: REHABILITATION | Facility: HOSPITAL | Age: 57
End: 2022-11-14
Attending: ORTHOPAEDIC SURGERY
Payer: COMMERCIAL

## 2022-11-14 DIAGNOSIS — R29.898 DECREASED PINCH STRENGTH: ICD-10-CM

## 2022-11-14 DIAGNOSIS — Z78.9 DECREASED ACTIVITIES OF DAILY LIVING (ADL): Primary | ICD-10-CM

## 2022-11-14 DIAGNOSIS — M25.642 DECREASED RANGE OF MOTION OF LEFT THUMB: ICD-10-CM

## 2022-11-14 DIAGNOSIS — M79.642 LEFT HAND PAIN: ICD-10-CM

## 2022-11-14 DIAGNOSIS — R29.898 DECREASED GRIP STRENGTH OF LEFT HAND: ICD-10-CM

## 2022-11-14 PROCEDURE — 97530 THERAPEUTIC ACTIVITIES: CPT | Mod: PO

## 2022-11-14 PROCEDURE — 97022 WHIRLPOOL THERAPY: CPT | Mod: PO

## 2022-11-14 PROCEDURE — 97110 THERAPEUTIC EXERCISES: CPT | Mod: PO

## 2022-11-14 NOTE — PROGRESS NOTES
Occupational Therapy Daily Treatment Note     Date: 11/14/2022  Name: Lashell Stroud  Clinic Number: [id number]    Therapy Diagnosis: L hand pain, decreased ROM L thumb, decreased /pinch/ADL  Physician: Stanley Rivero MD     Physician Orders: Please begin therapy to the left hand.  Status post thumb CMC arthroplasty protocol    Frequency:  3 times per week  Duration:  4 weeks         Medical Diagnosis: M18.12 (ICD-10-CM) - Arthritis of carpometacarpal (CMC) joint of left thumb  Evaluation Date: 11/7/2022  Insurance Authorization period Expiration: 12/31/23  Plan of Care Expiration Period: 1/3/23     Visit # / Visits Authorized: 4 / 20  Time In: 1500  Time Out: 1549    Total Billable Time: 47 minutes     Surgical Procedure:   L thumb CMC suspension arthroplasty     Precautions: Standard and Weightbearing and post op precautions     Date of  Surgery: 10/4/22                              S/P: 5 weeks on 11/8/22        SUBJECTIVE     Pt reports:  No new complaints, scar at thumb remains somewhat sensitive     She was compliant with home exercise program given last session.   Response to previous treatment:Good   Functional change: none allowed     Pain:   Functional Pain Scale Rating 0-10:   0/10 now  0/10 at best  2/10 at worst  Location: L wrist, over donor incision, and over area of CMC-1     OBJECTIVE     Not measured this date  Range of Motion:               Left/ Right   Wrist E/F 70/60 70/63  Post heat and AROM on 11/11/2022     Wrist RD/UD 17/28 20/35   Thumb R/P Abd 58/40 43/50   Thumb MP flex 0/40 0/60   Thumb IP flex 0/29 0/45   Thumb Opp 2.7 cm  0 cm   Post heat and AROM on 11/11/2022          Treatment     Lashell received the treatments listed below:     Supervised modalities after being cleared for contradictions for 15 minutes:   - Fluidotherapy 15 min at 115 degrees.  - Not Today: Moist Heat applied to L wrist/hand x 5 min to decrease pain and  increase blood flow and tissue elasticity  prior to treatment.     Lashell received the following manual therapy techniques for 2 minutes:   -Retrograde massage to L digits/hand/wrist x 0 min to stimulate lymphatics to decrease pain,  edema and increase AROM and functional use.  (NT)  -Performed scar massage to incisions for 2 minutes to decrease adhesions and improve tensile glide.      Lashell received therapeutic exercises for 10 minutes including:  -gentle PROM thumb IP/MPJ and composite with CMC supported (NT)  -AAROM wrist E/F 1# DB  - Isolated IP flexion, flat thumb and slides down small finger 2x20 each L thumb    Therapeutic activities to improve functional performance for 20 minutes, including:  - Wrist wheel into gentle wrist flexion and ext 2 min each  - isospheres 3 min  - in hand manipulation with red beans 3x20   - isospheres 3 min   -pen walks and rotations 10x (NT)    Patient Education and Home Exercises      Education provided:   - continue same; benefits of Dycem non-slip material to ease opening caps and jars and protect joints.    Written Home Exercises Provided: Patient instructed to cont prior HEP.  Exercises were reviewed and Lashell was able to demonstrate them prior to the end of the session.  Lashell demonstrated good  understanding of the HEP provided. See EMR under Patient Instructions for exercises provided during therapy sessions.       Assessment     Pt would continue to benefit from skilled OT.  Pt tolerated progression with Tx well this date. Excellent AROM gains at L wrist and L thumb. Cont per current POC.  - Progress towards goals: Good ; STG 1 met    Lashell is progressing well towards her goals and there are no updates to goals at this time. Pt prognosis is Good.     Pt will continue to benefit from skilled outpatient occupational therapy to address the deficits listed in the problem list on initial evaluation provide pt/family education and to maximize pt's level of independence in the home and community environment.     Pt's  spiritual, cultural and educational needs considered and pt agreeable to plan of care and goals.    Anticipated barriers to occupational therapy: none    Goals:  Short Term Goals: (4 weeks)  1. Pt will be independent with HEP in 2 visits. (Met 11/9/22)  2. Pt will report pain to a 5-6/10 at worst once therapy has been initiated   3. Pt will increase L wrist flexion AROM by 5-10 degrees to enable dressing, grooming activities.  4. Pt will increase L thumb MANN by 15-20 degrees to enable in hand manipulation.        Long Term Goals: (by discharge)  1. Pt will report decreased pain to 1-2/10 with ADLs.   2. Pt will increase L thumbTAM to 99 degrees to enable independence with self-care and meal preparation.  3. Pt will increase R wrist Flexion  to 55-60 degrees to enable self care.   4. Pt will exhibit opposition to within 1 cm of DPC to allow manipulation of change and small objects.   5. Pt will exhibit L  strength of 60-75% of R hand to allow a firm grasp on cooking utensils, steering wheel, etc.  6. Pt will exhibit 7-9# of L functional pinch strength to allow writing,opening containers, and turning keys.  7. Pt will exhibit a significant increase (30+ points) in the FOTO assessment.  8. Pt will return to PLOF.    PLAN     Certification Period/Plan of care expiration: 11/7/2022 to 1/3/23.    Continue Occupational Therapy 3 times per week x 4 weeks to decrease pain and edema, and increase A/PROM, strength, and functional use of L upper extremity.     Updates/Grading for next session: progress per protocol        SAILAJA Lombardi/MICAH

## 2022-11-16 ENCOUNTER — CLINICAL SUPPORT (OUTPATIENT)
Dept: REHABILITATION | Facility: HOSPITAL | Age: 57
End: 2022-11-16
Attending: ORTHOPAEDIC SURGERY
Payer: COMMERCIAL

## 2022-11-16 DIAGNOSIS — R29.898 DECREASED PINCH STRENGTH: ICD-10-CM

## 2022-11-16 DIAGNOSIS — M79.642 LEFT HAND PAIN: ICD-10-CM

## 2022-11-16 DIAGNOSIS — R29.898 DECREASED GRIP STRENGTH OF LEFT HAND: ICD-10-CM

## 2022-11-16 DIAGNOSIS — Z78.9 DECREASED ACTIVITIES OF DAILY LIVING (ADL): Primary | ICD-10-CM

## 2022-11-16 DIAGNOSIS — M25.642 DECREASED RANGE OF MOTION OF LEFT THUMB: ICD-10-CM

## 2022-11-16 PROCEDURE — 97022 WHIRLPOOL THERAPY: CPT | Mod: PO

## 2022-11-16 PROCEDURE — 97110 THERAPEUTIC EXERCISES: CPT | Mod: PO

## 2022-11-16 PROCEDURE — 97530 THERAPEUTIC ACTIVITIES: CPT | Mod: PO

## 2022-11-16 NOTE — PROGRESS NOTES
Occupational Therapy Daily Treatment Note     Date: 11/16/2022  Name: Lashell Stroud  Clinic Number: [id number]    Therapy Diagnosis: L hand pain, decreased ROM L thumb, decreased /pinch/ADL  Physician: Stanley Rivero MD     Physician Orders: Please begin therapy to the left hand.  Status post thumb CMC arthroplasty protocol    Frequency:  3 times per week  Duration:  4 weeks       Medical Diagnosis: M18.12 (ICD-10-CM) - Arthritis of carpometacarpal (CMC) joint of left thumb  Evaluation Date: 11/7/2022  Insurance Authorization period Expiration: 12/31/23  Plan of Care Expiration Period: 1/3/23     Visit # / Visits Authorized: 5 / 20  Time In: 1500  Time Out: 1547    Total Billable Time: 45 minutes     Surgical Procedure:   L thumb CMC suspension arthroplasty     Precautions: Standard and Weightbearing and post op precautions     Date of  Surgery: 10/4/22                              S/P: 6 weeks on 11/15/22        SUBJECTIVE     Pt reports:  No new complaints, scar at thumb remains somewhat sensitive     She was compliant with home exercise program given last session.   Response to previous treatment:Good   Functional change: none allowed     Pain:   Functional Pain Scale Rating 0-10:   0/10 now  0/10 at best  2/10 at worst  Location: L wrist, over donor incision, and over area of CMC-1     OBJECTIVE     Not measured this date  Range of Motion:               Left/ Right   Wrist E/F 70/60 70/63  Post heat and AROM on 11/11/2022     Wrist RD/UD 17/28 20/35   Thumb R/P Abd 58/40 43/50   Thumb MP flex 0/40 0/60   Thumb IP flex 0/29 0/45   Thumb Opp 2.3 cm (+0.4)  0 cm   Post heat and AROM on 11/16/2022        Treatment     Lashell received the treatments listed below:     Supervised modalities after being cleared for contradictions for 15 minutes:   - Fluidotherapy 15 min at 115 degrees.  - Not Today: Moist Heat applied to L wrist/hand x 5 min to decrease pain and  increase blood flow and tissue  elasticity prior to treatment.     Lashell received the following manual therapy techniques for 0 minutes: Not Today   -Retrograde massage to L digits/hand/wrist x 0 min to stimulate lymphatics to decrease pain,  edema and increase AROM and functional use.  (NT)  -Performed scar massage to incisions for 0 minutes to decrease adhesions and improve tensile glide.      Lashell received therapeutic exercises for 10 minutes including:  -gentle PROM thumb IP/MPJ and composite with CMC supported (NT)  -AAROM wrist E/F 1# DB  - Isolated IP flexion, flat thumb and slides down small finger 2x20 each L thumb    Therapeutic activities to improve functional performance for 20 minutes, including:  - Wrist wheel into gentle wrist flexion and ext 2 min each  - isospheres 3 min  - Spikey ball 2 min gentle rolls over palm and scar.  - in hand manipulation with mancala x 1 set  - pen walks and rotations 10x (NT)    Patient Education and Home Exercises      Education provided:   - continue same    Written Home Exercises Provided: Patient instructed to cont prior HEP.  Exercises were reviewed and Lashell was able to demonstrate them prior to the end of the session.  Lashell demonstrated good  understanding of the HEP provided. See EMR under Patient Instructions for exercises provided during therapy sessions.       Assessment     Pt would continue to benefit from skilled OT.  Pt tolerated progression with Tx well this date. Excellent AROM gains continue at L thumb. Cont per current POC.  - Progress towards goals: Good ; STG 1 met    Lashell is progressing well towards her goals and there are no updates to goals at this time. Pt prognosis is Good.     Pt will continue to benefit from skilled outpatient occupational therapy to address the deficits listed in the problem list on initial evaluation provide pt/family education and to maximize pt's level of independence in the home and community environment.     Pt's spiritual, cultural and educational  needs considered and pt agreeable to plan of care and goals.    Anticipated barriers to occupational therapy: none    Goals:  Short Term Goals: (4 weeks)  1. Pt will be independent with HEP in 2 visits. (Met 11/9/22)  2. Pt will report pain to a 5-6/10 at worst once therapy has been initiated   3. Pt will increase L wrist flexion AROM by 5-10 degrees to enable dressing, grooming activities.  4. Pt will increase L thumb MANN by 15-20 degrees to enable in hand manipulation.        Long Term Goals: (by discharge)  1. Pt will report decreased pain to 1-2/10 with ADLs.   2. Pt will increase L thumbTAM to 99 degrees to enable independence with self-care and meal preparation.  3. Pt will increase R wrist Flexion  to 55-60 degrees to enable self care.   4. Pt will exhibit opposition to within 1 cm of DPC to allow manipulation of change and small objects.   5. Pt will exhibit L  strength of 60-75% of R hand to allow a firm grasp on cooking utensils, steering wheel, etc.  6. Pt will exhibit 7-9# of L functional pinch strength to allow writing,opening containers, and turning keys.  7. Pt will exhibit a significant increase (30+ points) in the FOTO assessment.  8. Pt will return to PLOF.    PLAN     Certification Period/Plan of care expiration: 11/7/2022 to 1/3/23.    Continue Occupational Therapy 3 times per week x 4 weeks to decrease pain and edema, and increase A/PROM, strength, and functional use of L upper extremity.     Updates/Grading for next session: progress per protocol        SAILAJA Lombardi/MICAH

## 2022-11-18 ENCOUNTER — CLINICAL SUPPORT (OUTPATIENT)
Dept: REHABILITATION | Facility: HOSPITAL | Age: 57
End: 2022-11-18
Attending: ORTHOPAEDIC SURGERY
Payer: COMMERCIAL

## 2022-11-18 DIAGNOSIS — R29.898 DECREASED PINCH STRENGTH: ICD-10-CM

## 2022-11-18 DIAGNOSIS — M79.642 LEFT HAND PAIN: ICD-10-CM

## 2022-11-18 DIAGNOSIS — M25.642 DECREASED RANGE OF MOTION OF LEFT THUMB: ICD-10-CM

## 2022-11-18 DIAGNOSIS — R29.898 DECREASED GRIP STRENGTH OF LEFT HAND: ICD-10-CM

## 2022-11-18 DIAGNOSIS — Z78.9 DECREASED ACTIVITIES OF DAILY LIVING (ADL): Primary | ICD-10-CM

## 2022-11-18 PROCEDURE — 97530 THERAPEUTIC ACTIVITIES: CPT | Mod: PO

## 2022-11-18 PROCEDURE — 97022 WHIRLPOOL THERAPY: CPT | Mod: PO

## 2022-11-18 PROCEDURE — 97110 THERAPEUTIC EXERCISES: CPT | Mod: PO

## 2022-11-21 ENCOUNTER — CLINICAL SUPPORT (OUTPATIENT)
Dept: REHABILITATION | Facility: HOSPITAL | Age: 57
End: 2022-11-21
Attending: ORTHOPAEDIC SURGERY
Payer: COMMERCIAL

## 2022-11-21 ENCOUNTER — PATIENT MESSAGE (OUTPATIENT)
Dept: ORTHOPEDICS | Facility: CLINIC | Age: 57
End: 2022-11-21
Payer: COMMERCIAL

## 2022-11-21 DIAGNOSIS — M25.642 DECREASED RANGE OF MOTION OF LEFT THUMB: ICD-10-CM

## 2022-11-21 DIAGNOSIS — Z78.9 DECREASED ACTIVITIES OF DAILY LIVING (ADL): Primary | ICD-10-CM

## 2022-11-21 DIAGNOSIS — M79.642 LEFT HAND PAIN: ICD-10-CM

## 2022-11-21 DIAGNOSIS — R29.898 DECREASED GRIP STRENGTH OF LEFT HAND: ICD-10-CM

## 2022-11-21 DIAGNOSIS — R29.898 DECREASED PINCH STRENGTH: ICD-10-CM

## 2022-11-21 PROCEDURE — 97530 THERAPEUTIC ACTIVITIES: CPT | Mod: PO

## 2022-11-21 PROCEDURE — 97110 THERAPEUTIC EXERCISES: CPT | Mod: PO

## 2022-11-21 PROCEDURE — 97022 WHIRLPOOL THERAPY: CPT | Mod: PO

## 2022-11-21 NOTE — PROGRESS NOTES
Occupational Therapy Daily Treatment Note     Date: 11/21/2022  Name: Lashell Stroud  Clinic Number: [id number]    Therapy Diagnosis: L hand pain, decreased ROM L thumb, decreased /pinch/ADL  Physician: Stanley Rivero MD     Physician Orders: Please begin therapy to the left hand.  Status post thumb CMC arthroplasty protocol    Frequency:  3 times per week  Duration:  4 weeks       Medical Diagnosis: M18.12 (ICD-10-CM) - Arthritis of carpometacarpal (CMC) joint of left thumb  Evaluation Date: 11/7/2022  Insurance Authorization period Expiration: 12/31/23  Plan of Care Expiration Period: 1/3/23     Visit # / Visits Authorized:  7 / 20  Time In:  3:15 pm  Time Out: 4:00 pm    Total Billable Time: 45 minutes     Surgical Procedure:   L thumb CMC suspension arthroplasty     Precautions: Standard and Weightbearing and post op precautions     Date of  Surgery: 10/4/22                              S/P: 6 weeks on 11/15/22        SUBJECTIVE     Pt reports: she has more pain with wrist maze exercise than anything else.     She was compliant with home exercise program given last session.   Response to previous treatment:Good   Functional change: none allowed     Pain:   Functional Pain Scale Rating 0-10:   0/10 now  0/10 at best  3.5/10 at worst at wrist on occasion  Location: L wrist, over donor incision, and over area of CMC-1     OBJECTIVE     Not measured this date  Range of Motion:               Left/ Right   Wrist E/F 70/60 70/63  Post heat and AROM on 11/11/2022     Wrist RD/UD 17/28 20/35   Thumb R/P Abd 58/40 43/50   Thumb MP flex 0/40 0/60   Thumb IP flex 0/29 0/45   Thumb Opp 2.3 cm (+0.4)  0 cm   Post heat and AROM on 11/16/2022        Treatment     Lashell received the treatments listed below:     Supervised modalities after being cleared for contradictions for 15 minutes:   - Fluidotherapy 15 min at 115 degrees.    - Not Today: Moist Heat applied to L wrist/hand x 5 min to decrease pain and   increase blood flow and tissue elasticity prior to treatment.     Lashell received the following manual therapy techniques for 3 minutes   -Retrograde massage to L digits/hand/wrist x 1 min to stimulate lymphatics to decrease pain,  edema and increase AROM and functional use.   (NT)   -Performed scar massage to incisions for 3 minutes to decrease adhesions and improve tensile glide.      Lashell received therapeutic exercises for 13 minutes including:  -gentle PROM thumb IP/MPJ and composite with CMC supported and gentle wrist PROM/stretching   -AAROM wrist E/F 1# DB (NT)  -wrist AROM with dowel over wedge 20x ea, 4 ways   - Isolated IP flexion, flat thumb and slides down small finger 2x20 each L thumb    Therapeutic activities to improve functional performance for 14 minutes, including:  - Wrist wheel into gentle wrist flexion and ext 2 min each (NT)  - isospheres 3 min  - Spikey ball 2 min gentle rolls over palm and scar.(NT)   - Dexterciser 3 min   - in hand manipulation with medium pom poms   - pen walks and rotations 10x (NT)   -yellow sponge squeezes 3 min     Patient Education and Home Exercises      Education provided:   - continue same. Benefits of silicone scar tape.    Written Home Exercises Provided: Patient instructed to cont prior HEP.  Exercises were reviewed and Lashell was able to demonstrate them prior to the end of the session.  Lashell demonstrated good  understanding of the HEP provided. See EMR under Patient Instructions for exercises provided during therapy sessions.       Assessment     Pt would continue to benefit from skilled OT.  Scar mobility improving. Thumb AROM improving based on clinical observation and gentle PROM by therapist.   Cont per current POC.    - Progress towards goals: Good ;     Lashell is progressing well towards her goals and there are no updates to goals at this time. Pt prognosis is Good.     Pt will continue to benefit from skilled outpatient occupational therapy to address  the deficits listed in the problem list on initial evaluation provide pt/family education and to maximize pt's level of independence in the home and community environment.     Pt's spiritual, cultural and educational needs considered and pt agreeable to plan of care and goals.    Anticipated barriers to occupational therapy: none    Goals:  Short Term Goals: (4 weeks)  1. Pt will be independent with HEP in 2 visits. (Met 11/9/22)  2. Pt will report pain to a 5-6/10 at worst once therapy has been initiated   3. Pt will increase L wrist flexion AROM by 5-10 degrees to enable dressing, grooming activities.  4. Pt will increase L thumb MANN by 15-20 degrees to enable in hand manipulation.        Long Term Goals: (by discharge)  1. Pt will report decreased pain to 1-2/10 with ADLs.   2. Pt will increase L thumbTAM to 99 degrees to enable independence with self-care and meal preparation.  3. Pt will increase R wrist Flexion  to 55-60 degrees to enable self care.   4. Pt will exhibit opposition to within 1 cm of DPC to allow manipulation of change and small objects.   5. Pt will exhibit L  strength of 60-75% of R hand to allow a firm grasp on cooking utensils, steering wheel, etc.  6. Pt will exhibit 7-9# of L functional pinch strength to allow writing,opening containers, and turning keys.  7. Pt will exhibit a significant increase (30+ points) in the FOTO assessment.  8. Pt will return to PLOF.    PLAN     Certification Period/Plan of care expiration: 11/7/2022 to 1/3/23.    Continue Occupational Therapy 3 times per week x 4 weeks to decrease pain and edema, and increase A/PROM, strength, and functional use of L upper extremity.     Updates/Grading for next session: progress per protocol      SAILAJA Brian, BAHMANT

## 2022-11-21 NOTE — PROGRESS NOTES
Occupational Therapy Daily Treatment Note     Date: 11/22/2022  Name: Lashell Stroud  Clinic Number: [id number]    Therapy Diagnosis: L hand pain, decreased ROM L thumb, decreased /pinch/ADL  Physician: Stanley Rivero MD     Physician Orders: Please begin therapy to the left hand.  Status post thumb CMC arthroplasty protocol    Frequency:  3 times per week  Duration:  4 weeks       Medical Diagnosis: M18.12 (ICD-10-CM) - Arthritis of carpometacarpal (CMC) joint of left thumb  Evaluation Date: 11/7/2022  Insurance Authorization period Expiration: 12/31/23  Plan of Care Expiration Period: 1/3/23     Visit # / Visits Authorized:  8 / 20  Time In:  2:40 pm  Time Out: 3:40 pm    Total Billable Time: 60 minutes     Surgical Procedure:   L thumb CMC suspension arthroplasty     Precautions: Standard and Weightbearing and post op precautions     Date of  Surgery: 10/4/22                              S/P: 7 weeks on 11/22/22        SUBJECTIVE     Pt reports: no new complaints     She was compliant with home exercise program given last session.   Response to previous treatment:Good   Functional change: none allowed     Pain:   Functional Pain Scale Rating 0-10:   0/10 now  0/10 at best  3.5/10 at worst at wrist on occasion  Location: L wrist, over donor incision, and over area of CMC-1     OBJECTIVE     AROM measured this date  Range of Motion:               Left/ Right   Wrist E/F 70/55 (+5/+15)  70/63     Wrist RD/UD 15/30 (-2/+2)  20/35   Thumb R/P Abd 58/50 (NT/+10) 43/50   Thumb MP flex 0/53 (+13) 0/60   Thumb IP flex 0/43 (+14) 0/45   Thumb Opp 0.5 (+1.8 cm)  0 cm           Treatment     Lashell received the treatments listed below:     Supervised modalities after being cleared for contradictions for 15 minutes:   - Fluidotherapy 15 min at 115 degrees.    Lashell received the following manual therapy techniques for 7 minutes   -Retrograde massage to L digits/hand/wrist x 4 min to stimulate lymphatics to  decrease pain,  edema and increase AROM and functional use.     -Performed scar massage to incisions for 3 minutes to decrease adhesions and improve tensile glide.      Lashell received therapeutic exercises for 20 minutes including:  -gentle PROM thumb IP/MPJ and composite with CMC supported and gentle wrist PROM/stretching   -AAROM wrist E/F 1# DB   -wrist AROM with dowel over wedge 20x ea, 4 ways   - Isolated IP flexion, flat thumb and slides down small finger 2x20 each L thumb   -AROM in fluido     Therapeutic activities to improve functional performance for 18 minutes, including:  - Wrist wheel into gentle wrist flexion and ext 2 min each   - isospheres 3 min  - Spikey ball 2 min gentle rolls over palm and scar.(NT)   - Dexterciser 3 min   - in hand manipulation with medium pom poms   - pen walks and rotations 10x (NT)   -yellow sponge squeezes 3 min     Patient Education and Home Exercises      Education provided:   - continue same. Benefits of silicone scar tape.    Written Home Exercises Provided: Patient instructed to cont prior HEP.  Exercises were reviewed and Lashell was able to demonstrate them prior to the end of the session.  Lashell demonstrated good  understanding of the HEP provided. See EMR under Patient Instructions for exercises provided during therapy sessions.       Assessment     Pt would continue to benefit from skilled OT.  Good AROM gains for thumb and wrist. Cont per current POC.    - Progress towards goals: Good ; STGs 2-4 met    Lashell is progressing well towards her goals and there are no updates to goals at this time. Pt prognosis is Good.     Pt will continue to benefit from skilled outpatient occupational therapy to address the deficits listed in the problem list on initial evaluation provide pt/family education and to maximize pt's level of independence in the home and community environment.     Pt's spiritual, cultural and educational needs considered and pt agreeable to plan of care and  goals.    Anticipated barriers to occupational therapy: none    Goals:  Short Term Goals: (4 weeks)  1. Pt will be independent with HEP in 2 visits. (Met 11/9/22)  2. Pt will report pain to a 5-6/10 at worst once therapy has been initiated  (met 11/22/22)   3. Pt will increase L wrist flexion AROM by 5-10 degrees to enable dressing, grooming activities. (met 11/22/22)  4. Pt will increase L thumb MANN by 15-20 degrees to enable in hand manipulation. (met 11/22/22)        Long Term Goals: (by discharge)  1. Pt will report decreased pain to 1-2/10 with ADLs.   2. Pt will increase L thumbTAM to 99 degrees to enable independence with self-care and meal preparation.  3. Pt will increase R wrist Flexion  to 55-60 degrees to enable self care.   4. Pt will exhibit opposition to within 1 cm of DPC to allow manipulation of change and small objects.   5. Pt will exhibit L  strength of 60-75% of R hand to allow a firm grasp on cooking utensils, steering wheel, etc.  6. Pt will exhibit 7-9# of L functional pinch strength to allow writing,opening containers, and turning keys.  7. Pt will exhibit a significant increase (30+ points) in the FOTO assessment.  8. Pt will return to PLOF.    PLAN     Certification Period/Plan of care expiration: 11/7/2022 to 1/3/23.    Continue Occupational Therapy 3 times per week x 4 weeks to decrease pain and edema, and increase A/PROM, strength, and functional use of L upper extremity.     Updates/Grading for next session: progress per protocol      SAILAJA Brian, BAHMANT

## 2022-11-22 ENCOUNTER — CLINICAL SUPPORT (OUTPATIENT)
Dept: REHABILITATION | Facility: HOSPITAL | Age: 57
End: 2022-11-22
Attending: ORTHOPAEDIC SURGERY
Payer: COMMERCIAL

## 2022-11-22 DIAGNOSIS — R29.898 DECREASED GRIP STRENGTH OF LEFT HAND: ICD-10-CM

## 2022-11-22 DIAGNOSIS — Z78.9 DECREASED ACTIVITIES OF DAILY LIVING (ADL): Primary | ICD-10-CM

## 2022-11-22 DIAGNOSIS — M25.642 DECREASED RANGE OF MOTION OF LEFT THUMB: ICD-10-CM

## 2022-11-22 DIAGNOSIS — M79.642 LEFT HAND PAIN: ICD-10-CM

## 2022-11-22 DIAGNOSIS — R29.898 DECREASED PINCH STRENGTH: ICD-10-CM

## 2022-11-22 PROCEDURE — 97530 THERAPEUTIC ACTIVITIES: CPT | Mod: PO

## 2022-11-22 PROCEDURE — 97110 THERAPEUTIC EXERCISES: CPT | Mod: PO

## 2022-11-22 PROCEDURE — 97022 WHIRLPOOL THERAPY: CPT | Mod: PO

## 2022-11-22 NOTE — PROGRESS NOTES
"  Occupational Therapy Daily Treatment Note / Progress Note     Date: 11/28/2022  Name: Lashell Stroud  Clinic Number: [id number]    Therapy Diagnosis: L hand pain, decreased ROM L thumb, decreased /pinch/ADL  Physician: Stanley Rivero MD     Physician Orders: Please begin therapy to the left hand.  Status post thumb CMC arthroplasty protocol    Frequency:  3 times per week  Duration:  4 weeks       Medical Diagnosis: M18.12 (ICD-10-CM) - Arthritis of carpometacarpal (CMC) joint of left thumb  Evaluation Date: 11/7/2022  Insurance Authorization period Expiration: 12/31/23  Plan of Care Expiration Period: 1/3/23     Visit # / Visits Authorized:  10  / 20  Time In:  3:21 pm  Time Out: 4:21 pm    Total Billable Time: 60 minutes     Surgical Procedure:   L thumb CMC suspension arthroplasty     Precautions: Standard and Weightbearing and post op precautions     Date of  Surgery: 10/4/22                              S/P: 7 weeks on 11/22/22        SUBJECTIVE     Pt reports: some exercises are getting easier. "I have a hard time keeping small objects in my hands, even before surgery." Pt reports she does not feel that opposition to DPC has progressed lately, though she works on it consistently.     She was compliant with home exercise program given last session.   Response to previous treatment:Good   Functional change: none allowed     Pain:   Functional Pain Scale Rating 0-10:   0/10 now  0/10 at best  3.5/10 at worst at wrist on occasion  Location: L wrist, over donor incision, and over area of CMC-1     OBJECTIVE     Pt has had 10 OT visits. Treatment has consisted of moist heat/ fluidotherapy, scar mobilization, edema control, A/PROM, there ex..     Range of Motion:               Left/ Right   Wrist E/F 70/55 (+5/+15)  70/63     Wrist RD/UD 15/30 (-2/+2)  20/35   Thumb R/P Abd 58/50 (NT/+10) 43/50   Thumb MP flex 0/53 (+13) 0/60   Thumb IP flex 0/43 (+14) 0/45   Thumb Opp 0.5 (+1.8 cm)  0 cm       "   FOTO score: 40    Treatment     Ele received the treatments listed below:     Supervised modalities after being cleared for contradictions for 15 minutes:   - Fluidotherapy 15 min at 115 degrees.    Elesa received the following manual therapy techniques for 3 minutes   -Retrograde massage to L digits/hand/wrist x 4 min to stimulate lymphatics to decrease pain,  edema and increase AROM and functional use.   (NT)   -Performed scar massage to incisions for 3 minutes to decrease adhesions and improve tensile glide.      Ele received therapeutic exercises for 24 minutes including:  -gentle PROM thumb IP/MPJ and composite with CMC supported and gentle wrist PROM/stretching   -AAROM wrist E/F 1# DB  (NT)   -wrist AROM with dowel over wedge 20x ea, 4 ways   - Isolated IP flexion, flat thumb and slides down small finger 2x20 each L thumb   -AROM in fluido     Therapeutic activities to improve functional performance for 18 minutes, including:  - Wrist wheel into gentle wrist flexion and ext 2 min each   - isospheres 3 min  - Dexterciser 3 min   - in hand manipulation with small pom poms   -yellow sponge squeezes 3 min     Patient Education and Home Exercises      Education provided:   - continue same. Benefits of silicone scar tape.    Written Home Exercises Provided: Patient instructed to cont prior HEP.  Exercises were reviewed and Lashell was able to demonstrate them prior to the end of the session.  Elesa demonstrated good  understanding of the HEP provided. See EMR under Patient Instructions for exercises provided during therapy sessions.       Assessment     Pt is responding favorably to therapy. FOTO score has improved by 13 points. Edema has decreased overall. Scar mobility improving.Wrist and thumb AROM has increased. Pt is still under limited weightbearing restrictions per post op protocol and is wearing wrist brace.  See below for goal achievement. Pt would benefit from continued occupational therapy services  to increase A/PROM, strength, and functional use.     Pt would continue to benefit from skilled OT.     - Progress towards goals: Good ;     Lashell is progressing well towards her goals and there are no updates to goals at this time. Pt prognosis is Good.     Pt will continue to benefit from skilled outpatient occupational therapy to address the deficits listed in the problem list on initial evaluation provide pt/family education and to maximize pt's level of independence in the home and community environment.     Pt's spiritual, cultural and educational needs considered and pt agreeable to plan of care and goals.    Anticipated barriers to occupational therapy: none    Goals:  Short Term Goals: (4 weeks)  1. Pt will be independent with HEP in 2 visits. (Met 11/9/22)  2. Pt will report pain to a 5-6/10 at worst once therapy has been initiated  (met 11/22/22)   3. Pt will increase L wrist flexion AROM by 5-10 degrees to enable dressing, grooming activities. (met 11/22/22)  4. Pt will increase L thumb MANN by 15-20 degrees to enable in hand manipulation. (met 11/22/22)        Long Term Goals: (by discharge)  1. Pt will report decreased pain to 1-2/10 with ADLs.   2. Pt will increase L thumbTAM to 99 degrees to enable independence with self-care and meal preparation.  3. Pt will increase R wrist Flexion  to 55-60 degrees to enable self care. (Met 11/28/22)   4. Pt will exhibit opposition to within 1 cm of DPC to allow manipulation of change and small objects.  (Met 11/28/22)   5. Pt will exhibit L  strength of 60-75% of R hand to allow a firm grasp on cooking utensils, steering wheel, etc.  6. Pt will exhibit 7-9# of L functional pinch strength to allow writing,opening containers, and turning keys.  7. Pt will exhibit a significant increase (30+ points) in the FOTO assessment.  8. Pt will return to PLOF.    PLAN     Certification Period/Plan of care expiration: 11/7/2022 to 1/3/23.    Continue Occupational Therapy 3  times per week x 4 weeks to decrease pain and edema, and increase A/PROM, strength, and functional use of L upper extremity.     Updates/Grading for next session: progress per protocol      SAILAJA Brian CHT

## 2022-11-23 ENCOUNTER — CLINICAL SUPPORT (OUTPATIENT)
Dept: REHABILITATION | Facility: HOSPITAL | Age: 57
End: 2022-11-23
Attending: ORTHOPAEDIC SURGERY
Payer: COMMERCIAL

## 2022-11-23 DIAGNOSIS — M79.642 LEFT HAND PAIN: ICD-10-CM

## 2022-11-23 DIAGNOSIS — R29.898 DECREASED GRIP STRENGTH OF LEFT HAND: ICD-10-CM

## 2022-11-23 DIAGNOSIS — R29.898 DECREASED PINCH STRENGTH: ICD-10-CM

## 2022-11-23 DIAGNOSIS — Z78.9 DECREASED ACTIVITIES OF DAILY LIVING (ADL): Primary | ICD-10-CM

## 2022-11-23 DIAGNOSIS — M25.642 DECREASED RANGE OF MOTION OF LEFT THUMB: ICD-10-CM

## 2022-11-23 PROCEDURE — 97022 WHIRLPOOL THERAPY: CPT | Mod: PO

## 2022-11-23 PROCEDURE — 97530 THERAPEUTIC ACTIVITIES: CPT | Mod: PO

## 2022-11-23 NOTE — PROGRESS NOTES
Occupational Therapy Daily Treatment Note     Date: 11/23/2022  Name: Lashell Stroud  Clinic Number: [id number]    Therapy Diagnosis: L hand pain, decreased ROM L thumb, decreased /pinch/ADL  Physician: Stanley Rivero MD     Physician Orders: Please begin therapy to the left hand.  Status post thumb CMC arthroplasty protocol    Frequency:  3 times per week  Duration:  4 weeks       Medical Diagnosis: M18.12 (ICD-10-CM) - Arthritis of carpometacarpal (CMC) joint of left thumb  Evaluation Date: 11/7/2022  Insurance Authorization period Expiration: 12/31/23  Plan of Care Expiration Period: 1/3/23     Visit # / Visits Authorized:   9 / 20  Time In:  1425  Time Out: 2130    Total Billable Time: 40 minutes     Surgical Procedure:   L thumb CMC suspension arthroplasty     Precautions: Standard and Weightbearing and post op precautions     Date of  Surgery: 10/4/22                              S/P: 7 weeks on 11/22/22        SUBJECTIVE     Pt reports: no new complaints     She was compliant with home exercise program given last session.   Response to previous treatment:Good   Functional change: none allowed     Pain:   Functional Pain Scale Rating 0-10:   0/10 now  0/10 at best  3.5/10 at worst at wrist on occasion  Location: L wrist, over donor incision, and over area of CMC-1     OBJECTIVE     AROM measured this date  Range of Motion:               Left/ Right   Wrist E/F 70/55 (+5/+15)  70/63     Wrist RD/UD 15/30 (-2/+2)  20/35   Thumb R/P Abd 58/50 (NT/+10) 43/50   Thumb MP flex 0/53 (+13) 0/60   Thumb IP flex 0/43 (+14) 0/45   Thumb Opp 0.5 (+1.8 cm)  0 cm           Treatment     Lashell received the treatments listed below:     Supervised modalities after being cleared for contradictions for 15 minutes:   - Fluidotherapy 15 min at 115 degrees.    Lashell received the following manual therapy techniques for 0 minutes  (NT)  -Retrograde massage to L digits/hand/wrist x 4 min to stimulate lymphatics to  decrease pain,  edema and increase AROM and functional use.     -Performed scar massage to incisions for 3 minutes to decrease adhesions and improve tensile glide.      Lashell received therapeutic exercises for 0 minutes including: (NT)  -gentle PROM thumb IP/MPJ and composite with CMC supported and gentle wrist PROM/stretching   -AAROM wrist E/F 1# DB   -wrist AROM with dowel over wedge 20x ea, 4 ways   - Isolated IP flexion, flat thumb and slides down small finger 2x20 each L thumb   -AROM in fluido     Therapeutic activities to improve functional performance for 25 minutes, including:  - Wrist wheel into gentle wrist flexion and ext 2 min each   - isospheres 3 min  - Spikey ball 2 min gentle rolls over palm and scar   - Dexterciser 3 min   - In hand manipulation with buttons, 1 container  - pen walks and rotations 10x (NT)   - yellow sponge squeezes 3 min     Patient Education and Home Exercises      Education provided:   - continue same. Benefits of silicone scar tape.    Written Home Exercises Provided: Patient instructed to cont prior HEP.  Exercises were reviewed and Lashell was able to demonstrate them prior to the end of the session.  Lashell demonstrated good  understanding of the HEP provided. See EMR under Patient Instructions for exercises provided during therapy sessions.       Assessment     Pt would continue to benefit from skilled OT. Pt tolerated progression with Tx well this date. Cont per current POC.    - Progress towards goals: Good ; STGs 2-4 met    Lashell is progressing well towards her goals and there are no updates to goals at this time. Pt prognosis is Good.     Pt will continue to benefit from skilled outpatient occupational therapy to address the deficits listed in the problem list on initial evaluation provide pt/family education and to maximize pt's level of independence in the home and community environment.     Pt's spiritual, cultural and educational needs considered and pt agreeable to  plan of care and goals.    Anticipated barriers to occupational therapy: none    Goals:  Short Term Goals: (4 weeks)  1. Pt will be independent with HEP in 2 visits. (Met 11/9/22)  2. Pt will report pain to a 5-6/10 at worst once therapy has been initiated  (met 11/22/22)   3. Pt will increase L wrist flexion AROM by 5-10 degrees to enable dressing, grooming activities. (met 11/22/22)  4. Pt will increase L thumb MANN by 15-20 degrees to enable in hand manipulation. (met 11/22/22)        Long Term Goals: (by discharge)  1. Pt will report decreased pain to 1-2/10 with ADLs.   2. Pt will increase L thumbTAM to 99 degrees to enable independence with self-care and meal preparation.  3. Pt will increase R wrist Flexion  to 55-60 degrees to enable self care.   4. Pt will exhibit opposition to within 1 cm of DPC to allow manipulation of change and small objects.   5. Pt will exhibit L  strength of 60-75% of R hand to allow a firm grasp on cooking utensils, steering wheel, etc.  6. Pt will exhibit 7-9# of L functional pinch strength to allow writing,opening containers, and turning keys.  7. Pt will exhibit a significant increase (30+ points) in the FOTO assessment.  8. Pt will return to PLOF.    PLAN     Certification Period/Plan of care expiration: 11/7/2022 to 1/3/23.    Continue Occupational Therapy 3 times per week x 4 weeks to decrease pain and edema, and increase A/PROM, strength, and functional use of L upper extremity.     Updates/Grading for next session: progress per protocol      SAILAJA Lombardi/MICAH

## 2022-11-28 ENCOUNTER — CLINICAL SUPPORT (OUTPATIENT)
Dept: REHABILITATION | Facility: HOSPITAL | Age: 57
End: 2022-11-28
Attending: ORTHOPAEDIC SURGERY
Payer: COMMERCIAL

## 2022-11-28 DIAGNOSIS — Z78.9 DECREASED ACTIVITIES OF DAILY LIVING (ADL): Primary | ICD-10-CM

## 2022-11-28 DIAGNOSIS — M25.642 DECREASED RANGE OF MOTION OF LEFT THUMB: ICD-10-CM

## 2022-11-28 DIAGNOSIS — R29.898 DECREASED PINCH STRENGTH: ICD-10-CM

## 2022-11-28 DIAGNOSIS — R29.898 DECREASED GRIP STRENGTH OF LEFT HAND: ICD-10-CM

## 2022-11-28 DIAGNOSIS — M79.642 LEFT HAND PAIN: ICD-10-CM

## 2022-11-28 PROCEDURE — 97530 THERAPEUTIC ACTIVITIES: CPT | Mod: PO

## 2022-11-28 PROCEDURE — 97110 THERAPEUTIC EXERCISES: CPT | Mod: PO

## 2022-11-28 PROCEDURE — 97022 WHIRLPOOL THERAPY: CPT | Mod: PO

## 2022-11-29 NOTE — PROGRESS NOTES
Occupational Therapy Daily Treatment Note      Date: 11/30/2022  Name: Lashell Stroud  Clinic Number: [id number]    Therapy Diagnosis: L hand pain, decreased ROM L thumb, decreased /pinch/ADL  Physician: Stanley Rivero MD     Physician Orders: Please begin therapy to the left hand.  Status post thumb CMC arthroplasty protocol    Frequency:  3 times per week  Duration:  4 weeks       Medical Diagnosis: M18.12 (ICD-10-CM) - Arthritis of carpometacarpal (CMC) joint of left thumb  Evaluation Date: 11/7/2022  Insurance Authorization period Expiration: 12/31/23  Plan of Care Expiration Period: 1/3/23     Visit # / Visits Authorized:  11 / 20  Time In:  2:15 pm  Time Out: 3:15 pm    Total Billable Time: 60 minutes     Surgical Procedure:   L thumb CMC suspension arthroplasty     Precautions: Standard and Weightbearing and post op precautions     Date of  Surgery: 10/4/22                              S/P: 8 weeks on 11/29/22        SUBJECTIVE     Pt reports: no pain with gentle wrist stretching. Also no longer has pain with wrist maze exercise.   She was compliant with home exercise program given last session.   Response to previous treatment:Good   Functional change: none allowed     Pain:   Functional Pain Scale Rating 0-10:   0/10 now  0/10 at best  3.5/10 at worst at wrist on occasion  Location: L wrist, over donor incision, and over area of CMC-1     OBJECTIVE       Range of Motion:               Left/ Right   Wrist E/F 74/59 (+4/+4)  70/63     Wrist RD/UD 15/30 (+5/+5)  20/35   Thumb R/P Abd 58/50 (NT/+10) 43/50   Thumb MP flex 0/53 (=) 0/60   Thumb IP flex 0/40 (-3) 0/45   Thumb Opp 1.0 (-0.5 cm)  0 cm          Treatment     Lashell received the treatments listed below:     Supervised modalities after being cleared for contradictions for 15 minutes:   - Fluidotherapy 15 min at 115 degrees.    Lashell received the following manual therapy techniques for 3 minutes   -Retrograde massage to L  digits/hand/wrist x 4 min to stimulate lymphatics to decrease pain,  edema and increase AROM and functional use.   (NT)   -Performed scar massage to incisions for 3 minutes to decrease adhesions and improve tensile glide.      Lashell received therapeutic exercises for 26 minutes including:  -gentle PROM thumb IP/MPJ and composite with CMC supported and gentle wrist PROM/stretching   -AAROM wrist E/F 1# DB  (NT)   -wrist PREs with 1#l over wedge 20x ea, 4 ways   -AROM in fluido   -place/hold thumb opposition to DPC    Therapeutic activities to improve functional performance for 16 minutes, including:  - Wrist wheel into gentle wrist flexion and ext 2 min each   - isospheres 3 min  - Dexterciser 3 min   - in hand manipulation with small pom poms   -yellow putty squeezes 3 min   -functional pinches with yellow CP 20x ea     Patient Education and Home Exercises      Education provided:   - continue same  -    Written Home Exercises Provided: Patient instructed to cont prior HEP.  Exercises were reviewed and Lashell was able to demonstrate them prior to the end of the session.  Lashell demonstrated good  understanding of the HEP provided. See EMR under Patient Instructions for exercises provided during therapy sessions.       Assessment     Wrist AROM continues to improve. No change in active thumb flexion this date. Pt able to hold opposition to DPC with place/hold exercises.  Pt would benefit from continued occupational therapy services to increase A/PROM, strength, and functional use.     Pt would continue to benefit from skilled OT.     - Progress towards goals: Good ;     Lashell is progressing well towards her goals and there are no updates to goals at this time. Pt prognosis is Good.     Pt will continue to benefit from skilled outpatient occupational therapy to address the deficits listed in the problem list on initial evaluation provide pt/family education and to maximize pt's level of independence in the home and  community environment.     Pt's spiritual, cultural and educational needs considered and pt agreeable to plan of care and goals.    Anticipated barriers to occupational therapy: none    Goals:  Short Term Goals: (4 weeks)  1. Pt will be independent with HEP in 2 visits. (Met 11/9/22)  2. Pt will report pain to a 5-6/10 at worst once therapy has been initiated  (met 11/22/22)   3. Pt will increase L wrist flexion AROM by 5-10 degrees to enable dressing, grooming activities. (met 11/22/22)  4. Pt will increase L thumb MANN by 15-20 degrees to enable in hand manipulation. (met 11/22/22)        Long Term Goals: (by discharge)  1. Pt will report decreased pain to 1-2/10 with ADLs.   2. Pt will increase L thumbTAM to 99 degrees to enable independence with self-care and meal preparation.  3. Pt will increase R wrist Flexion  to 55-60 degrees to enable self care. (Met 11/28/22)   4. Pt will exhibit opposition to within 1 cm of DPC to allow manipulation of change and small objects.  (Met 11/28/22)   5. Pt will exhibit L  strength of 60-75% of R hand to allow a firm grasp on cooking utensils, steering wheel, etc.  6. Pt will exhibit 7-9# of L functional pinch strength to allow writing,opening containers, and turning keys.  7. Pt will exhibit a significant increase (30+ points) in the FOTO assessment.  8. Pt will return to PLOF.    PLAN     Certification Period/Plan of care expiration: 11/7/2022 to 1/3/23.    Continue Occupational Therapy 3 times per week x 4 weeks to decrease pain and edema, and increase A/PROM, strength, and functional use of L upper extremity.     Updates/Grading for next session: progress per protocol      SAILAJA Brian, BAHMANT

## 2022-11-30 ENCOUNTER — OFFICE VISIT (OUTPATIENT)
Dept: ORTHOPEDICS | Facility: CLINIC | Age: 57
End: 2022-11-30
Payer: COMMERCIAL

## 2022-11-30 ENCOUNTER — CLINICAL SUPPORT (OUTPATIENT)
Dept: REHABILITATION | Facility: HOSPITAL | Age: 57
End: 2022-11-30
Attending: ORTHOPAEDIC SURGERY
Payer: COMMERCIAL

## 2022-11-30 DIAGNOSIS — R29.898 DECREASED PINCH STRENGTH: ICD-10-CM

## 2022-11-30 DIAGNOSIS — Z78.9 DECREASED ACTIVITIES OF DAILY LIVING (ADL): Primary | ICD-10-CM

## 2022-11-30 DIAGNOSIS — M25.642 DECREASED RANGE OF MOTION OF LEFT THUMB: ICD-10-CM

## 2022-11-30 DIAGNOSIS — M18.12 ARTHRITIS OF CARPOMETACARPAL (CMC) JOINT OF LEFT THUMB: Primary | ICD-10-CM

## 2022-11-30 DIAGNOSIS — M79.642 LEFT HAND PAIN: ICD-10-CM

## 2022-11-30 DIAGNOSIS — R29.898 DECREASED GRIP STRENGTH OF LEFT HAND: ICD-10-CM

## 2022-11-30 PROCEDURE — 3044F HG A1C LEVEL LT 7.0%: CPT | Mod: CPTII,S$GLB,, | Performed by: ORTHOPAEDIC SURGERY

## 2022-11-30 PROCEDURE — 97530 THERAPEUTIC ACTIVITIES: CPT | Mod: PO

## 2022-11-30 PROCEDURE — 99999 PR PBB SHADOW E&M-EST. PATIENT-LVL II: CPT | Mod: PBBFAC,,, | Performed by: ORTHOPAEDIC SURGERY

## 2022-11-30 PROCEDURE — 99024 PR POST-OP FOLLOW-UP VISIT: ICD-10-PCS | Mod: S$GLB,,, | Performed by: ORTHOPAEDIC SURGERY

## 2022-11-30 PROCEDURE — 97110 THERAPEUTIC EXERCISES: CPT | Mod: PO

## 2022-11-30 PROCEDURE — 1159F PR MEDICATION LIST DOCUMENTED IN MEDICAL RECORD: ICD-10-PCS | Mod: CPTII,S$GLB,, | Performed by: ORTHOPAEDIC SURGERY

## 2022-11-30 PROCEDURE — 99999 PR PBB SHADOW E&M-EST. PATIENT-LVL II: ICD-10-PCS | Mod: PBBFAC,,, | Performed by: ORTHOPAEDIC SURGERY

## 2022-11-30 PROCEDURE — 97022 WHIRLPOOL THERAPY: CPT | Mod: PO

## 2022-11-30 PROCEDURE — 1159F MED LIST DOCD IN RCRD: CPT | Mod: CPTII,S$GLB,, | Performed by: ORTHOPAEDIC SURGERY

## 2022-11-30 PROCEDURE — 99024 POSTOP FOLLOW-UP VISIT: CPT | Mod: S$GLB,,, | Performed by: ORTHOPAEDIC SURGERY

## 2022-11-30 PROCEDURE — 3044F PR MOST RECENT HEMOGLOBIN A1C LEVEL <7.0%: ICD-10-PCS | Mod: CPTII,S$GLB,, | Performed by: ORTHOPAEDIC SURGERY

## 2022-11-30 NOTE — PROGRESS NOTES
Ms Stroud returns to clinic today.  She is approximately 8 weeks status post left thumb CMC arthroplasty.  She started work with therapy and is improving well.  States that she is still having pain about the right thumb CMC joint as well     Physical exam:  Examination left hand reveals that the incisions are healing well.  There is minimal edema.  There is no erythema.  Palpation produces very minimal tenderness.  She is able to flex the thumb to the distal palmar crease and fully extend.  She does report intact sensation in the median radial ulnar distribution     Assessment:  Status post left thumb CMC arthroplasty     Plan:    1. She will continue to wear her brace for heavy activity    2. She will continue to work with therapy on the CMC arthroplasty protocol    3.  She will follow up in 4 weeks with an x-ray of the left hand.  At that time I may consider injecting her right thumb CMC joint

## 2022-12-02 ENCOUNTER — CLINICAL SUPPORT (OUTPATIENT)
Dept: REHABILITATION | Facility: HOSPITAL | Age: 57
End: 2022-12-02
Attending: ORTHOPAEDIC SURGERY
Payer: COMMERCIAL

## 2022-12-02 DIAGNOSIS — M25.642 DECREASED RANGE OF MOTION OF LEFT THUMB: ICD-10-CM

## 2022-12-02 DIAGNOSIS — M79.642 LEFT HAND PAIN: ICD-10-CM

## 2022-12-02 DIAGNOSIS — R29.898 DECREASED GRIP STRENGTH OF LEFT HAND: ICD-10-CM

## 2022-12-02 DIAGNOSIS — Z78.9 DECREASED ACTIVITIES OF DAILY LIVING (ADL): Primary | ICD-10-CM

## 2022-12-02 DIAGNOSIS — R29.898 DECREASED PINCH STRENGTH: ICD-10-CM

## 2022-12-02 PROCEDURE — 97530 THERAPEUTIC ACTIVITIES: CPT | Mod: PO

## 2022-12-02 PROCEDURE — 97110 THERAPEUTIC EXERCISES: CPT | Mod: PO

## 2022-12-02 PROCEDURE — 97022 WHIRLPOOL THERAPY: CPT | Mod: PO

## 2022-12-02 NOTE — PROGRESS NOTES
Occupational Therapy Daily Treatment Note      Date: 12/2/2022  Name: Lashell Stroud  Clinic Number: [id number]    Therapy Diagnosis: L hand pain, decreased ROM L thumb, decreased /pinch/ADL  Physician: Stanley Rivero MD     Physician Orders: Please begin therapy to the left hand.  Status post thumb CMC arthroplasty protocol     Frequency:  3 times per week  Duration:  4 weeks       Medical Diagnosis: M18.12 (ICD-10-CM) - Arthritis of carpometacarpal (CMC) joint of left thumb  Evaluation Date: 11/7/2022  Insurance Authorization period Expiration: 12/31/23  Plan of Care Expiration Period: 1/3/23     Visit # / Visits Authorized:  12 / 20  Time In: 1515  Time Out: 1600    Total Billable Time: 40 minutes     Surgical Procedure:   L thumb CMC suspension arthroplasty     Precautions: Standard and brace only for heavy activities     Date of  Surgery: 10/4/22                              S/P: 8 weeks on 11/29/22        SUBJECTIVE     Pt reports:Pt tolerating putty well  She was compliant with home exercise program given last session.   Response to previous treatment:Good   Functional change: none allowed     Pain:   Functional Pain Scale Rating 0-10:   0/10 now  0/10 at best  3.5/10 at worst at wrist on occasion  Location: L wrist, over donor incision, and over area of CMC-1     OBJECTIVE       Range of Motion:               Left/ Right   Wrist E/F 74/59 (+4/+4)  70/63     Wrist RD/UD 15/30 (+5/+5)  20/35   Thumb R/P Abd 58/50 (NT/+10) 43/50   Thumb MP flex 0/53 (=) 0/60   Thumb IP flex 0/40 (-3) 0/45   Thumb Opp 0.9  (-0.1 cm)  0 cm          Treatment     Lashell received the treatments listed below:     Supervised modalities after being cleared for contradictions for 15 minutes:   - Fluidotherapy 15 min at 115 degrees.    Lashell received the following manual therapy techniques for 0 minutes   -Retrograde massage to L digits/hand/wrist x 4 min to stimulate lymphatics to decrease pain,  edema and increase  AROM and functional use.   (NT)   -Performed scar massage to incisions for 3 minutes to decrease adhesions and improve tensile glide. (NT)     Lashell received therapeutic exercises for 10 minutes including:    -gentle PROM thumb IP/MPJ and composite with CMC supported and gentle wrist PROM/stretching   -AAROM with full flex/ext each rep post 5 sec down 2 sec up 2x10 with 2# DB    -wrist PREs with 1#l over wedge 20x ea, 4 ways   -AROM in fluido   -Gentle PROM  and place/hold thumb opposition to DPC x10    Therapeutic activities to improve functional performance for 10 minutes, including:  - Wrist wheel into gentle wrist flexion and ext 2 min each   - isospheres 3 min  - Dexterciser 5 sets  - in hand manipulation with small pom poms  (NT)  -yellow putty squeezes 3 min  (NT)  -functional pinches with yellow CP 20x ea (NT)    Patient Education and Home Exercises      Education provided:   - continue same  -    Written Home Exercises Provided: Patient instructed to cont prior HEP.  Exercises were reviewed and Lashell was able to demonstrate them prior to the end of the session.  Lashell demonstrated good  understanding of the HEP provided. See EMR under Patient Instructions for exercises provided during therapy sessions.       Assessment     Wrist AROM continues to improve. Very small gain with flexion this date. Pt would benefit from continued occupational therapy services to increase A/PROM, strength, and functional use.     Pt would continue to benefit from skilled OT.     - Progress towards goals: Good ;     Lashell is progressing well towards her goals and there are no updates to goals at this time. Pt prognosis is Good.     Pt will continue to benefit from skilled outpatient occupational therapy to address the deficits listed in the problem list on initial evaluation provide pt/family education and to maximize pt's level of independence in the home and community environment.     Pt's spiritual, cultural and educational  needs considered and pt agreeable to plan of care and goals.    Anticipated barriers to occupational therapy: none    Goals:  Short Term Goals: (4 weeks)  1. Pt will be independent with HEP in 2 visits. (Met 11/9/22)  2. Pt will report pain to a 5-6/10 at worst once therapy has been initiated  (met 11/22/22)   3. Pt will increase L wrist flexion AROM by 5-10 degrees to enable dressing, grooming activities. (met 11/22/22)  4. Pt will increase L thumb MANN by 15-20 degrees to enable in hand manipulation. (met 11/22/22)        Long Term Goals: (by discharge)  1. Pt will report decreased pain to 1-2/10 with ADLs.   2. Pt will increase L thumbTAM to 99 degrees to enable independence with self-care and meal preparation.  3. Pt will increase R wrist Flexion  to 55-60 degrees to enable self care. (Met 11/28/22)   4. Pt will exhibit opposition to within 1 cm of DPC to allow manipulation of change and small objects.  (Met 11/28/22)   5. Pt will exhibit L  strength of 60-75% of R hand to allow a firm grasp on cooking utensils, steering wheel, etc.  6. Pt will exhibit 7-9# of L functional pinch strength to allow writing,opening containers, and turning keys.  7. Pt will exhibit a significant increase (30+ points) in the FOTO assessment.  8. Pt will return to PLOF.    PLAN     Certification Period/Plan of care expiration: 11/7/2022 to 1/3/2023.    Continue Occupational Therapy 3 times per week x 4 weeks to decrease pain and edema, and increase A/PROM, strength, and functional use of L upper extremity.     Updates/Grading for next session: progress per protocol      SAILAJA Lombardi/MICAH

## 2022-12-05 ENCOUNTER — CLINICAL SUPPORT (OUTPATIENT)
Dept: REHABILITATION | Facility: HOSPITAL | Age: 57
End: 2022-12-05
Attending: ORTHOPAEDIC SURGERY
Payer: COMMERCIAL

## 2022-12-05 DIAGNOSIS — M79.642 LEFT HAND PAIN: ICD-10-CM

## 2022-12-05 DIAGNOSIS — R29.898 DECREASED GRIP STRENGTH OF LEFT HAND: ICD-10-CM

## 2022-12-05 DIAGNOSIS — M25.642 DECREASED RANGE OF MOTION OF LEFT THUMB: ICD-10-CM

## 2022-12-05 DIAGNOSIS — Z78.9 DECREASED ACTIVITIES OF DAILY LIVING (ADL): Primary | ICD-10-CM

## 2022-12-05 DIAGNOSIS — R29.898 DECREASED PINCH STRENGTH: ICD-10-CM

## 2022-12-05 PROCEDURE — 97022 WHIRLPOOL THERAPY: CPT | Mod: PO

## 2022-12-05 PROCEDURE — 97530 THERAPEUTIC ACTIVITIES: CPT | Mod: PO

## 2022-12-05 NOTE — PROGRESS NOTES
Occupational Therapy Daily Treatment Note      Date: 12/5/2022  Name: Lashell Stroud  Clinic Number: [id number]    Therapy Diagnosis: L hand pain, decreased ROM L thumb, decreased /pinch/ADL  Physician: Stanley Rivero MD     Physician Orders: Please begin therapy to the left hand.  Status post thumb CMC arthroplasty protocol     Frequency:  3 times per week  Duration:  4 weeks       Medical Diagnosis: M18.12 (ICD-10-CM) - Arthritis of carpometacarpal (CMC) joint of left thumb  Evaluation Date: 11/7/2022  Insurance Authorization period Expiration: 12/31/23  Plan of Care Expiration Period: 1/3/23     Visit # / Visits Authorized:  13 / 20  Time In:  1430  Time Out:  1515    Total Billable Time: 40 minutes     Surgical Procedure:   L thumb CMC suspension arthroplasty     Precautions: Standard and brace only for heavy activities     Date of  Surgery: 10/4/22                              S/P: 8 weeks on 11/29/22        SUBJECTIVE     Pt reports:Pt feels like the stiffness has decreased in her thumb.  She was compliant with home exercise program given last session.   Response to previous treatment:Good   Functional change: none allowed     Pain:   Functional Pain Scale Rating 0-10:   0/10 now  0/10 at best  3.5/10 at worst at wrist on occasion  Location: L wrist, over donor incision, and over area of CMC-1     OBJECTIVE       Range of Motion:               Left/ Right   Wrist E/F 74/59 (+4/+4)  70/63     Wrist RD/UD 15/30 (+5/+5)  20/35   Thumb R/P Abd 58/50 (NT/+10) 43/50   Thumb MP flex 0/53 (=) 0/60   Thumb IP flex 0/40 (-3) 0/45   Thumb Opp Intact 0.0 vs 0 cm          Treatment     Lashell received the treatments listed below:     Supervised modalities after being cleared for contradictions for 15 minutes:   - Fluidotherapy 15 min at 115 degrees.    Lashell received the following manual therapy techniques for 0 minutes   -Retrograde massage to L digits/hand/wrist x 4 min to stimulate lymphatics to  decrease pain,  edema and increase AROM and functional use.   (NT)   -Performed scar massage to incisions for 3 minutes to decrease adhesions and improve tensile glide. (NT)     Lashell received therapeutic exercises for 0 minutes including:  Not today for the below therex:  -gentle PROM thumb IP/MPJ and composite with CMC supported and gentle wrist PROM/stretching   -AAROM with full flex/ext each rep post 5 sec down 2 sec up 2x10 with 2# DB    -wrist PREs with 1#l over wedge 20x ea, 4 ways   -AROM in fluido   -Gentle PROM  and place/hold thumb opposition to DPC x10    Therapeutic activities to improve functional performance for 25 minutes, including:  - Wrist wheel into gentle wrist flexion and ext 2 min each   - isospheres 3 min (NT)  - Dexterciser 5 sets  - in hand manipulation with small pom poms  (NT)  -yellow putty squeezes 3 min  (NT)  - Functional pinches with yellow CP 2x15 ea  - Flexbar palm up/palm down, wrist flexion, wrist extension and unilateral pronation 2x10 each with yellow.    Patient Education and Home Exercises      Education provided:   - continue same    Written Home Exercises Provided: Patient instructed to cont prior HEP.  Exercises were reviewed and Lashell was able to demonstrate them prior to the end of the session.  Lashell demonstrated good  understanding of the HEP provided. See EMR under Patient Instructions for exercises provided during therapy sessions.       Assessment     Wrist AROM continues to improve. Very good gain with L thumb opposition now intact to DPC  pre heat. Cont per current poc.     Pt would continue to benefit from skilled OT.     - Progress towards goals: Good ;     Lashell is progressing well towards her goals and there are no updates to goals at this time. Pt prognosis is Good.     Pt will continue to benefit from skilled outpatient occupational therapy to address the deficits listed in the problem list on initial evaluation provide pt/family education and to maximize  pt's level of independence in the home and community environment.     Pt's spiritual, cultural and educational needs considered and pt agreeable to plan of care and goals.    Anticipated barriers to occupational therapy: none    Goals:  Short Term Goals: (4 weeks)  1. Pt will be independent with HEP in 2 visits. (Met 11/9/22)  2. Pt will report pain to a 5-6/10 at worst once therapy has been initiated  (met 11/22/22)   3. Pt will increase L wrist flexion AROM by 5-10 degrees to enable dressing, grooming activities. (met 11/22/22)  4. Pt will increase L thumb MANN by 15-20 degrees to enable in hand manipulation. (met 11/22/22)        Long Term Goals: (by discharge)  1. Pt will report decreased pain to 1-2/10 with ADLs.   2. Pt will increase L thumbTAM to 99 degrees to enable independence with self-care and meal preparation.  3. Pt will increase R wrist Flexion  to 55-60 degrees to enable self care. (Met 11/28/22)   4. Pt will exhibit opposition to within 1 cm of DPC to allow manipulation of change and small objects.  (Met 11/28/22)   5. Pt will exhibit L  strength of 60-75% of R hand to allow a firm grasp on cooking utensils, steering wheel, etc.  6. Pt will exhibit 7-9# of L functional pinch strength to allow writing,opening containers, and turning keys.  7. Pt will exhibit a significant increase (30+ points) in the FOTO assessment.  8. Pt will return to PLOF.    PLAN     Certification Period/Plan of care expiration: 11/7/2022 to 1/3/2023.    Continue Occupational Therapy 3 times per week x 4 weeks to decrease pain and edema, and increase A/PROM, strength, and functional use of L upper extremity.     Updates/Grading for next session: progress per protocol      SAILAJA Lombardi/MICAH

## 2022-12-06 NOTE — PROGRESS NOTES
Occupational Therapy Daily Treatment Note      Date: 12/7/2022  Name: Lashell Stroud  Clinic Number: [id number]    Therapy Diagnosis: L hand pain, decreased ROM L thumb, decreased /pinch/ADL  Physician: Stanley Rivero MD     Physician Orders: Please begin therapy to the left hand.  Status post thumb CMC arthroplasty protocol     Frequency:  3 times per week  Duration:  4 weeks       Medical Diagnosis: M18.12 (ICD-10-CM) - Arthritis of carpometacarpal (CMC) joint of left thumb  Evaluation Date: 11/7/2022  Insurance Authorization period Expiration: 12/31/23  Plan of Care Expiration Period: 1/3/23     Visit # / Visits Authorized:  13 / 20  Time In:  2:45 pm  Time Out:  3:50 pm    Total Billable Time: 65 minutes     Surgical Procedure:   L thumb CMC suspension arthroplasty     Precautions: Standard and brace only for heavy activities     Date of  Surgery: 10/4/22                              S/P: 9 weeks on 12/6/22        SUBJECTIVE     Pt reports:Pt feels like the stiffness has decreased in her thumb.  She was compliant with home exercise program given last session.   Response to previous treatment:Good   Functional change: none allowed     Pain:   Functional Pain Scale Rating 0-10:   0/10 now  0/10 at best  3.5/10 at worst at wrist on occasion  Location: L wrist, over donor incision, and over area of CMC-1     OBJECTIVE       Range of Motion:               Left/ Right   Wrist E/F 74/59 (+4/+4)  70/63     Wrist RD/UD 15/30 (+5/+5)  20/35   Thumb R/P Abd 58/50 (NT/+10) 43/50   Thumb MP flex 0/53 (=) 0/60   Thumb IP flex 0/40 (-3) 0/45   Thumb Opp Intact 0.0 vs 0 cm         and Pinch Strength (in pounds, psi's):   Left Right    II 30 45    III     Lateral 6 15   Tripod 8 13   Tip 6.5 8       Treatment     Lashell received the treatments listed below:     Supervised modalities after being cleared for contradictions for 15 minutes:   - Fluidotherapy 15 min at 115 degrees    Lashell received the  following manual therapy techniques for 3 minutes   -Performed scar massage to incisions for 3 minutes to decrease adhesions and improve tensile glide.      Lashell received therapeutic exercises for 20 minutes including:  -gentle PROM thumb IP/MPJ and composite with CMC supported and gentle wrist PROM/stretching   -AAROM with full flex/ext each rep post 5 sec down 2 sec up 2x10 with 2# DB  (NT)   -wrist PREs with 2# over wedge 20x ea, 4 ways   -AROM in fluido   -Gentle PROM  and place/hold thumb opposition to DPC x10 (NT)     Therapeutic activities to improve functional performance for 27 minutes, including:  - Wrist wheel into gentle wrist flexion and ext 2 min each   - isospheres 3 min (NT)  - Dexterciser 3 min  -yellow putty squeezes 3 min    - Functional pinches with yellow CP 2x15 ea  -opposition pinch yellow CP 20x   - Flexbar palm up/palm down, wrist flexion, wrist extension and unilateral pronation 2x10 each with yellow.  -hand helper with blue and yellow RB (20#) 3/20  -composite thumb flexion in yellow putty 30x     Patient Education and Home Exercises      Education provided:   - continue same    Written Home Exercises Provided: Patient instructed to cont prior HEP.  Exercises were reviewed and Lashell was able to demonstrate them prior to the end of the session.  Lashell demonstrated good  understanding of the HEP provided. See EMR under Patient Instructions for exercises provided during therapy sessions.       Assessment     Baseline  and pinch measures taken. L  is 66% of contralateral R dominant side. All pinches are decreased and range from 40% to 81% of R hand. Wrist PREs increased to 2#, which pt tolerated well. Progressed strengthening to include opposition pinch, rubber band gripper, and composite thumb flexion.      Pt would continue to benefit from skilled OT.     - Progress towards goals: Good ;     Lashell is progressing well towards her goals and there are no updates to goals at this time.  Pt prognosis is Good.     Pt will continue to benefit from skilled outpatient occupational therapy to address the deficits listed in the problem list on initial evaluation provide pt/family education and to maximize pt's level of independence in the home and community environment.     Pt's spiritual, cultural and educational needs considered and pt agreeable to plan of care and goals.    Anticipated barriers to occupational therapy: none    Goals:  Short Term Goals: (4 weeks)  1. Pt will be independent with HEP in 2 visits. (Met 11/9/22)  2. Pt will report pain to a 5-6/10 at worst once therapy has been initiated  (met 11/22/22)   3. Pt will increase L wrist flexion AROM by 5-10 degrees to enable dressing, grooming activities. (met 11/22/22)  4. Pt will increase L thumb MANN by 15-20 degrees to enable in hand manipulation. (met 11/22/22)        Long Term Goals: (by discharge)  1. Pt will report decreased pain to 1-2/10 with ADLs.   2. Pt will increase L thumbTAM to 99 degrees to enable independence with self-care and meal preparation.  3. Pt will increase R wrist Flexion  to 55-60 degrees to enable self care. (Met 11/28/22)   4. Pt will exhibit opposition to within 1 cm of DPC to allow manipulation of change and small objects.  (Met 11/28/22)   5. Pt will exhibit L  strength of 60-75% of R hand to allow a firm grasp on cooking utensils, steering wheel, etc.  6. Pt will exhibit 7-9# of L functional pinch strength to allow writing,opening containers, and turning keys.  7. Pt will exhibit a significant increase (30+ points) in the FOTO assessment.  8. Pt will return to PLOF.    PLAN     Certification Period/Plan of care expiration: 11/7/2022 to 1/3/2023.    Continue Occupational Therapy 3 times per week x 4 weeks to decrease pain and edema, and increase A/PROM, strength, and functional use of L upper extremity.     Updates/Grading for next session: progress per protocol  SAILAJA Brian,  BAHMANT

## 2022-12-07 ENCOUNTER — CLINICAL SUPPORT (OUTPATIENT)
Dept: REHABILITATION | Facility: HOSPITAL | Age: 57
End: 2022-12-07
Attending: ORTHOPAEDIC SURGERY
Payer: COMMERCIAL

## 2022-12-07 DIAGNOSIS — M79.642 LEFT HAND PAIN: ICD-10-CM

## 2022-12-07 DIAGNOSIS — R29.898 DECREASED PINCH STRENGTH: ICD-10-CM

## 2022-12-07 DIAGNOSIS — R29.898 DECREASED GRIP STRENGTH OF LEFT HAND: ICD-10-CM

## 2022-12-07 DIAGNOSIS — Z78.9 DECREASED ACTIVITIES OF DAILY LIVING (ADL): Primary | ICD-10-CM

## 2022-12-07 DIAGNOSIS — M25.642 DECREASED RANGE OF MOTION OF LEFT THUMB: ICD-10-CM

## 2022-12-07 PROCEDURE — 97022 WHIRLPOOL THERAPY: CPT | Mod: PO

## 2022-12-07 PROCEDURE — 97110 THERAPEUTIC EXERCISES: CPT | Mod: PO

## 2022-12-07 PROCEDURE — 97530 THERAPEUTIC ACTIVITIES: CPT | Mod: PO

## 2022-12-08 NOTE — PROGRESS NOTES
Occupational Therapy Daily Treatment Note      Date: 12/12/2022  Name: Lashell Stroud  Clinic Number: [id number]    Therapy Diagnosis: L hand pain, decreased ROM L thumb, decreased /pinch/ADL  Physician: Stanley Rivero MD     Physician Orders: Please begin therapy to the left hand.  Status post thumb CMC arthroplasty protocol     Frequency:  3 times per week  Duration:  4 weeks       Medical Diagnosis: M18.12 (ICD-10-CM) - Arthritis of carpometacarpal (CMC) joint of left thumb  Evaluation Date: 11/7/2022  Insurance Authorization period Expiration: 12/31/23  Plan of Care Expiration Period: 1/3/23     Visit # / Visits Authorized:  14 / 20  Time In:  2:45 pm  Time Out:  3:50 pm    Total Billable Time: 65 minutes     Surgical Procedure:   L thumb CMC suspension arthroplasty     Precautions: Standard and brace only for heavy activities     Date of  Surgery: 10/4/22                              S/P: 9 weeks on 12/6/22        SUBJECTIVE     Pt reports:Pt feels like the stiffness has decreased in her thumb.  She was compliant with home exercise program given last session.   Response to previous treatment:Good   Functional change: none allowed     Pain:   Functional Pain Scale Rating 0-10:   0/10 now  0/10 at best  3.5/10 at worst at wrist on occasion  Location: L wrist, over donor incision, and over area of CMC-1     OBJECTIVE       Range of Motion:               Left/ Right   Wrist E/F 74/59 (+4/+4)  70/63     Wrist RD/UD 15/30 (+5/+5)  20/35   Thumb R/P Abd 58/50 (NT/+10) 43/50   Thumb MP flex 0/53 (=) 0/60   Thumb IP flex 0/40 (-3) 0/45   Thumb Opp Intact 0.0 vs 0 cm         and Pinch Strength (in pounds, psi's):   Left Right    II 30 45    III     Lateral 6 15   Tripod 8 13   Tip 6.5 8       Treatment     Lashell received the treatments listed below:     Supervised modalities after being cleared for contradictions for 15 minutes:   - Fluidotherapy 15 min at 115 degrees    Lashell received the  following manual therapy techniques for 3 minutes   -Performed scar massage to incisions for 3 minutes to decrease adhesions and improve tensile glide.      Lashell received therapeutic exercises for 20 minutes including:  -gentle PROM thumb IP/MPJ and composite with CMC supported and gentle wrist PROM/stretching   -AAROM with full flex/ext each rep post 5 sec down 2 sec up 2x10 with 2# DB  (NT)   -wrist PREs with 2# over wedge 20x ea, 4 ways   -AROM in fluido   -Gentle PROM  and place/hold thumb opposition to DPC x10 (NT)     Therapeutic activities to improve functional performance for 27 minutes, including:  - Wrist wheel into gentle wrist flexion and ext 2 min each   - isospheres 3 min (NT)  - Dexterciser 3 min  -yellow putty squeezes 3 min    - Functional pinches with yellow CP 2x15 ea  -opposition pinch yellow CP 20x   - Flexbar palm up/palm down, wrist flexion, wrist extension and unilateral pronation 2x10 each with yellow.  -hand helper with blue and yellow RB (20#) 3/20  -composite thumb flexion in yellow putty 30x     Patient Education and Home Exercises      Education provided:   - continue same    Written Home Exercises Provided: Patient instructed to cont prior HEP.  Exercises were reviewed and Lashell was able to demonstrate them prior to the end of the session.  Lashell demonstrated good  understanding of the HEP provided. See EMR under Patient Instructions for exercises provided during therapy sessions.       Assessment     Baseline  and pinch measures taken. L  is 66% of contralateral R dominant side. All pinches are decreased and range from 40% to 81% of R hand. Wrist PREs increased to 2#, which pt tolerated well. Progressed strengthening to include opposition pinch, rubber band gripper, and composite thumb flexion.      Pt would continue to benefit from skilled OT.     - Progress towards goals: Good ;     Lashell is progressing well towards her goals and there are no updates to goals at this time.  Pt prognosis is Good.     Pt will continue to benefit from skilled outpatient occupational therapy to address the deficits listed in the problem list on initial evaluation provide pt/family education and to maximize pt's level of independence in the home and community environment.     Pt's spiritual, cultural and educational needs considered and pt agreeable to plan of care and goals.    Anticipated barriers to occupational therapy: none    Goals:  Short Term Goals: (4 weeks)  1. Pt will be independent with HEP in 2 visits. (Met 11/9/22)  2. Pt will report pain to a 5-6/10 at worst once therapy has been initiated  (met 11/22/22)   3. Pt will increase L wrist flexion AROM by 5-10 degrees to enable dressing, grooming activities. (met 11/22/22)  4. Pt will increase L thumb MANN by 15-20 degrees to enable in hand manipulation. (met 11/22/22)        Long Term Goals: (by discharge)  1. Pt will report decreased pain to 1-2/10 with ADLs.   2. Pt will increase L thumbTAM to 99 degrees to enable independence with self-care and meal preparation.  3. Pt will increase R wrist Flexion  to 55-60 degrees to enable self care. (Met 11/28/22)   4. Pt will exhibit opposition to within 1 cm of DPC to allow manipulation of change and small objects.  (Met 11/28/22)   5. Pt will exhibit L  strength of 60-75% of R hand to allow a firm grasp on cooking utensils, steering wheel, etc.  6. Pt will exhibit 7-9# of L functional pinch strength to allow writing,opening containers, and turning keys.  7. Pt will exhibit a significant increase (30+ points) in the FOTO assessment.  8. Pt will return to PLOF.    PLAN     Certification Period/Plan of care expiration: 11/7/2022 to 1/3/2023.    Continue Occupational Therapy 3 times per week x 4 weeks to decrease pain and edema, and increase A/PROM, strength, and functional use of L upper extremity.     Updates/Grading for next session: progress per protocol  SAILAJA Brian,  BAHMANT

## 2022-12-12 ENCOUNTER — CLINICAL SUPPORT (OUTPATIENT)
Dept: REHABILITATION | Facility: HOSPITAL | Age: 57
End: 2022-12-12
Attending: ORTHOPAEDIC SURGERY
Payer: COMMERCIAL

## 2022-12-12 DIAGNOSIS — R29.898 DECREASED PINCH STRENGTH: ICD-10-CM

## 2022-12-12 DIAGNOSIS — M79.642 LEFT HAND PAIN: ICD-10-CM

## 2022-12-12 DIAGNOSIS — Z78.9 DECREASED ACTIVITIES OF DAILY LIVING (ADL): Primary | ICD-10-CM

## 2022-12-12 DIAGNOSIS — M25.642 DECREASED RANGE OF MOTION OF LEFT THUMB: ICD-10-CM

## 2022-12-12 DIAGNOSIS — R29.898 DECREASED GRIP STRENGTH OF LEFT HAND: ICD-10-CM

## 2022-12-12 PROCEDURE — 97530 THERAPEUTIC ACTIVITIES: CPT | Mod: PO

## 2022-12-12 PROCEDURE — 97110 THERAPEUTIC EXERCISES: CPT | Mod: PO

## 2022-12-12 NOTE — PROGRESS NOTES
Occupational Therapy Daily Treatment Note      Date: 12/12/2022  Name: Lashell Stroud  Clinic Number: [id number]    Therapy Diagnosis: L hand pain, decreased ROM L thumb, decreased /pinch/ADL  Physician: Stanley Rivero MD     Physician Orders: Please begin therapy to the left hand.  Status post thumb CMC arthroplasty protocol     Frequency:  3 times per week  Duration:  4 weeks       Medical Diagnosis: M18.12 (ICD-10-CM) - Arthritis of carpometacarpal (CMC) joint of left thumb  Evaluation Date: 11/7/2022  Insurance Authorization period Expiration: 12/31/23  Plan of Care Expiration Period: 1/3/23     Visit # / Visits Authorized:  14 / 20  Time In:  1433  Time Out:  4485    Total Billable Time: 38 minutes     Surgical Procedure:   L thumb CMC suspension arthroplasty     Precautions: Standard and brace only for heavy activities     Date of  Surgery: 10/4/22                              S/P: 9 weeks on 12/6/22        SUBJECTIVE     Pt reports:Pt feels like the stiffness has decreased in her thumb.  She was compliant with home exercise program given last session.   Response to previous treatment:Good   Functional change: none allowed     Pain:   Functional Pain Scale Rating 0-10:   0/10 now  0/10 at best  0/10 at worst at wrist on occasion for the past few days  Location: L wrist, over donor incision, and over area of CMC-1     OBJECTIVE       Range of Motion:               Left/ Right   Wrist E/F 74/59 (+4/+4)  70/63     Wrist RD/UD 15/30 (+5/+5)  20/35   Thumb R/P Abd 58/50 (NT/+10) 43/50   Thumb MP flex 0/53 (=) 0/60   Thumb IP flex 0/40 (-3) 0/45   Thumb Opp Intact 0.0 vs 0 cm         and Pinch Strength (in pounds, psi's):   Left Right    II 30 45    III     Lateral 6 15   Tripod 8 13   Tip 6.5 8       Treatment     Lashell received the treatments listed below:     Supervised modalities after being cleared for contradictions for 4 minutes:   - Moist heat x 4 min pre tx to increase blood  flow/decrease pain.  - Fluidotherapy 15 min at 115 degrees (NT)    Lashell received the following manual therapy techniques for 0 minutes   -Performed scar massage to incisions for 3 minutes to decrease adhesions and improve tensile glide. (NT)     Lashell received therapeutic exercises for 8 minutes including:  -gentle PROM thumb IP/MPJ and composite with CMC supported and gentle wrist PROM/stretching   -AAROM with full ext 10x10 with 2# DB  -wrist PREs with 2# over wedge 20x ea, 4 ways   -AROM in fluido   -Gentle PROM  and place/hold thumb opposition to DPC x10 (NT)     Therapeutic activities to improve functional performance for 30 minutes, including:  - Wrist wheel into gentle wrist flexion and ext 2 min each  (NT)  - isospheres 3 min   - Dexterciser 3 min  - Medium pom poms x1 set and small pom poms x2 sets  - Putty 3x10 (75% yellow, 25% green mix) grasp and lateral pinch ( and added for Home program)  -yellow putty squeezes 3 min (NT)   - Functional pinches with red  yellow CP 3x10 ea  -opposition pinch yellow CP 20 (NT)  - Flexbar palm up/palm down, wrist flexion, wrist extension and unilateral pronation 3x10 each with yellow.  -hand helper with blue, yellow, and red (20#) 3 x 20.  -composite thumb flexion in yellow putty 30x  (NT)    Patient Education and Home Exercises      Education provided:   - continue same. Add putty 1-2 x day 2-3x10 (75% yellow, 25% green mix)    Written Home Exercises Provided: Patient instructed to cont prior HEP.  Exercises were reviewed and Lashell was able to demonstrate them prior to the end of the session.  Lashell demonstrated good  understanding of the HEP provided. See EMR under Patient Instructions for exercises provided during therapy sessions.       Assessment      Pt would continue to benefit from skilled OT. Pt reporting no pain lately and tolerated all tx very well this date. Cont per current poc    - Progress towards goals: Good     Lashell is progressing well towards her goals  and there are no updates to goals at this time. Pt prognosis is Good.     Pt will continue to benefit from skilled outpatient occupational therapy to address the deficits listed in the problem list on initial evaluation provide pt/family education and to maximize pt's level of independence in the home and community environment.     Pt's spiritual, cultural and educational needs considered and pt agreeable to plan of care and goals.    Anticipated barriers to occupational therapy: none    Goals:  Short Term Goals: (4 weeks)  1. Pt will be independent with HEP in 2 visits. (Met 11/9/22)  2. Pt will report pain to a 5-6/10 at worst once therapy has been initiated  (met 11/22/22)   3. Pt will increase L wrist flexion AROM by 5-10 degrees to enable dressing, grooming activities. (met 11/22/22)  4. Pt will increase L thumb MANN by 15-20 degrees to enable in hand manipulation. (met 11/22/22)        Long Term Goals: (by discharge)  1. Pt will report decreased pain to 1-2/10 with ADLs.   2. Pt will increase L thumbTAM to 99 degrees to enable independence with self-care and meal preparation.  3. Pt will increase R wrist Flexion  to 55-60 degrees to enable self care. (Met 11/28/22)   4. Pt will exhibit opposition to within 1 cm of DPC to allow manipulation of change and small objects.  (Met 11/28/22)   5. Pt will exhibit L  strength of 60-75% of R hand to allow a firm grasp on cooking utensils, steering wheel, etc.  6. Pt will exhibit 7-9# of L functional pinch strength to allow writing,opening containers, and turning keys.  7. Pt will exhibit a significant increase (30+ points) in the FOTO assessment.  8. Pt will return to PLOF.    PLAN     Certification Period/Plan of care expiration: 11/7/2022 to 1/3/2023.    Continue Occupational Therapy 3 times per week x 4 weeks to decrease pain and edema, and increase A/PROM, strength, and functional use of L upper extremity.     Updates/Grading for next session: progress per  protocol      SAILAJA Lombardi/MICAH

## 2022-12-13 NOTE — PROGRESS NOTES
Occupational Therapy Daily Treatment Note      Date: 12/14/2022  Name: Lahsell Stroud  Clinic Number: [id number]    Therapy Diagnosis: L hand pain, decreased ROM L thumb, decreased /pinch/ADL  Physician: Stanley Rivero MD     Physician Orders: Please begin therapy to the left hand.  Status post thumb CMC arthroplasty protocol     Frequency:  3 times per week  Duration:  4 weeks       Medical Diagnosis: M18.12 (ICD-10-CM) - Arthritis of carpometacarpal (CMC) joint of left thumb  Evaluation Date: 11/7/2022  Insurance [id number] period Expiration: 12/31/23  Plan of Care Expiration Period: 1/3/23     Visit # / Visits Authorized:  15 / 20  Time In:  1320  Time Out:  1418    Total Billable Time: 53 minutes     Surgical Procedure:   L thumb CMC suspension arthroplasty     Precautions: Standard and brace only for heavy activities     Date of  Surgery: 10/4/22                              S/P: 10 weeks on 12/14/22        SUBJECTIVE     Pt reports: has been using her putty.   She was compliant with home exercise program given last session.   Response to previous treatment:Good   Functional change: none allowed     Pain:   Functional Pain Scale Rating 0-10:   0/10 now  0/10 at best  0/10 at worst at wrist on occasion for the past few days  Location: L wrist, over donor incision, and over area of CMC-1     OBJECTIVE       Range of Motion:               Left/ Right   Wrist E/F 74/59 (+4/+4)  70/63     Wrist RD/UD 15/30 (+5/+5)  20/35   Thumb R/P Abd 58/50 (NT/+10) 43/50   Thumb MP flex 0/53 (=) 0/60   Thumb IP flex 0/40 (-3) 0/45   Thumb Opp Intact 0.0 vs 0 cm         and Pinch Strength (in pounds, psi's):   Left Right    II 30 45    III     Lateral 6 15   Tripod 8 13   Tip 6.5 8       Treatment     Lashell received the treatments listed below:     Supervised modalities after being cleared for contradictions for 5 minutes:   - Moist heat x 5 min pre tx to increase blood flow/decrease pain.  -  Fluidotherapy 15 min at 115 degrees (NT)    Ele received the following manual therapy techniques for 3 minutes   -Performed scar massage to incisions for 3 minutes to decrease adhesions and improve tensile glide.      Lashell received therapeutic exercises for 20  minutes including:  -gentle PROM thumb IP/MPJ and composite with CMC supported and gentle wrist PROM/stretching   -AAROM with full ext 10x10 with 2# DB (NT)   -wrist PREs with 3# over wedge 20x ea, 4 ways   -AROM in fluido (NT)   -Gentle PROM  and place/hold thumb opposition to DPC x10 (NT)     Therapeutic activities to improve functional performance for 30 minutes, including:  - Wrist wheel into gentle wrist flexion and ext 2 min each  (NT)  - isospheres 3 min (NT)   - Dexterciser 3 min (NT)   - Medium pom poms x1 set and small pom poms x2 sets (NT)   -thumbciser 2 RB 30x   -yellow/green  putty squeezes 3 min   - Functional pinches with red  yellow CP 3x10 ea  -opposition pinch yellow CP 20 (NT)  - Flexbar palm up/palm down, wrist flexion, wrist extension and unilateral pronation 3x10 each with yellow.  -hand helper with blue, yellow, and red (20#) 3 x 20. (NT)   -composite thumb flexion in yellow putty 30x    -calibrated gripper 25# 3/20    Patient Education and Home Exercises      Education provided:   - continue same.   Written Home Exercises Provided: Patient instructed to cont prior HEP.  Exercises were reviewed and Lashell was able to demonstrate them prior to the end of the session.  Lashell demonstrated good  understanding of the HEP provided. See EMR under Patient Instructions for exercises provided during therapy sessions.       Assessment      Pt would continue to benefit from skilled OT. Mild adhesions persist at proximal incision. Upgraded strengthening exercises and resistance levels today, which pt tolerated well. Calibrated gripper was challenging for patient.     - Progress towards goals: Good     Lashell is progressing well towards her goals  and there are no updates to goals at this time. Pt prognosis is Good.     Pt will continue to benefit from skilled outpatient occupational therapy to address the deficits listed in the problem list on initial evaluation provide pt/family education and to maximize pt's level of independence in the home and community environment.     Pt's spiritual, cultural and educational needs considered and pt agreeable to plan of care and goals.    Anticipated barriers to occupational therapy: none    Goals:  Short Term Goals: (4 weeks)  1. Pt will be independent with HEP in 2 visits. (Met 11/9/22)  2. Pt will report pain to a 5-6/10 at worst once therapy has been initiated  (met 11/22/22)   3. Pt will increase L wrist flexion AROM by 5-10 degrees to enable dressing, grooming activities. (met 11/22/22)  4. Pt will increase L thumb MANN by 15-20 degrees to enable in hand manipulation. (met 11/22/22)        Long Term Goals: (by discharge)  1. Pt will report decreased pain to 1-2/10 with ADLs.   2. Pt will increase L thumbTAM to 99 degrees to enable independence with self-care and meal preparation.  3. Pt will increase R wrist Flexion  to 55-60 degrees to enable self care. (Met 11/28/22)   4. Pt will exhibit opposition to within 1 cm of DPC to allow manipulation of change and small objects.  (Met 11/28/22)   5. Pt will exhibit L  strength of 60-75% of R hand to allow a firm grasp on cooking utensils, steering wheel, etc.  6. Pt will exhibit 7-9# of L functional pinch strength to allow writing,opening containers, and turning keys.  7. Pt will exhibit a significant increase (30+ points) in the FOTO assessment.  8. Pt will return to PLOF.    PLAN     Certification Period/Plan of care expiration: 11/7/2022 to 1/3/2023.    Continue Occupational Therapy 3 times per week x 4 weeks to decrease pain and edema, and increase A/PROM, strength, and functional use of L upper extremity.     Updates/Grading for next session: progress per  protocol      SAILAJA Brian, BAHMANT

## 2022-12-14 ENCOUNTER — CLINICAL SUPPORT (OUTPATIENT)
Dept: REHABILITATION | Facility: HOSPITAL | Age: 57
End: 2022-12-14
Attending: ORTHOPAEDIC SURGERY
Payer: COMMERCIAL

## 2022-12-14 DIAGNOSIS — Z78.9 DECREASED ACTIVITIES OF DAILY LIVING (ADL): Primary | ICD-10-CM

## 2022-12-14 DIAGNOSIS — R29.898 DECREASED PINCH STRENGTH: ICD-10-CM

## 2022-12-14 DIAGNOSIS — R29.898 DECREASED GRIP STRENGTH OF LEFT HAND: ICD-10-CM

## 2022-12-14 DIAGNOSIS — M25.642 DECREASED RANGE OF MOTION OF LEFT THUMB: ICD-10-CM

## 2022-12-14 DIAGNOSIS — M79.642 LEFT HAND PAIN: ICD-10-CM

## 2022-12-14 PROCEDURE — 97530 THERAPEUTIC ACTIVITIES: CPT | Mod: PO

## 2022-12-14 PROCEDURE — 97110 THERAPEUTIC EXERCISES: CPT | Mod: PO

## 2022-12-16 ENCOUNTER — CLINICAL SUPPORT (OUTPATIENT)
Dept: REHABILITATION | Facility: HOSPITAL | Age: 57
End: 2022-12-16
Attending: ORTHOPAEDIC SURGERY
Payer: COMMERCIAL

## 2022-12-16 DIAGNOSIS — M79.642 LEFT HAND PAIN: ICD-10-CM

## 2022-12-16 DIAGNOSIS — Z78.9 DECREASED ACTIVITIES OF DAILY LIVING (ADL): Primary | ICD-10-CM

## 2022-12-16 DIAGNOSIS — R29.898 DECREASED PINCH STRENGTH: ICD-10-CM

## 2022-12-16 DIAGNOSIS — M25.642 DECREASED RANGE OF MOTION OF LEFT THUMB: ICD-10-CM

## 2022-12-16 DIAGNOSIS — R29.898 DECREASED GRIP STRENGTH OF LEFT HAND: ICD-10-CM

## 2022-12-16 PROCEDURE — 97110 THERAPEUTIC EXERCISES: CPT | Mod: PO

## 2022-12-16 PROCEDURE — 97530 THERAPEUTIC ACTIVITIES: CPT | Mod: PO

## 2022-12-16 NOTE — PROGRESS NOTES
Occupational Therapy Daily Treatment Note      Date: 12/16/2022  Name: Lashell Stroud  Clinic Number: [id number]    Therapy Diagnosis: L hand pain, decreased ROM L thumb, decreased /pinch/ADL  Physician: Stanley Rivero MD     Physician Orders: Please begin therapy to the left hand.  Status post thumb CMC arthroplasty protocol     Frequency:  3 times per week  Duration:  4 weeks       Medical Diagnosis: M18.12 (ICD-10-CM) - Arthritis of carpometacarpal (CMC) joint of left thumb  Evaluation Date: 11/7/2022  Insurance [id number] period Expiration: 12/31/23  Plan of Care Expiration Period: 1/3/23     Visit # / Visits Authorized:   16 / 20  Time In: 1455  Time Out:  1540    Total Billable Time: 40 minutes     Surgical Procedure:   L thumb CMC suspension arthroplasty     Precautions: Standard and brace only for heavy activities     Date of  Surgery: 10/4/22                              S/P: 10 weeks on 12/14/22        SUBJECTIVE     Pt reports: Hand/thumb/wrist do not hurt, but her hand still looks swollen since sx..   She was compliant with home exercise program given last session.   Response to previous treatment:Good   Functional change: none allowed     Pain:   Functional Pain Scale Rating 0-10:   0/10 now  0/10 at best  0/10 at worst at wrist on occasion for the past few days  Location: L wrist, over donor incision, and over area of CMC-1     OBJECTIVE       Range of Motion:               Left/ Right   Wrist E/F 74/65 (+0/6) on 12/16/2022  70/63     Wrist RD/UD 15/30 (+5/+5)  20/35   Thumb R/P Abd 58/50 (NT/+10) 43/50   Thumb MP flex 0/53 (=) 0/60   Thumb IP flex 0/40 (-3) 0/45   Thumb Opp Intact 0.0 vs 0 cm         and Pinch Strength (in pounds, psi's): (Not tested today)   Left Right    II 30 45    III     Lateral 6 15   Tripod 8 13   Tip 6.5 8       Treatment     Lashell received the treatments listed below:     Supervised modalities after being cleared for contradictions for 4  minutes:   - Moist heat x 4 min pre tx to increase blood flow/decrease pain.  - Fluidotherapy 15 min at 115 degrees (NT)    Elesa received the following manual therapy techniques for 0 minutes (NT)  -Performed scar massage to incisions for  3 minutes to decrease adhesions and improve tensile glide.      Elesa received therapeutic exercises for 15  minutes including:  -gentle PROM thumb IP/MPJ and composite with CMC supported and gentle wrist PROM/stretching   -AAROM with full flexion [id number] with 2# DB   -wrist PREs with 3# over wedge 20x ea, 4 ways   -AROM in fluido (NT)   -Gentle PROM  and place/hold thumb opposition to DPC x10 (NT)     Therapeutic activities to improve functional performance for 25 minutes, including:  - Wrist wheel into gentle wrist flexion and ext 2 min each  (NT)  - isospheres 3 min (NT)   - Dexterciser 3 min (NT)   - Medium pom poms x1 set and small pom poms x2 sets (NT)   -thumbciser 2 RB 30x   -yellow/green  putty squeezes 3 min   - Functional pinches with red CP 3x10 ea  -opposition pinch yellow CP 20 (NT)  - Flexbar palm up/palm down, wrist flexion, wrist extension and unilateral pronation 3x10 each with yellow.  -hand helper with blue, yellow, and red (20#) 3 x 20. (NT)   -composite thumb flexion in yellow putty 30x    -calibrated gripper level 1 red spring 5x10  - In hand manipulation with corn kernels 2x20    Patient Education and Home Exercises      Education provided:   - continue same. Benefits of isotoner glove use for continued edema control.    Written Home Exercises Provided: Patient instructed to cont prior HEP.  Exercises were reviewed and Lashell was able to demonstrate them prior to the end of the session.  Lashell demonstrated good  understanding of the HEP provided. See EMR under Patient Instructions for exercises provided during therapy sessions.       Assessment      Pt tolerated progression with Tx well this date. Cont per current POC.    - Progress towards goals: Good      Lashell is progressing well towards her goals and there are no updates to goals at this time. Pt prognosis is Good.     Pt will continue to benefit from skilled outpatient occupational therapy to address the deficits listed in the problem list on initial evaluation provide pt/family education and to maximize pt's level of independence in the home and community environment.     Pt's spiritual, cultural and educational needs considered and pt agreeable to plan of care and goals.    Anticipated barriers to occupational therapy: none    Goals:  Short Term Goals: (4 weeks)  1. Pt will be independent with HEP in 2 visits. (Met 11/9/22)  2. Pt will report pain to a 5-6/10 at worst once therapy has been initiated  (met 11/22/22)   3. Pt will increase L wrist flexion AROM by 5-10 degrees to enable dressing, grooming activities. (met 11/22/22)  4. Pt will increase L thumb MANN by 15-20 degrees to enable in hand manipulation. (met 11/22/22)        Long Term Goals: (by discharge)  1. Pt will report decreased pain to 1-2/10 with ADLs.   2. Pt will increase L thumbTAM to 99 degrees to enable independence with self-care and meal preparation.  3. Pt will increase R wrist Flexion  to 55-60 degrees to enable self care. (Met 11/28/22)   4. Pt will exhibit opposition to within 1 cm of DPC to allow manipulation of change and small objects.  (Met 11/28/22)   5. Pt will exhibit L  strength of 60-75% of R hand to allow a firm grasp on cooking utensils, steering wheel, etc.  6. Pt will exhibit 7-9# of L functional pinch strength to allow writing,opening containers, and turning keys.  7. Pt will exhibit a significant increase (30+ points) in the FOTO assessment.  8. Pt will return to PLOF.    PLAN     Certification Period/Plan of care expiration: 11/7/2022 to 1/3/2023.    Continue Occupational Therapy 3 times per week x 4 weeks to decrease pain and edema, and increase A/PROM, strength, and functional use of L upper extremity.      Updates/Grading for next session: progress per protocol      SAILAJA Lombardi/MICAH

## 2022-12-19 ENCOUNTER — PATIENT MESSAGE (OUTPATIENT)
Dept: RHEUMATOLOGY | Facility: CLINIC | Age: 57
End: 2022-12-19
Payer: COMMERCIAL

## 2022-12-19 ENCOUNTER — CLINICAL SUPPORT (OUTPATIENT)
Dept: REHABILITATION | Facility: HOSPITAL | Age: 57
End: 2022-12-19
Attending: ORTHOPAEDIC SURGERY
Payer: COMMERCIAL

## 2022-12-19 DIAGNOSIS — R29.898 DECREASED PINCH STRENGTH: ICD-10-CM

## 2022-12-19 DIAGNOSIS — Z78.9 DECREASED ACTIVITIES OF DAILY LIVING (ADL): Primary | ICD-10-CM

## 2022-12-19 DIAGNOSIS — M25.642 DECREASED RANGE OF MOTION OF LEFT THUMB: ICD-10-CM

## 2022-12-19 DIAGNOSIS — R29.898 DECREASED GRIP STRENGTH OF LEFT HAND: ICD-10-CM

## 2022-12-19 DIAGNOSIS — M79.642 LEFT HAND PAIN: ICD-10-CM

## 2022-12-19 PROCEDURE — 97530 THERAPEUTIC ACTIVITIES: CPT | Mod: PO

## 2022-12-19 PROCEDURE — 97022 WHIRLPOOL THERAPY: CPT | Mod: PO

## 2022-12-19 PROCEDURE — 97110 THERAPEUTIC EXERCISES: CPT | Mod: PO

## 2022-12-19 NOTE — PROGRESS NOTES
Occupational Therapy Daily Treatment Note      Date: 12/19/2022  Name: Lashell Stroud  Clinic Number: [id number]    Therapy Diagnosis: L hand pain, decreased ROM L thumb, decreased /pinch/ADL  Physician: Stanley Rivero MD     Physician Orders: Please begin therapy to the left hand.  Status post thumb CMC arthroplasty protocol     Frequency:  3 times per week  Duration:  4 weeks       Medical Diagnosis: M18.12 (ICD-10-CM) - Arthritis of carpometacarpal (CMC) joint of left thumb  Evaluation Date: 11/7/2022  Insurance [id number] period Expiration: 12/31/23  Plan of Care Expiration Period: 1/3/23     Visit # / Visits Authorized:   17 / 20  Time In: 2:55 pm  Time Out:  3:50 pm  Total Billable Time: 55 minutes     Surgical Procedure:   L thumb CMC suspension arthroplasty     Precautions: Standard and brace only for heavy activities     Date of  Surgery: 10/4/22                              S/P: 10 weeks on 12/14/22        SUBJECTIVE     Pt reports: she did something to her hand while sleeping on Saturday night and was very sore Sunday. It's better today but is tender.   She was compliant with home exercise program given last session.   Response to previous treatment:Good   Functional change: carrying grocery bags    Pain:   Functional Pain Scale Rating 0-10:   0/10 now  0/10 at best  0/10 at worst at wrist on occasion for the past few days  Location: L wrist, over donor incision, and over area of CMC-1     OBJECTIVE       Range of Motion:               Left/ Right   Wrist E/F 74/65 (+0/6) on 12/16/2022  70/63     Wrist RD/UD 15/30 (+5/+5)  20/35   Thumb R/P Abd 58/50 (NT/+10) 43/50   Thumb MP flex 0/53 (=) 0/60   Thumb IP flex 0/40 (-3) 0/45   Thumb Opp Intact 0.0 vs 0 cm         and Pinch Strength (in pounds, psi's): (Not tested today)   Left Right    II 30 45    III     Lateral 6 15   Tripod 8 13   Tip 6.5 8       Treatment     Lashell received the treatments listed below:     Supervised  modalities after being cleared for contradictions for 15 minutes:   - Moist heat x 4 min pre tx to increase blood flow/decrease pain. (NT)  - Fluidotherapy 15 min at 115 degrees     Elesa received the following manual therapy techniques for  5 minutes    -Performed scar massage to incisions for  2 minutes to decrease adhesions and improve tensile glide.   Retrograde massage to L digits/hand/wrist x 3 min to stimulate lymphatics to decrease pain,  edema and increase AROM and functional use.        Elesa received therapeutic exercises for 15  minutes including:  -gentle passive stretching thumb IP/MPJ and composite with CMC supported and gentle wrist PROM/stretching   -AAROM with full flexion [id number] with 2# DB   -wrist PREs with 3# over wedge 20x ea, 4 ways   -AROM in fluido    -Gentle PROM  and place/hold thumb opposition to DPC x10 (NT)     Therapeutic activities to improve functional performance for 20 minutes, including:  - Wrist wheel into gentle wrist flexion and ext 2 min each  (NT)  - isospheres 3 min (NT)   - Dexterciser 3 min (NT)   - Medium pom poms x1 set and small pom poms x2 sets (NT)   -thumbciser 2 RB 30x   -yellow/green  putty squeezes 3 min   - Functional pinches with red CP 20x  ea  -opposition pinch yellow CP 20x  - Flexbar palm up/palm down, wrist flexion, wrist extension and unilateral pronation 3x10 each with yellow.  -hand helper with blue, yellow, and red (20#) 3 x 20. (NT)   -composite thumb flexion in yellow putty 30x    -calibrated gripper level 2 black spring 3x20  - In hand manipulation with corn kernels 2x20 (NT)   -lock and key dis/assembly    Patient Education and Home Exercises      Education provided:   - continue same    Written Home Exercises Provided: Patient instructed to cont prior HEP.  Exercises were reviewed and Lashell was able to demonstrate them prior to the end of the session.  Elesa demonstrated good  understanding of the HEP provided. See EMR under Patient Instructions  for exercises provided during therapy sessions.       Assessment     Tenderness present in area of 1st DI this date. Not TTP along 1st metacarpal or over surgical site. Pt tolerated strengthening levels prior to increased soreness without complaint.     - Progress towards goals: Dez Lobo is progressing well towards her goals and there are no updates to goals at this time. Pt prognosis is Good.     Pt will continue to benefit from skilled outpatient occupational therapy to address the deficits listed in the problem list on initial evaluation provide pt/family education and to maximize pt's level of independence in the home and community environment.     Pt's spiritual, cultural and educational needs considered and pt agreeable to plan of care and goals.    Anticipated barriers to occupational therapy: none    Goals:  Short Term Goals: (4 weeks)  1. Pt will be independent with HEP in 2 visits. (Met 11/9/22)  2. Pt will report pain to a 5-6/10 at worst once therapy has been initiated  (met 11/22/22)   3. Pt will increase L wrist flexion AROM by 5-10 degrees to enable dressing, grooming activities. (met 11/22/22)  4. Pt will increase L thumb MANN by 15-20 degrees to enable in hand manipulation. (met 11/22/22)        Long Term Goals: (by discharge)  1. Pt will report decreased pain to 1-2/10 with ADLs.   2. Pt will increase L thumbTAM to 99 degrees to enable independence with self-care and meal preparation.  3. Pt will increase R wrist Flexion  to 55-60 degrees to enable self care. (Met 11/28/22)   4. Pt will exhibit opposition to within 1 cm of DPC to allow manipulation of change and small objects.  (Met 11/28/22)   5. Pt will exhibit L  strength of 60-75% of R hand to allow a firm grasp on cooking utensils, steering wheel, etc.  6. Pt will exhibit 7-9# of L functional pinch strength to allow writing,opening containers, and turning keys.  7. Pt will exhibit a significant increase (30+ points) in the FOTO  assessment.  8. Pt will return to PLOF.    PLAN     Certification Period/Plan of care expiration: 11/7/2022 to 1/3/2023.    Continue Occupational Therapy 3 times per week x 4 weeks to decrease pain and edema, and increase A/PROM, strength, and functional use of L upper extremity.     Updates/Grading for next session: progress per protocol      SAILAJA Brian, BAHMANT

## 2022-12-20 ENCOUNTER — PATIENT MESSAGE (OUTPATIENT)
Dept: RHEUMATOLOGY | Facility: CLINIC | Age: 57
End: 2022-12-20
Payer: COMMERCIAL

## 2022-12-20 NOTE — PROGRESS NOTES
Occupational Therapy Daily Treatment Note      Date: 12/21/2022  Name: Lashell Stroud  Clinic Number: [id number]    Therapy Diagnosis: L hand pain, decreased ROM L thumb, decreased /pinch/ADL  Physician: Stanley Rivero MD     Physician Orders: Please begin therapy to the left hand.  Status post thumb CMC arthroplasty protocol     Frequency:  3 times per week  Duration:  4 weeks       Medical Diagnosis: M18.12 (ICD-10-CM) - Arthritis of carpometacarpal (CMC) joint of left thumb  Evaluation Date: 11/7/2022  Insurance [id number] period Expiration: 12/31/23  Plan of Care Expiration Period: 1/3/23     Visit # / Visits Authorized:   18 / 20  Time In: 2:05 pm  Time Out:  3:00 pm  Total Billable Time: 55 minutes     Surgical Procedure:   L thumb CMC suspension arthroplasty     Precautions: Standard and brace only for heavy activities     Date of  Surgery: 10/4/22                              S/P: 11 weeks on 12/21/22        SUBJECTIVE     Pt reports: she had pain today at area of surgery when holding laundry in L hand.   She was compliant with home exercise program given last session.   Response to previous treatment:Good   Functional change: carrying grocery bags    Pain:   Functional Pain Scale Rating 0-10:   0/10 now  0/10 at best  0/10 at worst at wrist on occasion for the past few days  Location: L wrist, over donor incision, and over area of CMC-1     OBJECTIVE       Range of Motion:               Left/ Right   Wrist E/F 74/65 (+0/6) on 12/16/2022  70/63     Wrist RD/UD 15/30 (+5/+5)  20/35   Thumb R/P Abd 58/50 (NT/+10) 43/50   Thumb MP flex 0/53 (=) 0/60   Thumb IP flex 0/40 (-3) 0/45   Thumb Opp Intact 0.0 vs 0 cm         and Pinch Strength (in pounds, psi's): (Not tested today)   Left Right    II 30 45    III     Lateral 6 15   Tripod 8 13   Tip 6.5 8       Treatment     Lashell received the treatments listed below:     Supervised modalities after being cleared for contradictions for  15 minutes:   - Moist heat x 4 min pre tx to increase blood flow/decrease pain. (NT)  - Fluidotherapy 15 min at 115 degrees     Elesa received the following manual therapy techniques for  5 minutes    -Performed scar massage to incisions for  2 minutes to decrease adhesions and improve tensile glide.   Retrograde massage to L digits/hand/wrist x 3 min to stimulate lymphatics to decrease pain,  edema and increase AROM and functional use.        Ele received therapeutic exercises for 15  minutes including:  -gentle passive stretching thumb IP/MPJ and composite with CMC supported and gentle wrist PROM/stretching   -AAROM with full flexion [id number] with 2# DB   -wrist PREs with 3# over wedge 20x ea, 4 ways   -AROM in fluido    -Gentle PROM  and place/hold thumb opposition to DPC x10 (NT)     Therapeutic activities to improve functional performance for 20 minutes, including:  -thumbciser 2 RB 30x   -yellow/green  putty squeezes 3 min (NT)  - Functional pinches with red CP 20x  ea  -opposition pinch yellow CP 20x  - Flexbar palm up/palm down, wrist flexion, wrist extension and unilateral pronation 3x10 each with red   -hand helper with blue, yellow, and red (20#) 3 x 20. (NT)   -composite thumb flexion in yellow putty 30x    -calibrated gripper level 2 black spring 3x20  - In hand manipulation with corn kernels 2x20 (NT)     Patient Education and Home Exercises      Education provided:   -reviewed thenar and wrist strengthening for pt to perform at home     Written Home Exercises Provided: Patient instructed to cont prior HEP.  Exercises were reviewed and Louise was able to demonstrate them prior to the end of the session.  Elesa demonstrated good  understanding of the HEP provided. See EMR under Patient Instructions for exercises provided during therapy sessions.       Assessment     Pt reporting some pain when balancing laundry on her L hand, but is doing well otherwise. Pt released for RTW next week. Preparing for  discharge over next 1-2 visits.       - Progress towards goals: Good     Lashell is progressing well towards her goals and there are no updates to goals at this time. Pt prognosis is Good.     Pt will continue to benefit from skilled outpatient occupational therapy to address the deficits listed in the problem list on initial evaluation provide pt/family education and to maximize pt's level of independence in the home and community environment.     Pt's spiritual, cultural and educational needs considered and pt agreeable to plan of care and goals.    Anticipated barriers to occupational therapy: none    Goals:  Short Term Goals: (4 weeks)  1. Pt will be independent with HEP in 2 visits. (Met 11/9/22)  2. Pt will report pain to a 5-6/10 at worst once therapy has been initiated  (met 11/22/22)   3. Pt will increase L wrist flexion AROM by 5-10 degrees to enable dressing, grooming activities. (met 11/22/22)  4. Pt will increase L thumb MANN by 15-20 degrees to enable in hand manipulation. (met 11/22/22)        Long Term Goals: (by discharge)  1. Pt will report decreased pain to 1-2/10 with ADLs.   2. Pt will increase L thumbTAM to 99 degrees to enable independence with self-care and meal preparation.  3. Pt will increase R wrist Flexion  to 55-60 degrees to enable self care. (Met 11/28/22)   4. Pt will exhibit opposition to within 1 cm of DPC to allow manipulation of change and small objects.  (Met 11/28/22)   5. Pt will exhibit L  strength of 60-75% of R hand to allow a firm grasp on cooking utensils, steering wheel, etc.  6. Pt will exhibit 7-9# of L functional pinch strength to allow writing,opening containers, and turning keys.  7. Pt will exhibit a significant increase (30+ points) in the FOTO assessment.  8. Pt will return to PLOF.    PLAN     Certification Period/Plan of care expiration: 11/7/2022 to 1/3/2023.    Continue Occupational Therapy 3 times per week x 4 weeks to decrease pain and edema, and increase  A/PROM, strength, and functional use of L upper extremity.     Updates/Grading for next session: progress per protocol      SAILAJA Brian, BAHMANT

## 2022-12-21 ENCOUNTER — CLINICAL SUPPORT (OUTPATIENT)
Dept: REHABILITATION | Facility: HOSPITAL | Age: 57
End: 2022-12-21
Attending: ORTHOPAEDIC SURGERY
Payer: COMMERCIAL

## 2022-12-21 DIAGNOSIS — R29.898 DECREASED GRIP STRENGTH OF LEFT HAND: ICD-10-CM

## 2022-12-21 DIAGNOSIS — Z78.9 DECREASED ACTIVITIES OF DAILY LIVING (ADL): Primary | ICD-10-CM

## 2022-12-21 DIAGNOSIS — M79.642 LEFT HAND PAIN: ICD-10-CM

## 2022-12-21 DIAGNOSIS — M25.642 DECREASED RANGE OF MOTION OF LEFT THUMB: ICD-10-CM

## 2022-12-21 DIAGNOSIS — R29.898 DECREASED PINCH STRENGTH: ICD-10-CM

## 2022-12-21 PROCEDURE — 97110 THERAPEUTIC EXERCISES: CPT | Mod: PO

## 2022-12-21 PROCEDURE — 97530 THERAPEUTIC ACTIVITIES: CPT | Mod: PO

## 2022-12-21 PROCEDURE — 97022 WHIRLPOOL THERAPY: CPT | Mod: PO

## 2022-12-22 DIAGNOSIS — M79.642 LEFT HAND PAIN: Primary | ICD-10-CM

## 2023-01-04 ENCOUNTER — OFFICE VISIT (OUTPATIENT)
Dept: ORTHOPEDICS | Facility: CLINIC | Age: 58
End: 2023-01-04
Payer: COMMERCIAL

## 2023-01-04 ENCOUNTER — HOSPITAL ENCOUNTER (OUTPATIENT)
Dept: RADIOLOGY | Facility: HOSPITAL | Age: 58
Discharge: HOME | End: 2023-01-04
Attending: ORTHOPAEDIC SURGERY
Payer: COMMERCIAL

## 2023-01-04 DIAGNOSIS — M79.642 LEFT HAND PAIN: ICD-10-CM

## 2023-01-04 DIAGNOSIS — M18.0 PRIMARY ARTHROSIS OF FIRST CARPOMETACARPAL JOINTS, BILATERAL: Primary | ICD-10-CM

## 2023-01-04 PROCEDURE — 99213 PR OFFICE/OUTPT VISIT, EST, LEVL III, 20-29 MIN: ICD-10-PCS | Mod: 25,S$GLB,, | Performed by: ORTHOPAEDIC SURGERY

## 2023-01-04 PROCEDURE — 73130 XR HAND COMPLETE 3 VIEW LEFT: ICD-10-PCS | Mod: 26,LT,, | Performed by: RADIOLOGY

## 2023-01-04 PROCEDURE — 99999 PR PBB SHADOW E&M-EST. PATIENT-LVL II: CPT | Mod: PBBFAC,,, | Performed by: ORTHOPAEDIC SURGERY

## 2023-01-04 PROCEDURE — 20600 SMALL JOINT ASPIRATION/INJECTION: R THUMB CMC: ICD-10-PCS | Mod: RT,S$GLB,, | Performed by: ORTHOPAEDIC SURGERY

## 2023-01-04 PROCEDURE — 99999 PR PBB SHADOW E&M-EST. PATIENT-LVL II: ICD-10-PCS | Mod: PBBFAC,,, | Performed by: ORTHOPAEDIC SURGERY

## 2023-01-04 PROCEDURE — 1159F PR MEDICATION LIST DOCUMENTED IN MEDICAL RECORD: ICD-10-PCS | Mod: CPTII,S$GLB,, | Performed by: ORTHOPAEDIC SURGERY

## 2023-01-04 PROCEDURE — 73130 X-RAY EXAM OF HAND: CPT | Mod: TC,PO,LT

## 2023-01-04 PROCEDURE — 1159F MED LIST DOCD IN RCRD: CPT | Mod: CPTII,S$GLB,, | Performed by: ORTHOPAEDIC SURGERY

## 2023-01-04 PROCEDURE — 99213 OFFICE O/P EST LOW 20 MIN: CPT | Mod: 25,S$GLB,, | Performed by: ORTHOPAEDIC SURGERY

## 2023-01-04 PROCEDURE — 20600 DRAIN/INJ JOINT/BURSA W/O US: CPT | Mod: RT,S$GLB,, | Performed by: ORTHOPAEDIC SURGERY

## 2023-01-04 PROCEDURE — 73130 X-RAY EXAM OF HAND: CPT | Mod: 26,LT,, | Performed by: RADIOLOGY

## 2023-01-04 RX ORDER — TRIAMCINOLONE ACETONIDE 40 MG/ML
40 INJECTION, SUSPENSION INTRA-ARTICULAR; INTRAMUSCULAR
Status: DISCONTINUED | OUTPATIENT
Start: 2023-01-04 | End: 2023-01-04 | Stop reason: HOSPADM

## 2023-01-04 RX ADMIN — TRIAMCINOLONE ACETONIDE 40 MG: 40 INJECTION, SUSPENSION INTRA-ARTICULAR; INTRAMUSCULAR at 01:01

## 2023-01-04 NOTE — PROCEDURES
Small Joint Aspiration/Injection: R thumb CMC    Date/Time: 1/4/2023 1:40 PM  Performed by: Stanley Rivero MD  Authorized by: Stanley Rivero MD     Consent Done?:  Yes (Verbal)  Indications:  Pain  Site marked: the procedure site was marked    Timeout: prior to procedure the correct patient, procedure, and site was verified    Prep: patient was prepped and draped in usual sterile fashion      Local anesthesia used?: No    Location:  Thumb  Site:  R thumb CMC (Right thumb CMC joint)  Ultrasonic guidance for needle placement?: No    Medications:  40 mg triamcinolone acetonide 40 mg/mL  Patient tolerance:  Patient tolerated the procedure well with no immediate complications

## 2023-01-04 NOTE — PROGRESS NOTES
Ms. Stroud returns to clinic today.  She has a history of left thumb CMC arthroplasty.  She is now approximately 3 months postop.  States that her left hand and thumb are doing well.  She continues to have complaints of pain at the base of the right thumb.      Physical exam: Examination left hand and wrist reveals that the incision is well healed.  There is no edema.  This suspension remains well maintained.  She is able to flex to the distal palmar crease and extend with the thumb.  She is neurovascularly intact.      Examination the right hand and wrist reveals that there is prominence of the CMC joint.  Palpation over the joint does produce tenderness.  She has a positive grind test.  She is neurovascularly intact.      Assessment: Status post left thumb CMC arthroplasty, right thumb CMC arthritis     Plan:    1.  After informed consent was obtained and injection was placed to the right thumb CMC joint     2. Patient can begin to increase activity as tolerated with the left hand    3.  Follow-up with me on a p.r.n. basis

## 2023-02-20 ENCOUNTER — PATIENT MESSAGE (OUTPATIENT)
Dept: ORTHOPEDICS | Facility: CLINIC | Age: 58
End: 2023-02-20
Payer: COMMERCIAL

## 2023-03-10 ENCOUNTER — OFFICE VISIT (OUTPATIENT)
Dept: RHEUMATOLOGY | Facility: CLINIC | Age: 58
End: 2023-03-10
Payer: COMMERCIAL

## 2023-03-10 VITALS
DIASTOLIC BLOOD PRESSURE: 76 MMHG | HEART RATE: 64 BPM | HEIGHT: 64 IN | BODY MASS INDEX: 27.1 KG/M2 | WEIGHT: 158.75 LBS | SYSTOLIC BLOOD PRESSURE: 121 MMHG

## 2023-03-10 DIAGNOSIS — G89.4 CHRONIC PAIN SYNDROME: ICD-10-CM

## 2023-03-10 DIAGNOSIS — M54.50 PAIN IN LEFT LUMBAR REGION OF BACK: ICD-10-CM

## 2023-03-10 DIAGNOSIS — M62.830 SPASM OF BACK MUSCLES: ICD-10-CM

## 2023-03-10 DIAGNOSIS — M19.041 OSTEOARTHRITIS OF BOTH HANDS, UNSPECIFIED OSTEOARTHRITIS TYPE: ICD-10-CM

## 2023-03-10 DIAGNOSIS — M35.3 PMR (POLYMYALGIA RHEUMATICA): Primary | ICD-10-CM

## 2023-03-10 DIAGNOSIS — Z98.1 HISTORY OF LUMBAR FUSION: ICD-10-CM

## 2023-03-10 DIAGNOSIS — Z79.899 LONG-TERM USE OF PLAQUENIL: ICD-10-CM

## 2023-03-10 DIAGNOSIS — M62.89 PSOAS SYNDROME: ICD-10-CM

## 2023-03-10 DIAGNOSIS — E03.9 HYPOTHYROIDISM, UNSPECIFIED TYPE: ICD-10-CM

## 2023-03-10 DIAGNOSIS — M19.90 INFLAMMATORY ARTHRITIS: ICD-10-CM

## 2023-03-10 DIAGNOSIS — M19.042 OSTEOARTHRITIS OF BOTH HANDS, UNSPECIFIED OSTEOARTHRITIS TYPE: ICD-10-CM

## 2023-03-10 DIAGNOSIS — M54.50 LUMBAR PAIN: ICD-10-CM

## 2023-03-10 DIAGNOSIS — I10 ESSENTIAL HYPERTENSION: ICD-10-CM

## 2023-03-10 DIAGNOSIS — D83.9 CVID (COMMON VARIABLE IMMUNODEFICIENCY): ICD-10-CM

## 2023-03-10 PROCEDURE — 1159F PR MEDICATION LIST DOCUMENTED IN MEDICAL RECORD: ICD-10-PCS | Mod: CPTII,S$GLB,, | Performed by: INTERNAL MEDICINE

## 2023-03-10 PROCEDURE — 3074F SYST BP LT 130 MM HG: CPT | Mod: CPTII,S$GLB,, | Performed by: INTERNAL MEDICINE

## 2023-03-10 PROCEDURE — 1159F MED LIST DOCD IN RCRD: CPT | Mod: CPTII,S$GLB,, | Performed by: INTERNAL MEDICINE

## 2023-03-10 PROCEDURE — 3008F BODY MASS INDEX DOCD: CPT | Mod: CPTII,S$GLB,, | Performed by: INTERNAL MEDICINE

## 2023-03-10 PROCEDURE — 99999 PR PBB SHADOW E&M-EST. PATIENT-LVL III: ICD-10-PCS | Mod: PBBFAC,,, | Performed by: INTERNAL MEDICINE

## 2023-03-10 PROCEDURE — 3008F PR BODY MASS INDEX (BMI) DOCUMENTED: ICD-10-PCS | Mod: CPTII,S$GLB,, | Performed by: INTERNAL MEDICINE

## 2023-03-10 PROCEDURE — 99214 PR OFFICE/OUTPT VISIT, EST, LEVL IV, 30-39 MIN: ICD-10-PCS | Mod: S$GLB,,, | Performed by: INTERNAL MEDICINE

## 2023-03-10 PROCEDURE — 99999 PR PBB SHADOW E&M-EST. PATIENT-LVL III: CPT | Mod: PBBFAC,,, | Performed by: INTERNAL MEDICINE

## 2023-03-10 PROCEDURE — 1160F RVW MEDS BY RX/DR IN RCRD: CPT | Mod: CPTII,S$GLB,, | Performed by: INTERNAL MEDICINE

## 2023-03-10 PROCEDURE — 3078F PR MOST RECENT DIASTOLIC BLOOD PRESSURE < 80 MM HG: ICD-10-PCS | Mod: CPTII,S$GLB,, | Performed by: INTERNAL MEDICINE

## 2023-03-10 PROCEDURE — 3074F PR MOST RECENT SYSTOLIC BLOOD PRESSURE < 130 MM HG: ICD-10-PCS | Mod: CPTII,S$GLB,, | Performed by: INTERNAL MEDICINE

## 2023-03-10 PROCEDURE — 1160F PR REVIEW ALL MEDS BY PRESCRIBER/CLIN PHARMACIST DOCUMENTED: ICD-10-PCS | Mod: CPTII,S$GLB,, | Performed by: INTERNAL MEDICINE

## 2023-03-10 PROCEDURE — 3078F DIAST BP <80 MM HG: CPT | Mod: CPTII,S$GLB,, | Performed by: INTERNAL MEDICINE

## 2023-03-10 PROCEDURE — 99214 OFFICE O/P EST MOD 30 MIN: CPT | Mod: S$GLB,,, | Performed by: INTERNAL MEDICINE

## 2023-03-10 RX ORDER — TRAMADOL HYDROCHLORIDE 50 MG/1
50 TABLET ORAL EVERY 6 HOURS PRN
Qty: 120 TABLET | Refills: 5 | Status: SHIPPED | OUTPATIENT
Start: 2023-03-10 | End: 2023-09-19

## 2023-03-10 RX ORDER — LEVOTHYROXINE SODIUM 50 UG/1
50 TABLET ORAL
Qty: 90 TABLET | Refills: 3 | Status: SHIPPED | OUTPATIENT
Start: 2023-03-10 | End: 2023-09-29 | Stop reason: DRUGHIGH

## 2023-03-10 RX ORDER — MELOXICAM 15 MG/1
15 TABLET ORAL DAILY
Qty: 90 TABLET | Refills: 3 | Status: SHIPPED | OUTPATIENT
Start: 2023-03-10 | End: 2023-10-27 | Stop reason: SDUPTHER

## 2023-03-10 RX ORDER — METHOCARBAMOL 750 MG/1
750 TABLET, FILM COATED ORAL 3 TIMES DAILY PRN
Qty: 90 TABLET | Refills: 1 | Status: SHIPPED | OUTPATIENT
Start: 2023-03-10 | End: 2023-09-06

## 2023-03-10 NOTE — PROGRESS NOTES
Subjective:       Patient ID: Lashell Stroud is a 57 y.o. female.    Chief Complaint: Disease Management    Follow  Up: 57 year old female who presents to clinic for follow up on PMR and osteoarthritis.  She had her L cmc joint repair.She has noticed increased pain in her bilateral CMC joints and having difficulty with gripping and opening objects. No previous joint injections or occupational therapy. She has constant aching/stiffness in her PIP and DIP joints as well, which is unchanged. She is a  and does a lot of repetitive movements with her hands. No shoulder/hip pain/stiffness. She has been complaint with plaquenil, mobic, and tramadol tid PRN for severe pain with adequate control of her symptoms. She could not tolerate lamisil so she d/deejay this. She has chronic fatigue for many years.    No serious infections since her last visit.     Her TSH normalized    Current tx  :stopped tx due to ineffective Plaquenil  2. mobic  3. Tramadol      Review of Systems   Constitutional:  Positive for activity change. Negative for unexpected weight change.   HENT:  Negative for mouth sores.    Eyes:  Negative for redness.   Respiratory:  Negative for cough and shortness of breath.    Cardiovascular:  Negative for chest pain.   Gastrointestinal:  Positive for constipation. Negative for diarrhea.   Genitourinary:  Negative for genital sores.   Musculoskeletal:  Positive for arthralgias.   Skin:  Negative for rash.   Hematological:  Does not bruise/bleed easily.       Objective:     Vitals:    03/10/23 1053   BP: 121/76   Pulse: 64       Past Medical History:   Diagnosis Date    Constipation 12/06/2017    COVID-19 virus infection     pos 7/20/20    Family history of colon cancer in mother 08/17/2016    History of chicken pox     Hypertension     Palindromic rheumatism involving tarsus     Psoas tendinitis of both sides 04/01/2019    Shoulder pain, bilateral 03/08/2016    Spinal stenosis of lumbar region  2019     Past Surgical History:   Procedure Laterality Date    ARTHROSCOPIC REPAIR OF ROTATOR CUFF OF SHOULDER Left 2020    Procedure: REPAIR, ROTATOR CUFF, ARTHROSCOPIC;  Surgeon: Sb Reis II, MD;  Location: [facility] OR;  Service: Orthopedics;  Laterality: Left;    ARTHROSCOPY OF SHOULDER WITH DECOMPRESSION OF SUBACROMIAL SPACE Left 2020    Procedure: ARTHROSCOPY, SHOULDER, WITH SUBACROMIAL SPACE DECOMPRESSION;  Surgeon: Sb Reis II, MD;  Location: [facility] OR;  Service: Orthopedics;  Laterality: Left;     SECTION      CHOLECYSTECTOMY      COLONOSCOPY N/A 2016    Procedure: COLONOSCOPY;  Surgeon: Hesham Enriquez MD;  Location: [facility] ENDO;  Service: Endoscopy;  Laterality: N/A;    COLONOSCOPY N/A 2017    Procedure: COLONOSCOPY;  Surgeon: Hesham Enriquez MD;  Location: [facility] ENDO;  Service: Endoscopy;  Laterality: N/A;    COLONOSCOPY N/A 10/15/2021    Procedure: COLONOSCOPY;  Surgeon: Hesham Enriquez MD;  Location: [facility] ENDO;  Service: Endoscopy;  Laterality: N/A;    EPIDURAL STEROID INJECTION INTO LUMBAR SPINE N/A 2019    Procedure: Injection-steroid-epidural-lumbar L4/5;  Surgeon: Cristhian Gee MD;  Location: [facility] OR;  Service: Pain Management;  Laterality: N/A;    EPIDURAL STEROID INJECTION INTO LUMBAR SPINE N/A 2019    Procedure: Injection-steroid-epidural-lumbar;  Surgeon: Nigel Zuniga MD;  Location: [facility] OR;  Service: Pain Management;  Laterality: N/A;  L4/5 interlaminar    EXTREME LATERAL INTERBODY FUSION (XLIF) OF SPINE Left 2019    Procedure: FUSION, SPINE, XLIF L3-4;  Surgeon: Cleve Bowers MD;  Location: [facility] OR;  Service: Orthopedics;  Laterality: Left;    HAND ARTHROPLASTY Left 10/4/2022    Procedure: Left thumb CMC arthroplasty;  Surgeon: Stanley Rivero MD;  Location: [facility] OR;  Service: Orthopedics;  Laterality: Left;  Anesthesia:  General with a single shot supraclavicular Exparel block    LAMINECTOMY USING  MINIMALLY INVASIVE TECHNIQUE N/A 11/18/2019    Procedure: LAMINECTOMY, SPINE, MINIMALLY INVASIVE L3-L4;  Surgeon: Cleve Bowers MD;  Location: [facility];  Service: Orthopedics;  Laterality: N/A;    MINIMALLY INVASIVE FUSION OF SPINE N/A 11/18/2019    Procedure: FUSION, SPINE, MINIMALLY INVASIVE L3-L4;  Surgeon: Cleve Bowers MD;  Location: [facility] OR;  Service: Orthopedics;  Laterality: N/A;    TUBAL LIGATION N/A 1986          Physical Exam   Eyes: Right conjunctiva is not injected. Left conjunctiva is not injected.   Neck: No JVD present. No thyromegaly present.   Cardiovascular: Normal rate.   Pulmonary/Chest: Effort normal.   Abdominal:   No abdominal exam completed. Unable to delete statement above.   Musculoskeletal:         General: Tenderness and deformity present.      Right shoulder: Normal.      Left shoulder: Normal.      Right elbow: Normal.      Left elbow: Normal.      Right wrist: Normal.      Left wrist: Normal.      Right knee: Normal.      Left knee: Normal.   Lymphadenopathy:     She has no cervical adenopathy.   Neurological: Gait normal.   Skin: No rash noted.   Psychiatric: Mood and affect normal.       Right Side Rheumatological Exam     Examination finds the shoulder, elbow, wrist, knee, 1st MCP, 2nd MCP, 3rd MCP, 4th MCP and 5th MCP normal.    The patient is tender to palpation of the 1st PIP, 2nd PIP, 3rd PIP, 4th PIP, 5th PIP, 1st CMC, 2nd DIP, 3rd DIP, 4th DIP and 5th DIP    The patient has an enlarged 1st PIP, 2nd PIP, 3rd PIP, 4th PIP, 5th PIP, 1st CMC, 2nd DIP, 3rd DIP, 4th DIP and 5th DIP    Left Side Rheumatological Exam     Examination finds the shoulder, elbow, wrist, knee, 1st MCP, 2nd MCP, 3rd MCP, 4th MCP and 5th MCP normal.    The patient is tender to palpation of the 1st PIP, 2nd PIP, 3rd PIP, 4th PIP, 5th PIP, 1st CMC, 2nd DIP, 3rd DIP, 4th DIP and 5th DIP.    The patient has an enlarged 1st PIP, 2nd PIP, 3rd PIP, 4th PIP, 5th PIP, 1st CMC, 2nd DIP, 3rd DIP, 4th DIP and 5th  DIP.        Results for orders placed or performed in visit on 09/26/22   CBC Auto Differential   Result Value Ref Range    WBC 5.99 3.90 - 12.70 K/uL    RBC 4.47 4.00 - 5.40 M/uL    Hemoglobin 13.1 12.0 - 16.0 g/dL    Hematocrit 40.5 37.0 - 48.5 %    MCV 91 82 - 98 fL    MCH 29.3 27.0 - 31.0 pg    MCHC 32.3 32.0 - 36.0 g/dL    RDW 13.8 11.5 - 14.5 %    Platelets 254 150 - 450 K/uL    MPV 10.2 9.2 - 12.9 fL    Immature Granulocytes 0.3 0.0 - 0.5 %    Gran # (ANC) 4.1 1.8 - 7.7 K/uL    Immature Grans (Abs) 0.02 0.00 - 0.04 K/uL    Lymph # 1.4 1.0 - 4.8 K/uL    Mono # 0.3 0.3 - 1.0 K/uL    Eos # 0.2 0.0 - 0.5 K/uL    Baso # 0.05 0.00 - 0.20 K/uL    nRBC 0 0 /100 WBC    Gran % 67.7 38.0 - 73.0 %    Lymph % 23.2 18.0 - 48.0 %    Mono % 5.5 4.0 - 15.0 %    Eosinophil % 2.5 0.0 - 8.0 %    Basophil % 0.8 0.0 - 1.9 %    Differential Method Automated    Basic Metabolic Panel   Result Value Ref Range    Sodium 143 136 - 145 mmol/L    Potassium 4.8 3.5 - 5.1 mmol/L    Chloride 104 95 - 110 mmol/L    CO2 29 22 - 31 mmol/L    Glucose 74 70 - 110 mg/dL    BUN 13 7 - 18 mg/dL    Creatinine 0.68 0.50 - 1.40 mg/dL    Calcium 9.0 8.4 - 10.2 mg/dL    Anion Gap 10 8 - 16 mmol/L    eGFR >60 >60 mL/min/1.73 m^2   reviewed labs with patient during this visit        Assessment:       1. PMR (polymyalgia rheumatica)    2. Osteoarthritis of both hands, unspecified osteoarthritis type    3. Chronic pain syndrome    4. Hypothyroidism, unspecified type    5. Inflammatory arthritis              Plan:       PMR (polymyalgia rheumatica)  -     Anti-Thyroglobulin Antibody; Future; Expected date: 03/10/2023  -     T3; Future; Expected date: 03/10/2023  -     T4, Free; Future; Expected date: 03/10/2023  -     Thyroid Peroxidase Antibody; Future; Expected date: 03/10/2023  -     Sedimentation rate; Future; Expected date: 03/10/2023  -     C-Reactive Protein; Future; Expected date: 03/10/2023  -     Comprehensive Metabolic Panel; Future; Expected date:  03/10/2023  -     CBC Auto Differential; Future; Expected date: 03/10/2023  -     Vitamin D; Future; Expected date: 03/10/2023    Osteoarthritis of both hands, unspecified osteoarthritis type  -     Anti-Thyroglobulin Antibody; Future; Expected date: 03/10/2023  -     T3; Future; Expected date: 03/10/2023  -     T4, Free; Future; Expected date: 03/10/2023  -     Thyroid Peroxidase Antibody; Future; Expected date: 03/10/2023  -     Sedimentation rate; Future; Expected date: 03/10/2023  -     C-Reactive Protein; Future; Expected date: 03/10/2023  -     Comprehensive Metabolic Panel; Future; Expected date: 03/10/2023  -     CBC Auto Differential; Future; Expected date: 03/10/2023  -     Vitamin D; Future; Expected date: 03/10/2023    Chronic pain syndrome  -     Anti-Thyroglobulin Antibody; Future; Expected date: 03/10/2023  -     T3; Future; Expected date: 03/10/2023  -     T4, Free; Future; Expected date: 03/10/2023  -     Thyroid Peroxidase Antibody; Future; Expected date: 03/10/2023  -     Sedimentation rate; Future; Expected date: 03/10/2023  -     C-Reactive Protein; Future; Expected date: 03/10/2023  -     Comprehensive Metabolic Panel; Future; Expected date: 03/10/2023  -     CBC Auto Differential; Future; Expected date: 03/10/2023  -     Vitamin D; Future; Expected date: 03/10/2023    Hypothyroidism, unspecified type  -     Anti-Thyroglobulin Antibody; Future; Expected date: 03/10/2023  -     T3; Future; Expected date: 03/10/2023  -     T4, Free; Future; Expected date: 03/10/2023  -     Thyroid Peroxidase Antibody; Future; Expected date: 03/10/2023  -     Sedimentation rate; Future; Expected date: 03/10/2023  -     C-Reactive Protein; Future; Expected date: 03/10/2023  -     Comprehensive Metabolic Panel; Future; Expected date: 03/10/2023  -     CBC Auto Differential; Future; Expected date: 03/10/2023  -     Vitamin D; Future; Expected date: 03/10/2023    Inflammatory arthritis  -     Anti-Thyroglobulin  Antibody; Future; Expected date: 03/10/2023  -     T3; Future; Expected date: 03/10/2023  -     T4, Free; Future; Expected date: 03/10/2023  -     Thyroid Peroxidase Antibody; Future; Expected date: 03/10/2023  -     Sedimentation rate; Future; Expected date: 03/10/2023  -     C-Reactive Protein; Future; Expected date: 03/10/2023  -     Comprehensive Metabolic Panel; Future; Expected date: 03/10/2023  -     CBC Auto Differential; Future; Expected date: 03/10/2023  -     Vitamin D; Future; Expected date: 03/10/2023          Assessment:  57 year old female with  PMR on plaquenil  --osteoarthritis  --hypothyroidism  --chronic fatigue  --chronic pain syndrome on tramadol  --lumbar spinal stenosis    Plan:  1.  mobic 15 mg daily  2. Cont tramadol  I have checked [place] prescription monitoring program site and no unusual or abnormal behavior has occurred pt understand the risk and benefits of taking opioid medications and has decided to continue the medication.nd repeat labs in 12 weeks  4. Add robaxin prn  5. If her back pain returns xray ordered      More than 50% of the  30 minute encounter was spent face to face counseling the patient regarding current status and future plan of care as well as side effects  of the medications. All questions were answered to patient's satisfaction also includes  non-face to face time preparing to see the patient (eg, review of tests), Obtaining and/or reviewing separately obtained history, Documenting clinical information in the electronic or other health record, Independently interpreting results

## 2023-03-22 ENCOUNTER — TELEPHONE (OUTPATIENT)
Dept: FAMILY MEDICINE | Facility: CLINIC | Age: 58
End: 2023-03-22
Payer: COMMERCIAL

## 2023-04-03 ENCOUNTER — PATIENT MESSAGE (OUTPATIENT)
Dept: ORTHOPEDICS | Facility: CLINIC | Age: 58
End: 2023-04-03
Payer: COMMERCIAL

## 2023-04-18 ENCOUNTER — OFFICE VISIT (OUTPATIENT)
Dept: SPINE | Facility: CLINIC | Age: 58
End: 2023-04-18
Payer: COMMERCIAL

## 2023-04-18 VITALS — BODY MASS INDEX: 27.1 KG/M2 | HEIGHT: 64 IN | WEIGHT: 158.75 LBS

## 2023-04-18 DIAGNOSIS — M51.36 LUMBAR DEGENERATIVE DISC DISEASE: ICD-10-CM

## 2023-04-18 DIAGNOSIS — G89.29 CHRONIC BILATERAL LOW BACK PAIN WITH RIGHT-SIDED SCIATICA: Primary | ICD-10-CM

## 2023-04-18 DIAGNOSIS — M54.41 CHRONIC BILATERAL LOW BACK PAIN WITH RIGHT-SIDED SCIATICA: Primary | ICD-10-CM

## 2023-04-18 PROCEDURE — 1159F MED LIST DOCD IN RCRD: CPT | Mod: CPTII,S$GLB,, | Performed by: PHYSICAL MEDICINE & REHABILITATION

## 2023-04-18 PROCEDURE — 1160F PR REVIEW ALL MEDS BY PRESCRIBER/CLIN PHARMACIST DOCUMENTED: ICD-10-PCS | Mod: CPTII,S$GLB,, | Performed by: PHYSICAL MEDICINE & REHABILITATION

## 2023-04-18 PROCEDURE — 3008F BODY MASS INDEX DOCD: CPT | Mod: CPTII,S$GLB,, | Performed by: PHYSICAL MEDICINE & REHABILITATION

## 2023-04-18 PROCEDURE — 1159F PR MEDICATION LIST DOCUMENTED IN MEDICAL RECORD: ICD-10-PCS | Mod: CPTII,S$GLB,, | Performed by: PHYSICAL MEDICINE & REHABILITATION

## 2023-04-18 PROCEDURE — 99204 PR OFFICE/OUTPT VISIT, NEW, LEVL IV, 45-59 MIN: ICD-10-PCS | Mod: S$GLB,,, | Performed by: PHYSICAL MEDICINE & REHABILITATION

## 2023-04-18 PROCEDURE — 1160F RVW MEDS BY RX/DR IN RCRD: CPT | Mod: CPTII,S$GLB,, | Performed by: PHYSICAL MEDICINE & REHABILITATION

## 2023-04-18 PROCEDURE — 3008F PR BODY MASS INDEX (BMI) DOCUMENTED: ICD-10-PCS | Mod: CPTII,S$GLB,, | Performed by: PHYSICAL MEDICINE & REHABILITATION

## 2023-04-18 PROCEDURE — 99204 OFFICE O/P NEW MOD 45 MIN: CPT | Mod: S$GLB,,, | Performed by: PHYSICAL MEDICINE & REHABILITATION

## 2023-04-18 NOTE — PROGRESS NOTES
SUBJECTIVE:    Patient ID: Lashell Stroud is a 57 y.o. female.    Chief Complaint: Low-back Pain    This is a 57-year-old woman who sees Dr. Iverson for her primary care.  History of hypothyroidism hypertension and polymyalgia rheumatica otherwise denies any chronic major medical problems.  No cancer history.  She status post instrumented lumbar fusion at L3-4 done by Dr. Cleve Bowers in 2019.  She had significant improvement from that surgery.  She presents now with a primary complaint of low back pain at the lumbosacral junction that has been going on for about 2 months.  No specific injury but she does housekeeping for living.  This is associated with pain radiating down the posterior portion of the right leg into the calf.  No bowel or bladder dysfunction fever chills sweats or unexpected weight loss.  Saw primary care on 03/01/2023 and was given a shot of Toradol and prescribed prednisone and Robaxin.  That helped significantly.  She is scheduled to start physical therapy tomorrow.  X-rays show stable fusion at L3-4 with no evidence of hardware complications.  Moderate degeneration of the L2-3 disc and mild degeneration at L4-5 and L5-S1        Past Medical History:   Diagnosis Date    Constipation 12/06/2017    COVID-19 virus infection     pos 7/20/20    Family history of colon cancer in mother 08/17/2016    History of chicken pox     Hypertension     Palindromic rheumatism involving tarsus     Psoas tendinitis of both sides 04/01/2019    Shoulder pain, bilateral 03/08/2016    Spinal stenosis of lumbar region 11/18/2019     Social History     Socioeconomic History    Marital status:    Tobacco Use    Smoking status: Never    Smokeless tobacco: Never   Substance and Sexual Activity    Alcohol use: Yes     Comment: occassional    Drug use: No    Sexual activity: Yes     Birth control/protection: Surgical     Comment: tubal ligation     Social Determinants of Health     Financial Resource Strain:  Low Risk     Difficulty of Paying Living Expenses: Not hard at all   Food Insecurity: No Food Insecurity    Worried About Running Out of Food in the Last Year: Never true    Ran Out of Food in the Last Year: Never true   Transportation Needs: No Transportation Needs    Lack of Transportation (Medical): No    Lack of Transportation (Non-Medical): No   Physical Activity: Inactive    Days of Exercise per Week: 2 days    Minutes of Exercise per Session: 0 min   Stress: No Stress Concern Present    Feeling of Stress : Not at all   Social Connections: Unknown    Frequency of Communication with Friends and Family: Patient refused    Frequency of Social Gatherings with Friends and Family: Patient refused    Active Member of Clubs or Organizations: Patient refused    Attends Club or Organization Meetings: Patient refused    Marital Status:    Housing Stability: Low Risk     Unable to Pay for Housing in the Last Year: No    Number of Places Lived in the Last Year: 1    Unstable Housing in the Last Year: No     Past Surgical History:   Procedure Laterality Date    ARTHROSCOPIC REPAIR OF ROTATOR CUFF OF SHOULDER Left 2020    Procedure: REPAIR, ROTATOR CUFF, ARTHROSCOPIC;  Surgeon: Sb Reis II, MD;  Location: [facility];  Service: Orthopedics;  Laterality: Left;    ARTHROSCOPY OF SHOULDER WITH DECOMPRESSION OF SUBACROMIAL SPACE Left 2020    Procedure: ARTHROSCOPY, SHOULDER, WITH SUBACROMIAL SPACE DECOMPRESSION;  Surgeon: Sb Reis II, MD;  Location: [facility];  Service: Orthopedics;  Laterality: Left;     SECTION      CHOLECYSTECTOMY      COLONOSCOPY N/A 2016    Procedure: COLONOSCOPY;  Surgeon: Hesham Enriquez MD;  Location: [facility];  Service: Endoscopy;  Laterality: N/A;    COLONOSCOPY N/A 2017    Procedure: COLONOSCOPY;  Surgeon: Hesham Enriquez MD;  Location: [facility];  Service: Endoscopy;  Laterality: N/A;    COLONOSCOPY N/A 10/15/2021    Procedure: COLONOSCOPY;   "Surgeon: Hesham Enriquez MD;  Location: [facility] ENDO;  Service: Endoscopy;  Laterality: N/A;    EPIDURAL STEROID INJECTION INTO LUMBAR SPINE N/A 5/22/2019    Procedure: Injection-steroid-epidural-lumbar L4/5;  Surgeon: Cristhian Gee MD;  Location: [facility] OR;  Service: Pain Management;  Laterality: N/A;    EPIDURAL STEROID INJECTION INTO LUMBAR SPINE N/A 7/31/2019    Procedure: Injection-steroid-epidural-lumbar;  Surgeon: Nigel Zuniga MD;  Location: [facility] OR;  Service: Pain Management;  Laterality: N/A;  L4/5 interlaminar    EXTREME LATERAL INTERBODY FUSION (XLIF) OF SPINE Left 11/18/2019    Procedure: FUSION, SPINE, XLIF L3-4;  Surgeon: Cleve Bowers MD;  Location: [facility] OR;  Service: Orthopedics;  Laterality: Left;    HAND ARTHROPLASTY Left 10/4/2022    Procedure: Left thumb CMC arthroplasty;  Surgeon: Stanley Rivero MD;  Location: [facility] OR;  Service: Orthopedics;  Laterality: Left;  Anesthesia:  General with a single shot supraclavicular Exparel block    LAMINECTOMY USING MINIMALLY INVASIVE TECHNIQUE N/A 11/18/2019    Procedure: LAMINECTOMY, SPINE, MINIMALLY INVASIVE L3-L4;  Surgeon: Cleve Bowers MD;  Location: [facility] OR;  Service: Orthopedics;  Laterality: N/A;    MINIMALLY INVASIVE FUSION OF SPINE N/A 11/18/2019    Procedure: FUSION, SPINE, MINIMALLY INVASIVE L3-L4;  Surgeon: Cleve Bowers MD;  Location: [facility];  Service: Orthopedics;  Laterality: N/A;    TUBAL LIGATION N/A 1986     Family History   Problem Relation Age of Onset    Cancer Mother     Hypertension Mother     Heart disease Father     Hypertension Father     Diabetes Father     No Known Problems Brother      Vitals:    04/18/23 1045   Weight: 72 kg (158 lb 11.7 oz)   Height: 5' 4.02" (1.626 m)       Review of Systems   Constitutional:  Negative for chills, diaphoresis, fatigue, fever and unexpected weight change.   HENT:  Negative for trouble swallowing.    Eyes:  Negative for visual disturbance.   Respiratory:  Negative for " shortness of breath.    Cardiovascular:  Negative for chest pain.   Gastrointestinal:  Negative for abdominal pain, constipation, nausea and vomiting.   Genitourinary:  Negative for difficulty urinating.   Musculoskeletal:  Negative for arthralgias, back pain, gait problem, joint swelling, myalgias, neck pain and neck stiffness.   Neurological:  Negative for dizziness, speech difficulty, weakness, light-headedness, numbness and headaches.        Objective:      Physical Exam  Neurological:      Mental Status: She is alert and oriented to person, place, and time.      Comments: She is awake and in no acute distress  Mild tenderness palpation lumbar paraspinous musculature at the lumbosacral junction no palpable masses  Forward flexion of the lumbar spine is to about 60° before she complains of pain at the lumbosacral junction.  Extension causes mild pain at the lumbosacral junction  She can heel and toe walk normally  Reflexes- +1-+2 reflexes at the following:   C5-Biceps   C6-Brachioradialis   C7-Triceps   L3/4-Patellar   S1-Achilles   Alfreda sign negative bilaterally  Strength testing- 5/5 strength in the following muscle groups:  C5-Elbow flexion  C6-Wrist extension  C7-Elbow extension  C8-Finger flexion  T1-Finger abduction  L2-Hip flexion  L3-Knee extension  L4-Ankle dorsiflexion  L5-Great toe extension  S1-Ankle plantar flexion       Straight leg raise negative bilaterally  LAURA test negative bilaterally           Assessment:       1. Chronic bilateral low back pain with right-sided sciatica    2. Lumbar degenerative disc disease           Plan:     She has a nonfocal neurological examination and no historical red flags.  I suspect she has low back pain on basis of degenerative disc disease and facet arthropathy.  She has pain with facet loading.  She has symptoms of right S1 radiculitis with no evidence of nerve root dysfunction.  I agree with conservative treatment with physical therapy.  She can follow up  with me at the completion of therapy.  Consider MRI and subsequent epidural steroid injections      Chronic bilateral low back pain with right-sided sciatica    Lumbar degenerative disc disease  -     Ambulatory referral[facility]consult to [facility]

## 2023-04-19 ENCOUNTER — CLINICAL SUPPORT (OUTPATIENT)
Dept: REHABILITATION | Facility: HOSPITAL | Age: 58
End: 2023-04-19
Payer: COMMERCIAL

## 2023-04-19 DIAGNOSIS — M54.50 PAIN IN LEFT LUMBAR REGION OF BACK: ICD-10-CM

## 2023-04-19 DIAGNOSIS — Z98.1 HISTORY OF LUMBAR FUSION: ICD-10-CM

## 2023-04-19 DIAGNOSIS — Z74.09 IMPAIRED FUNCTIONAL MOBILITY AND ACTIVITY TOLERANCE: Primary | ICD-10-CM

## 2023-04-19 DIAGNOSIS — M62.830 SPASM OF BACK MUSCLES: ICD-10-CM

## 2023-04-19 DIAGNOSIS — M51.36 LUMBAR DEGENERATIVE DISC DISEASE: ICD-10-CM

## 2023-04-19 DIAGNOSIS — M62.89 PSOAS SYNDROME: ICD-10-CM

## 2023-04-19 PROBLEM — M51.369 LUMBAR DEGENERATIVE DISC DISEASE: Status: ACTIVE | Noted: 2023-04-19

## 2023-04-19 PROCEDURE — 97162 PT EVAL MOD COMPLEX 30 MIN: CPT | Mod: PN

## 2023-04-19 PROCEDURE — 97110 THERAPEUTIC EXERCISES: CPT | Mod: PN

## 2023-04-19 NOTE — PLAN OF CARE
"[facility]   Physical Therapy Initial Evaluation       Name: Lashell Stroud  Clinic Number: [id number]    Therapy Diagnosis:   Encounter Diagnoses   Name Primary?    Impaired functional mobility and activity tolerance Yes    Pain in left lumbar region of back     Spasm of back muscles     Psoas syndrome     Lumbar degenerative disc disease     History of lumbar fusion         Physician: Enrrique Hill APRN    Physician Orders: PT Eval and Treat   Medical Diagnosis from Referral: Lumbar degenerative disc disease; Pain in left lumbar region of back; History of lumbar fusion; Psoas syndrome; Spasm of back muscles   Evaluation Date: 4/19/2023  Authorization Period Expiration: 12/31/2023   Plan of Care Expiration: 6/1/2023  Progress Note Due: 6/1/2023  Visit # / Visits authorized: 1/ 1       FOTO: Next survey due week of 5/1/2023    Precautions:  history of lumbar fusion      Time In: 1512  Time Out: 1600  Total Appointment Time (timed & untimed codes): 45 minutes      SUBJECTIVE     Date of onset: 3/2023    History of current condition - Lashell reports: she has had chronic problems with her lower back for quite a while but began having pain in her R buttock a little over a month ago.  Then she began having muscle spasms in her L lower back  She went to doctor and received injection which helped.  However, the R buttock pain returned and has remained.  She has tightness across the lower back as well.  She has been trying to do some stretching before getting up that has helped.  She is now referred to PT.      Falls: No    Imaging: X-Ray Lumbar Spine AP And Lateral 03/20/2023: Impression per report: "1. No acute osseous abnormality, 2. Intact L3-L4 surgical changes.  3. Interval progression degenerative change L2-L3 level with disc space narrowing and marginal osteophytosis present."     Prior Therapy: Yes.    Social History:  lives with their spouse in a 1 story home with no steps to " "enter  Occupation: housekeeping  Prior Level of Function: independent in self care, walking "a mile or 2" on her days off, performing housework activities,   Current Level of Function: increased pain upon initiating walking that improves with time, increased pain with sitting > 1 hour; standing > 15 min increases pain.      Pain:  Current 0/10, worst 5/10, best 0/10   Location: right buttock   Description: intermittent pain  Aggravating Factors: initiating walking after sitting, prolonged sitting  Easing Factors: continuing walking, ice pack, Aleve    Patients goals: To get rid of the pain in my buttock, to learn different exercises to help my back      Medical History:   Past Medical History:   Diagnosis Date    Constipation 2017    COVID-19 virus infection     pos 20    Family history of colon cancer in mother 2016    History of chicken pox     Hypertension     Palindromic rheumatism involving tarsus     Psoas tendinitis of both sides 2019    Shoulder pain, bilateral 2016    Spinal stenosis of lumbar region 2019       Surgical History:   Lashell Stroud  has a past surgical history that includes Cholecystectomy;  section; Tubal ligation (N/A, ); Colonoscopy (N/A, 2016); Colonoscopy (N/A, 2017); Epidural steroid injection into lumbar spine (N/A, 2019); Epidural steroid injection into lumbar spine (N/A, 2019); Extreme lateral interbody fusion (XLIF) of spine (Left, 2019); Minimally invasive fusion of spine (N/A, 2019); Laminectomy using minimally invasive technique (N/A, 2019); Arthroscopy of shoulder with decompression of subacromial space (Left, 2020); Arthroscopic repair of rotator cuff of shoulder (Left, 2020); Colonoscopy (N/A, 10/15/2021); and Hand Arthroplasty (Left, 10/4/2022).    Medications:   Lashell has a current medication list which includes the following prescription(s): cholecalciferol (vitamin d3), " levothyroxine, meloxicam, methocarbamol, metoprolol succinate, restasis, and tramadol.    Allergies:   Review of patient's allergies indicates:   Allergen Reactions    Aspirin Itching    Penicillins Hives    Adhesive Rash          OBJECTIVE     Posture: Standing: Pelvis and shoulders level, minimal decreased lumbar lordosis but otherwise unremarkable.  Sitting: minimal forward head posture; otherwise unremarkable  Palpation: Tenderness present R buttock over piriformis and at proximal attachment of R hamstrings.   Sensation:  Reports an episode of numbness on dorsal aspect of R distal foot but not consistent  DTRs: Knee jerk +2 and symmetrical; Ankle jerk R +1, L +2  Range of Motion/Strength:   Thoracic/Lumbar AROM: Pain/Dysfunction with Movement:   Flexion                       60* - 35* = 25*    Extension                  25* - 12* = 13*    Right side bending    25*    Left side bending      33*    Right rotation            75%    Left rotation              75%    LE ROM: grossly WNL B   Strength: hip flexion, abd/add 4+/5, ext 4/5; knees 4+/5, ankles 4+/5 B     Flexibility: Hamstring length R 135*, L 128*; piriformis moderate tightness B; hip flexors minimal tightness B; calves moderate tightness B  Gait: Without AD  Analysis: minimally antalgic favoring R LE  Bed Mobility:Independent  Transfers: Independent with difficulty   Special Tests: N/A      Limitation/Restriction for FOTO Lumbar Spine Survey    Therapist reviewed FOTO scores for Lashell Stroud on 4/19/2023.   FOTO documents entered into [employer] - see Media section.    Limitation Score: 40%         TREATMENT     Total Treatment time (time-based codes) separate from Evaluation: 16 minutes      Lashell received the treatments listed below:      therapeutic exercises to develop flexibility for 9 minutes including:  Seated hamstring stretch 3x30s ea   Piriformis stretch 3x30s ea  Standing calf stretch using wedge 3x30s    manual therapy techniques: Soft  tissue Mobilization were applied to the: R buttock/hamstring for 7 minutes, including:  Deep pressure/strumming to R buttock and proximal hamstring    PATIENT EDUCATION AND HOME EXERCISES     Education provided:   - Discussed condition, role of PT and proposed plan of care.  Initiated instruction in exercise program    Written Home Exercises Provided: yes. Exercises were reviewed and Lashell was able to demonstrate them prior to the end of the session.  Lashell demonstrated good  understanding of the education provided. See EMR under Patient Instructions for exercises provided during therapy sessions.    ASSESSMENT     Lashell is a 57 y.o. female referred to outpatient Physical Therapy with a medical diagnosis of Lumbar degenerative disc disease; Pain in left lumbar region of back; History of lumbar fusion; Psoas syndrome; Spasm of back muscles. Patient presents with lower back pain, decreased trunk/lumbar ROM, decreased LE flexibility, increased tenderness, impaired core strength.  These problems contribute to limited activity tolerance and difficulty with prolonged positions.  She will benefit from skilled PT services to address these problems in order to minimize functional deficits and to maximize activity tolerance.    Patient prognosis is Good.   Patient will benefit from skilled outpatient Physical Therapy to address the deficits stated above and in the chart below, provide patient /family education, and to maximize patientt's level of independence.     Plan of care discussed with patient: Yes  Patient's spiritual, cultural and educational needs considered and patient is agreeable to the plan of care and goals as stated below:     Anticipated Barriers for therapy: possible work schedule conflict    Medical Necessity is demonstrated by the following  History  Co-morbidities and personal factors that may impact the plan of care Co-morbidities:   HTN and prior lumbar surgery    Personal Factors:   no deficits      moderate   Examination  Body Structures and Functions, activity limitations and participation restrictions that may impact the plan of care Body Regions:   back  lower extremities    Body Systems:    gross symmetry  ROM  strength  gait  transfers    Participation Restrictions:   None anticipated     Activity limitations:   Learning and applying knowledge  no deficits    General Tasks and Commands  no deficits    Communication  no deficits    Mobility  lifting and carrying objects  walking  using transportation (bus, train, plane, car)    Self care  no deficits    Domestic Life  no deficits    Interactions/Relationships  no deficits    Life Areas  employment    Community and Social Life  community life  recreation and leisure         moderate   Clinical Presentation evolving clinical presentation with changing clinical characteristics moderate   Decision Making/ Complexity Score: moderate     Goals:  Short Term Goals: 3 weeks   1) Facilitate improved lower quadrant flexibility  2) Decrease tenderness to minimal  3) Patient will initiate walking after sitting > 30 min without increased discomfort 4 of 10 trials  4) Patient will stand > 20 min without increased pain    Long Term Goals: 6 weeks   1) Patient will consistently initiate walking after sitting at least 1 hour without increased pain  2) Patient will consistently stand > 30 min without increased pain  3) Patient will resume use of elliptical exerciser without increased pain  4) Patient will resume walking 1-2 miles per day without increased pain  5) Improve functional deficit to < 25% as indicated by FOTO lumbar spine survey  6) Patient will be independent in [facility] for ROM, flexibility, and core strengthening     PLAN   Plan of care Certification: 4/19/2023 to 6/1/2023.    Outpatient Physical Therapy 2 times weekly for 6 weeks to include the following interventions: Electrical Stimulation IFC/TENS, Manual Therapy, Moist Heat/ Ice, Patient Education,  Therapeutic Exercise, and Functional Dry Needling .     Lorie Muniz, PT      I CERTIFY THE NEED FOR THESE SERVICES FURNISHED UNDER THIS PLAN OF TREATMENT AND WHILE UNDER MY CARE   Physician's comments:     Physician's Signature: ___________________________________________________

## 2023-04-26 ENCOUNTER — OFFICE VISIT (OUTPATIENT)
Dept: ORTHOPEDICS | Facility: CLINIC | Age: 58
End: 2023-04-26
Payer: COMMERCIAL

## 2023-04-26 VITALS — BODY MASS INDEX: 26.98 KG/M2 | HEIGHT: 64 IN | WEIGHT: 158 LBS

## 2023-04-26 DIAGNOSIS — M18.11 ARTHRITIS OF CARPOMETACARPAL (CMC) JOINT OF RIGHT THUMB: Primary | ICD-10-CM

## 2023-04-26 PROCEDURE — 20600 SMALL JOINT ASPIRATION/INJECTION: R THUMB CMC: ICD-10-PCS | Mod: RT,S$GLB,, | Performed by: ORTHOPAEDIC SURGERY

## 2023-04-26 PROCEDURE — 3008F PR BODY MASS INDEX (BMI) DOCUMENTED: ICD-10-PCS | Mod: CPTII,S$GLB,, | Performed by: ORTHOPAEDIC SURGERY

## 2023-04-26 PROCEDURE — 1159F PR MEDICATION LIST DOCUMENTED IN MEDICAL RECORD: ICD-10-PCS | Mod: CPTII,S$GLB,, | Performed by: ORTHOPAEDIC SURGERY

## 2023-04-26 PROCEDURE — 99999 PR PBB SHADOW E&M-EST. PATIENT-LVL III: CPT | Mod: PBBFAC,,, | Performed by: ORTHOPAEDIC SURGERY

## 2023-04-26 PROCEDURE — 99213 PR OFFICE/OUTPT VISIT, EST, LEVL III, 20-29 MIN: ICD-10-PCS | Mod: 25,S$GLB,, | Performed by: ORTHOPAEDIC SURGERY

## 2023-04-26 PROCEDURE — 3008F BODY MASS INDEX DOCD: CPT | Mod: CPTII,S$GLB,, | Performed by: ORTHOPAEDIC SURGERY

## 2023-04-26 PROCEDURE — 20600 DRAIN/INJ JOINT/BURSA W/O US: CPT | Mod: RT,S$GLB,, | Performed by: ORTHOPAEDIC SURGERY

## 2023-04-26 PROCEDURE — 99213 OFFICE O/P EST LOW 20 MIN: CPT | Mod: 25,S$GLB,, | Performed by: ORTHOPAEDIC SURGERY

## 2023-04-26 PROCEDURE — 99999 PR PBB SHADOW E&M-EST. PATIENT-LVL III: ICD-10-PCS | Mod: PBBFAC,,, | Performed by: ORTHOPAEDIC SURGERY

## 2023-04-26 PROCEDURE — 1159F MED LIST DOCD IN RCRD: CPT | Mod: CPTII,S$GLB,, | Performed by: ORTHOPAEDIC SURGERY

## 2023-04-26 RX ORDER — TRIAMCINOLONE ACETONIDE 40 MG/ML
40 INJECTION, SUSPENSION INTRA-ARTICULAR; INTRAMUSCULAR
Status: DISCONTINUED | OUTPATIENT
Start: 2023-04-26 | End: 2023-04-26 | Stop reason: HOSPADM

## 2023-04-26 RX ADMIN — TRIAMCINOLONE ACETONIDE 40 MG: 40 INJECTION, SUSPENSION INTRA-ARTICULAR; INTRAMUSCULAR at 08:04

## 2023-04-26 NOTE — PROCEDURES
Small Joint Aspiration/Injection: R thumb CMC    Date/Time: 4/26/2023 8:40 AM  Performed by: Stanley Rivero MD  Authorized by: Stanley Rivero MD     Consent Done?:  Yes (Verbal)  Indications:  Pain  Site marked: the procedure site was marked    Timeout: prior to procedure the correct patient, procedure, and site was verified    Prep: patient was prepped and draped in usual sterile fashion      Local anesthesia used?: No    Location:  Thumb  Site:  R thumb CMC (Right thumb CMC joint)  Ultrasonic guidance for needle placement?: No    Medications:  40 mg triamcinolone acetonide 40 mg/mL  Patient tolerance:  Patient tolerated the procedure well with no immediate complications

## 2023-04-26 NOTE — PROGRESS NOTES
Ms Stroud returns to clinic today.  She has a history of bilateral thumb CMC arthritis.  She underwent a left thumb CMC arthroplasty and did well but she continues to have pain about the right thumb CMC joint.      Physical exam:  Examination the right hand reveals that there is no edema.  She does have prominence of the thumb CMC joint.  Palpation over the joint does produce tenderness.  Has a positive grind test.  She is grossly neurovascularly intact.      Assessment:  Right thumb CMC arthritis     Plan:    1.  After informed consent was obtained injection was placed to the right thumb CMC joint.  The patient tolerated that well    2.  Follow up with me on a p.r.n. basis

## 2023-04-28 ENCOUNTER — CLINICAL SUPPORT (OUTPATIENT)
Dept: REHABILITATION | Facility: HOSPITAL | Age: 58
End: 2023-04-28
Payer: COMMERCIAL

## 2023-04-28 DIAGNOSIS — M62.89 PSOAS SYNDROME: ICD-10-CM

## 2023-04-28 DIAGNOSIS — M51.36 LUMBAR DEGENERATIVE DISC DISEASE: ICD-10-CM

## 2023-04-28 DIAGNOSIS — Z74.09 IMPAIRED FUNCTIONAL MOBILITY AND ACTIVITY TOLERANCE: Primary | ICD-10-CM

## 2023-04-28 DIAGNOSIS — M62.830 SPASM OF BACK MUSCLES: ICD-10-CM

## 2023-04-28 DIAGNOSIS — Z98.1 HISTORY OF LUMBAR FUSION: ICD-10-CM

## 2023-04-28 DIAGNOSIS — M54.50 PAIN IN LEFT LUMBAR REGION OF BACK: ICD-10-CM

## 2023-04-28 PROCEDURE — 97110 THERAPEUTIC EXERCISES: CPT | Mod: PN,CQ

## 2023-04-28 NOTE — PROGRESS NOTES
"[facility]   Physical Therapy Treatment Note     Name: Lashell Stroud  Clinic Number: [id number]    Therapy Diagnosis:   Encounter Diagnoses   Name Primary?    Impaired functional mobility and activity tolerance Yes    Pain in left lumbar region of back     Spasm of back muscles     Psoas syndrome     Lumbar degenerative disc disease     History of lumbar fusion      Physician: Enrrique Hill APRN    Visit Date: 4/28/2023    Physician Orders: PT Eval and Treat   Medical Diagnosis from Referral: Lumbar degenerative disc disease; Pain in left lumbar region of back; History of lumbar fusion; Psoas syndrome; Spasm of back muscles   Evaluation Date: 4/19/2023  Authorization Period Expiration: 12/31/2023   Plan of Care Expiration: 6/1/2023  Progress Note Due: 6/1/2023  Visit # / Visits authorized: 1/ 19 (2 total)      FOTO: Next survey due week of 5/1/2023     Precautions: history of lumbar fusion      PTA Visit #: 1/5     Time In: 1105  Time Out: 1150  Total Billable Time: 45 minutes    SUBJECTIVE     Pt reports: "The stretches are really helping." "I already stopped having pain in my hip and all, just gets tight midway through my shift, especially with pushing my cart."  She was compliant with home exercise program.  Response to previous treatment: "It was a little aggravated afterwards, but just another day."  Functional change: N/A at this time    Pain: 0/10  Location: left back      OBJECTIVE     Objective Measures updated at progress report unless specified.     Treatment     Lashell received the treatments listed below:      therapeutic exercises to develop strength, endurance, ROM, flexibility, posture, and core stabilization for 45 minutes including:  Recumbent bike lvl2 x6'  Standing gastroc stretches 3x30s  Seated hamstring stretch 3x30s ea   Piriformis stretch 3x30s ea   Thoracolumbar stretches 10x3s ea    Physioball flexion " "rollout    Side-bending    Rotation    Extension  Supine hip flexor stretch 3x30s ea   Lower trunk rotation on Physioball 10x3s ea   Posterior pelvic tilt x15    Core isometric w/physioball x10 ea     Dying bug     Alternate arm & leg    To be added:   Prone extension exercises and alternate arm & leg lifts; postural strengthening; body mechanics (see plan)    Pt defers manual techniques and modalities post, stating "It feels good just from doing the exercises."    Patient Education and Home Exercises     Home Exercises Provided and Patient Education Provided     Education provided:   - The patient received education on effects of increased muscle tension. Education was provided on body mechanics while pushing cart at work. Patient was instructed in initial therapeutic treatment program as stated above.    Written Home Exercises Provided: Patient instructed to cont prior HEP. Exercises were reviewed and Lashell was able to demonstrate them prior to the end of the session.  Lashell demonstrated good  understanding of the education provided. See EMR under Patient Instructions for exercises provided during therapy sessions    ASSESSMENT     Lashell Stroud was instructed in initial therapeutic treatment program as stated above. The patient did not report increased pain, fatigue, or any adverse reactions during. Patient reports feeling better post treatment. Patient deferred manual techniques. Maximus works as , and would benefit from increased body mechanics training as well as strengthening core and posture for support with continued stretches for mobility and flexibility/range of motion to assist with activities of daily living and job duties.     Lashell Is progressing well towards her goals.   Pt prognosis is Good.     Pt will continue to benefit from skilled outpatient physical therapy to address the deficits listed in the problem list box on initial evaluation, provide pt/family education and to maximize pt's " level of independence in the home and community environment.     Pt's spiritual, cultural and educational needs considered and pt agreeable to plan of care and goals.     Anticipated barriers to physical therapy: possible work schedule conflict    Goals:  Short Term Goals: 3 weeks   1) Facilitate improved lower quadrant flexibility Initiated  2) Decrease tenderness to minimal Ongoing  3) Patient will initiate walking after sitting > 30 min without increased discomfort 4 of 10 trials Ongoing  4) Patient will stand > 20 min without increased pain Ongoing     Long Term Goals: 6 weeks   1) Patient will consistently initiate walking after sitting at least 1 hour without increased pain Ongoing  2) Patient will consistently stand > 30 min without increased pain Ongoing  3) Patient will resume use of elliptical exerciser without increased pain Ongoing  4) Patient will resume walking 1-2 miles per day without increased pain Ongoing  5) Improve functional deficit to < 25% as indicated by FOTO lumbar spine survey Ongoing  6) Patient will be independent in HEP for ROM, flexibility, and core strengthening Initiated    PLAN     POC: Outpatient Physical Therapy 2 times weekly for 6 weeks to include the following interventions: Electrical Stimulation IFC/TENS, Manual Therapy, Moist Heat/ Ice, Patient Education, Therapeutic Exercise, and Functional Dry Needling.    The patient is to be progressed within the established plan of care as tolerated in order to accomplish goals as stated above. Plan to increase exercise complexity as applicable, and incorporate postural strengthening, prone spinal extension exercises, and prone alternate arm and leg lifts in subsequent session. Will provide update HEP as applicable. Review body mechanics for ADLs, work duties, and lifting. Begin working on increasing standing and ambulation tolerance in subsequent sessions (increasing bike and then to treadmill).    Patricia Arboleda, PTA

## 2023-05-01 ENCOUNTER — DOCUMENTATION ONLY (OUTPATIENT)
Dept: REHABILITATION | Facility: HOSPITAL | Age: 58
End: 2023-05-01
Payer: COMMERCIAL

## 2023-05-01 NOTE — PROGRESS NOTES
Occupational Therapy Discharge Note    2023    Pt was referred by Dr. Rivero and seen for initial evaluation on 22 for L thumb CMC suspension arthroplasty. Pt was seen for 18  OT visits for treatment of L hand pain, decreased ROM L thumb, decreased /pinch/ADL. Please see last note, dated 22,  for last objective measures as well as goal achievements/modifications. Pt was released to RTW and did not return to therapy. Pt's POC  on 1/3/2023. Current status is not known.     Discharge OT services to [facility]    SAILAJA Brian CHT

## 2023-05-03 ENCOUNTER — CLINICAL SUPPORT (OUTPATIENT)
Dept: REHABILITATION | Facility: HOSPITAL | Age: 58
End: 2023-05-03
Payer: COMMERCIAL

## 2023-05-03 DIAGNOSIS — Z98.1 HISTORY OF LUMBAR FUSION: ICD-10-CM

## 2023-05-03 DIAGNOSIS — M62.830 SPASM OF BACK MUSCLES: ICD-10-CM

## 2023-05-03 DIAGNOSIS — M62.89 PSOAS SYNDROME: ICD-10-CM

## 2023-05-03 DIAGNOSIS — M54.50 PAIN IN LEFT LUMBAR REGION OF BACK: ICD-10-CM

## 2023-05-03 DIAGNOSIS — M51.36 LUMBAR DEGENERATIVE DISC DISEASE: ICD-10-CM

## 2023-05-03 DIAGNOSIS — Z74.09 IMPAIRED FUNCTIONAL MOBILITY AND ACTIVITY TOLERANCE: Primary | ICD-10-CM

## 2023-05-03 PROCEDURE — 97110 THERAPEUTIC EXERCISES: CPT | Mod: PN

## 2023-05-03 NOTE — PROGRESS NOTES
"[facility]   Physical Therapy Treatment Note     Name: Lashell Mcwilliams Maximus  Clinic Number: [id number]    Therapy Diagnosis:   Encounter Diagnoses   Name Primary?    Impaired functional mobility and activity tolerance Yes    Pain in left lumbar region of back     Spasm of back muscles     Psoas syndrome     Lumbar degenerative disc disease     History of lumbar fusion      Physician: Enrrique Hill APRN    Visit Date: 5/3/2023    Physician Orders: PT Eval and Treat   Medical Diagnosis from Referral: Lumbar degenerative disc disease; Pain in left lumbar region of back; History of lumbar fusion; Psoas syndrome; Spasm of back muscles   Evaluation Date: 4/19/2023  Authorization Period Expiration: 12/31/2023   Plan of Care Expiration: 6/1/2023  Progress Note Due: 6/1/2023  Visit # / Visits authorized: 2/ 19 (3 total)      FOTO: Next survey due week of 5/1/2023     Precautions: history of lumbar fusion      PTA Visit #: 0/5     Time In: 1108  Time Out: 1202  Total Billable Time: 49 minutes    SUBJECTIVE     Pt reports: "Yesterday I didn't hurt at all but over the weekend it was like it was back where it was started.  The stretching helps.."  She was compliant with home exercise program.  Response to previous treatment: "Felt fine"  Functional change: Increased walking tolerance    Pain: 0/10  Location: left back      OBJECTIVE     Objective Measures updated at progress report unless specified.     Treatment     Lashell received the treatments listed below:      therapeutic exercises to develop strength, endurance, ROM, flexibility, posture, and core stabilization for 49 minutes including:  Recumbent bike lvl3 x8'  Standing gastroc stretches 3x30s  Seated hamstring stretch 3x30s ea   Piriformis stretch 3x30s ea   Thoracolumbar stretches 10x3s ea    Physioball flexion rollout    Side-bending    Rotation    Extension  Standing core strengthening using green theraband x 20 ea   Shoulder " "extension   Low row   Shoulder external rotation with scapular retraction  Supine hip flexor stretch 3x30s ea   Lower trunk rotation on Physioball 10x3s ea   Posterior pelvic tilt x15    Core isometric w/physioball x15 ea     Dying bug     Alternate arm & leg    To be added:   Prone extension exercises and alternate arm & leg lifts (either prone or quadruped); postural strengthening; body mechanics (see plan)    Pt defers manual techniques and modalities post, stating "It feels good just from doing the exercises."    Patient Education and Home Exercises     Home Exercises Provided and Patient Education Provided     Education provided:   - The patient was instructed in additional exercises as noted above in bold print.  Discussed modifications for sweeping/mopping to reduce stress on lower back.      Written Home Exercises Provided: Patient instructed to cont prior HEP. Exercises were reviewed and Lashell was able to demonstrate them prior to the end of the session.  Lashell demonstrated good  understanding of the education provided. See EMR under Patient Instructions for exercises provided during therapy sessions    ASSESSMENT     Lashell Stroud presents today with improved symptoms and improved flexibility.  She continues to experience flare ups of symptoms but this is becoming less frequent.      Lashell Is progressing well towards her goals.   Pt prognosis is Good.     Pt will continue to benefit from skilled outpatient physical therapy to address the deficits listed in the problem list box on initial evaluation, provide pt/family education and to maximize pt's level of independence in the home and community environment.     Pt's spiritual, cultural and educational needs considered and pt agreeable to plan of care and goals.     Anticipated barriers to physical therapy: possible work schedule conflict    Goals:  Short Term Goals: 3 weeks   1) Facilitate improved lower quadrant flexibility Progressing  2) Decrease " tenderness to minimal Ongoing (not assessed this date)  3) Patient will initiate walking after sitting > 30 min without increased discomfort 4 of 10 trials Minimal progress  4) Patient will stand > 20 min without increased pain Minimal progress     Long Term Goals: 6 weeks   1) Patient will consistently initiate walking after sitting at least 1 hour without increased pain Ongoing  2) Patient will consistently stand > 30 min without increased pain Ongoing  3) Patient will resume use of elliptical exerciser without increased pain Ongoing  4) Patient will resume walking 1-2 miles per day without increased pain Ongoing  5) Improve functional deficit to < 25% as indicated by FOTO lumbar spine survey Ongoing  6) Patient will be independent in HEP for ROM, flexibility, and core strengthening Progressing    PLAN     POC: Outpatient Physical Therapy 2 times weekly for 6 weeks to include the following interventions: Electrical Stimulation IFC/TENS, Manual Therapy, Moist Heat/ Ice, Patient Education, Therapeutic Exercise, and Functional Dry Needling.    Add prone spinal extension exercises and prone/quadruped alternate arm and leg lifts in subsequent sessions. Will provide update HEP as applicable. Review body mechanics for ADLs, work duties, and lifting. Begin working on increasing standing and ambulation tolerance in subsequent sessions (increasing bike and then to treadmill).    Lorie Muniz, PT

## 2023-05-05 ENCOUNTER — CLINICAL SUPPORT (OUTPATIENT)
Dept: REHABILITATION | Facility: HOSPITAL | Age: 58
End: 2023-05-05
Payer: COMMERCIAL

## 2023-05-05 DIAGNOSIS — M54.50 PAIN IN LEFT LUMBAR REGION OF BACK: ICD-10-CM

## 2023-05-05 DIAGNOSIS — M62.89 PSOAS SYNDROME: ICD-10-CM

## 2023-05-05 DIAGNOSIS — Z74.09 IMPAIRED FUNCTIONAL MOBILITY AND ACTIVITY TOLERANCE: Primary | ICD-10-CM

## 2023-05-05 DIAGNOSIS — M51.36 LUMBAR DEGENERATIVE DISC DISEASE: ICD-10-CM

## 2023-05-05 DIAGNOSIS — Z98.1 HISTORY OF LUMBAR FUSION: ICD-10-CM

## 2023-05-05 DIAGNOSIS — M62.830 SPASM OF BACK MUSCLES: ICD-10-CM

## 2023-05-05 PROCEDURE — 97110 THERAPEUTIC EXERCISES: CPT | Mod: PN

## 2023-05-05 NOTE — PROGRESS NOTES
"[facility]   Physical Therapy Treatment Note     Name: Lashell Stroud  Clinic Number: [id number]    Therapy Diagnosis:   Encounter Diagnoses   Name Primary?    Impaired functional mobility and activity tolerance Yes    Pain in left lumbar region of back     Spasm of back muscles     Psoas syndrome     Lumbar degenerative disc disease     History of lumbar fusion      Physician: Enrrique Hill APRN    Visit Date: 5/5/2023    Physician Orders: PT Eval and Treat   Medical Diagnosis from Referral: Lumbar degenerative disc disease; Pain in left lumbar region of back; History of lumbar fusion; Psoas syndrome; Spasm of back muscles   Evaluation Date: 4/19/2023  Authorization Period Expiration: 12/31/2023   Plan of Care Expiration: 6/1/2023  Progress Note Due: 6/1/2023  Visit # / Visits authorized: 3/ 19 (4 total)      FOTO: Next survey due week of 5/1/2023     Precautions: history of lumbar fusion      PTA Visit #: 0/5     Time In: 0903  Time Out: 1000  Total Billable Time: 53 minutes    SUBJECTIVE     Pt reports: "I don't have as much stiffness.  It's helping."  She was compliant with home exercise program.  Response to previous treatment: "A little soreness but not much."  Functional change: Increased walking tolerance    Pain: 0/10  Location: left back      OBJECTIVE     Objective Measures updated at progress report unless specified.     Treatment     Lashell received the treatments listed below:      therapeutic exercises to develop strength, endurance, ROM, flexibility, posture, and core stabilization for 53 minutes including:  Treadmill @ 1.8 mph x8'  Standing gastroc stretches 3x30s  Seated hamstring stretch 3x30s ea   Piriformis stretch 3x30s ea   Thoracolumbar stretches 10x3s ea    Physioball flexion rollout    Side-bending    Rotation    Extension  Standing core strengthening using green theraband x 20 ea   Shoulder extension   Low row   Shoulder external rotation with " "scapular retraction  Supine hip flexor stretch 3x30s ea   Lower trunk rotation on Physioball 10x3s ea   Posterior pelvic tilt x15   Double knee to chest using physioball x 10     Core isometric w/physioball x15 ea     Dying bug     Alternate arm & leg   Prone:     On elbows x 1'    Alt arm/leg lift x 10 ea    To be added:    postural strengthening; body mechanics (see plan). Add squats with gluteus medius bias, gluteus medius dips for core strengthening    Pt defers manual techniques and modalities post, stating "It feels good just from doing the exercises."    Patient Education and Home Exercises     Home Exercises Provided and Patient Education Provided     Education provided:   - The patient was instructed in additional exercises as noted above in bold print.  Verbally instructed patient in golfer's lift for light items and in correct technique for floor to thigh lift.      Written Home Exercises Provided: Patient instructed to cont prior HEP. Exercises were reviewed and Lashell was able to demonstrate them prior to the end of the session.  Lashell demonstrated good  understanding of the education provided. See EMR under Patient Instructions for exercises provided during therapy sessions    ASSESSMENT     Symptoms continue to improve per patient's verbal report.  She reports only minimal soreness following sessions.  Will benefit from concentrated instruction in body mechanics for work tasks as well as continuing stretching and core strengthening in order to enhance symptom resolution and prevention of further problems     Lashell Is progressing well towards her goals.   Pt prognosis is Good.     Pt will continue to benefit from skilled outpatient physical therapy to address the deficits listed in the problem list box on initial evaluation, provide pt/family education and to maximize pt's level of independence in the home and community environment.     Pt's spiritual, cultural and educational needs considered and pt " agreeable to plan of care and goals.     Anticipated barriers to physical therapy: possible work schedule conflict    Goals:  Short Term Goals: 3 weeks   1) Facilitate improved lower quadrant flexibility Progressing  2) Decrease tenderness to minimal Progressing  3) Patient will initiate walking after sitting > 30 min without increased discomfort 4 of 10 trials Progressing  4) Patient will stand > 20 min without increased pain Minimal progress     Long Term Goals: 6 weeks   1) Patient will consistently initiate walking after sitting at least 1 hour without increased pain Minimal progress  2) Patient will consistently stand > 30 min without increased pain Ongoing  3) Patient will resume use of elliptical exerciser without increased pain Ongoing  4) Patient will resume walking 1-2 miles per day without increased pain Minimal progress  5) Improve functional deficit to < 25% as indicated by FOTO lumbar spine survey Ongoing  6) Patient will be independent in HEP for ROM, flexibility, and core strengthening Progressing    PLAN     POC: Outpatient Physical Therapy 2 times weekly for 6 weeks to include the following interventions: Electrical Stimulation IFC/TENS, Manual Therapy, Moist Heat/ Ice, Patient Education, Therapeutic Exercise, and Functional Dry Needling.    Add prone spinal extension exercises and prone/quadruped alternate arm and leg lifts in subsequent sessions. Will provide update HEP as applicable. Practice body mechanics for ADLs, work duties, and lifting. Transition to treadmill activity.  Add squats with gluteus medius bias, gluteus medius dips for core strengthening      Lorie Muniz, PT

## 2023-05-12 ENCOUNTER — TELEPHONE (OUTPATIENT)
Dept: REHABILITATION | Facility: HOSPITAL | Age: 58
End: 2023-05-12

## 2023-07-03 ENCOUNTER — DOCUMENTATION ONLY (OUTPATIENT)
Dept: REHABILITATION | Facility: HOSPITAL | Age: 58
End: 2023-07-03
Payer: COMMERCIAL

## 2023-07-03 DIAGNOSIS — M62.830 SPASM OF BACK MUSCLES: ICD-10-CM

## 2023-07-03 DIAGNOSIS — M62.89 PSOAS SYNDROME: ICD-10-CM

## 2023-07-03 DIAGNOSIS — M54.50 PAIN IN LEFT LUMBAR REGION OF BACK: ICD-10-CM

## 2023-07-03 DIAGNOSIS — Z98.1 HISTORY OF LUMBAR FUSION: ICD-10-CM

## 2023-07-03 DIAGNOSIS — M51.36 LUMBAR DEGENERATIVE DISC DISEASE: Primary | ICD-10-CM

## 2023-07-03 DIAGNOSIS — Z74.09 IMPAIRED FUNCTIONAL MOBILITY AND ACTIVITY TOLERANCE: ICD-10-CM

## 2023-07-03 NOTE — PROGRESS NOTES
"[facility]  PT Discharge Note    Name: Lashell Stroud  Clinic Number: [id number]    Therapy Diagnosis:   Encounter Diagnoses   Name Primary?    Lumbar degenerative disc disease Yes    Pain in left lumbar region of back     Psoas syndrome     Spasm of back muscles     Impaired functional mobility and activity tolerance     History of lumbar fusion      Physician: Enrrique Hill APRN     Physician Orders: PT Eval and Treat   Medical Diagnosis from Referral: Lumbar degenerative disc disease; Pain in left lumbar region of back; History of lumbar fusion; Psoas syndrome; Spasm of back muscles   Evaluation Date: 4/19/2023    Date of Last visit: 5/5/2023  Total Visits Received: 4    ASSESSMENT      Patient made fair progress in PT despite missing multiple appointments.  After missing 2 appointments, she contacted office requesting discharge stating she was feeling better.  See daily treatment notes for progression.    Discharge reason: Patient requested discharge.  Reports feeling "so much better"    Discharge FOTO Score: N/A    Goals: as documented in final treatment note  Short Term Goals: 3 weeks   1) Facilitate improved lower quadrant flexibility Progressing  2) Decrease tenderness to minimal Progressing  3) Patient will initiate walking after sitting > 30 min without increased discomfort 4 of 10 trials Progressing  4) Patient will stand > 20 min without increased pain Minimal progress     Long Term Goals: 6 weeks   1) Patient will consistently initiate walking after sitting at least 1 hour without increased pain Minimal progress  2) Patient will consistently stand > 30 min without increased pain Ongoing  3) Patient will resume use of elliptical exerciser without increased pain Ongoing  4) Patient will resume walking 1-2 miles per day without increased pain Minimal progress  5) Improve functional deficit to < 25% as indicated by FOTO lumbar spine survey Ongoing  6) Patient will be " independent in HEP for ROM, flexibility, and core strengthening Progressing    PLAN   This patient is discharged from Physical Therapy      Lorie Muniz, PT

## 2023-08-23 ENCOUNTER — OFFICE VISIT (OUTPATIENT)
Dept: ORTHOPEDICS | Facility: CLINIC | Age: 58
End: 2023-08-23
Payer: COMMERCIAL

## 2023-08-23 VITALS — HEIGHT: 64 IN | WEIGHT: 158 LBS | BODY MASS INDEX: 26.98 KG/M2

## 2023-08-23 DIAGNOSIS — M18.11 ARTHRITIS OF CARPOMETACARPAL (CMC) JOINT OF RIGHT THUMB: Primary | ICD-10-CM

## 2023-08-23 PROCEDURE — 1159F PR MEDICATION LIST DOCUMENTED IN MEDICAL RECORD: ICD-10-PCS | Mod: CPTII,S$GLB,, | Performed by: ORTHOPAEDIC SURGERY

## 2023-08-23 PROCEDURE — 99213 OFFICE O/P EST LOW 20 MIN: CPT | Mod: 25,S$GLB,, | Performed by: ORTHOPAEDIC SURGERY

## 2023-08-23 PROCEDURE — 3008F PR BODY MASS INDEX (BMI) DOCUMENTED: ICD-10-PCS | Mod: CPTII,S$GLB,, | Performed by: ORTHOPAEDIC SURGERY

## 2023-08-23 PROCEDURE — 99999 PR PBB SHADOW E&M-EST. PATIENT-LVL III: CPT | Mod: PBBFAC,,, | Performed by: ORTHOPAEDIC SURGERY

## 2023-08-23 PROCEDURE — 20600 DRAIN/INJ JOINT/BURSA W/O US: CPT | Mod: RT,S$GLB,, | Performed by: ORTHOPAEDIC SURGERY

## 2023-08-23 PROCEDURE — 20600 SMALL JOINT ASPIRATION/INJECTION: R THUMB CMC: ICD-10-PCS | Mod: RT,S$GLB,, | Performed by: ORTHOPAEDIC SURGERY

## 2023-08-23 PROCEDURE — 99213 PR OFFICE/OUTPT VISIT, EST, LEVL III, 20-29 MIN: ICD-10-PCS | Mod: 25,S$GLB,, | Performed by: ORTHOPAEDIC SURGERY

## 2023-08-23 PROCEDURE — 3008F BODY MASS INDEX DOCD: CPT | Mod: CPTII,S$GLB,, | Performed by: ORTHOPAEDIC SURGERY

## 2023-08-23 PROCEDURE — 99999 PR PBB SHADOW E&M-EST. PATIENT-LVL III: ICD-10-PCS | Mod: PBBFAC,,, | Performed by: ORTHOPAEDIC SURGERY

## 2023-08-23 PROCEDURE — 1159F MED LIST DOCD IN RCRD: CPT | Mod: CPTII,S$GLB,, | Performed by: ORTHOPAEDIC SURGERY

## 2023-08-23 RX ORDER — TRIAMCINOLONE ACETONIDE 40 MG/ML
40 INJECTION, SUSPENSION INTRA-ARTICULAR; INTRAMUSCULAR
Status: DISCONTINUED | OUTPATIENT
Start: 2023-08-23 | End: 2023-08-23 | Stop reason: HOSPADM

## 2023-08-23 RX ADMIN — TRIAMCINOLONE ACETONIDE 40 MG: 40 INJECTION, SUSPENSION INTRA-ARTICULAR; INTRAMUSCULAR at 10:08

## 2023-08-23 NOTE — PROGRESS NOTES
Ms. Stroud returns to clinic today.  Has a history of right thumb CMC arthritis.  She is here today to undergo repeat injection.  Has no new complaints     Physical exam:  Examination of the right hand reveals that there is no edema.  There is prominence of the CMC joint.  Palpation over that area does produce tenderness.  She has a positive grind test.  She does report grossly intact sensation and capillary refill is less than 2 seconds     Assessment:  Right thumb CMC arthritis     Plan:    1.  After informed consent was obtained injection was placed to the right thumb CMC joint.  The patient tolerated that well    2.  I have discussed the possibility of CMC arthroplasty at some point in the future as it does appear that these injections are becoming less effective    3.  She will follow up with me on a p.r.n. basis.  I have her next follow-up she will have repeat x-ray of her right hand

## 2023-08-23 NOTE — PROCEDURES
Small Joint Aspiration/Injection: R thumb CMC    Date/Time: 8/23/2023 10:20 AM    Performed by: Stanley Rivero MD  Authorized by: Stanley Rivero MD    Consent Done?:  Yes (Verbal)  Indications:  Pain  Site marked: the procedure site was marked    Timeout: prior to procedure the correct patient, procedure, and site was verified    Prep: patient was prepped and draped in usual sterile fashion      Local anesthesia used?: No    Location:  Thumb  Site:  R thumb CMC (Right thumb CMC joint)  Ultrasonic guidance for needle placement?: No    Medications:  40 mg triamcinolone acetonide 40 mg/mL  Patient tolerance:  Patient tolerated the procedure well with no immediate complications

## 2023-09-16 DIAGNOSIS — Z79.899 LONG-TERM USE OF PLAQUENIL: ICD-10-CM

## 2023-09-16 DIAGNOSIS — M35.3 PMR (POLYMYALGIA RHEUMATICA): ICD-10-CM

## 2023-09-16 DIAGNOSIS — G89.4 CHRONIC PAIN SYNDROME: ICD-10-CM

## 2023-09-16 DIAGNOSIS — E03.9 HYPOTHYROIDISM, UNSPECIFIED TYPE: ICD-10-CM

## 2023-09-16 DIAGNOSIS — I10 ESSENTIAL HYPERTENSION: ICD-10-CM

## 2023-09-16 DIAGNOSIS — M19.90 INFLAMMATORY ARTHRITIS: ICD-10-CM

## 2023-09-19 RX ORDER — TRAMADOL HYDROCHLORIDE 50 MG/1
50 TABLET ORAL EVERY 6 HOURS PRN
Qty: 120 TABLET | Refills: 5 | Status: SHIPPED | OUTPATIENT
Start: 2023-09-19 | End: 2024-03-29

## 2023-10-07 ENCOUNTER — PATIENT MESSAGE (OUTPATIENT)
Dept: RHEUMATOLOGY | Facility: CLINIC | Age: 58
End: 2023-10-07
Payer: COMMERCIAL

## 2023-10-16 ENCOUNTER — PATIENT MESSAGE (OUTPATIENT)
Dept: ORTHOPEDICS | Facility: CLINIC | Age: 58
End: 2023-10-16
Payer: COMMERCIAL

## 2023-10-27 ENCOUNTER — OFFICE VISIT (OUTPATIENT)
Dept: RHEUMATOLOGY | Facility: CLINIC | Age: 58
End: 2023-10-27
Payer: COMMERCIAL

## 2023-10-27 VITALS
BODY MASS INDEX: 27.03 KG/M2 | HEART RATE: 76 BPM | SYSTOLIC BLOOD PRESSURE: 131 MMHG | HEIGHT: 64 IN | WEIGHT: 158.31 LBS | DIASTOLIC BLOOD PRESSURE: 79 MMHG

## 2023-10-27 DIAGNOSIS — R79.82 CRP ELEVATED: ICD-10-CM

## 2023-10-27 DIAGNOSIS — M35.3 PMR (POLYMYALGIA RHEUMATICA): ICD-10-CM

## 2023-10-27 DIAGNOSIS — M19.042 OSTEOARTHRITIS OF BOTH HANDS, UNSPECIFIED OSTEOARTHRITIS TYPE: ICD-10-CM

## 2023-10-27 DIAGNOSIS — D83.9 CVID (COMMON VARIABLE IMMUNODEFICIENCY): ICD-10-CM

## 2023-10-27 DIAGNOSIS — M79.10 MYALGIA: ICD-10-CM

## 2023-10-27 DIAGNOSIS — E03.9 HYPOTHYROIDISM, UNSPECIFIED TYPE: Primary | ICD-10-CM

## 2023-10-27 DIAGNOSIS — B37.0 THRUSH: ICD-10-CM

## 2023-10-27 DIAGNOSIS — G89.4 CHRONIC PAIN SYNDROME: ICD-10-CM

## 2023-10-27 DIAGNOSIS — M25.50 ARTHRALGIA, UNSPECIFIED JOINT: ICD-10-CM

## 2023-10-27 DIAGNOSIS — M19.041 OSTEOARTHRITIS OF BOTH HANDS, UNSPECIFIED OSTEOARTHRITIS TYPE: ICD-10-CM

## 2023-10-27 PROCEDURE — 1159F PR MEDICATION LIST DOCUMENTED IN MEDICAL RECORD: ICD-10-PCS | Mod: CPTII,S$GLB,, | Performed by: INTERNAL MEDICINE

## 2023-10-27 PROCEDURE — 3075F SYST BP GE 130 - 139MM HG: CPT | Mod: CPTII,S$GLB,, | Performed by: INTERNAL MEDICINE

## 2023-10-27 PROCEDURE — 99999 PR PBB SHADOW E&M-EST. PATIENT-LVL III: ICD-10-PCS | Mod: PBBFAC,,, | Performed by: INTERNAL MEDICINE

## 2023-10-27 PROCEDURE — 3078F PR MOST RECENT DIASTOLIC BLOOD PRESSURE < 80 MM HG: ICD-10-PCS | Mod: CPTII,S$GLB,, | Performed by: INTERNAL MEDICINE

## 2023-10-27 PROCEDURE — 3008F PR BODY MASS INDEX (BMI) DOCUMENTED: ICD-10-PCS | Mod: CPTII,S$GLB,, | Performed by: INTERNAL MEDICINE

## 2023-10-27 PROCEDURE — 3078F DIAST BP <80 MM HG: CPT | Mod: CPTII,S$GLB,, | Performed by: INTERNAL MEDICINE

## 2023-10-27 PROCEDURE — 99999 PR PBB SHADOW E&M-EST. PATIENT-LVL III: CPT | Mod: PBBFAC,,, | Performed by: INTERNAL MEDICINE

## 2023-10-27 PROCEDURE — 99214 PR OFFICE/OUTPT VISIT, EST, LEVL IV, 30-39 MIN: ICD-10-PCS | Mod: S$GLB,,, | Performed by: INTERNAL MEDICINE

## 2023-10-27 PROCEDURE — 3075F PR MOST RECENT SYSTOLIC BLOOD PRESS GE 130-139MM HG: ICD-10-PCS | Mod: CPTII,S$GLB,, | Performed by: INTERNAL MEDICINE

## 2023-10-27 PROCEDURE — 1160F PR REVIEW ALL MEDS BY PRESCRIBER/CLIN PHARMACIST DOCUMENTED: ICD-10-PCS | Mod: CPTII,S$GLB,, | Performed by: INTERNAL MEDICINE

## 2023-10-27 PROCEDURE — 1159F MED LIST DOCD IN RCRD: CPT | Mod: CPTII,S$GLB,, | Performed by: INTERNAL MEDICINE

## 2023-10-27 PROCEDURE — 3008F BODY MASS INDEX DOCD: CPT | Mod: CPTII,S$GLB,, | Performed by: INTERNAL MEDICINE

## 2023-10-27 PROCEDURE — 1160F RVW MEDS BY RX/DR IN RCRD: CPT | Mod: CPTII,S$GLB,, | Performed by: INTERNAL MEDICINE

## 2023-10-27 PROCEDURE — 99214 OFFICE O/P EST MOD 30 MIN: CPT | Mod: S$GLB,,, | Performed by: INTERNAL MEDICINE

## 2023-10-27 RX ORDER — FLUCONAZOLE 150 MG/1
150 TABLET ORAL DAILY
Qty: 3 TABLET | Refills: 3 | Status: SHIPPED | OUTPATIENT
Start: 2023-10-27 | End: 2023-10-30

## 2023-10-27 RX ORDER — MELOXICAM 15 MG/1
15 TABLET ORAL DAILY
Qty: 90 TABLET | Refills: 3 | Status: ON HOLD | OUTPATIENT
Start: 2023-10-27 | End: 2024-03-10 | Stop reason: HOSPADM

## 2023-10-27 NOTE — PROGRESS NOTES
Subjective:       Patient ID: Lashell Stroud is a 58 y.o. female.    Chief Complaint: Disease Management    Follow  Up: 58 year old female who presents to clinic for follow up on PMR and osteoarthritis.  She had her L cmc joint repair.She has noticed increased pain in her bilateral CMC joints and having difficulty with gripping and opening objects. No previous joint injections or occupational therapy. She has constant aching/stiffness in her PIP and DIP joints as well, which is unchanged. She is a  and does a lot of repetitive movements with her hands. No shoulder/hip pain/stiffness. She has been complaint with plaquenil, mobic, and tramadol tid PRN for severe pain with adequate control of her symptoms. She could not tolerate lamisil so she d/deejay this. She has chronic fatigue for many years.    No serious infections since her last visit.     Her TSH normalized    Current tx  :stopped tx due to ineffective Plaquenil  2. mobic  3. Tramadol        Review of Systems   Constitutional:  Positive for activity change. Negative for unexpected weight change.   HENT:  Negative for mouth sores.    Eyes:  Negative for redness.   Respiratory:  Negative for cough and shortness of breath.    Cardiovascular:  Negative for chest pain.   Gastrointestinal:  Positive for constipation. Negative for diarrhea.   Genitourinary:  Negative for genital sores.   Musculoskeletal:  Positive for arthralgias.   Skin:  Negative for rash.   Hematological:  Does not bruise/bleed easily.         Objective:     Vitals:    10/27/23 1109   BP: 131/79   Pulse: 76       Past Medical History:   Diagnosis Date    Constipation 12/06/2017    COVID-19 virus infection     pos 7/20/20    Family history of colon cancer in mother 08/17/2016    History of chicken pox     Hypertension     Palindromic rheumatism involving tarsus     Psoas tendinitis of both sides 04/01/2019    Shoulder pain, bilateral 03/08/2016    Spinal stenosis of lumbar region  2019     Past Surgical History:   Procedure Laterality Date    ARTHROSCOPIC REPAIR OF ROTATOR CUFF OF SHOULDER Left 2020    Procedure: REPAIR, ROTATOR CUFF, ARTHROSCOPIC;  Surgeon: Sb Reis II, MD;  Location: [facility] OR;  Service: Orthopedics;  Laterality: Left;    ARTHROSCOPY OF SHOULDER WITH DECOMPRESSION OF SUBACROMIAL SPACE Left 2020    Procedure: ARTHROSCOPY, SHOULDER, WITH SUBACROMIAL SPACE DECOMPRESSION;  Surgeon: Sb Reis II, MD;  Location: [facility] OR;  Service: Orthopedics;  Laterality: Left;     SECTION      CHOLECYSTECTOMY      COLONOSCOPY N/A 2016    Procedure: COLONOSCOPY;  Surgeon: Hesham Enriquez MD;  Location: [facility] ENDO;  Service: Endoscopy;  Laterality: N/A;    COLONOSCOPY N/A 2017    Procedure: COLONOSCOPY;  Surgeon: Hesham Enriquez MD;  Location: [facility] ENDO;  Service: Endoscopy;  Laterality: N/A;    COLONOSCOPY N/A 10/15/2021    Procedure: COLONOSCOPY;  Surgeon: Hesham Enriquez MD;  Location: [facility] ENDO;  Service: Endoscopy;  Laterality: N/A;    EPIDURAL STEROID INJECTION INTO LUMBAR SPINE N/A 2019    Procedure: Injection-steroid-epidural-lumbar L4/5;  Surgeon: Cristhian Gee MD;  Location: [facility] OR;  Service: Pain Management;  Laterality: N/A;    EPIDURAL STEROID INJECTION INTO LUMBAR SPINE N/A 2019    Procedure: Injection-steroid-epidural-lumbar;  Surgeon: Nigel Zuniga MD;  Location: [facility] OR;  Service: Pain Management;  Laterality: N/A;  L4/5 interlaminar    EXTREME LATERAL INTERBODY FUSION (XLIF) OF SPINE Left 2019    Procedure: FUSION, SPINE, XLIF L3-4;  Surgeon: Cleve Bowers MD;  Location: [facility] OR;  Service: Orthopedics;  Laterality: Left;    HAND ARTHROPLASTY Left 10/4/2022    Procedure: Left thumb CMC arthroplasty;  Surgeon: Stanley Rivero MD;  Location: [facility] OR;  Service: Orthopedics;  Laterality: Left;  Anesthesia:  General with a single shot supraclavicular Exparel block    LAMINECTOMY USING  MINIMALLY INVASIVE TECHNIQUE N/A 11/18/2019    Procedure: LAMINECTOMY, SPINE, MINIMALLY INVASIVE L3-L4;  Surgeon: Cleve Bowers MD;  Location: [facility];  Service: Orthopedics;  Laterality: N/A;    MINIMALLY INVASIVE FUSION OF SPINE N/A 11/18/2019    Procedure: FUSION, SPINE, MINIMALLY INVASIVE L3-L4;  Surgeon: Cleve Bowers MD;  Location: [facility] OR;  Service: Orthopedics;  Laterality: N/A;    TUBAL LIGATION N/A 1986          Physical Exam   Eyes: Right conjunctiva is not injected. Left conjunctiva is not injected.   Neck: No JVD present. No thyromegaly present.   Cardiovascular: Normal rate.   Pulmonary/Chest: Effort normal.   Abdominal:   No abdominal exam completed. Unable to delete statement above.   Musculoskeletal:         General: Tenderness and deformity present.      Right shoulder: Normal.      Left shoulder: Normal.      Right elbow: Normal.      Left elbow: Normal.      Right wrist: Normal.      Left wrist: Normal.      Right knee: Normal.      Left knee: Normal.   Lymphadenopathy:     She has no cervical adenopathy.   Neurological: Gait normal.   Skin: No rash noted.   Psychiatric: Mood and affect normal.       Right Side Rheumatological Exam     Examination finds the shoulder, elbow, wrist, knee, 1st MCP, 2nd MCP, 3rd MCP, 4th MCP and 5th MCP normal.    The patient is tender to palpation of the 1st PIP, 2nd PIP, 3rd PIP, 4th PIP, 5th PIP, 1st CMC, 2nd DIP, 3rd DIP, 4th DIP and 5th DIP    The patient has an enlarged 1st PIP, 2nd PIP, 3rd PIP, 4th PIP, 5th PIP, 1st CMC, 2nd DIP, 3rd DIP, 4th DIP and 5th DIP    Left Side Rheumatological Exam     Examination finds the shoulder, elbow, wrist, knee, 1st MCP, 2nd MCP, 3rd MCP, 4th MCP and 5th MCP normal.    The patient is tender to palpation of the 1st PIP, 2nd PIP, 3rd PIP, 4th PIP, 5th PIP, 1st CMC, 2nd DIP, 3rd DIP, 4th DIP and 5th DIP.    The patient has an enlarged 1st PIP, 2nd PIP, 3rd PIP, 4th PIP, 5th PIP, 1st CMC, 2nd DIP, 3rd DIP, 4th DIP and 5th  DIP.          Results for orders placed or performed in visit on 10/13/23   C-Reactive Protein   Result Value Ref Range    CRP <0.50 0.00 - 0.90 mg/dL   Comprehensive Metabolic Panel   Result Value Ref Range    Sodium 140 136 - 145 mmol/L    Potassium 4.3 3.5 - 5.1 mmol/L    Chloride 101 95 - 110 mmol/L    CO2 34 (H) 22 - 31 mmol/L    Glucose 69 (L) 70 - 110 mg/dL    BUN 23 (H) 7 - 18 mg/dL    Creatinine 0.68 0.50 - 1.40 mg/dL    Calcium 9.5 8.4 - 10.2 mg/dL    Total Protein 6.2 6.0 - 8.4 g/dL    Albumin 4.2 3.5 - 5.2 g/dL    Total Bilirubin 0.6 0.2 - 1.3 mg/dL    Alkaline Phosphatase 60 38 - 145 U/L    AST 36 14 - 36 U/L    ALT 27 0 - 35 U/L    Anion Gap 5 5 - 12 mmol/L    eGFR >60 >60 mL/min/1.73 m^2   CBC Auto Differential   Result Value Ref Range    WBC 5.13 3.90 - 12.70 K/uL    RBC 4.57 4.00 - 5.40 M/uL    Hemoglobin 13.7 12.0 - 16.0 g/dL    Hematocrit 42.5 37.0 - 48.5 %    MCV 93 82 - 98 fL    MCH 30.0 27.0 - 31.0 pg    MCHC 32.2 32.0 - 36.0 g/dL    RDW 13.4 11.5 - 14.5 %    Platelets 265 150 - 450 K/uL    MPV 10.5 9.2 - 12.9 fL    Immature Granulocytes 0.2 0.0 - 0.5 %    Gran # (ANC) 2.5 1.8 - 7.7 K/uL    Immature Grans (Abs) 0.01 0.00 - 0.04 K/uL    Lymph # 1.8 1.0 - 4.8 K/uL    Mono # 0.5 0.3 - 1.0 K/uL    Eos # 0.2 0.0 - 0.5 K/uL    Baso # 0.08 0.00 - 0.20 K/uL    nRBC 0 0 /100 WBC    Gran % 49.7 38.0 - 73.0 %    Lymph % 34.9 18.0 - 48.0 %    Mono % 9.7 4.0 - 15.0 %    Eosinophil % 3.9 0.0 - 8.0 %    Basophil % 1.6 0.0 - 1.9 %    Differential Method Automated    Vitamin D   Result Value Ref Range    Vit D, 25-Hydroxy 37 30 - 96 ng/mL   Anti-Thyroglobulin Antibody   Result Value Ref Range    Thyroglobulin Ab Screen <0.9 0.0 - 4.0 IU/mL   T3   Result Value Ref Range    T3, Total 86 60 - 180 ng/dL   T4, Free   Result Value Ref Range    Free T4 1.36 0.78 - 2.19 ng/dL   Thyroid Peroxidase Antibody   Result Value Ref Range    Thyroperoxidase Antibodies <0.3 0.0 - 9.0 IU/mL   Sedimentation rate   Result Value Ref  Range    Sed Rate 10 0 - 29 mm/Hr   reviewed labs with patient during this visit        Assessment:       1. Hypothyroidism, unspecified type    2. Thrush    3. Arthralgia, unspecified joint    4. Myalgia    5. PMR (polymyalgia rheumatica)    6. CRP elevated    7. Osteoarthritis of both hands, unspecified osteoarthritis type    8. Chronic pain syndrome    9. CVID (common variable immunodeficiency)                Plan:       Hypothyroidism, unspecified type  -     meloxicam (MOBIC) 15 MG tablet; Take 1 tablet (15 mg total) by mouth once daily.  Dispense: 90 tablet; Refill: 3    Thrush  -     fluconazole (DIFLUCAN) 150 MG Tab; Take 1 tablet (150 mg total) by mouth once daily. for 12 days  Dispense: 3 tablet; Refill: 3  -     Sedimentation rate; Future; Expected date: 10/27/2023  -     C-Reactive Protein; Future; Expected date: 10/27/2023  -     Comprehensive Metabolic Panel; Future; Expected date: 10/27/2023  -     CBC Auto Differential; Future; Expected date: 10/27/2023    Arthralgia, unspecified joint  -     Sedimentation rate; Future; Expected date: 10/27/2023  -     C-Reactive Protein; Future; Expected date: 10/27/2023  -     Comprehensive Metabolic Panel; Future; Expected date: 10/27/2023  -     CBC Auto Differential; Future; Expected date: 10/27/2023    Myalgia  -     Sedimentation rate; Future; Expected date: 10/27/2023  -     C-Reactive Protein; Future; Expected date: 10/27/2023  -     Comprehensive Metabolic Panel; Future; Expected date: 10/27/2023  -     CBC Auto Differential; Future; Expected date: 10/27/2023    PMR (polymyalgia rheumatica)  -     meloxicam (MOBIC) 15 MG tablet; Take 1 tablet (15 mg total) by mouth once daily.  Dispense: 90 tablet; Refill: 3  -     Sedimentation rate; Future; Expected date: 10/27/2023  -     C-Reactive Protein; Future; Expected date: 10/27/2023  -     Comprehensive Metabolic Panel; Future; Expected date: 10/27/2023  -     CBC Auto Differential; Future; Expected date:  10/27/2023    CRP elevated  -     Sedimentation rate; Future; Expected date: 10/27/2023  -     C-Reactive Protein; Future; Expected date: 10/27/2023  -     Comprehensive Metabolic Panel; Future; Expected date: 10/27/2023  -     CBC Auto Differential; Future; Expected date: 10/27/2023    Osteoarthritis of both hands, unspecified osteoarthritis type  -     meloxicam (MOBIC) 15 MG tablet; Take 1 tablet (15 mg total) by mouth once daily.  Dispense: 90 tablet; Refill: 3  -     Sedimentation rate; Future; Expected date: 10/27/2023  -     C-Reactive Protein; Future; Expected date: 10/27/2023  -     Comprehensive Metabolic Panel; Future; Expected date: 10/27/2023  -     CBC Auto Differential; Future; Expected date: 10/27/2023    Chronic pain syndrome  -     meloxicam (MOBIC) 15 MG tablet; Take 1 tablet (15 mg total) by mouth once daily.  Dispense: 90 tablet; Refill: 3  -     Sedimentation rate; Future; Expected date: 10/27/2023  -     C-Reactive Protein; Future; Expected date: 10/27/2023  -     Comprehensive Metabolic Panel; Future; Expected date: 10/27/2023  -     CBC Auto Differential; Future; Expected date: 10/27/2023    CVID (common variable immunodeficiency)  -     meloxicam (MOBIC) 15 MG tablet; Take 1 tablet (15 mg total) by mouth once daily.  Dispense: 90 tablet; Refill: 3            Assessment:  57 year old female with  PMRoff  plaquenil  --osteoarthritis  --hypothyroidism  --chronic fatigue  --chronic pain syndrome on tramadol  --lumbar spinal stenosis    Plan:  1.  mobic 15 mg daily  2. Cont tramadol  I have checked [place] prescription monitoring program site and no unusual or abnormal behavior has occurred pt understand the risk and benefits of taking opioid medications and has decided to continue the medication.nd repeat labs in 12 weeks  4. Add robaxin prn  5. If her back pain returns xray ordered  F/u 6  month    More than 50% of the  30 minute encounter was spent face to face counseling the patient  regarding current status and future plan of care as well as side effects  of the medications. All questions were answered to patient's satisfaction also includes  non-face to face time preparing to see the patient (eg, review of tests), Obtaining and/or reviewing separately obtained history, Documenting clinical information in the electronic or other health record, Independently interpreting results

## 2023-10-30 DIAGNOSIS — B37.0 THRUSH: ICD-10-CM

## 2023-10-30 RX ORDER — FLUCONAZOLE 150 MG/1
150 TABLET ORAL DAILY
Qty: 12 TABLET | Refills: 0 | Status: SHIPPED | OUTPATIENT
Start: 2023-10-30 | End: 2023-11-11

## 2023-10-30 NOTE — TELEPHONE ENCOUNTER
----- Message from Shanta Rutherford sent at 10/30/2023  2:51 PM CDT -----  Contact: [facility]  Type:  Pharmacy Calling to Clarify an RX    Name of Caller:Amanda    Pharmacy Name:[facility]    Prescription Name:fluconazole (DIFLUCAN) 150 MG Tab    What do they need to clarify?:instructions and dosage    Best Call Back Number:    Additional Information: please call to advise  Thanks

## 2023-11-27 ENCOUNTER — HOSPITAL ENCOUNTER (OUTPATIENT)
Dept: RADIOLOGY | Facility: HOSPITAL | Age: 58
Discharge: HOME | End: 2023-11-27
Attending: ORTHOPAEDIC SURGERY
Payer: COMMERCIAL

## 2023-11-27 ENCOUNTER — OFFICE VISIT (OUTPATIENT)
Dept: ORTHOPEDICS | Facility: CLINIC | Age: 58
End: 2023-11-27
Payer: COMMERCIAL

## 2023-11-27 DIAGNOSIS — M18.11 ARTHRITIS OF CARPOMETACARPAL (CMC) JOINT OF RIGHT THUMB: ICD-10-CM

## 2023-11-27 DIAGNOSIS — M18.11 ARTHRITIS OF CARPOMETACARPAL (CMC) JOINT OF RIGHT THUMB: Primary | ICD-10-CM

## 2023-11-27 PROCEDURE — 1159F MED LIST DOCD IN RCRD: CPT | Mod: CPTII,S$GLB,, | Performed by: ORTHOPAEDIC SURGERY

## 2023-11-27 PROCEDURE — 99999 PR PBB SHADOW E&M-EST. PATIENT-LVL III: CPT | Mod: PBBFAC,,, | Performed by: ORTHOPAEDIC SURGERY

## 2023-11-27 PROCEDURE — 73130 X-RAY EXAM OF HAND: CPT | Mod: 26,RT,, | Performed by: RADIOLOGY

## 2023-11-27 PROCEDURE — 73130 XR HAND COMPLETE 3 VIEW RIGHT: ICD-10-PCS | Mod: 26,RT,, | Performed by: RADIOLOGY

## 2023-11-27 PROCEDURE — 73130 X-RAY EXAM OF HAND: CPT | Mod: TC,PO,RT

## 2023-11-27 PROCEDURE — 99213 OFFICE O/P EST LOW 20 MIN: CPT | Mod: S$GLB,,, | Performed by: ORTHOPAEDIC SURGERY

## 2023-11-27 PROCEDURE — 1159F PR MEDICATION LIST DOCUMENTED IN MEDICAL RECORD: ICD-10-PCS | Mod: CPTII,S$GLB,, | Performed by: ORTHOPAEDIC SURGERY

## 2023-11-27 PROCEDURE — 99999 PR PBB SHADOW E&M-EST. PATIENT-LVL III: ICD-10-PCS | Mod: PBBFAC,,, | Performed by: ORTHOPAEDIC SURGERY

## 2023-11-27 PROCEDURE — 99213 PR OFFICE/OUTPT VISIT, EST, LEVL III, 20-29 MIN: ICD-10-PCS | Mod: S$GLB,,, | Performed by: ORTHOPAEDIC SURGERY

## 2023-11-27 NOTE — H&P (VIEW-ONLY)
2023    Chief Complaint:  Chief Complaint   Patient presents with    Right Hand - Pain     Thumb - Discuss surgery       HPI:  Lashell Stroud is a 58 y.o. female, who presents to clinic today she has a history of CMC arthritis to the base of the thumb.  We have treated this conservatively.  She has had multiple injections.  She continues to have return of her symptoms.  She is here today to discuss further treatment options.    PMHX:  Past Medical History:   Diagnosis Date    Constipation 2017    COVID-19 virus infection     pos 20    Family history of colon cancer in mother 2016    History of chicken pox     Hypertension     Palindromic rheumatism involving tarsus     Psoas tendinitis of both sides 2019    Shoulder pain, bilateral 2016    Spinal stenosis of lumbar region 2019       PSHX:  Past Surgical History:   Procedure Laterality Date    ARTHROSCOPIC REPAIR OF ROTATOR CUFF OF SHOULDER Left 2020    Procedure: REPAIR, ROTATOR CUFF, ARTHROSCOPIC;  Surgeon: Sb Reis II, MD;  Location: [facility] OR;  Service: Orthopedics;  Laterality: Left;    ARTHROSCOPY OF SHOULDER WITH DECOMPRESSION OF SUBACROMIAL SPACE Left 2020    Procedure: ARTHROSCOPY, SHOULDER, WITH SUBACROMIAL SPACE DECOMPRESSION;  Surgeon: Sb Reis II, MD;  Location: [facility] OR;  Service: Orthopedics;  Laterality: Left;     SECTION      CHOLECYSTECTOMY      COLONOSCOPY N/A 2016    Procedure: COLONOSCOPY;  Surgeon: Hesham Enriquez MD;  Location: [facility] ENDO;  Service: Endoscopy;  Laterality: N/A;    COLONOSCOPY N/A 2017    Procedure: COLONOSCOPY;  Surgeon: Hesham Enriquez MD;  Location: [facility] ENDO;  Service: Endoscopy;  Laterality: N/A;    COLONOSCOPY N/A 10/15/2021    Procedure: COLONOSCOPY;  Surgeon: Hesham Enriquez MD;  Location: [facility] ENDO;  Service: Endoscopy;  Laterality: N/A;    EPIDURAL STEROID INJECTION INTO LUMBAR SPINE N/A 2019    Procedure:  Injection-steroid-epidural-lumbar L4/5;  Surgeon: Cristhian eGe MD;  Location: [facility] OR;  Service: Pain Management;  Laterality: N/A;    EPIDURAL STEROID INJECTION INTO LUMBAR SPINE N/A 7/31/2019    Procedure: Injection-steroid-epidural-lumbar;  Surgeon: Nigel Zuniga MD;  Location: [facility] OR;  Service: Pain Management;  Laterality: N/A;  L4/5 interlaminar    EXTREME LATERAL INTERBODY FUSION (XLIF) OF SPINE Left 11/18/2019    Procedure: FUSION, SPINE, XLIF L3-4;  Surgeon: Cleve Bowers MD;  Location: [facility] OR;  Service: Orthopedics;  Laterality: Left;    HAND ARTHROPLASTY Left 10/4/2022    Procedure: Left thumb CMC arthroplasty;  Surgeon: Stanley Rivero MD;  Location: [facility] OR;  Service: Orthopedics;  Laterality: Left;  Anesthesia:  General with a single shot supraclavicular Exparel block    LAMINECTOMY USING MINIMALLY INVASIVE TECHNIQUE N/A 11/18/2019    Procedure: LAMINECTOMY, SPINE, MINIMALLY INVASIVE L3-L4;  Surgeon: Cleve Bowers MD;  Location: [facility] OR;  Service: Orthopedics;  Laterality: N/A;    MINIMALLY INVASIVE FUSION OF SPINE N/A 11/18/2019    Procedure: FUSION, SPINE, MINIMALLY INVASIVE L3-L4;  Surgeon: Cleve Bowers MD;  Location: [facility];  Service: Orthopedics;  Laterality: N/A;    TUBAL LIGATION N/A 1986       FMHX:  Family History   Problem Relation Age of Onset    Cancer Mother     Hypertension Mother     Heart disease Father     Hypertension Father     Diabetes Father     No Known Problems Brother        SOCHX:  Social History     Tobacco Use    Smoking status: Never    Smokeless tobacco: Never   Substance Use Topics    Alcohol use: Not Currently       ALLERGIES:  Aspirin, Penicillins, and Adhesive    CURRENT MEDICATIONS:  Current Outpatient Medications on File Prior to Visit   Medication Sig Dispense Refill    cholecalciferol, vitamin D3, (VITAMIN D3) 50 mcg (2,000 unit) Cap Take 1 capsule by mouth once daily.      levothyroxine (SYNTHROID) 75 MCG tablet Take 1 tablet (75 mcg  total) by mouth before breakfast. 30 tablet 11    meloxicam (MOBIC) 15 MG tablet Take 1 tablet (15 mg total) by mouth once daily. 90 tablet 3    metoprolol succinate (TOPROL-XL) 25 MG 24 hr tablet TAKE 1 TABLET BY MOUTH once daily 90 tablet 1    RESTASIS 0.05 % ophthalmic emulsion Place 1 drop into both eyes 2 (two) times daily.      traMADoL (ULTRAM) 50 mg tablet TAKE 1 TABLET BY MOUTH EVERY 6 HOURS AS NEEDED FOR PAIN 120 tablet 5     No current facility-administered medications on file prior to visit.       REVIEW OF SYSTEMS:  ROS    GENERAL PHYSICAL EXAM:   [facility] 10/02/2015    GEN: well developed, well nourished, no acute distress   HENT: Normocephalic, atraumatic   EYES: No discharge, conjunctiva normal   NECK: Supple, non-tender   PULM: No wheezing, no respiratory distress   CV: RRR   ABD: Soft, non-tender    ORTHO EXAM:   Examination of the right hand and wrist reveals that there is prominence of the CMC joint.  Palpation over the joint does produce tenderness.  She does have a positive grind test.  She does report intact sensation in the median radial ulnar distribution.  She has a 2+ radial pulse    RADIOLOGY:   X-rays of the right hand were taken in clinic today.  There was noted to be a significant amount of arthritis at the CMC joint.  This will be severe in nature.    ASSESSMENT:   Right thumb CMC arthritis    PLAN:  1. I have discussed treatment with the patient.  I have discussed the possibility of CMC joint arthroplasty.  I did discuss the risks and benefits of the procedure.  After the discussion informed consent has been obtained     2. Will proceed with right thumb CMC suspension arthroplasty under general anesthesia with a single-shot supraclavicular block    3.  She will follow up with me 2 weeks postoperatively

## 2023-11-27 NOTE — PROGRESS NOTES
2023    Chief Complaint:  Chief Complaint   Patient presents with    Right Hand - Pain     Thumb - Discuss surgery       HPI:  Lashell Stroud is a 58 y.o. female, who presents to clinic today she has a history of CMC arthritis to the base of the thumb.  We have treated this conservatively.  She has had multiple injections.  She continues to have return of her symptoms.  She is here today to discuss further treatment options.    PMHX:  Past Medical History:   Diagnosis Date    Constipation 2017    COVID-19 virus infection     pos 20    Family history of colon cancer in mother 2016    History of chicken pox     Hypertension     Palindromic rheumatism involving tarsus     Psoas tendinitis of both sides 2019    Shoulder pain, bilateral 2016    Spinal stenosis of lumbar region 2019       PSHX:  Past Surgical History:   Procedure Laterality Date    ARTHROSCOPIC REPAIR OF ROTATOR CUFF OF SHOULDER Left 2020    Procedure: REPAIR, ROTATOR CUFF, ARTHROSCOPIC;  Surgeon: Sb Reis II, MD;  Location: [facility] OR;  Service: Orthopedics;  Laterality: Left;    ARTHROSCOPY OF SHOULDER WITH DECOMPRESSION OF SUBACROMIAL SPACE Left 2020    Procedure: ARTHROSCOPY, SHOULDER, WITH SUBACROMIAL SPACE DECOMPRESSION;  Surgeon: Sb Reis II, MD;  Location: [facility] OR;  Service: Orthopedics;  Laterality: Left;     SECTION      CHOLECYSTECTOMY      COLONOSCOPY N/A 2016    Procedure: COLONOSCOPY;  Surgeon: Hesham Enriquez MD;  Location: [facility] ENDO;  Service: Endoscopy;  Laterality: N/A;    COLONOSCOPY N/A 2017    Procedure: COLONOSCOPY;  Surgeon: Hesham Enriquez MD;  Location: [facility] ENDO;  Service: Endoscopy;  Laterality: N/A;    COLONOSCOPY N/A 10/15/2021    Procedure: COLONOSCOPY;  Surgeon: Hesham Enriquez MD;  Location: [facility] ENDO;  Service: Endoscopy;  Laterality: N/A;    EPIDURAL STEROID INJECTION INTO LUMBAR SPINE N/A 2019    Procedure:  Injection-steroid-epidural-lumbar L4/5;  Surgeon: Cristhian Gee MD;  Location: [facility] OR;  Service: Pain Management;  Laterality: N/A;    EPIDURAL STEROID INJECTION INTO LUMBAR SPINE N/A 7/31/2019    Procedure: Injection-steroid-epidural-lumbar;  Surgeon: Nigel Zuniga MD;  Location: [facility] OR;  Service: Pain Management;  Laterality: N/A;  L4/5 interlaminar    EXTREME LATERAL INTERBODY FUSION (XLIF) OF SPINE Left 11/18/2019    Procedure: FUSION, SPINE, XLIF L3-4;  Surgeon: Cleve Bowers MD;  Location: [facility] OR;  Service: Orthopedics;  Laterality: Left;    HAND ARTHROPLASTY Left 10/4/2022    Procedure: Left thumb CMC arthroplasty;  Surgeon: Stanley Rivero MD;  Location: [facility] OR;  Service: Orthopedics;  Laterality: Left;  Anesthesia:  General with a single shot supraclavicular Exparel block    LAMINECTOMY USING MINIMALLY INVASIVE TECHNIQUE N/A 11/18/2019    Procedure: LAMINECTOMY, SPINE, MINIMALLY INVASIVE L3-L4;  Surgeon: Cleve Bowers MD;  Location: [facility] OR;  Service: Orthopedics;  Laterality: N/A;    MINIMALLY INVASIVE FUSION OF SPINE N/A 11/18/2019    Procedure: FUSION, SPINE, MINIMALLY INVASIVE L3-L4;  Surgeon: Cleve Bowers MD;  [facility];  Service: Orthopedics;  Laterality: N/A;    TUBAL LIGATION N/A 1986       FMHX:  Family History   Problem Relation Age of Onset    Cancer Mother     Hypertension Mother     Heart disease Father     Hypertension Father     Diabetes Father     No Known Problems Brother        SOCHX:  Social History     Tobacco Use    Smoking status: Never    Smokeless tobacco: Never   Substance Use Topics    Alcohol use: Not Currently       ALLERGIES:  Aspirin, Penicillins, and Adhesive    CURRENT MEDICATIONS:  Current Outpatient Medications on File Prior to Visit   Medication Sig Dispense Refill    cholecalciferol, vitamin D3, (VITAMIN D3) 50 mcg (2,000 unit) Cap Take 1 capsule by mouth once daily.      levothyroxine (SYNTHROID) 75 MCG tablet Take 1 tablet (75 mcg  total) by mouth before breakfast. 30 tablet 11    meloxicam (MOBIC) 15 MG tablet Take 1 tablet (15 mg total) by mouth once daily. 90 tablet 3    metoprolol succinate (TOPROL-XL) 25 MG 24 hr tablet TAKE 1 TABLET BY MOUTH once daily 90 tablet 1    RESTASIS 0.05 % ophthalmic emulsion Place 1 drop into both eyes 2 (two) times daily.      traMADoL (ULTRAM) 50 mg tablet TAKE 1 TABLET BY MOUTH EVERY 6 HOURS AS NEEDED FOR PAIN 120 tablet 5     No current facility-administered medications on file prior to visit.       REVIEW OF SYSTEMS:  ROS    GENERAL PHYSICAL EXAM:   [facility] 10/02/2015    GEN: well developed, well nourished, no acute distress   HENT: Normocephalic, atraumatic   EYES: No discharge, conjunctiva normal   NECK: Supple, non-tender   PULM: No wheezing, no respiratory distress   CV: RRR   ABD: Soft, non-tender    ORTHO EXAM:   Examination of the right hand and wrist reveals that there is prominence of the CMC joint.  Palpation over the joint does produce tenderness.  She does have a positive grind test.  She does report intact sensation in the median radial ulnar distribution.  She has a 2+ radial pulse    RADIOLOGY:   X-rays of the right hand were taken in clinic today.  There was noted to be a significant amount of arthritis at the CMC joint.  This will be severe in nature.    ASSESSMENT:   Right thumb CMC arthritis    PLAN:  1. I have discussed treatment with the patient.  I have discussed the possibility of CMC joint arthroplasty.  I did discuss the risks and benefits of the procedure.  After the discussion informed consent has been obtained     2. Will proceed with right thumb CMC suspension arthroplasty under general anesthesia with a single-shot supraclavicular block    3.  She will follow up with me 2 weeks postoperatively

## 2023-11-27 NOTE — PATIENT INSTRUCTIONS
Surgery Instructions:     Your surgery is scheduled on 12/12/23 [facility], second entrance.    The pre-op department will be in contact with you prior to your procedure to review medications and instructions.       Nothing to eat or drink after midnight prior to day of surgery.    The surgery center will contact you the day prior to surgery to advise you of your arrival time for surgery.     Your post op appointment is scheduled on 12/27/23 @ 2:00pm.

## 2023-12-01 ENCOUNTER — PATIENT MESSAGE (OUTPATIENT)
Dept: ORTHOPEDICS | Facility: CLINIC | Age: 58
End: 2023-12-01
Payer: COMMERCIAL

## 2023-12-01 DIAGNOSIS — M18.11 ARTHRITIS OF CARPOMETACARPAL (CMC) JOINT OF RIGHT THUMB: Primary | ICD-10-CM

## 2023-12-01 RX ORDER — MUPIROCIN 20 MG/G
OINTMENT TOPICAL
Status: CANCELLED | OUTPATIENT
Start: 2023-12-01

## 2023-12-11 ENCOUNTER — ANESTHESIA EVENT (OUTPATIENT)
Dept: SURGERY | Facility: HOSPITAL | Age: 58
End: 2023-12-11
Payer: COMMERCIAL

## 2023-12-12 ENCOUNTER — HOSPITAL ENCOUNTER (OUTPATIENT)
Dept: RADIOLOGY | Facility: HOSPITAL | Age: 58
Discharge: HOME | End: 2023-12-12
Attending: ORTHOPAEDIC SURGERY | Admitting: ORTHOPAEDIC SURGERY
Payer: COMMERCIAL

## 2023-12-12 ENCOUNTER — ANESTHESIA (OUTPATIENT)
Dept: SURGERY | Facility: HOSPITAL | Age: 58
End: 2023-12-12
Payer: COMMERCIAL

## 2023-12-12 ENCOUNTER — HOSPITAL ENCOUNTER (OUTPATIENT)
Facility: HOSPITAL | Age: 58
Discharge: HOME | End: 2023-12-12
Attending: ORTHOPAEDIC SURGERY | Admitting: ORTHOPAEDIC SURGERY
Payer: COMMERCIAL

## 2023-12-12 VITALS
BODY MASS INDEX: 26.46 KG/M2 | RESPIRATION RATE: 12 BRPM | TEMPERATURE: 97 F | HEIGHT: 64 IN | DIASTOLIC BLOOD PRESSURE: 66 MMHG | OXYGEN SATURATION: 100 % | WEIGHT: 155 LBS | SYSTOLIC BLOOD PRESSURE: 119 MMHG | HEART RATE: 85 BPM

## 2023-12-12 DIAGNOSIS — M18.11 ARTHRITIS OF CARPOMETACARPAL (CMC) JOINT OF RIGHT THUMB: ICD-10-CM

## 2023-12-12 DIAGNOSIS — M79.644 CHRONIC PAIN OF RIGHT THUMB: ICD-10-CM

## 2023-12-12 DIAGNOSIS — G89.29 CHRONIC PAIN OF RIGHT THUMB: ICD-10-CM

## 2023-12-12 PROCEDURE — 71000015 HC POSTOP RECOV 1ST HR: Mod: PO | Performed by: ORTHOPAEDIC SURGERY

## 2023-12-12 PROCEDURE — 63600175 PHARM REV CODE 636 W HCPCS: Mod: PO | Performed by: NURSE ANESTHETIST, CERTIFIED REGISTERED

## 2023-12-12 PROCEDURE — 71000033 HC RECOVERY, INTIAL HOUR: Mod: PO | Performed by: ORTHOPAEDIC SURGERY

## 2023-12-12 PROCEDURE — 76000 FLUOROSCOPY <1 HR PHYS/QHP: CPT | Mod: TC,PO

## 2023-12-12 PROCEDURE — 36000709 HC OR TIME LEV III EA ADD 15 MIN: Mod: PO | Performed by: ORTHOPAEDIC SURGERY

## 2023-12-12 PROCEDURE — D9220A PRA ANESTHESIA: Mod: CRNA,,, | Performed by: NURSE ANESTHETIST, CERTIFIED REGISTERED

## 2023-12-12 PROCEDURE — 63600175 PHARM REV CODE 636 W HCPCS: Mod: PO | Performed by: PHYSICIAN ASSISTANT

## 2023-12-12 PROCEDURE — 27201423 OPTIME MED/SURG SUP & DEVICES STERILE SUPPLY: Mod: PO | Performed by: ORTHOPAEDIC SURGERY

## 2023-12-12 PROCEDURE — 27200651 HC AIRWAY, LMA: Mod: PO | Performed by: ANESTHESIOLOGY

## 2023-12-12 PROCEDURE — 25000003 PHARM REV CODE 250: Mod: PO | Performed by: PHYSICIAN ASSISTANT

## 2023-12-12 PROCEDURE — D9220A PRA ANESTHESIA: ICD-10-PCS | Mod: CRNA,,, | Performed by: NURSE ANESTHETIST, CERTIFIED REGISTERED

## 2023-12-12 PROCEDURE — 25310 PR TRANSPLANT FOREARM/WRIST TENDON: ICD-10-PCS | Mod: 51,RT,, | Performed by: ORTHOPAEDIC SURGERY

## 2023-12-12 PROCEDURE — 25000003 PHARM REV CODE 250: Mod: PO | Performed by: NURSE ANESTHETIST, CERTIFIED REGISTERED

## 2023-12-12 PROCEDURE — D9220A PRA ANESTHESIA: Mod: ANES,,, | Performed by: ANESTHESIOLOGY

## 2023-12-12 PROCEDURE — 25447 PR REPAIR INTERCARP/CARP-METACARP JT: ICD-10-PCS | Mod: RT,,, | Performed by: ORTHOPAEDIC SURGERY

## 2023-12-12 PROCEDURE — 37000009 HC ANESTHESIA EA ADD 15 MINS: Mod: PO | Performed by: ORTHOPAEDIC SURGERY

## 2023-12-12 PROCEDURE — 25310 TRANSPLANT FOREARM TENDON: CPT | Mod: 51,RT,, | Performed by: ORTHOPAEDIC SURGERY

## 2023-12-12 PROCEDURE — D9220A PRA ANESTHESIA: ICD-10-PCS | Mod: ANES,,, | Performed by: ANESTHESIOLOGY

## 2023-12-12 PROCEDURE — 25447 ARTHRP NTRCRP/CRP/MTCR NTRPS: CPT | Mod: RT,,, | Performed by: ORTHOPAEDIC SURGERY

## 2023-12-12 PROCEDURE — 63600175 PHARM REV CODE 636 W HCPCS: Mod: PO | Performed by: ANESTHESIOLOGY

## 2023-12-12 PROCEDURE — 36000708 HC OR TIME LEV III 1ST 15 MIN: Mod: PO | Performed by: ORTHOPAEDIC SURGERY

## 2023-12-12 PROCEDURE — 37000008 HC ANESTHESIA 1ST 15 MINUTES: Mod: PO | Performed by: ORTHOPAEDIC SURGERY

## 2023-12-12 RX ORDER — LIDOCAINE HYDROCHLORIDE 10 MG/ML
1 INJECTION, SOLUTION EPIDURAL; INFILTRATION; INTRACAUDAL; PERINEURAL ONCE
Status: ACTIVE | OUTPATIENT
Start: 2023-12-12

## 2023-12-12 RX ORDER — FENTANYL CITRATE 50 UG/ML
100 INJECTION, SOLUTION INTRAMUSCULAR; INTRAVENOUS SEE ADMIN INSTRUCTIONS
Status: DISCONTINUED | OUTPATIENT
Start: 2023-12-12 | End: 2023-12-12 | Stop reason: HOSPADM

## 2023-12-12 RX ORDER — MIDAZOLAM HYDROCHLORIDE 1 MG/ML
2 INJECTION INTRAMUSCULAR; INTRAVENOUS
Status: DISCONTINUED | OUTPATIENT
Start: 2023-12-12 | End: 2023-12-12 | Stop reason: HOSPADM

## 2023-12-12 RX ORDER — FENTANYL CITRATE 50 UG/ML
INJECTION, SOLUTION INTRAMUSCULAR; INTRAVENOUS
Status: DISCONTINUED | OUTPATIENT
Start: 2023-12-12 | End: 2023-12-12

## 2023-12-12 RX ORDER — DIPHENHYDRAMINE HYDROCHLORIDE 50 MG/ML
INJECTION INTRAMUSCULAR; INTRAVENOUS
Status: DISCONTINUED | OUTPATIENT
Start: 2023-12-12 | End: 2023-12-12

## 2023-12-12 RX ORDER — EPHEDRINE SULFATE 50 MG/ML
INJECTION, SOLUTION INTRAVENOUS
Status: DISCONTINUED | OUTPATIENT
Start: 2023-12-12 | End: 2023-12-12

## 2023-12-12 RX ORDER — DEXAMETHASONE SODIUM PHOSPHATE 4 MG/ML
INJECTION, SOLUTION INTRA-ARTICULAR; INTRALESIONAL; INTRAMUSCULAR; INTRAVENOUS; SOFT TISSUE
Status: DISCONTINUED | OUTPATIENT
Start: 2023-12-12 | End: 2023-12-12

## 2023-12-12 RX ORDER — ONDANSETRON 2 MG/ML
INJECTION INTRAMUSCULAR; INTRAVENOUS
Status: DISCONTINUED | OUTPATIENT
Start: 2023-12-12 | End: 2023-12-12

## 2023-12-12 RX ORDER — OXYCODONE HYDROCHLORIDE 5 MG/1
5 TABLET ORAL
Status: DISCONTINUED | OUTPATIENT
Start: 2023-12-12 | End: 2024-03-09

## 2023-12-12 RX ORDER — CEFAZOLIN SODIUM 2 G/50ML
2 SOLUTION INTRAVENOUS
Status: COMPLETED | OUTPATIENT
Start: 2023-12-12 | End: 2023-12-12

## 2023-12-12 RX ORDER — OXYCODONE HYDROCHLORIDE 10 MG/1
10 TABLET ORAL EVERY 4 HOURS PRN
Qty: 12 TABLET | Refills: 0 | Status: SHIPPED | OUTPATIENT
Start: 2023-12-12 | End: 2024-03-09

## 2023-12-12 RX ORDER — ACETAMINOPHEN 10 MG/ML
INJECTION, SOLUTION INTRAVENOUS
Status: DISCONTINUED | OUTPATIENT
Start: 2023-12-12 | End: 2023-12-12

## 2023-12-12 RX ORDER — MUPIROCIN 20 MG/G
OINTMENT TOPICAL
Status: DISCONTINUED | OUTPATIENT
Start: 2023-12-12 | End: 2023-12-12 | Stop reason: HOSPADM

## 2023-12-12 RX ORDER — PROPOFOL 10 MG/ML
VIAL (ML) INTRAVENOUS
Status: DISCONTINUED | OUTPATIENT
Start: 2023-12-12 | End: 2023-12-12

## 2023-12-12 RX ORDER — LIDOCAINE HYDROCHLORIDE 20 MG/ML
INJECTION INTRAVENOUS
Status: DISCONTINUED | OUTPATIENT
Start: 2023-12-12 | End: 2023-12-12

## 2023-12-12 RX ORDER — SODIUM CHLORIDE 0.9 % (FLUSH) 0.9 %
3 SYRINGE (ML) INJECTION
Status: SHIPPED | OUTPATIENT
Start: 2023-12-12

## 2023-12-12 RX ORDER — ONDANSETRON 8 MG/1
8 TABLET, ORALLY DISINTEGRATING ORAL EVERY 8 HOURS PRN
Qty: 3 TABLET | Refills: 0 | Status: SHIPPED | OUTPATIENT
Start: 2023-12-12 | End: 2024-03-09

## 2023-12-12 RX ADMIN — DEXAMETHASONE SODIUM PHOSPHATE 8 MG: 4 INJECTION, SOLUTION INTRAMUSCULAR; INTRAVENOUS at 01:12

## 2023-12-12 RX ADMIN — SODIUM CHLORIDE, SODIUM LACTATE, POTASSIUM CHLORIDE, AND CALCIUM CHLORIDE: .6; .31; .03; .02 INJECTION, SOLUTION INTRAVENOUS at 02:12

## 2023-12-12 RX ADMIN — MIDAZOLAM 1 MG: 1 INJECTION INTRAMUSCULAR; INTRAVENOUS at 11:12

## 2023-12-12 RX ADMIN — CEFAZOLIN SODIUM 200 MG: 2 SOLUTION INTRAVENOUS at 01:12

## 2023-12-12 RX ADMIN — ACETAMINOPHEN 1000 MG: 10 INJECTION, SOLUTION INTRAVENOUS at 12:12

## 2023-12-12 RX ADMIN — LIDOCAINE HYDROCHLORIDE 75 MG: 20 INJECTION INTRAVENOUS at 01:12

## 2023-12-12 RX ADMIN — FENTANYL CITRATE 50 MCG: 50 INJECTION INTRAMUSCULAR; INTRAVENOUS at 11:12

## 2023-12-12 RX ADMIN — EPHEDRINE SULFATE 10 MG: 50 INJECTION INTRAVENOUS at 02:12

## 2023-12-12 RX ADMIN — SODIUM CHLORIDE, SODIUM LACTATE, POTASSIUM CHLORIDE, AND CALCIUM CHLORIDE: .6; .31; .03; .02 INJECTION, SOLUTION INTRAVENOUS at 12:12

## 2023-12-12 RX ADMIN — EPHEDRINE SULFATE 10 MG: 50 INJECTION INTRAVENOUS at 01:12

## 2023-12-12 RX ADMIN — ONDANSETRON 4 MG: 2 INJECTION, SOLUTION INTRAMUSCULAR; INTRAVENOUS at 01:12

## 2023-12-12 RX ADMIN — EPHEDRINE SULFATE 5 MG: 50 INJECTION INTRAVENOUS at 01:12

## 2023-12-12 RX ADMIN — DIPHENHYDRAMINE HYDROCHLORIDE 6.25 MG: 50 INJECTION INTRAMUSCULAR; INTRAVENOUS at 01:12

## 2023-12-12 RX ADMIN — FENTANYL CITRATE 50 MCG: 50 INJECTION, SOLUTION INTRAMUSCULAR; INTRAVENOUS at 01:12

## 2023-12-12 RX ADMIN — MUPIROCIN: 20 OINTMENT TOPICAL at 10:12

## 2023-12-12 RX ADMIN — CEFAZOLIN SODIUM 1400 MG: 2 SOLUTION INTRAVENOUS at 01:12

## 2023-12-12 RX ADMIN — EPHEDRINE SULFATE 5 MG: 50 INJECTION INTRAVENOUS at 02:12

## 2023-12-12 RX ADMIN — PROPOFOL 150 MG: 10 INJECTION, EMULSION INTRAVENOUS at 01:12

## 2023-12-12 NOTE — ANESTHESIA PREPROCEDURE EVALUATION
12/12/2023  Lashell Stroud is a 58 y.o., female.      Pre-op Assessment    I have reviewed the Patient Summary Reports.     I have reviewed the Nursing Notes. I have reviewed the NPO Status.   I have reviewed the Medications.     Review of Systems  Anesthesia Hx:  No problems with previous Anesthesia                Social:  Non-Smoker       Cardiovascular:     Hypertension, well controlled                                        Pulmonary:  Pulmonary Normal                       Renal/:  Renal/ Normal                 Hepatic/GI:  Hepatic/GI Normal                 Musculoskeletal:  Arthritis   Lumbar spinal stenosis              Neurological:    Neuromuscular Disease,       Chronic fatigue  Myalgia  Polymyalgia Rheumatica  Lumbar radiculopathy                                Endocrine:  Endocrine Normal                Physical Exam  General: Well nourished, Cooperative, Alert and Oriented    Airway:  Mallampati: II   Mouth Opening: Normal  TM Distance: Normal  Neck ROM: Normal ROM        Anesthesia Plan  Type of Anesthesia, risks & benefits discussed:    Anesthesia Type: Gen ETT, Gen Supraglottic Airway, Gen Natural Airway, MAC  Intra-op Monitoring Plan: Standard ASA Monitors  Post Op Pain Control Plan: multimodal analgesia  Induction:  IV  Airway Plan: Direct, Video and Fiberoptic, Post-Induction  Informed Consent: Informed consent signed with the Patient and all parties understand the risks and agree with anesthesia plan.  All questions answered.   ASA Score: 2    Ready For Surgery From Anesthesia Perspective.     .

## 2023-12-12 NOTE — OP NOTE
NAME@  1965    DATE OF SURGERY: 12/12/2023     PRE-OPERATIVE DIAGNOSIS: right Thumb Carpometacarpal Arthritis    POST-OPERATIVE DIAGNOSIS: right Thumb Carpometacarpal Arthritis    ANESTHESIA TYPE:  General with a single-shot supraclavicular block    BLOOD LOSS:  10 cc    TOURNIQUET TIME:  Approximately 50 minutes    SURGEON: Dr Rivero    ASSISTANT:  Dana Donahue    PROCEDURE: right Thumb CMC suspension arthroplasty    IMPLANTS: None     SPECIMENS: None    INDICATION:     Ms. Stroud with a long history of right thumb CMC arthritis. She underwent a long course of nonoperative treatment including anti-inflammatories and steroid injections. These treatments initially provided temporary relief but the patient has continued to have pain at the CMC joint and limitation of strength. I've discussed treatment options including continued conservative treatment as well as the possibility of CMC arthroplasty. After a discussion of the risks and benefits of the surgical procedure, informed consent has been obtained.    PROCEDURE IN DETAIL:     Ms. Stroud was transported to the operating room and was placed supine on the operating room table. All appropriate points were padded. The right arm and hand was prepped and draped in the normal sterile fashion. Time out was called. The correct patient, correct operative site, correct procedure, antibiotic administration which consisted of 2 g of Ancef, and allergies to medications which are to Aspirin, Penicillins, and Adhesive  were reviewed. Time in was then called.     Attention was turned to he right thumb. An S-shaped incision was made directly over the thumb metacarpal and CMC joint. This was carried through the skin. Subcutaneous tissues were dissected with tenotomy scissors. The thumb extensor tendons as well as branches of the superficial radial nerve were identified and were protected throughout the case. An incision was then made through the carpometacarpal joint  capsule. The tissues were elevated radially and ulnarly. The trapezium was identified. Fluoroscopic imaging was used to confirm the location of the trapezium. Using a combination of sharp and blunt dissection around the trapezium, it was removed in a piecemeal fashion. With complete excision of the trapezium the scapho-trapezoid joint was visualized and was noted to have no significant arthropathy. the flexor carpi radialis tendon was identified at the base of the wound. The wound was then copiously irrigated.    Attention was turned to the forearm where a 2 cm incision was made over the proximal FCR tendon. The FCR tendon was cut and was dissected distally. The FCR tendon was then delivered into the distal wound. The FCR tendon was split into 2 branches. 2 drill holes were made through the thumb metacarpal base. This was performed with a 2.7 mm drill bit. A curette was used to connect the 2 holes for tendon passage. At that point a Hewson suture passer was placed across the drill hole tunnels. One branch of the FCR tendon was delivered through the thumb metacarpal. The tendon was then tied on itself and was secured with 4-0 nylon suture. With good suspension performed the remainder of the tendon limbs were rolled up and were fashioned into an anchovy. the anchovy was then stuffed into the void from removal of the trapezium. The wound was again irrigated. The capsule was then closed using 3-0 Ethibond suture. The skin wounds were closed using 3-0 Vicryl and 4-0 nylon superficially.    The wounds were dressed with Xeroform, gauze padding, cast padding and a short arm thumb spica splint was placed.    The patient was awakened from anesthesia and was transported to the recovery room in stable condition. All lap, needle, sponge, and equipment counts were correct at the end of the case.    POST-OPERATIVE PLAN:    Patient will keep the splint in place for 2 weeks at which time  she  will followup with me. She will be  changed over to a cast at the 2 week natalya. She will remain immobilized for a total of 6 weeks prior to beginning range of motion.

## 2023-12-12 NOTE — DISCHARGE SUMMARY
[facility] - Surgery  Discharge Note  Short Stay    Procedure(s) (LRB):  Right thumb CMC arthroplasty (Right)      OUTCOME: Patient tolerated treatment/procedure well without complication and is now ready for discharge.    DISPOSITION: Home or Self Care    FINAL DIAGNOSIS:  Arthritis of carpometacarpal (CMC) joint of right thumb    FOLLOWUP: In clinic    DISCHARGE INSTRUCTIONS:    Discharge Procedure Orders   SLING ORTHOPEDIC LARGE FOR HOME USE     Diet general     Activity as tolerated     Keep surgical extremity elevated     Lifting restrictions   Order Comments: Please do not lift or push off with the right arm or hand     Leave dressing on - Keep it clean, dry, and intact until clinic visit     Call MD for:  temperature >100.4     Call MD for:  persistent nausea and vomiting     Call MD for:  severe uncontrolled pain     Call MD for:  difficulty breathing, headache or visual disturbances     Call MD for:  redness, tenderness, or signs of infection (pain, swelling, redness, odor or green/yellow discharge around incision site)     Call MD for:  hives     Call MD for:  persistent dizziness or light-headedness     Call MD for:  extreme fatigue        TIME SPENT ON DISCHARGE: 15 minutes

## 2023-12-12 NOTE — TRANSFER OF CARE
"Anesthesia Transfer of Care Note    Patient: Lashell Stroud    Procedure(s) Performed: Procedure(s) (LRB):  Right thumb CMC arthroplasty (Right)    Patient location: PACU    Anesthesia Type: general    Transport from OR: Transported from OR on room air with adequate spontaneous ventilation    Post pain: adequate analgesia    Post assessment: no apparent anesthetic complications    Post vital signs: stable    Level of consciousness: sedated    Nausea/Vomiting: no nausea/vomiting    Complications: none    Transfer of care protocol was followed      Last vitals: Visit Vitals  BP (!) 142/71 (BP Location: Left arm, Patient Position: Lying)   Pulse 65   Temp 36.7 °C (98 °F)   Resp 14   Ht 5' 4" (1.626 m)   Wt 70.3 kg (155 lb)   LMP 10/02/2015   SpO2 100%   Breastfeeding No   BMI 26.61 kg/m²     "

## 2023-12-12 NOTE — DISCHARGE INSTRUCTIONS
Procedure:  Right thumb CMC suspension arthroplasty    1.  Please keep the splint clean, dry, and in place. Do not take it off and do not get it wet.    2.  Please keep the sling to the right arm in place until you have regained full control over the arm and hand    3.  The pain block to the right arm will last between 1 and 3 days.  As soon as the pain block begins to wear off you can begin taking the prescribed pain medication    4.  Nausea medication has been prescribed in the case that you have any postoperative stomach upset    5.  Flexion and extension of the exposed fingers is encouraged but do NOT attempt to lift or push off with the right arm or hand.    6.  If there are any questions or concerns please call Dr. Rivero office at [phone]    7.  Follow up with Dr. Rivero in 2 weeks

## 2023-12-12 NOTE — ANESTHESIA POSTPROCEDURE EVALUATION
Anesthesia Post Evaluation    Patient: Lashell Stroud    Procedure(s) Performed: Procedure(s) (LRB):  Right thumb CMC arthroplasty (Right)    Final Anesthesia Type: general      Patient location during evaluation: PACU  Patient participation: Yes- Able to Participate  Level of consciousness: awake and alert and oriented  Post-procedure vital signs: reviewed and stable  Pain management: adequate  Airway patency: patent  JULIA mitigation strategies: Use of major conduction anesthesia (spinal/epidural) or peripheral nerve block, Multimodal analgesia and Extubation while patient is awake  PONV status at discharge: No PONV  Anesthetic complications: no      Cardiovascular status: blood pressure returned to baseline  Respiratory status: unassisted, spontaneous ventilation and room air  Hydration status: euvolemic  Follow-up not needed.              Vitals Value Taken Time   /65 12/12/23 1453   Temp 36.3 °C (97.4 °F) 12/12/23 1453   Pulse 86 12/12/23 1453   Resp 21 12/12/23 1453   SpO2 97 % 12/12/23 1453         No case tracking events are documented in the log.      Pain/Maverick Score: Pain Rating Prior to Med Admin: 0 (12/12/2023 12:34 PM)  Pain Rating Post Med Admin: 0 (12/12/2023 12:34 PM)  Maverick Score: 6 (12/12/2023  2:53 PM)

## 2023-12-12 NOTE — ANESTHESIA PROCEDURE NOTES
LMA    Date/Time: 12/12/2023 1:18 PM    Performed by: Shanta Oh CRNA  Authorized by: Thomas Arteaga MD    Intubation:     Induction:  Intravenous    Intubated:  Postinduction    Mask Ventilation:  Easy mask    Attempts:  1    Attempted By:  CRNA    Difficult Airway Encountered?: No      Complications:  None    Airway Device:  Supraglottic airway/LMA    Airway Device Size:  4.0    Inflation Amount (mL):  12    Secured at:  The lips    Placement Verified By:  Capnometry    Complicating Factors:  None    Findings Post-Intubation:  Atraumatic/condition of teeth unchanged

## 2023-12-12 NOTE — PROGRESS NOTES
Right supraclavicular single shot block complete per Dr Arteaga with Anesthesia. Exparel band placed to pt's wrist. Pt tolerated well. See Anesthesia record for procedural notes. See flowsheet and MAR for vitals and medications. Monitors in place, alarms audible. VSS. etCO2 monitoring in place. Resp even, unlabored. Skin warm, dry. Pt in NAD. Pt awaiting transport to OR. Family at bedside. Bed in lowest, locked position. Side rails up x2. Call light within reach.

## 2023-12-19 DIAGNOSIS — M18.11 ARTHRITIS OF CARPOMETACARPAL (CMC) JOINT OF RIGHT THUMB: Primary | ICD-10-CM

## 2023-12-27 ENCOUNTER — HOSPITAL ENCOUNTER (OUTPATIENT)
Dept: RADIOLOGY | Facility: HOSPITAL | Age: 58
Discharge: HOME | End: 2023-12-27
Attending: PHYSICIAN ASSISTANT
Payer: COMMERCIAL

## 2023-12-27 ENCOUNTER — OFFICE VISIT (OUTPATIENT)
Dept: ORTHOPEDICS | Facility: CLINIC | Age: 58
End: 2023-12-27
Payer: COMMERCIAL

## 2023-12-27 VITALS — BODY MASS INDEX: 26.46 KG/M2 | WEIGHT: 155 LBS | HEIGHT: 64 IN

## 2023-12-27 DIAGNOSIS — M18.11 ARTHRITIS OF CARPOMETACARPAL (CMC) JOINT OF RIGHT THUMB: ICD-10-CM

## 2023-12-27 DIAGNOSIS — Z98.890 STATUS POST SURGERY: Primary | ICD-10-CM

## 2023-12-27 PROCEDURE — 73130 X-RAY EXAM OF HAND: CPT | Mod: 26,RT,, | Performed by: RADIOLOGY

## 2023-12-27 PROCEDURE — 1159F PR MEDICATION LIST DOCUMENTED IN MEDICAL RECORD: ICD-10-PCS | Mod: CPTII,S$GLB,, | Performed by: PHYSICIAN ASSISTANT

## 2023-12-27 PROCEDURE — 1160F RVW MEDS BY RX/DR IN RCRD: CPT | Mod: CPTII,S$GLB,, | Performed by: PHYSICIAN ASSISTANT

## 2023-12-27 PROCEDURE — 99999 PR PBB SHADOW E&M-EST. PATIENT-LVL III: CPT | Mod: PBBFAC,,, | Performed by: PHYSICIAN ASSISTANT

## 2023-12-27 PROCEDURE — 29075 PR APPLY FOREARM CAST: ICD-10-PCS | Mod: RT,S$GLB,, | Performed by: PHYSICIAN ASSISTANT

## 2023-12-27 PROCEDURE — 73130 XR HAND COMPLETE 3 VIEW RIGHT: ICD-10-PCS | Mod: 26,RT,, | Performed by: RADIOLOGY

## 2023-12-27 PROCEDURE — 73130 X-RAY EXAM OF HAND: CPT | Mod: TC,PO,RT

## 2023-12-27 PROCEDURE — 99024 PR POST-OP FOLLOW-UP VISIT: ICD-10-PCS | Mod: S$GLB,,, | Performed by: PHYSICIAN ASSISTANT

## 2023-12-27 PROCEDURE — 99024 POSTOP FOLLOW-UP VISIT: CPT | Mod: S$GLB,,, | Performed by: PHYSICIAN ASSISTANT

## 2023-12-27 PROCEDURE — 29075 APPL CST ELBW FNGR SHORT ARM: CPT | Mod: RT,S$GLB,, | Performed by: PHYSICIAN ASSISTANT

## 2023-12-27 PROCEDURE — 1160F PR REVIEW ALL MEDS BY PRESCRIBER/CLIN PHARMACIST DOCUMENTED: ICD-10-PCS | Mod: CPTII,S$GLB,, | Performed by: PHYSICIAN ASSISTANT

## 2023-12-27 PROCEDURE — 99999 PR PBB SHADOW E&M-EST. PATIENT-LVL III: ICD-10-PCS | Mod: PBBFAC,,, | Performed by: PHYSICIAN ASSISTANT

## 2023-12-27 PROCEDURE — 1159F MED LIST DOCD IN RCRD: CPT | Mod: CPTII,S$GLB,, | Performed by: PHYSICIAN ASSISTANT

## 2023-12-27 NOTE — PROGRESS NOTES
Lashell Stroud is a 58 y.o. female who presents to clinic with her  for follow-up status post right thumb CMC suspension arthroplasty.  She is approximately 2 weeks status post surgery.  States she is kept her splint dry, clean, and intact as instructed.  States he has had limited weight-bearing as instructed.  States overall she is doing well.  Denies any acute injuries since last visit.  Denies any paresthesias.  Denies any other complaints at this time.    Physical Exam:  Examination of the right hand/wrist reveals appropriately healing surgical sites.  Sutures remain intact.  Presence of minimal ecchymosis.  No edema, erythema, or skin breakdown noted.  Range of motion not tested secondary to surgery.  Strength not tested secondary to surgery.  Sensation is grossly intact in the radial, ulnar, median nerve distributions capillary refill less than 2 seconds.    Radiology:  X-rays of the right hand were taken today in clinic.  X-rays reviewed by myself.  Imaging showed the presence of a right thumb CMC suspension arthroplasty.  No evidence of subsiding of the 1st metacarpal, and the arthroplasty remains appropriately suspended.  No other significant bony abnormalities noted.    Assessment:  Status post right thumb CMC suspension arthroplasty    Plan:  1. I discussed with Lashell Stroud and her  that she is progressing appropriately in the postoperative course we discussed the best course of action this time is to remove her sutures and place Steri-Strips.  We discussed we would transition to a thumb spica cast of the right thumb/wrist.  We discussed importance of keeping the cast dry, clean, and intact until seen again in clinic.  We discussed importance of limited to no weight-bearing of the right hand/arm until seen again in clinic.  She verbally agreed with the treatment plan    2. Her sutures were thoroughly cleaned in clinic today with chlorhexidine.  Her sutures were removed,  and Steri-Strips were placed.    3. She was placed in a thumb spica cast using fiberglass of the right thumb/wrist in clinic today.      4. I would like him to follow up in clinic in 3 weeks for repeat evaluation at which time we will perform repeat x-rays of the right hand out of the cast.  They were instructed to contact clinic for any problems or concerns in the interim.

## 2024-01-12 DIAGNOSIS — M18.11 ARTHRITIS OF CARPOMETACARPAL (CMC) JOINT OF RIGHT THUMB: Primary | ICD-10-CM

## 2024-01-17 ENCOUNTER — HOSPITAL ENCOUNTER (OUTPATIENT)
Dept: RADIOLOGY | Facility: HOSPITAL | Age: 59
Discharge: HOME | End: 2024-01-17
Attending: ORTHOPAEDIC SURGERY
Payer: COMMERCIAL

## 2024-01-17 ENCOUNTER — OFFICE VISIT (OUTPATIENT)
Dept: ORTHOPEDICS | Facility: CLINIC | Age: 59
End: 2024-01-17
Payer: COMMERCIAL

## 2024-01-17 VITALS — HEIGHT: 64 IN | BODY MASS INDEX: 26.46 KG/M2 | WEIGHT: 155 LBS

## 2024-01-17 DIAGNOSIS — M18.11 ARTHRITIS OF CARPOMETACARPAL (CMC) JOINT OF RIGHT THUMB: ICD-10-CM

## 2024-01-17 DIAGNOSIS — M18.11 ARTHRITIS OF CARPOMETACARPAL (CMC) JOINT OF RIGHT THUMB: Primary | ICD-10-CM

## 2024-01-17 PROCEDURE — 99213 OFFICE O/P EST LOW 20 MIN: CPT | Mod: PBBFAC,PO | Performed by: ORTHOPAEDIC SURGERY

## 2024-01-17 PROCEDURE — 99999 PR PBB SHADOW E&M-EST. PATIENT-LVL III: CPT | Mod: PBBFAC,,, | Performed by: ORTHOPAEDIC SURGERY

## 2024-01-17 PROCEDURE — 99024 POSTOP FOLLOW-UP VISIT: CPT | Mod: S$GLB,,, | Performed by: ORTHOPAEDIC SURGERY

## 2024-01-17 PROCEDURE — 73130 X-RAY EXAM OF HAND: CPT | Mod: 26,RT,, | Performed by: RADIOLOGY

## 2024-01-17 PROCEDURE — 73130 X-RAY EXAM OF HAND: CPT | Mod: TC,PO,RT

## 2024-01-17 NOTE — PROGRESS NOTES
Ms. Stroud returns to clinic today.  She is status post right thumb CMC suspension arthroplasty.  She is approximately 5 weeks postop.  She is overall doing well.      Physical exam: Examination of the right hand and wrist reveals that there is no edema.  The incisions are healing well.  There are Steri-Strips in place.  She is neurovascularly intact.      Radiology:  X-rays of the right hand were taken in clinic today.  The films have been reviewed by me.  There is noted to be evidence of the CMC suspension arthroplasty.  The suspension is well-maintained.      Assessment: Status post right thumb CMC suspension arthroplasty     Plan:    1. Will place her into a Velcro thumb spica splint    2.  She will continue limited weight-bearing    3.  Will set her up with occupational therapy    4.  She will follow up with me in 6 weeks

## 2024-01-23 ENCOUNTER — CLINICAL SUPPORT (OUTPATIENT)
Dept: REHABILITATION | Facility: HOSPITAL | Age: 59
End: 2024-01-23
Attending: ORTHOPAEDIC SURGERY
Payer: COMMERCIAL

## 2024-01-23 DIAGNOSIS — M25.631 STIFFNESS OF RIGHT WRIST JOINT: ICD-10-CM

## 2024-01-23 DIAGNOSIS — M79.644 PAIN OF RIGHT THUMB: ICD-10-CM

## 2024-01-23 DIAGNOSIS — M25.531 PAIN, WRIST, RIGHT: Primary | ICD-10-CM

## 2024-01-23 DIAGNOSIS — Z78.9 DECREASED ACTIVITIES OF DAILY LIVING (ADL): ICD-10-CM

## 2024-01-23 DIAGNOSIS — M25.649 THUMB JOINT STIFFNESS: ICD-10-CM

## 2024-01-23 DIAGNOSIS — M18.11 ARTHRITIS OF CARPOMETACARPAL (CMC) JOINT OF RIGHT THUMB: ICD-10-CM

## 2024-01-23 PROCEDURE — 97110 THERAPEUTIC EXERCISES: CPT | Mod: PO

## 2024-01-23 PROCEDURE — 97166 OT EVAL MOD COMPLEX 45 MIN: CPT | Mod: PO

## 2024-01-23 NOTE — PLAN OF CARE
[facility] OUTPATIENT THERAPY [facility]LLNESS  Occupational Therapy Initial Evaluation     Name: Lashell Stroud  Clinic Number: [id number]  1/23/2024    Therapy Diagnosis:   Encounter Diagnoses   Name Primary?    Arthritis of carpometacarpal (CMC) joint of right thumb     Pain, wrist, right Yes    Pain of right thumb     Stiffness of right wrist joint     Decreased activities of daily living (ADL)     Thumb joint stiffness      Physician: Stanley Rivero MD    Physician Orders:   Note:  Please begin therapy to the right wrist and hand. Status post CMC suspension arthroplasty protocol.       Frequency:  3 times per week     Duration:  6 weeks      Diagnosis: Status post right thumb CMC suspension arthroplasty    Medical Diagnosis:   M18.11 (ICD-10-CM) - Arthritis of carpometacarpal (CMC) joint of right thumb   Surgical Procedure and Date:   PROCEDURE: right Thumb CMC suspension arthroplasty  IMPLANTS: None   12/12/2023  S/P: 6 weeks on 1/23/2024  Evaluation Date: 1/23/2024  Insurance Authorization Period Expiration:  Calendar year    Plan of Care Certification Period: 6 weeks = 3/4/2024   Date of Return to Referring Provider: 2/28/2024  Visit # / Visits authorized: 1/1  FOTO: Eval  / 1  Next Reassessment at 10th visit or by 30 days = 2/22/2024    Time In: 1500  Time Out:  1535  Total Appointment Time (timed & untimed codes): 35 minutes      Precautions:  standard and post op per protocol  wears velcro wrist/thumb brace       Subjective     Date of Onset: Chronic with recent sx, last injection did not provide any relief per pt report    History of Current Condition/Mechanism of Injury: Per HPI and/or pt report Chronic pain R thumb,with recent sx, last injection did not provide any relief per pt report       Involved Side: Right    Dominant Side: Right    Prior Surgical Procedure or injuries to:   RUE None reported   LUE CMC arthroplasty approx 1 yr ago.    Imaging:  See Epic     Prior Therapy: OTW Cov OT for her  L CMC arthroplasty    Pain:  Functional Pain Scale Rating 0-10:   0/10 on average  0/10 at best  5/10 at worst  Location: R thumb and wraps arounf R wrist  Description: Aching and Sharp  Aggravating Factors: ROM  Easing Factors: Rest, brace use    Occupation:    Working presently: On leave   Duties: Cleaning, mopping, wiping down tables, patient beds.    Functional Limitations/Social History:    Previous functional status includes: Independent with all ADLs.     Current Functional Status   Home/Living environment: with spouse        Limitation of Functional Status as follows:   ADLs/IADLs: Mod, overall limitations reported with involved UE (s)    See  FOTO results for further    Patient's Goals for Therapy: Get her RUE back to normal function/strength to allow for RTW.    Past Medical History/Physical Systems Review:   Lashell Stroud  has a past medical history of Constipation, COVID-19 virus infection, Family history of colon cancer in mother, History of chicken pox, Hypertension, Palindromic rheumatism involving tarsus, Psoas tendinitis of both sides, Shoulder pain, bilateral, and Spinal stenosis of lumbar region.    Lashell Stroud  has a past surgical history that includes Cholecystectomy;  section; Tubal ligation (N/A, ); Colonoscopy (N/A, 2016); Colonoscopy (N/A, 2017); Epidural steroid injection into lumbar spine (N/A, 2019); Epidural steroid injection into lumbar spine (N/A, 2019); Extreme lateral interbody fusion (XLIF) of spine (Left, 2019); Minimally invasive fusion of spine (N/A, 2019); Laminectomy using minimally invasive technique (N/A, 2019); Arthroscopy of shoulder with decompression of subacromial space (Left, 2020); Arthroscopic repair of rotator cuff of shoulder (Left, 2020); Colonoscopy (N/A, 10/15/2021); Hand Arthroplasty (Left, 10/4/2022); and Hand Arthroplasty (Right, 2023).    Lashell has a current  medication list which includes the following prescription(s): cholecalciferol (vitamin d3), levothyroxine, meloxicam, metoprolol succinate, ondansetron, oxycodone, restasis, and tramadol, and the following Facility-Administered Medications: electrolyte-s (ph 7.4), lidocaine (pf) 10 mg/ml (1%), oxycodone, and sodium chloride 0.9%.    Review of patient's allergies indicates:   Allergen Reactions    Aspirin Itching    Penicillins Hives    Adhesive Rash      Objective     CMS Impairment/Limitation/Restriction for FOTO Hand Survey    Therapist reviewed FOTO scores for Lashell Stroud on 1/23/2024.       Limitation Score: 39%      Observation/Inspection/Wound/Incision/Scar   Mild to moderate edema,  non pitting, wound closed/healed with mild hypertrophy.    Sensation: Grossly WNL,    Edema:          Circumferential (in cm) L R   Proximal Wrist Crease 15.3 15.8   MPs NT NT   Mid P1 of  Long Finger NT NT      AROM Hand: Tip to palm/DPC digits 1-5 (in cm)   Left Right   1st Intact -3.0   2nd Grossly intact 2-5 Grossly intact 2-4   3rd     4th     5th  Some significant OA R SFat IPs limit full hook fist but WFL     AROM Wrist/Forearm:               Left              Right   Wrist Ext/Flex:  70/65° Ext/Flex:  65/40°        Forearm Pron/Sup  WNL° Pro/Sup:  WNL°     AROM Elbow:                Left              Right   Elbow Ext/Flex  WNL° Ext/Flex:  WNL°         Manual Muscle Test: NT                                       and Pinch Strength: NTat this time due to post operative status.    Special and/or Standardized Tests:  NT      Treatment     Treatment Time In:  1520  Treatment Time Out:  1535  Total Treatment time separate from Evaluation time:15 minutes.    Lashell received therapeutic exercises for 12 minutes including: Initial Home Exercise Program Instruction.  Tendon Gliding Exercises, Wrist AROM with gravity; flex/ext and UD/RD, thumb IP, flat and full thumb AROM, thumb Abd/Add, touches to each digit, thumb  circles, with handout and return demo for 2x10 recommended 3-4 x day.    Manual Therapy: Pt seen for Retrograde Massage and Scar Massage to R wrist R hand for 3 min total  Instructed on Home Program Retrograde Massage 20 reps, then Scar Massage x20 reps each CW, CCW, across and lengthwise, followed by retrograde x20 reps, 1-2 x day as tolerated.      Home Exercise Program/Education:  Issued HEP (see patient instructions in EMR) and educated on modality use for pain management . Exercises were reviewed and Lashell was able to demonstrate them prior to the end of the session.   Pt received a written copy of exercises to perform at home. Lashell demonstrated good  understanding of the education provided.  Pt was advised to perform these exercises free of pain, and to stop performing them if pain occurs.    Patient/Family Education: role of OT, goals for OT, scheduling/cancellations - pt verbalized understanding.     Additional Education provided: N/A    Assessment     Lashell Stroud is a 58 y.o. female referred to outpatient occupational therapy and presents with a medical diagnosis of Status post right thumb CMC suspension arthroplasty.    Following medical record review it is determined that pt will benefit from occupational therapy services in order to maximize pain free and/or functional use of right UE. The following goals were discussed with the patient and patient is in agreement with them as to be addressed in the treatment plan. The patient's rehab potential is  good.     Anticipated barriers to occupational therapy:  None.    Plan of care discussed with patient:   Yes  Patient's spiritual, cultural and educational needs considered and patient is agreeable to the plan of care and goals as stated below:     Medical Necessity is demonstrated by the following  Occupational Profile/History  Co-morbidities and personal factors that may impact the plan of care [] LOW: Brief chart review  [x] MODERATE: Expanded  chart review   [] HIGH: Extensive chart review    Moderate / High Support Documentation:  per chart review and pt report      Examination  Performance deficits relating to physical, cognitive or psychosocial skills that result in activity limitations and/or participation restrictions  [] LOW: addressing 1-3 Performance deficits  [x] MODERATE: 3-5 Performance deficits  [] HIGH: 5+ Performance deficits (please support below)    Moderate / High Support Documentation:  Per below problem list    Physical:  Joint Mobility  Muscle Power/Strength  Muscle Endurance  Skin Integrity/Scar Formation  Edema   Strength  Pinch Strength  Pain    Cognitive:  No Deficits    Psychosocial:    No Deficits     Treatment Options [] LOW: Limited options  [x] MODERATE: Several options  [] HIGH: Multiple options      Decision Making/ Complexity Score: Moderate         The following goals were discussed with the patient and patient is in agreeme  nt with them as to be addressed in the treatment plan.     Goals:     Short Term Goals: (30 days, 2/22/2024, and/or 10th visit) unless otherwise noted below.  1. Pt will be independent with HEP in 2 visits.  2. Pt will report decreased pain to a 3/10 at worst in RUE with ADLs in order to increase function/use of UE.   3. Pt will increase AROM wrist extension by 5 degrees (to 70 degrees) to increase function for ADL/IADL.  4. Pt will increase AROM wrist flexion by 10 degrees (to 50 degrees) to increase function for ADL/IADL.  5. Pt will demo increased tip to palm/DPC AROM  of 1.5 cm (to -1.5 cm) to increase function for ADL/IADL.     Long Term Goals: (by discharge)  1. Pt will report decreased pain to 1-2/10 with ADLs to allow for increased function/use of UE.   2. Pt will exhibit WNL AROM of R Wrist/hand to allow for Independent use of for all ADL/IADL tasks.  3. Pt will exhibit  WFL  strength to allow a firm grasp during ADL/IADL (cooking utensils, tool use, carrying groceries, steering  wheel, etc.)  4. Pt will exhibit WFL lateral pinch strength to allow writing,opening containers, and turning keys.  5  Pt will return to PLOF with all ADL/IADL, leisure and job tasks.    Plan   Plan of care expiration: 6 weeks =  3/4/2024    Outpatient Occupational Therapy 3 times weekly through current poc expiration date, to include the following interventions:     Moist heat, cold packs, paraffin, fluidotherapy, US, edema control, scar mobilization/scar massage, manual therapy/joint mobilizations,A/PROM, therapeutic exercises/activities, strengthening, desensitization/sensory re-education, orthotic fitting/fabrication/training  PRN, joint protections/energry conservation/adaptive equipment/activity modification HEP/education as well as any other treatments deemed necessary based on the patient's needs or progress.     Pt may be discharged prior to poc expiration date if all goals/desired outcome met or if max rehabilitation potential has been achieved.    Updates Next Visit: To review HEP understanding and compliance and progress with OT tx as tolerated.    SAILAJA Lombardi, BAHMANT

## 2024-01-24 ENCOUNTER — CLINICAL SUPPORT (OUTPATIENT)
Dept: REHABILITATION | Facility: HOSPITAL | Age: 59
End: 2024-01-24
Payer: COMMERCIAL

## 2024-01-24 DIAGNOSIS — M25.631 STIFFNESS OF RIGHT WRIST JOINT: ICD-10-CM

## 2024-01-24 DIAGNOSIS — M25.531 PAIN, WRIST, RIGHT: Primary | ICD-10-CM

## 2024-01-24 DIAGNOSIS — M79.644 PAIN OF RIGHT THUMB: ICD-10-CM

## 2024-01-24 PROCEDURE — 97110 THERAPEUTIC EXERCISES: CPT | Mod: PO

## 2024-01-24 PROCEDURE — 97140 MANUAL THERAPY 1/> REGIONS: CPT | Mod: PO

## 2024-01-24 PROCEDURE — 97530 THERAPEUTIC ACTIVITIES: CPT | Mod: PO

## 2024-01-24 NOTE — PROGRESS NOTES
[facility] OUTPATIENT THERAPY AND WELLNESS  Occupational Therapy Treatment Note     Date: 1/24/2024  Name: Lashell Stroud  Clinic Number: [id number]    Therapy Diagnosis:   Encounter Diagnoses   Name Primary?    Pain, wrist, right Yes    Pain of right thumb     Stiffness of right wrist joint      Physician: Stanley Rivero MD    Physician Orders:   Note:  Please begin therapy to the right wrist and hand. Status post CMC suspension arthroplasty protocol.       Frequency:  3 times per week     Duration:  6 weeks      Diagnosis: Status post right thumb CMC suspension arthroplasty     Medical Diagnosis:   M18.11 (ICD-10-CM) - Arthritis of carpometacarpal (CMC) joint of right thumb   Surgical Procedure and Date:   PROCEDURE: right Thumb CMC suspension arthroplasty  IMPLANTS: None   12/12/2023  S/P: 6 weeks on 1/23/2024  Evaluation Date: 1/23/2024  Insurance Authorization Period Expiration:  Calendar year     Plan of Care Certification Period: 6 weeks = 3/4/2024   Date of Return to Referring Provider: 2/28/2024  Visit # / Visits authorized: 2 / 20  FOTO: Jeff  / Reagan  Next Reassessment at 10th visit or by 30 days = 2/22/2024     Time In: 1352  Time Out: 1430  Total Appointment Time 38 min  (Timed 38 min & untimed codes: N/A)        Precautions:  standard and post op per protocol  wears velcro wrist/thumb brace     Subjective     Pt reports: No new problems or complaints   She was compliant with home exercise program given last session.   Response to previous treatment:Good  Functional change: None new    Pain 0-10 scale   Pain:  Functional Pain Scale Rating 0-10:   0/10 on average  0/10 at best  5/10 at worst  Location: R thumb and wraps arounf R wrist  Description: Aching and Sharp  Aggravating Factors: ROM  Easing Factors: Rest, brace use       Objective   MEASUREMENTS  Last measurements below are from Eval unless otherwise noted.    CMS Impairment/Limitation/Restriction for FOTO Hand Survey     Therapist reviewed  FOTO scores for Lashell Stroud on 1/23/2024.         Limitation Score: 39%        Observation/Inspection/Wound/Incision/Scar   Mild to moderate edema,  non pitting, wound closed/healed with mild hypertrophy.     Sensation: Grossly WNL,     Edema:            Circumferential (in cm) L R   Proximal Wrist Crease 15.3 15.8   MPs NT NT   Mid P1 of  Long Finger NT NT      AROM Hand: Tip to palm/DPC digits 1-5 (in cm)    Left Right   1st Intact -3.0   2nd Grossly intact 2-5 Grossly intact 2-4   3rd       4th       5th   Some significant OA R SFat IPs limit full hook fist but WFL      AROM Wrist/Forearm:                Left              Right   Wrist Ext/Flex:  70/65° Ext/Flex:  65/40°           Forearm Pron/Sup  WNL° Pro/Sup:  WNL°      AROM Elbow:                 Left              Right   Elbow Ext/Flex  WNL° Ext/Flex:  WNL°         Manual Muscle Test: NT                                       and Pinch Strength: NTat this time due to post operative status.     Special and/or Standardized Tests:  NT       Treatment     Lashell received the following supervised modalities   Please see under therapeutic exercises for further on heat and stretch ex with the use of Moist heat (pt was cleared for all contraindication/precautions prior) in order to increase: comfort, blood flow and soft tissue elasticity.     Lashell received the following manual therapy techniques for 8 minutes:   -Manual Therapy: Pt seen for Retrograde Massage and Scar Massage to R wrist and R hand/thumb for 8 min total.    Ele received therapeutic exercises for 20 minutes including:   Place and hold with thumb in composite flexion 5 min while on moist heat.  AROM thumb IP/MP, full flex and ext and wrist flex/ext and tendon glides; 2x10 each    Lashell participated in dynamic functional therapeutic activities to improve functional performance for 10  minutes, including:  Tip to palm and palm to tip with buttons x1 container    Home Exercises and  Education Provided     Education provided:   -  Cont per previous.    Written Home Exercises Provided:  Patient instructed to cont prior HEP.    Exercises were reviewed and Lashell was able to demonstrate them prior to the end of the session.  Lashell demonstrated good  understanding of the education provided.     See EMR under Media for exercises provided today and/or prior visit.        Assessment     Pt would continue to benefit from skilled OT. Pt tolerated progression with Tx well this date. Cont per current POC.      - Progress towards goals:  STG #1 has been met.    Lashell is progressing well towards her goals . Pt continues with good rehab potential.     Pt will continue to benefit from skilled outpatient occupational therapy to address the deficits listed in the problem list on initial evaluation in order to maximize pt's level of independence in the home and community.     Anticipated barriers to occupational therapy: None    Pt's spiritual, cultural and educational needs considered and pt agreeable to plan of care and goals.    Goals:  Short Term Goals: (30 days, 2/22/2024, and/or 10th visit) unless otherwise noted below.  1. Pt will be independent with HEP in 2 visits. MET  2. Pt will report decreased pain to a 3/10 at worst in RUE with ADLs in order to increase function/use of UE.   3. Pt will increase AROM wrist extension by 5 degrees (to 70 degrees) to increase function for ADL/IADL.  4. Pt will increase AROM wrist flexion by 10 degrees (to 50 degrees) to increase function for ADL/IADL.  5. Pt will demo increased tip to palm/DPC AROM  of 1.5 cm (to -1.5 cm) to increase function for ADL/IADL.      Long Term Goals: (by discharge)  1. Pt will report decreased pain to 1-2/10 with ADLs to allow for increased function/use of UE.   2. Pt will exhibit WNL AROM of R Wrist/hand to allow for Independent use of for all ADL/IADL tasks.  3. Pt will exhibit  WFL  strength to allow a firm grasp during ADL/IADL  (cooking utensils, tool use, carrying groceries, steering wheel, etc.)  4. Pt will exhibit WFL lateral pinch strength to allow writing,opening containers, and turning keys.  5  Pt will return to PLOF with all ADL/IADL, leisure and job tasks.    Plan   Continue Occupational Therapy 3 times per week through current poc expiration date of 3/4/2024, in order to to decrease pain and edema, and increase A/PROM, strength, and functional use of R upper extremity.    Updates/Grading for next session:  Progress with OT as tolerated.      SAILAJA Lombardi, BAHMANT

## 2024-01-26 ENCOUNTER — CLINICAL SUPPORT (OUTPATIENT)
Dept: REHABILITATION | Facility: HOSPITAL | Age: 59
End: 2024-01-26
Payer: COMMERCIAL

## 2024-01-26 DIAGNOSIS — M79.644 PAIN OF RIGHT THUMB: ICD-10-CM

## 2024-01-26 DIAGNOSIS — M25.631 STIFFNESS OF RIGHT WRIST JOINT: ICD-10-CM

## 2024-01-26 DIAGNOSIS — M25.531 PAIN, WRIST, RIGHT: Primary | ICD-10-CM

## 2024-01-26 PROCEDURE — 97110 THERAPEUTIC EXERCISES: CPT | Mod: PO

## 2024-01-26 PROCEDURE — 97530 THERAPEUTIC ACTIVITIES: CPT | Mod: PO

## 2024-01-26 PROCEDURE — 97022 WHIRLPOOL THERAPY: CPT | Mod: PO

## 2024-01-26 NOTE — PROGRESS NOTES
[facility]  Occupational Therapy Treatment Note     Date: 1/26/2024  Name: Lashell Stroud  Clinic Number: [id number]    Therapy Diagnosis:   Encounter Diagnoses   Name Primary?    Pain, wrist, right Yes    Pain of right thumb     Stiffness of right wrist joint        Physician: Stanley Rivero MD    Physician Orders:   Note:  Please begin therapy to the right wrist and hand. Status post CMC suspension arthroplasty protocol.       Frequency:  3 times per week     Duration:  6 weeks      Diagnosis: Status post right thumb CMC suspension arthroplasty     Medical Diagnosis:   M18.11 (ICD-10-CM) - Arthritis of carpometacarpal (CMC) joint of right thumb   Surgical Procedure and Date:   PROCEDURE: right Thumb CMC suspension arthroplasty  IMPLANTS: None   12/12/2023  S/P: 6 weeks and 3 days on 1/26/2024  Evaluation Date: 1/23/2024  Insurance Authorization Period Expiration:  Calendar year     Plan of Care Certification Period: 6 weeks = 3/4/2024   Date of Return to Referring Provider: 2/28/2024  Visit # / Visits authorized: 3 / 20  FOTO: Jeff  / 1  Next Reassessment at 10th visit or by 30 days = 2/22/2024     Time In: 1455  Time Out: 1455  Total Appointment Time 60 min  (Timed 40 min & untimed codes: Fluidotherapy)        Precautions:  standard and post op per protocol  wears velcro wrist/thumb brace     Subjective     Pt reports: No new problems or complaints   She was compliant with home exercise program given last session.   Response to previous treatment:Good  Functional change: None new    Pain 0-10 scale   Pain:  Functional Pain Scale Rating 0-10:   0/10 on average  0/10 at best  4-5/10 at worst  Location: R thumb and wraps around R wrist  Description: Aching and Sharp  Aggravating Factors: ROM  Easing Factors: Rest, brace use       Objective   MEASUREMENTS  Last measurements below are from Eval unless otherwise noted.    CMS Impairment/Limitation/Restriction for FOTO Hand  Survey     Therapist reviewed FOTO scores for Lashell Stroud on 1/23/2024.         Limitation Score: 39%        Observation/Inspection/Wound/Incision/Scar   Mild to moderate edema,  non pitting, wound closed/healed with mild hypertrophy.     Sensation: Grossly WNL,     Edema:            Circumferential (in cm) L R   Proximal Wrist Crease 15.3 15.8   MPs NT NT   Mid P1 of  Long Finger NT NT      AROM Hand: Tip to palm/DPC digits 1-5 (in cm) on 1/26/2024    Left Right   1st Intact -2.5 (+0.5)   2nd Grossly intact 2-5 Grossly intact 2-4   3rd       4th       5th   Some significant OA R SFat IPs limit full hook fist but WFL      AROM Wrist/Forearm:                Left              Right on 1/26/2024   Wrist Ext/Flex:  70/65° Ext/Flex: 70 (+5) /51° (+11)           Forearm Pron/Sup  WNL° Pro/Sup:  WNL°      AROM Elbow:                 Left              Right   Elbow Ext/Flex  WNL° Ext/Flex:  WNL°         Manual Muscle Test: NT                                       and Pinch Strength: NT at this time due to post operative status.     Special and/or Standardized Tests:  NT       Treatment     Lashell received the following supervised modalities   Please see under therapeutic exercises for further on heat and stretch ex with the use of Fluidotherapy in order to increase: comfort, blood flow and soft tissue elasticity. (Therex performed during)     Lashell received the following manual therapy techniques for 0 min massage to R wrist and R hand/thumb for 0 min total. (NT)    Lashell received therapeutic exercises for 25 minutes including:  Place and hold with thumb in composite flexion 5 min while on moist heat. (NT)  Wrist circles, Thumb flex/ext, wrist flex and wrist ext and tendon glides while in fluidotherapy  AAROM with approx 1/2# 10x10 sec wrist flexion and ext  UD with 1/2# 3x10 with arm by side    Lashell participated in dynamic functional therapeutic activities to improve functional performance for 15 minutes,  including:  Small pom poms Tip to palm 6 x 1/2 container  Isospheres 3 min  Wrist wheel 2 x 2 min forward and backwards.    Home Exercises and Education Provided     Education provided:   -  Cont per previous.    Written Home Exercises Provided:  Patient instructed to cont prior HEP.    Exercises were reviewed and Lashell was able to demonstrate them prior to the end of the session.  Lashell demonstrated good  understanding of the education provided.     See EMR under Media for exercises provided today and/or prior visit.        Assessment     Pt would continue to benefit from skilled OT. Excellent AROM gains and pt tolerated progression with Tx well this date. Cont per current POC.      - Progress towards goals:  STG #1 has been met.    Lashell is progressing well towards her goals . Pt continues with good rehab potential.     Pt will continue to benefit from skilled outpatient occupational therapy to address the deficits listed in the problem list on initial evaluation in order to maximize pt's level of independence in the home and community.     Anticipated barriers to occupational therapy: None    Pt's spiritual, cultural and educational needs considered and pt agreeable to plan of care and goals.    Goals:  Short Term Goals: (30 days, 2/22/2024, and/or 10th visit) unless otherwise noted below.  1. Pt will be independent with HEP in 2 visits. MET  2. Pt will report decreased pain to a 3/10 at worst in RUE with ADLs in order to increase function/use of UE.   3. Pt will increase AROM wrist extension by 5 degrees (to 70 degrees) to increase function for ADL/IADL.  4. Pt will increase AROM wrist flexion by 10 degrees (to 50 degrees) to increase function for ADL/IADL.  5. Pt will demo increased tip to palm/DPC AROM  of 1.5 cm (to -1.5 cm) to increase function for ADL/IADL.      Long Term Goals: (by discharge)  1. Pt will report decreased pain to 1-2/10 with ADLs to allow for increased function/use of UE.   2. Pt will  exhibit WNL AROM of R Wrist/hand to allow for Independent use of for all ADL/IADL tasks.  3. Pt will exhibit  WFL  strength to allow a firm grasp during ADL/IADL (cooking utensils, tool use, carrying groceries, steering wheel, etc.)  4. Pt will exhibit WFL lateral pinch strength to allow writing,opening containers, and turning keys.  5  Pt will return to [facility] with all ADL/IADL, leisure and job tasks.    Plan   Continue Occupational Therapy 3 times per week through current poc expiration date of 3/4/2024, in order to to decrease pain and edema, and increase A/PROM, strength, and functional use of R upper extremity.    Updates/Grading for next session:  Progress with OT as tolerated.      SAILAJA Lombardi, BAHMANT

## 2024-01-29 ENCOUNTER — CLINICAL SUPPORT (OUTPATIENT)
Dept: REHABILITATION | Facility: HOSPITAL | Age: 59
End: 2024-01-29
Payer: COMMERCIAL

## 2024-01-29 DIAGNOSIS — M79.644 PAIN OF RIGHT THUMB: ICD-10-CM

## 2024-01-29 DIAGNOSIS — M25.531 PAIN, WRIST, RIGHT: Primary | ICD-10-CM

## 2024-01-29 DIAGNOSIS — M25.631 STIFFNESS OF RIGHT WRIST JOINT: ICD-10-CM

## 2024-01-29 PROCEDURE — 97110 THERAPEUTIC EXERCISES: CPT | Mod: PO

## 2024-01-29 PROCEDURE — 97022 WHIRLPOOL THERAPY: CPT | Mod: PO

## 2024-01-29 PROCEDURE — 97530 THERAPEUTIC ACTIVITIES: CPT | Mod: PO

## 2024-01-29 NOTE — PROGRESS NOTES
[facility]  Occupational Therapy Treatment Note     Date: 1/29/2024  Name: Lashell Stroud  Clinic Number: [id number]    Therapy Diagnosis:   Encounter Diagnoses   Name Primary?    Pain, wrist, right Yes    Pain of right thumb     Stiffness of right wrist joint        Physician: Stanley Rivero MD    Physician Orders:   Note:  Please begin therapy to the right wrist and hand. Status post CMC suspension arthroplasty protocol.       Frequency:  3 times per week     Duration:  6 weeks      Diagnosis: Status post right thumb CMC suspension arthroplasty     Medical Diagnosis:   M18.11 (ICD-10-CM) - Arthritis of carpometacarpal (CMC) joint of right thumb   Surgical Procedure and Date:   PROCEDURE: right Thumb CMC suspension arthroplasty  IMPLANTS: None   12/12/2023  S/P: 6 weeks and 6 days on 1/29/2024  Evaluation Date: 1/23/2024  Insurance Authorization Period Expiration:  Calendar year     Plan of Care Certification Period: 6 weeks = 3/4/2024   Date of Return to Referring Provider: 2/28/2024  Visit # / Visits authorized: 4 / 20  FOTO: Jeff  / Reagan  Next Reassessment at 10th visit or by 30 days = 2/22/2024     Time In: 1437  Time Out: 1537  Total Appointment Time 60 min  (Timed 45 min & untimed codes: Fluidotherapy)        Precautions:  standard and post op per protocol  wears velcro wrist/thumb brace     Subjective     Pt reports: right hand is more painful than left one was, but has decreased for levels at worst. Occasionally gets sharp shootingpains.   She was compliant with home exercise program given last session.   Response to previous treatment:Good  Functional change: None new    Pain 0-10 scale   Pain:  Functional Pain Scale Rating 0-10:   0/10 on average  0/10 at best  1/10 at worst (sometimes her arthritis, decreased 4 levels) )   Location: R thumb and wraps around R wrist  Description: Aching and Sharp  Aggravating Factors: ROM  Easing Factors: Rest, brace use        Objective   MEASUREMENTS  Last measurements below are from Eval unless otherwise noted.     Edema:            Circumferential (in cm) L R   Proximal Wrist Crease 15.3 15.8   MPs NT NT   Mid P1 of  Long Finger NT NT      AROM Hand: Tip to palm/DPC digits 1-5 (in cm) on 1/26/2024    Left Right   1st Intact -2.5 (+0.5)   2nd Grossly intact 2-5 Grossly intact 2-4   3rd       4th       5th   Some significant OA R SFat IPs limit full hook fist but WFL      AROM Wrist/Forearm:                Left              Right on 1/26/2024   Wrist Ext/Flex:  70/65° Ext/Flex: 70 (+5) /51° (+11)           Forearm Pron/Sup  WNL° Pro/Sup:  WNL°      AROM Elbow:                 Left              Right   Elbow Ext/Flex  WNL° Ext/Flex:  WNL°         Treatment     Elesa received the following supervised modalities   Please see under therapeutic exercises for further on heat and stretch ex with the use of Fluidotherapy in order to increase: comfort, blood flow and soft tissue elasticity. (Therex performed during)     Elesa received the following manual therapy techniques for 5 min massage to R wrist and R hand/thumb     Elesa received therapeutic exercises for 25 minutes including:  Place and hold with thumb in composite flexion 5 min while on moist heat. (NT)  Wrist circles, Thumb flex/ext, wrist flex and wrist ext and tendon glides while in fluidotherapy  AAROM with approx 1/2# 10x10 sec wrist flexion and ext   Wrist PREs RD/UD in pronation and neutral with 1/2# 20x ea  UD with 1/2# 3x10 with arm by side  Gentle PROM/passive stretch isolated and composite joints R thumb and wrist    Elesa participated in dynamic functional therapeutic activities to improve functional performance for 15 minutes, including:  Small pom poms in hand manipulation 3 containers  Isospheres 3 min  Wrist wheel 2 x 2 min forward and backwards. (NT)   Pen walks and rotations 10x ea    Home Exercises and Education Provided     Education provided:   -  Cont per  previous.    Written Home Exercises Provided:  Patient instructed to cont prior HEP.    Exercises were reviewed and Lashell was able to demonstrate them prior to the end of the session.  Lashell demonstrated good  understanding of the education provided.     See EMR under Media for exercises provided today and/or prior visit.        Assessment     Pt would continue to benefit from skilled OT. Pain at worst decreased to a 1/10. Progressed light functional activities slightly in clinic, which pt tolerated well but states it was challenging.   - Progress towards goals:  STG #2 has been met.    Lashell is progressing well towards her goals . Pt continues with good rehab potential.     Pt will continue to benefit from skilled outpatient occupational therapy to address the deficits listed in the problem list on initial evaluation in order to maximize pt's level of independence in the home and community.     Anticipated barriers to occupational therapy: None    Pt's spiritual, cultural and educational needs considered and pt agreeable to plan of care and goals.    Goals:  Short Term Goals: (30 days, 2/22/2024, and/or 10th visit) unless otherwise noted below.  1. Pt will be independent with HEP in 2 visits. MET  2. Pt will report decreased pain to a 3/10 at worst in RUE with ADLs in order to increase function/use of UE. (Met 1/29/24)  3. Pt will increase AROM wrist extension by 5 degrees (to 70 degrees) to increase function for ADL/IADL.  4. Pt will increase AROM wrist flexion by 10 degrees (to 50 degrees) to increase function for ADL/IADL.  5. Pt will demo increased tip to palm/DPC AROM  of 1.5 cm (to -1.5 cm) to increase function for ADL/IADL.      Long Term Goals: (by discharge)  1. Pt will report decreased pain to 1-2/10 with ADLs to allow for increased function/use of UE.   2. Pt will exhibit WNL AROM of R Wrist/hand to allow for Independent use of for all ADL/IADL tasks.  3. Pt will exhibit  WFL  strength to allow a  firm grasp during ADL/IADL (cooking utensils, tool use, carrying groceries, steering wheel, etc.)  4. Pt will exhibit WFL lateral pinch strength to allow writing,opening containers, and turning keys.  5  Pt will return to PLOF with all ADL/IADL, leisure and job tasks.    Plan   Continue Occupational Therapy 3 times per week through current poc expiration date of 3/4/2024, in order to to decrease pain and edema, and increase A/PROM, strength, and functional use of R upper extremity.    Updates/Grading for next session:  Progress with OT as tolerated.    SAILAJA Brian, BAHMANT

## 2024-01-31 ENCOUNTER — CLINICAL SUPPORT (OUTPATIENT)
Dept: REHABILITATION | Facility: HOSPITAL | Age: 59
End: 2024-01-31
Payer: COMMERCIAL

## 2024-01-31 DIAGNOSIS — M79.644 PAIN OF RIGHT THUMB: ICD-10-CM

## 2024-01-31 DIAGNOSIS — M25.531 PAIN, WRIST, RIGHT: Primary | ICD-10-CM

## 2024-01-31 DIAGNOSIS — M25.631 STIFFNESS OF RIGHT WRIST JOINT: ICD-10-CM

## 2024-01-31 PROCEDURE — 97022 WHIRLPOOL THERAPY: CPT | Mod: PO

## 2024-01-31 PROCEDURE — 97110 THERAPEUTIC EXERCISES: CPT | Mod: PO

## 2024-01-31 PROCEDURE — 97530 THERAPEUTIC ACTIVITIES: CPT | Mod: PO

## 2024-01-31 NOTE — PROGRESS NOTES
[facility]  Occupational Therapy Treatment Note     Date: 1/31/2024  Name: Lashell Stroud  Clinic Number: [id number]    Therapy Diagnosis:   Encounter Diagnoses   Name Primary?    Pain, wrist, right Yes    Pain of right thumb     Stiffness of right wrist joint        Physician: Stanley Rivero MD    Physician Orders:   Note:  Please begin therapy to the right wrist and hand. Status post CMC suspension arthroplasty protocol.       Frequency:  3 times per week     Duration:  6 weeks      Diagnosis: Status post right thumb CMC suspension arthroplasty     Medical Jannette No new problems or complaints.     M18.11 (ICD-10-CM) - Arthritis of carpometacarpal (CMC) joint of right thumb   Surgical Procedure and Date:   PROCEDURE: right Thumb CMC suspension arthroplasty  IMPLANTS: None   12/12/2023  S/P: 6 weeks and 6 days on 1/29/2024  Evaluation Date: 1/23/2024  Insurance Authorization Period Expiration:  Calendar year     Plan of Care Certification Period: 6 weeks = 3/4/2024   Date of Return to Referring Provider: 2/28/2024  Visit # / Visits authorized: 5 / 20  FOTO: Jeff  / 1  Next Reassessment at 10th visit or by 30 days = 2/22/2024     Time In: 1515  Time Out: 1558  Total Appointment Time 43 min  (Timed 43 min & untimed codes: Fluidotherapy)        Precautions:  standard and post op per protocol  wears velcro wrist/thumb brace     Subjective     Pt reports: She was compliant with home exercise program given last session.   Response to previous treatment:Good  Functional change: None new    Pain 0-10 scale   Pain:  Functional Pain Scale Rating 0-10:   0/10 on average  0/10 at best  1/10 at worst (sometimes her arthritis, decreased 4 levels) )   Location: R thumb and wraps around R wrist  Description: Aching and Sharp  Aggravating Factors: ROM  Easing Factors: Rest, brace use       Objective   MEASUREMENTS  Last measurements below are from Eval unless otherwise noted.     Edema:             Circumferential (in cm) L R   Proximal Wrist Crease 15.3 15.8   MPs NT NT   Mid P1 of  Long Finger NT NT      AROM Hand: Tip to palm/DPC digits 1-5 (in cm) on 1/26/2024    Left Right   1st Intact -2.5 (+0.5)   2nd Grossly intact 2-5 Grossly intact 2-4   3rd       4th       5th   Some significant OA R SFat IPs limit full hook fist but WFL      AROM Wrist/Forearm:                Left              Right on 1/26/2024   Wrist Ext/Flex:  70/65° Ext/Flex: 70 (+5) /51° (+11)           Forearm Pron/Sup  WNL° Pro/Sup:  WNL°      AROM Elbow:                 Left              Right   Elbow Ext/Flex  WNL° Ext/Flex:  WNL°         Treatment     Elesa received the following supervised modalities   Please see under therapeutic exercises for further on heat and stretch ex with the use of Fluidotherapy in order to increase: comfort, blood flow and soft tissue elasticity. (Therex performed during)     Elesa received the following manual therapy techniques for 3 min massage to R wrist and R hand/thumb     Elesa received therapeutic exercises for 25 minutes including:  Place and hold with thumb in composite flexion 5 min while on moist heat. (NT)  Wrist circles, Thumb flex/ext, wrist flex and wrist ext, thumb touches to each finger and tendon glides while in fluidotherapy.  AAROM with approx 1/2# 10x10 sec wrist flexion and ext   Wrist PREs RD/UD in pronation and neutral with 1/2# 20x ea  UD with 1/2# 3x10 with arm by side.  Gentle PROM/passive stretch isolated and composite joints R thumb and wrist    Elesa participated in dynamic functional therapeutic activities to improve functional performance for 15 minutes, including:  Small pom poms in hand manipulation 3 containers (NT  Tip to palm and palm to tip with mancala x2 sets  Isospheres 3 min  Wrist wheel 2 x 2 min forward and backwards. (NT)   Rolls over basketball 2 min   Pen walks and rotations 10x ea (NT)    Home Exercises and Education Provided     Education provided:   -  Cont  per previous.    Written Home Exercises Provided:  Patient instructed to cont prior HEP.    Exercises were reviewed and Lashell was able to demonstrate them prior to the end of the session.  Lashell demonstrated good  understanding of the education provided.     See EMR under Media for exercises provided today and/or prior visit.        Assessment     Pt would continue to benefit from skilled OT. Pt tolerated progression with Tx well this date. Cont per current POC.       - Progress towards goals:  STG #2 has been met.    Lashell is progressing well towards her goals . Pt continues with good rehab potential.     Pt will continue to benefit from skilled outpatient occupational therapy to address the deficits listed in the problem list on initial evaluation in order to maximize pt's level of independence in the home and community.     Anticipated barriers to occupational therapy: None    Pt's spiritual, cultural and educational needs considered and pt agreeable to plan of care and goals.    Goals:  Short Term Goals: (30 days, 2/22/2024, and/or 10th visit) unless otherwise noted below.  1. Pt will be independent with HEP in 2 visits. MET  2. Pt will report decreased pain to a 3/10 at worst in RUE with ADLs in order to increase function/use of UE. (Met 1/29/24)  3. Pt will increase AROM wrist extension by 5 degrees (to 70 degrees) to increase function for ADL/IADL.  4. Pt will increase AROM wrist flexion by 10 degrees (to 50 degrees) to increase function for ADL/IADL.  5. Pt will demo increased tip to palm/DPC AROM  of 1.5 cm (to -1.5 cm) to increase function for ADL/IADL.      Long Term Goals: (by discharge)  1. Pt will report decreased pain to 1-2/10 with ADLs to allow for increased function/use of UE.   2. Pt will exhibit WNL AROM of R Wrist/hand to allow for Independent use of for all ADL/IADL tasks.  3. Pt will exhibit  WFL  strength to allow a firm grasp during ADL/IADL (cooking utensils, tool use, carrying  groceries, steering wheel, etc.)  4. Pt will exhibit WFL lateral pinch strength to allow writing,opening containers, and turning keys.  5  Pt will return to PLOF with all ADL/IADL, leisure and job tasks.    Plan   Continue Occupational Therapy 3 times per week through current poc expiration date of 3/4/2024, in order to to decrease pain and edema, and increase A/PROM, strength, and functional use of R upper extremity.    Updates/Grading for next session:  Progress with OT as tolerated    SAILAJA Lombardi/MICAH

## 2024-02-02 ENCOUNTER — CLINICAL SUPPORT (OUTPATIENT)
Dept: REHABILITATION | Facility: HOSPITAL | Age: 59
End: 2024-02-02
Payer: COMMERCIAL

## 2024-02-02 DIAGNOSIS — M79.644 PAIN OF RIGHT THUMB: ICD-10-CM

## 2024-02-02 DIAGNOSIS — M25.631 STIFFNESS OF RIGHT WRIST JOINT: ICD-10-CM

## 2024-02-02 DIAGNOSIS — M25.531 PAIN, WRIST, RIGHT: Primary | ICD-10-CM

## 2024-02-02 PROCEDURE — 97022 WHIRLPOOL THERAPY: CPT | Mod: PO

## 2024-02-02 PROCEDURE — 97530 THERAPEUTIC ACTIVITIES: CPT | Mod: PO

## 2024-02-02 PROCEDURE — 97110 THERAPEUTIC EXERCISES: CPT | Mod: PO

## 2024-02-02 NOTE — PROGRESS NOTES
[facility]  Occupational Therapy Treatment Note     Date: 2/2/2024  Name: Lashell Stroud  Clinic Number: [id number]    Therapy Diagnosis:   Encounter Diagnoses   Name Primary?    Pain, wrist, right Yes    Pain of right thumb     Stiffness of right wrist joint        Physician: Stanley Rivero MD    Physician Orders:   Note:  Please begin therapy to the right wrist and hand. Status post CMC suspension arthroplasty protocol.       Frequency:  3 times per week     Duration:  6 weeks      Diagnosis: Status post right thumb CMC suspension arthroplasty     Medical Jannette No new problems or complaints.     M18.11 (ICD-10-CM) - Arthritis of carpometacarpal (CMC) joint of right thumb   Surgical Procedure and Date:   PROCEDURE: right Thumb CMC suspension arthroplasty  IMPLANTS: None   12/12/2023  S/P: 6 weeks and 6 days on 1/29/2024  Evaluation Date: 1/23/2024  Insurance Authorization Period Expiration:  Calendar year     Plan of Care Certification Period: 6 weeks = 3/4/2024   Date of Return to Referring Provider: 2/28/2024  Visit # / Visits authorized: 7 / 20  FOTO: Jeff  / 1  Next Reassessment at 10th visit or by 30 days = 2/22/2024     Time In: 1515  Time Out: 1558  Total Appointment Time: 43 min  (Timed 40 min & untimed codes: Fluidotherapy)        Precautions:  standard and post op per protocol  wears velcro wrist/thumb brace     Subjective     Pt reports: She was compliant with home exercise program given last session.   Response to previous treatment:Good  Functional change: None new    Pain 0-10 scale   Pain:  Functional Pain Scale Rating 0-10:   0/10 on average  0/10 at best  1/10 at worst (sometimes her arthritis, decreased 4 levels) )   Location: R thumb and wraps around R wrist  Description: Aching and Sharp  Aggravating Factors: ROM  Easing Factors: Rest, brace use       Objective   MEASUREMENTS  Last measurements below are from Eval unless otherwise noted.     Edema:             Circumferential (in cm) L R   Proximal Wrist Crease 15.3 15.8   MPs NT NT   Mid P1 of  Long Finger NT NT      AROM Hand: Tip to palm/DPC digits 1-5 (in cm) on 1/26/2024    Left Right   1st Intact -2.5 (+0.5)   2nd Grossly intact 2-5 Grossly intact 2-4   3rd       4th       5th   Some significant OA R SFat IPs limit full hook fist but WFL      AROM Wrist/Forearm:                Left              Right on 1/26/2024   Wrist Ext/Flex:  70/65° Ext/Flex: 70 (+5) /51° (+11)           Forearm Pron/Sup  WNL° Pro/Sup:  WNL°      AROM Elbow:                 Left              Right   Elbow Ext/Flex  WNL° Ext/Flex:  WNL°         Treatment     Elesa received the following supervised modalities   Please see under therapeutic exercises for further on heat and stretch ex with the use of Fluidotherapy in order to increase: comfort, blood flow and soft tissue elasticity. (Therex performed during)     Elesa received the following manual therapy techniques for 0 min massage to R wrist and R hand/thumb     Elesa received therapeutic exercises for 25 minutes including:  Place and hold with thumb in composite flexion 5 min while on moist heat. (NT)  Wrist circles, Thumb flex/ext, wrist flex and wrist ext, thumb touches to each finger and tendon glides while in fluidotherapy.  AAROM with 1# DB 10x10 sec   Wrist PREs RD/UD in pronation and neutral with 1/2# 20x ea (NT)  UD with 1# 3x10 with arm by side.  Gentle PROM/passive stretch isolated and composite joints R thumb and wrist    Elesa participated in dynamic functional therapeutic activities to improve functional performance for 15 minutes, including:  Small pom poms in hand manipulation 3 containers (NT  Tip to palm and palm to tip with buttons 10 at a time x 1 container  Isospheres 3 min  Wrist wheel 2 x 2 min forward and backwards.   Rolls over basketball 2 min (NT)  Pen walks and rotations 10x ea (NT)    Home Exercises and Education Provided     Education provided:   -  Cont per  previous.    Written Home Exercises Provided:  Patient instructed to cont prior HEP.    Exercises were reviewed and Lashell was able to demonstrate them prior to the end of the session.  Lashell demonstrated good  understanding of the education provided.     See EMR under Media for exercises provided today and/or prior visit.        Assessment     Pt would continue to benefit from skilled OT. Pt tolerated progression with Tx well this date. Cont per current POC.       - Progress towards goals:  STG #2 has been met.    Lashell is progressing well towards her goals . Pt continues with good rehab potential.     Pt will continue to benefit from skilled outpatient occupational therapy to address the deficits listed in the problem list on initial evaluation in order to maximize pt's level of independence in the home and community.     Anticipated barriers to occupational therapy: None    Pt's spiritual, cultural and educational needs considered and pt agreeable to plan of care and goals.    Goals:  Short Term Goals: (30 days, 2/22/2024, and/or 10th visit) unless otherwise noted below.  1. Pt will be independent with HEP in 2 visits. MET  2. Pt will report decreased pain to a 3/10 at worst in RUE with ADLs in order to increase function/use of UE. (Met 1/29/24)  3. Pt will increase AROM wrist extension by 5 degrees (to 70 degrees) to increase function for ADL/IADL.  4. Pt will increase AROM wrist flexion by 10 degrees (to 50 degrees) to increase function for ADL/IADL.  5. Pt will demo increased tip to palm/DPC AROM  of 1.5 cm (to -1.5 cm) to increase function for ADL/IADL.      Long Term Goals: (by discharge)  1. Pt will report decreased pain to 1-2/10 with ADLs to allow for increased function/use of UE.   2. Pt will exhibit WNL AROM of R Wrist/hand to allow for Independent use of for all ADL/IADL tasks.  3. Pt will exhibit  WFL  strength to allow a firm grasp during ADL/IADL (cooking utensils, tool use, carrying groceries,  steering wheel, etc.)  4. Pt will exhibit WFL lateral pinch strength to allow writing,opening containers, and turning keys.  5  Pt will return to PLOF with all ADL/IADL, leisure and job tasks.    Plan   Continue Occupational Therapy 3 times per week through current poc expiration date of 3/4/2024, in order to to decrease pain and edema, and increase A/PROM, strength, and functional use of R upper extremity.    Updates/Grading for next session:  Progress with OT as tolerated    SAILAJA Lombardi/MICAH

## 2024-02-06 ENCOUNTER — CLINICAL SUPPORT (OUTPATIENT)
Dept: REHABILITATION | Facility: HOSPITAL | Age: 59
End: 2024-02-06
Payer: COMMERCIAL

## 2024-02-06 DIAGNOSIS — M25.631 STIFFNESS OF RIGHT WRIST JOINT: ICD-10-CM

## 2024-02-06 DIAGNOSIS — M79.644 PAIN OF RIGHT THUMB: ICD-10-CM

## 2024-02-06 DIAGNOSIS — M25.531 PAIN, WRIST, RIGHT: Primary | ICD-10-CM

## 2024-02-06 PROCEDURE — 97110 THERAPEUTIC EXERCISES: CPT | Mod: PO

## 2024-02-06 PROCEDURE — 97530 THERAPEUTIC ACTIVITIES: CPT | Mod: PO

## 2024-02-06 PROCEDURE — 97022 WHIRLPOOL THERAPY: CPT | Mod: PO

## 2024-02-06 NOTE — PROGRESS NOTES
[facility]  Occupational Therapy Treatment Note     Date: 2/6/2024  Name: Lashell Stroud  Clinic Number: [id number]    Therapy Diagnosis:   Encounter Diagnoses   Name Primary?    Pain, wrist, right Yes    Pain of right thumb     Stiffness of right wrist joint        Physician: Stanley Rivero MD    Physician Orders:   Note:  Please begin therapy to the right wrist and hand. Status post CMC suspension arthroplasty protocol.       Frequency:  3 times per week     Duration:  6 weeks      Diagnosis: Status post right thumb CMC suspension arthroplasty     Medical Jannette No new problems or complaints.     M18.11 (ICD-10-CM) - Arthritis of carpometacarpal (CMC) joint of right thumb   Surgical Procedure and Date:   PROCEDURE: right Thumb CMC suspension arthroplasty  IMPLANTS: None   12/12/2023  S/P: 8 weeks on 2/6/2024  Evaluation Date: 1/23/2024  Insurance Authorization Period Expiration:  Calendar year     Plan of Care Certification Period: 6 weeks = 3/4/2024   Date of Return to Referring Provider: 2/28/2024  Visit # / Visits authorized: 7 / 20  FOTO: Jeff  / Reagan  Next Reassessment at 10th visit or by 30 days = 2/22/2024     Time In: 1507  Time Out: 1603  Total Appointment Time: 56 min  (Timed 56 min & untimed codes: Fluidotherapy)        Precautions:  standard and post op per protocol  wears velcro wrist/thumb brace     Subjective     Pt reports: no new complaints. Beginning to wean out of brace at home per MD instruction.  She was compliant with home exercise program given last session.   Response to previous treatment:Good  Functional change: None new    Pain 0-10 scale   Pain:  Functional Pain Scale Rating 0-10:   0/10 on average  0/10 at best  1/10 at worst (sometimes her arthritis, decreased 4 levels) )   Location: R thumb and wraps around R wrist  Description: Aching and Sharp  Aggravating Factors: ROM  Easing Factors: Rest, brace use       Objective   MEASUREMENTS  Last  measurements below are from Eval unless otherwise noted.     Edema:            Circumferential (in cm) L R   Proximal Wrist Crease 15.3 15.8   MPs NT NT   Mid P1 of  Long Finger NT NT      AROM Hand: Tip to palm/DPC digits 1-5 (in cm) on 1/26/2024    Left Right   1st Intact -2.5 (+0.5)   2nd Grossly intact 2-5 Grossly intact 2-4   3rd       4th       5th   Some significant OA R SFat IPs limit full hook fist but WFL      AROM Wrist/Forearm:                Left              Right on 1/26/2024   Wrist Ext/Flex:  70/65° Ext/Flex: 70 (+5) /51° (+11)           Forearm Pron/Sup  WNL° Pro/Sup:  WNL°      AROM Elbow:                 Left              Right   Elbow Ext/Flex  WNL° Ext/Flex:  WNL°         Treatment     Elesa received the following supervised modalities   Please see under therapeutic exercises for further on heat and stretch ex with the use of Fluidotherapy in order to increase: comfort, blood flow and soft tissue elasticity. (Therex performed during)  Fluidotherapy x 15 min to R hand/arm  to decrease pain, desensitize, and increase tissue extensibility.        Elesa received the following manual therapy techniques for 5 min massage and scar mob to R wrist and R hand/thumb     Elesa received therapeutic exercises for 16 minutes including:  Place and hold with thumb in composite flexion 5 min while on moist heat. (NT)  Wrist circles, Thumb flex/ext, wrist flex and wrist ext, thumb touches to each finger and tendon glides while in fluidotherapy.  AAROM with 1# DB 10x10 sec (NT)   Wrist PREs with 1# 20x ea 4 ways  UD with 1# 3x10 with arm by side.  Gentle PROM/passive stretch isolated and composite joints R thumb and wrist    Elesa participated in dynamic functional therapeutic activities to improve functional performance for 20 minutes, including:  Small pom poms in hand manipulation 3 containers (NT)  Tip to palm and palm to tip with buttons 10 at a time x 1 container (NT)  Isospheres 2 min  Wrist wheel 2 min  forward and backwards.   Functional pinches with red CP 20x ea  Yellow putty squeezing 3 min  Composite thumb flexion in yellow putty 20x     Home Exercises and Education Provided     Education provided:   -  Cont per previous.    Written Home Exercises Provided:  Patient instructed to cont prior HEP.    Exercises were reviewed and Lashell was able to demonstrate them prior to the end of the session.  Lashell demonstrated good  understanding of the education provided.     See EMR under Media for exercises provided today and/or prior visit.        Assessment     Pt would continue to benefit from skilled OT. Progressed protocol to initiated light strengthening. Pt tolerated session well and reported no pain with pinching.        - Progress towards goals:  STG #2 has been met.    Lashell is progressing well towards her goals . Pt continues with good rehab potential.     Pt will continue to benefit from skilled outpatient occupational therapy to address the deficits listed in the problem list on initial evaluation in order to maximize pt's level of independence in the home and community.     Anticipated barriers to occupational therapy: None    Pt's spiritual, cultural and educational needs considered and pt agreeable to plan of care and goals.    Goals:  Short Term Goals: (30 days, 2/22/2024, and/or 10th visit) unless otherwise noted below.  1. Pt will be independent with HEP in 2 visits. MET  2. Pt will report decreased pain to a 3/10 at worst in RUE with ADLs in order to increase function/use of UE. (Met 1/29/24)  3. Pt will increase AROM wrist extension by 5 degrees (to 70 degrees) to increase function for ADL/IADL.  4. Pt will increase AROM wrist flexion by 10 degrees (to 50 degrees) to increase function for ADL/IADL.  5. Pt will demo increased tip to palm/DPC AROM  of 1.5 cm (to -1.5 cm) to increase function for ADL/IADL.      Long Term Goals: (by discharge)  1. Pt will report decreased pain to 1-2/10 with ADLs to allow  for increased function/use of UE.   2. Pt will exhibit WNL AROM of R Wrist/hand to allow for Independent use of for all ADL/IADL tasks.  3. Pt will exhibit  WFL  strength to allow a firm grasp during ADL/IADL (cooking utensils, tool use, carrying groceries, steering wheel, etc.)  4. Pt will exhibit WFL lateral pinch strength to allow writing,opening containers, and turning keys.  5  Pt will return to [facility] with all ADL/IADL, leisure and job tasks.    Plan   Continue Occupational Therapy 3 times per week through current poc expiration date of 3/4/2024, in order to to decrease pain and edema, and increase A/PROM, strength, and functional use of R upper extremity.    Updates/Grading for next session:  Progress with OT as tolerated    SAILAJA Brian, BAHMANT           patient

## 2024-02-07 ENCOUNTER — CLINICAL SUPPORT (OUTPATIENT)
Dept: REHABILITATION | Facility: HOSPITAL | Age: 59
End: 2024-02-07
Payer: COMMERCIAL

## 2024-02-07 DIAGNOSIS — M79.644 PAIN OF RIGHT THUMB: ICD-10-CM

## 2024-02-07 DIAGNOSIS — M25.531 PAIN, WRIST, RIGHT: Primary | ICD-10-CM

## 2024-02-07 DIAGNOSIS — M25.631 STIFFNESS OF RIGHT WRIST JOINT: ICD-10-CM

## 2024-02-07 PROCEDURE — 97530 THERAPEUTIC ACTIVITIES: CPT | Mod: PO

## 2024-02-07 PROCEDURE — 97110 THERAPEUTIC EXERCISES: CPT | Mod: PO

## 2024-02-07 NOTE — PROGRESS NOTES
[facility]  Occupational Therapy Treatment Note     Date: 2/7/2024  Name: Lashell Stroud  Clinic Number: [id number]    Therapy Diagnosis:   Encounter Diagnoses   Name Primary?    Pain, wrist, right Yes    Pain of right thumb     Stiffness of right wrist joint      Physician: Stanley Rivero MD    Physician Orders:   Note:  Please begin therapy to the right wrist and hand. Status post CMC suspension arthroplasty protocol.       Frequency:  3 times per week     Duration:  6 weeks      Diagnosis: Status post right thumb CMC suspension arthroplasty     Medical Jannette No new problems or complaints.     M18.11 (ICD-10-CM) - Arthritis of carpometacarpal (CMC) joint of right thumb   Surgical Procedure and Date:   PROCEDURE: right Thumb CMC suspension arthroplasty  IMPLANTS: None   12/12/2023  S/P: 8 weeks on 2/6/2024  Evaluation Date: 1/23/2024  Insurance Authorization Period Expiration:  Calendar year     Plan of Care Certification Period: 6 weeks = 3/4/2024   Date of Return to Referring Provider: 2/28/2024  Visit # / Visits authorized: 8 / 20  FOTO: Jfef  / Reagan  Next Reassessment at 10th visit or by 30 days = 2/22/2024     Time In: 1115 (pt ws not fully checked into the sytem; so checked in late this date.  Time Out: 1151  Total Appointment Time: 36 min  (Timed 33 min & untimed codes: N/A        Precautions:  standard and post op per protocol  wears velcro wrist/thumb brace     Subjective     Pt reports: no new complaints. Beginning to wean out of brace at home per MD instruction.  She was compliant with home exercise program given last session.   Response to previous treatment:Good  Functional change: None new    Pain 0-10 scale   Pain:  Functional Pain Scale Rating 0-10:   0/10 on average  0/10 at best  1/10 at worst (sometimes her arthritis, decreased 4 levels) )   Location: R thumb and wraps around R wrist  Description: Aching and Sharp  Aggravating Factors: ROM  Easing Factors:  Rest, brace use       Objective   MEASUREMENTS  Last measurements below are from Eval unless otherwise noted.     Edema:            Circumferential (in cm) L R   Proximal Wrist Crease 15.3 15.8   MPs NT NT   Mid P1 of  Long Finger NT NT      AROM Hand: Tip to palm/DPC digits 1-5 (in cm) on 1/26/2024    Left Right   1st Intact -2.5 (+0.5)   2nd Grossly intact 2-5 Grossly intact 2-4   3rd       4th       5th   Some significant OA R SFat IPs limit full hook fist but WFL      AROM Wrist/Forearm:                Left              Right on 1/26/2024   Wrist Ext/Flex:  70/65° Ext/Flex: 70 (+5) /51° (+11)           Forearm Pron/Sup  WNL° Pro/Sup:  WNL°      AROM Elbow:                 Left              Right   Elbow Ext/Flex  WNL° Ext/Flex:  WNL°         Treatment     Elesa received the following supervised modalities   Please see under therapeutic exercises for further on heat and stretch ex with the use of Fluidotherapy in order to increase: comfort, blood flow and soft tissue elasticity. (Therex performed during)  Fluidotherapy x 15 min to R hand/arm  to decrease pain, desensitize, and increase tissue extensibility. (NT)       Elesa received the following manual therapy techniques for 0 min massage and scar mob to R wrist and R hand/thumb (NT)    Elesa received therapeutic exercises for 10 minutes including:  Place and hold with thumb in composite flexion 5 min while on moist heat. (NT)  Wrist circles, Thumb flex/ext, wrist flex and wrist ext, thumb touches to each finger and tendon glides while in fluidotherapy.  AAROM with 2# DB 10x10 sec   Wrist PREs with 1# 20x ea 4 ways  UD with 2# 3x10 with arm by side.  Gentle PROM/passive stretch isolated and composite joints R thumb and wrist    Elesa participated in dynamic functional therapeutic activities to improve functional performance for 23 minutes, including:  Small pom poms in hand manipulation 3 containers (NT)  Tip to palm and palm to tip with macaroni  Isospheres 2  min (NT)  Hand helper 2 red bands 3x10  Handmaster blue 3x10 open and close  Flexbar palm up/palm down, wrist flexion, wrist extension and unilateral pronation 2x10 each with yellow.   Wrist wheel 2 min forward and backwards. (NT)  Functional pinches with red CP 20x ea (NT)  Yellow putty squeezing 3 min (NT)  Composite thumb flexion in yellow putty 20x     Home Exercises and Education Provided     Education provided:   -  Cont per previous.    Written Home Exercises Provided:  Patient instructed to cont prior HEP.    Exercises were reviewed and Lashell was able to demonstrate them prior to the end of the session.  Lashell demonstrated good  understanding of the education provided.     See EMR under Media for exercises provided today and/or prior visit.        Assessment     Pt would continue to benefit from skilled OT.  Pt tolerated progression with Tx well this date. Cont per current POC.     - Progress towards goals:  STG #2 has been met.    Lashell is progressing well towards her goals . Pt continues with good rehab potential.     Pt will continue to benefit from skilled outpatient occupational therapy to address the deficits listed in the problem list on initial evaluation in order to maximize pt's level of independence in the home and community.     Anticipated barriers to occupational therapy: None    Pt's spiritual, cultural and educational needs considered and pt agreeable to plan of care and goals.    Goals:  Short Term Goals: (30 days, 2/22/2024, and/or 10th visit) unless otherwise noted below.  1. Pt will be independent with HEP in 2 visits. MET  2. Pt will report decreased pain to a 3/10 at worst in RUE with ADLs in order to increase function/use of UE. (Met 1/29/24)  3. Pt will increase AROM wrist extension by 5 degrees (to 70 degrees) to increase function for ADL/IADL.  4. Pt will increase AROM wrist flexion by 10 degrees (to 50 degrees) to increase function for ADL/IADL.  5. Pt will demo increased tip to  palm/DPC AROM  of 1.5 cm (to -1.5 cm) to increase function for ADL/IADL.      Long Term Goals: (by discharge)  1. Pt will report decreased pain to 1-2/10 with ADLs to allow for increased function/use of UE.   2. Pt will exhibit WNL AROM of R Wrist/hand to allow for Independent use of for all ADL/IADL tasks.  3. Pt will exhibit  WFL  strength to allow a firm grasp during ADL/IADL (cooking utensils, tool use, carrying groceries, steering wheel, etc.)  4. Pt will exhibit WFL lateral pinch strength to allow writing,opening containers, and turning keys.  5  Pt will return to [facility] with all ADL/IADL, leisure and job tasks.    Plan   Continue Occupational Therapy 3 times per week through current poc expiration date of 3/4/2024, in order to to decrease pain and edema, and increase A/PROM, strength, and functional use of R upper extremity.    Updates/Grading for next session:  Progress with OT as tolerated    SAILAJA Lombardi/MICAH

## 2024-02-09 ENCOUNTER — CLINICAL SUPPORT (OUTPATIENT)
Dept: REHABILITATION | Facility: HOSPITAL | Age: 59
End: 2024-02-09
Payer: COMMERCIAL

## 2024-02-09 DIAGNOSIS — M25.631 STIFFNESS OF RIGHT WRIST JOINT: ICD-10-CM

## 2024-02-09 DIAGNOSIS — M79.644 PAIN OF RIGHT THUMB: ICD-10-CM

## 2024-02-09 DIAGNOSIS — M25.531 PAIN, WRIST, RIGHT: Primary | ICD-10-CM

## 2024-02-09 PROCEDURE — 97530 THERAPEUTIC ACTIVITIES: CPT | Mod: PO

## 2024-02-09 PROCEDURE — 97110 THERAPEUTIC EXERCISES: CPT | Mod: PO

## 2024-02-09 NOTE — PROGRESS NOTES
[facility]  Occupational Therapy Treatment Note     Date: 2/9/2024  Name: Lashell Stroud  Clinic Number: [id number]    Therapy Diagnosis:   Encounter Diagnoses   Name Primary?    Pain, wrist, right Yes    Pain of right thumb     Stiffness of right wrist joint      Physician: Stanley Rivero MD    Physician Orders:   Note:  Please begin therapy to the right wrist and hand. Status post CMC suspension arthroplasty protocol.       Frequency:  3 times per week     Duration:  6 weeks      Diagnosis: Status post right thumb CMC suspension arthroplasty     Medical Jannette No new problems or complaints.     M18.11 (ICD-10-CM) - Arthritis of carpometacarpal (CMC) joint of right thumb   Surgical Procedure and Date:   PROCEDURE: right Thumb CMC suspension arthroplasty  IMPLANTS: None   12/12/2023  S/P: 8 weeks and 3 days on 2/9/2024  Evaluation Date: 1/23/2024  Insurance Authorization Period Expiration:  Calendar year     Plan of Care Certification Period: 6 weeks = 3/4/2024   Date of Return to Referring Provider: 2/28/2024  Visit # / Visits authorized: 9 / 20  FOTO: Jeff  / Reagan  Next Reassessment at 10th visit or by 30 days = 2/22/2024     Time In: 1505  Time Out: 1545  Total Appointment Time: 40 min  (Timed 40 min & untimed codes: N/A        Precautions:  standard and post op per protocol     Subjective     Pt reports: no new complaints.   She was compliant with home exercise program given last session.   Response to previous treatment:Good  Functional change: None new    Pain 0-10 scale   Pain:  Functional Pain Scale Rating 0-10:   0/10 on average  0/10 at best  1/10 at worst (sometimes her arthritis, decreased 4 levels) )   Location: R thumb and wraps around R wrist  Description: Aching and Sharp  Aggravating Factors: ROM  Easing Factors: Rest, brace use       Objective   MEASUREMENTS  Last measurements below are from Eval unless otherwise noted.     Edema:            Circumferential (in  cm) L R   Proximal Wrist Crease 15.3 15.8   MPs NT NT   Mid P1 of  Long Finger NT NT      AROM Hand: Tip to palm/DPC digits 1-5 (in cm) on 1/26/2024    Left Right   1st Intact -2.5 (+0.5)   2nd Grossly intact 2-5 Grossly intact 2-4   3rd       4th       5th   Some significant OA R SFat IPs limit full hook fist but WFL      AROM Wrist/Forearm:                Left              Right on 2/9/2024   Wrist Ext/Flex:  70/65° Ext/Flex: 70 (+5) /55° (+4 and 58 post)           Forearm Pron/Sup  WNL° Pro/Sup:  WNL°      AROM Elbow:                 Left              Right   Elbow Ext/Flex  WNL° Ext/Flex:  WNL°         Treatment     Elesa received the following supervised modalities   Please see under therapeutic exercises for further on heat and stretch ex with the use of Fluidotherapy in order to increase: comfort, blood flow and soft tissue elasticity. (Therex performed during)  Fluidotherapy x 15 min to R hand/arm  to decrease pain, desensitize, and increase tissue extensibility. (NT)       Elesa received the following manual therapy techniques for 0 min massage and scar mob to R wrist and R hand/thumb (NT)    Elesa received therapeutic exercises for 10 minutes including:  Place and hold with thumb in composite flexion 5 min while on moist heat  Wrist circles, Thumb flex/ext, wrist flex and wrist ext, thumb touches to each finger and tendon glides while in fluidotherapy. (NT)  AAROM with 2# DB 10x10 sec   Wrist PREs with 1# 20x ea 4 ways  UD with 2# 3x10 with arm by side.  Gentle PROM/passive stretch isolated and composite joints R thumb and wrist    Elesa participated in dynamic functional therapeutic activities to improve functional performance for 30 minutes, including:  Small pom poms in hand manipulation 3 containers (NT)  Tip to palm and palm to tip with macaroni (NT)  Isospheres 23min   Hand helper 2 red bands 5x10  Handmaster blue 3x10 open and close  Flexbar palm up/palm down, wrist flexion, wrist extension and  unilateral pronation 2x10 each with red   Wrist wheel 2 min forward and backwards. (NT)  Functional pinches with yellow 2x20   Yellow putty squeezing 3 min (NT)  Composite thumb flexion in yellow putty 20x  (NT)    Home Exercises and Education Provided     Education provided:   -  Cont per previous.    Written Home Exercises Provided:  Patient instructed to cont prior HEP.    Exercises were reviewed and Lashell was able to demonstrate them prior to the end of the session.  Lashell demonstrated good  understanding of the education provided.     See EMR under Media for exercises provided today and/or prior visit.        Assessment     Pt would continue to benefit from skilled OT.  Pt tolerated progression with Tx well this date. Cont per current POC.     - Progress towards goals:  STG #2 has been met.    Lashell is progressing well towards her goals . Pt continues with good rehab potential.     Pt will continue to benefit from skilled outpatient occupational therapy to address the deficits listed in the problem list on initial evaluation in order to maximize pt's level of independence in the home and community.     Anticipated barriers to occupational therapy: None    Pt's spiritual, cultural and educational needs considered and pt agreeable to plan of care and goals.    Goals:  Short Term Goals: (30 days, 2/22/2024, and/or 10th visit) unless otherwise noted below.  1. Pt will be independent with HEP in 2 visits. MET  2. Pt will report decreased pain to a 3/10 at worst in RUE with ADLs in order to increase function/use of UE. (Met 1/29/24)  3. Pt will increase AROM wrist extension by 5 degrees (to 70 degrees) to increase function for ADL/IADL.  4. Pt will increase AROM wrist flexion by 10 degrees (to 50 degrees) to increase function for ADL/IADL.  5. Pt will demo increased tip to palm/DPC AROM  of 1.5 cm (to -1.5 cm) to increase function for ADL/IADL.      Long Term Goals: (by discharge)  1. Pt will report decreased pain to  1-2/10 with ADLs to allow for increased function/use of UE.   2. Pt will exhibit WNL AROM of R Wrist/hand to allow for Independent use of for all ADL/IADL tasks.  3. Pt will exhibit  WFL  strength to allow a firm grasp during ADL/IADL (cooking utensils, tool use, carrying groceries, steering wheel, etc.)  4. Pt will exhibit WFL lateral pinch strength to allow writing,opening containers, and turning keys.  5  Pt will return to [facility] with all ADL/IADL, leisure and job tasks.    Plan   Continue Occupational Therapy 3 times per week through current poc expiration date of 3/4/2024, in order to to decrease pain and edema, and increase A/PROM, strength, and functional use of R upper extremity.    Updates/Grading for next session:  Progress with OT as tolerated    SAILAJA Lombardi/MICAH

## 2024-02-12 ENCOUNTER — CLINICAL SUPPORT (OUTPATIENT)
Dept: REHABILITATION | Facility: HOSPITAL | Age: 59
End: 2024-02-12
Payer: COMMERCIAL

## 2024-02-12 DIAGNOSIS — M25.531 PAIN, WRIST, RIGHT: Primary | ICD-10-CM

## 2024-02-12 DIAGNOSIS — M79.644 PAIN OF RIGHT THUMB: ICD-10-CM

## 2024-02-12 DIAGNOSIS — M25.631 STIFFNESS OF RIGHT WRIST JOINT: ICD-10-CM

## 2024-02-12 PROCEDURE — 97530 THERAPEUTIC ACTIVITIES: CPT | Mod: PO

## 2024-02-12 NOTE — PROGRESS NOTES
[facility]  Occupational Therapy D/C and Treatment Note     Date: 2/12/2024  Name: Lashell Stroud  Clinic Number: [id number]    Therapy Diagnosis:   Encounter Diagnoses   Name Primary?    Pain, wrist, right Yes    Pain of right thumb     Stiffness of right wrist joint        Physician: Stanley Rivero MD    Physician Orders:   Note:  Please begin therapy to the right wrist and hand. Status post CMC suspension arthroplasty protocol.       Frequency:  3 times per week     Duration:  6 weeks      Diagnosis: Status post right thumb CMC suspension arthroplasty     Medical Jannette No new problems or complaints.     M18.11 (ICD-10-CM) - Arthritis of carpometacarpal (CMC) joint of right thumb   Surgical Procedure and Date:   PROCEDURE: right Thumb CMC suspension arthroplasty  IMPLANTS: None   12/12/2023  S/P: 8 weeks and 3 days on 2/9/2024  Evaluation Date: 1/23/2024  Insurance Authorization Period Expiration:  Calendar year     Plan of Care Certification Period: 6 weeks = 3/4/2024   Date of Return to Referring Provider: 2/28/2024  Visit # / Visits authorized: 10 / 20  FOTO: Eval  / 1  Next Reassessment at 10th visit or by 30 days = 2/22/2024     Time In:  1515  Time Out: 1555  Total Appointment Time: 40 min  (Timed 38 min & untimed codes: N/A        Precautions:  standard and post op per protocol     Subjective     Pt reports: no new complaints.   She was compliant with home exercise program given last session.   Response to previous treatment:Good  Functional change: None new    Pain 0-10 scale   Pain:  Functional Pain Scale Rating 0-10:   0/10 on average  0/10 at best  1/10 at worst (sometimes her arthritis, decreased 4 levels) )   Location: R thumb and wraps around R wrist  Description: Aching and Sharp  Aggravating Factors: ROM  Easing Factors: Rest, brace use       Objective   MEASUREMENTS  Last measurements below are from Eval unless otherwise noted.     FOTO 39 to 69 on D/C on  2/12/24 2/12/2024   Lateral Pinch: = 10#  Position II  = 25#    Edema:            Circumferential (in cm) L R   Proximal Wrist Crease 15.3 15.8   MPs NT NT   Mid P1 of  Long Finger NT NT      AROM Hand: Tip to palm/DPC digits 1-5 (in cm) on 2/12/2024    Left Right   1st Intact -1.0   2nd Grossly intact 2-5 Grossly intact 2-4   3rd       4th       5th   Some significant OA R SFat IPs limit full hook fist but WFL      AROM Wrist/Forearm:                Left              Right on 2/12/2024   Wrist Ext/Flex:  70/65° Ext/Flex: 70/° (60)           Forearm Pron/Sup  WNL° Pro/Sup:  WNL°      AROM Elbow:                 Left              Right   Elbow Ext/Flex  WNL° Ext/Flex:  WNL°         Treatment     Elesa received the following supervised modalities   Please see under therapeutic exercises for further on heat and stretch ex with the use of Fluidotherapy in order to increase: comfort, blood flow and soft tissue elasticity. (Therex performed during)  Fluidotherapy x 15 min to R hand/arm  to decrease pain, desensitize, and increase tissue extensibility. (NT)       Elesa received the following manual therapy techniques for 0 min massage and scar mob to R wrist and R hand/thumb (NT)    Elesa received therapeutic exercises for 0 minutes including:      Elesa participated in dynamic functional therapeutic activities to improve functional performance for 38 minutes, including:  Wrist wheel 2 min forward and backwards.  Putty: grasp roll-outs and pinches 3x10 each with red. Also instructed home program for putty.  Submax pinch and  testing (slow and pain free)    Home Exercises and Education Provided     Education provided:   -  Cont per previous.    Written Home Exercises Provided:  Patient instructed to cont prior HEP.    Exercises were reviewed and Lashell was able to demonstrate them prior to the end of the session.  Elesa demonstrated good  understanding of the education provided.     See EMR under Media for  exercises provided today and/or prior visit.        Assessment       Pt has met all goals, is pleased with progress, and is ready for D/C at this time.      Goals:  Short Term Goals: (30 days, 2/22/2024, and/or 10th visit) unless otherwise noted below.  1. Pt will be independent with HEP in 2 visits. MET  2. Pt will report decreased pain to a 3/10 at worst in RUE with ADLs in order to increase function/use of UE. (Met 1/29/24)  3. Pt will increase AROM wrist extension by 5 degrees (to 70 degrees) to increase function for ADL/IADL MET  4. Pt will increase AROM wrist flexion by 10 degrees (to 50 degrees) to increase function for ADL/IADL. MET  5. Pt will demo increased tip to palm/DPC AROM  of 1.5 cm (to -1.5 cm) to increase function for ADL/IADL.  MET     Long Term Goals: (by discharge)  1. Pt will report decreased pain to 1-2/10 with ADLs to allow for increased function/use of UE.  MET  2. Pt will exhibit WNL AROM of R Wrist/hand to allow for Independent use of for all ADL/IADL tasks. MET  3. Pt will exhibit  WFL  strength to allow a firm grasp during ADL/IADL (cooking utensils, tool use, carrying groceries, steering wheel, etc.) MET  4. Pt will exhibit WFL lateral pinch strength to allow writing,opening containers, and turning keys. MET  5  Pt will return to PLOF with all ADL/IADL, leisure and job tasks. MET; has not been cleared to return to full duty work this time.    Plan     Pt for D/C from OT services this date. Pt. to cont HEP as tolerated and will follow up with referring provider for any further tx needs.      SAILAJA Lombardi/MICAH

## 2024-02-28 ENCOUNTER — OFFICE VISIT (OUTPATIENT)
Dept: ORTHOPEDICS | Facility: CLINIC | Age: 59
End: 2024-02-28
Payer: COMMERCIAL

## 2024-02-28 DIAGNOSIS — M18.11 ARTHRITIS OF CARPOMETACARPAL (CMC) JOINT OF RIGHT THUMB: Primary | ICD-10-CM

## 2024-02-28 PROCEDURE — 99024 POSTOP FOLLOW-UP VISIT: CPT | Mod: S$GLB,,, | Performed by: ORTHOPAEDIC SURGERY

## 2024-02-28 PROCEDURE — 1159F MED LIST DOCD IN RCRD: CPT | Mod: CPTII,S$GLB,, | Performed by: ORTHOPAEDIC SURGERY

## 2024-02-28 PROCEDURE — 99999 PR PBB SHADOW E&M-EST. PATIENT-LVL II: CPT | Mod: PBBFAC,,, | Performed by: ORTHOPAEDIC SURGERY

## 2024-02-28 NOTE — LETTER
February 28, 2024      [facility]  [address] [place] [address]  Phone: [phone]       Patient: Lashell Stroud   YOB: 1965  Date of Visit: 02/28/2024    To Whom It May Concern:    Hayder Stroud  was at [facility] on 02/28/2024. She can return back to work on 03/04/24 with no restrictions.    Sincerely,    Stanley Rivero MD

## 2024-02-28 NOTE — PROGRESS NOTES
Ms Stroud Returns to clinic today.  She is approximately 3 months status post right thumb CMC suspension arthroplasty.  She has been overall doing well but states that any time she holds a pen or another object for any period of time she gets a cramping sensation in the palm of the hand and over the thumb.  She states that otherwise she is doing well.      Physical exam: Examination of the right hand and wrist reveals that the incisions are well healed.  There is no significant edema.  Palpation does not produce tenderness.  She is able to flex and extend the thumb without pain.  Pinching is not significantly painful.  She does have sensation intact over the majority of the thumb with mild decreased sensation over the radial dorsal side.    Assessment: Status post right thumb CMC suspension arthroplasty     Plan:     She can continue to increase her weight-bearing as tolerated    2.  I do feel like the cramping sensation is related to muscle fatigue and therefore feel like it will improve as her muscle tolerance and endurance increase his     3.  She will follow up with me in 2-3 months for final evaluation with an x-ray of the right Hand

## 2024-03-09 ENCOUNTER — HOSPITAL ENCOUNTER (OUTPATIENT)
Facility: HOSPITAL | Age: 59
Discharge: HOME | End: 2024-03-10
Attending: EMERGENCY MEDICINE | Admitting: INTERNAL MEDICINE
Payer: COMMERCIAL

## 2024-03-09 DIAGNOSIS — D64.9 ANEMIA, UNSPECIFIED TYPE: Primary | ICD-10-CM

## 2024-03-09 DIAGNOSIS — R07.9 CHEST PAIN: ICD-10-CM

## 2024-03-09 DIAGNOSIS — K92.2 LOWER GI BLEED: ICD-10-CM

## 2024-03-09 LAB
ABO + RH BLD: NORMAL
ALBUMIN SERPL BCP-MCNC: 4 G/DL (ref 3.5–5.2)
ALP SERPL-CCNC: 62 U/L (ref 55–135)
ALT SERPL W/O P-5'-P-CCNC: 16 U/L (ref 10–44)
ANION GAP SERPL CALC-SCNC: 5 MMOL/L (ref 8–16)
AST SERPL-CCNC: 23 U/L (ref 10–40)
BASOPHILS # BLD AUTO: 0.07 K/UL (ref 0–0.2)
BASOPHILS NFR BLD: 0.8 % (ref 0–1.9)
BILIRUB SERPL-MCNC: 0.3 MG/DL (ref 0.1–1)
BLD GP AB SCN CELLS X3 SERPL QL: NORMAL
BUN SERPL-MCNC: 12 MG/DL (ref 6–20)
CALCIUM SERPL-MCNC: 8.6 MG/DL (ref 8.7–10.5)
CHLORIDE SERPL-SCNC: 107 MMOL/L (ref 95–110)
CO2 SERPL-SCNC: 29 MMOL/L (ref 23–29)
CREAT SERPL-MCNC: 0.7 MG/DL (ref 0.5–1.4)
DIFFERENTIAL METHOD BLD: ABNORMAL
EOSINOPHIL # BLD AUTO: 0.1 K/UL (ref 0–0.5)
EOSINOPHIL NFR BLD: 1.3 % (ref 0–8)
ERYTHROCYTE [DISTWIDTH] IN BLOOD BY AUTOMATED COUNT: 14 % (ref 11.5–14.5)
EST. GFR  (NO RACE VARIABLE): >60 ML/MIN/1.73 M^2
GLUCOSE SERPL-MCNC: 118 MG/DL (ref 70–110)
HCT VFR BLD AUTO: 28.2 % (ref 37–48.5)
HGB BLD-MCNC: 8.9 G/DL (ref 12–16)
IMM GRANULOCYTES # BLD AUTO: 0.03 K/UL (ref 0–0.04)
IMM GRANULOCYTES NFR BLD AUTO: 0.4 % (ref 0–0.5)
LIPASE SERPL-CCNC: 33 U/L (ref 4–60)
LYMPHOCYTES # BLD AUTO: 2.2 K/UL (ref 1–4.8)
LYMPHOCYTES NFR BLD: 26.5 % (ref 18–48)
MCH RBC QN AUTO: 29.5 PG (ref 27–31)
MCHC RBC AUTO-ENTMCNC: 31.6 G/DL (ref 32–36)
MCV RBC AUTO: 93 FL (ref 82–98)
MONOCYTES # BLD AUTO: 0.4 K/UL (ref 0.3–1)
MONOCYTES NFR BLD: 5.1 % (ref 4–15)
NEUTROPHILS # BLD AUTO: 5.4 K/UL (ref 1.8–7.7)
NEUTROPHILS NFR BLD: 65.9 % (ref 38–73)
NRBC BLD-RTO: 0 /100 WBC
OB PNL STL: POSITIVE
PLATELET # BLD AUTO: 260 K/UL (ref 150–450)
PMV BLD AUTO: 10 FL (ref 9.2–12.9)
POTASSIUM SERPL-SCNC: 4 MMOL/L (ref 3.5–5.1)
PROT SERPL-MCNC: 5.8 G/DL (ref 6–8.4)
RBC # BLD AUTO: 3.02 M/UL (ref 4–5.4)
SODIUM SERPL-SCNC: 141 MMOL/L (ref 136–145)
SPECIMEN OUTDATE: NORMAL
WBC # BLD AUTO: 8.26 K/UL (ref 3.9–12.7)

## 2024-03-09 PROCEDURE — 82272 OCCULT BLD FECES 1-3 TESTS: CPT | Performed by: EMERGENCY MEDICINE

## 2024-03-09 PROCEDURE — 86901 BLOOD TYPING SEROLOGIC RH(D): CPT | Performed by: NURSE PRACTITIONER

## 2024-03-09 PROCEDURE — 25000003 PHARM REV CODE 250: Performed by: INTERNAL MEDICINE

## 2024-03-09 PROCEDURE — 80053 COMPREHEN METABOLIC PANEL: CPT | Performed by: NURSE PRACTITIONER

## 2024-03-09 PROCEDURE — 63600175 PHARM REV CODE 636 W HCPCS: Performed by: NURSE PRACTITIONER

## 2024-03-09 PROCEDURE — 83690 ASSAY OF LIPASE: CPT | Performed by: NURSE PRACTITIONER

## 2024-03-09 PROCEDURE — 96374 THER/PROPH/DIAG INJ IV PUSH: CPT

## 2024-03-09 PROCEDURE — G0378 HOSPITAL OBSERVATION PER HR: HCPCS

## 2024-03-09 PROCEDURE — 99285 EMERGENCY DEPT VISIT HI MDM: CPT | Mod: 25

## 2024-03-09 PROCEDURE — 85025 COMPLETE CBC W/AUTO DIFF WBC: CPT | Performed by: NURSE PRACTITIONER

## 2024-03-09 PROCEDURE — 25000003 PHARM REV CODE 250: Performed by: NURSE PRACTITIONER

## 2024-03-09 PROCEDURE — 94760 N-INVAS EAR/PLS OXIMETRY 1: CPT

## 2024-03-09 PROCEDURE — 36415 COLL VENOUS BLD VENIPUNCTURE: CPT | Performed by: NURSE PRACTITIONER

## 2024-03-09 PROCEDURE — C9113 INJ PANTOPRAZOLE SODIUM, VIA: HCPCS | Performed by: NURSE PRACTITIONER

## 2024-03-09 PROCEDURE — 99900031 HC PATIENT EDUCATION (STAT)

## 2024-03-09 PROCEDURE — 94761 N-INVAS EAR/PLS OXIMETRY MLT: CPT

## 2024-03-09 RX ORDER — LANOLIN ALCOHOL/MO/W.PET/CERES
800 CREAM (GRAM) TOPICAL
Status: DISCONTINUED | OUTPATIENT
Start: 2024-03-09 | End: 2024-03-10 | Stop reason: HOSPADM

## 2024-03-09 RX ORDER — LEVOTHYROXINE SODIUM 25 UG/1
50 TABLET ORAL
Status: DISCONTINUED | OUTPATIENT
Start: 2024-03-10 | End: 2024-03-10 | Stop reason: HOSPADM

## 2024-03-09 RX ORDER — SODIUM,POTASSIUM PHOSPHATES 280-250MG
2 POWDER IN PACKET (EA) ORAL
Status: DISCONTINUED | OUTPATIENT
Start: 2024-03-09 | End: 2024-03-10 | Stop reason: HOSPADM

## 2024-03-09 RX ORDER — IBUPROFEN 200 MG
24 TABLET ORAL
Status: DISCONTINUED | OUTPATIENT
Start: 2024-03-09 | End: 2024-03-09

## 2024-03-09 RX ORDER — CARBOXYMETHYLCELLULOSE SODIUM 5 MG/ML
2 SOLUTION/ DROPS OPHTHALMIC
Status: DISCONTINUED | OUTPATIENT
Start: 2024-03-09 | End: 2024-03-10 | Stop reason: HOSPADM

## 2024-03-09 RX ORDER — GLUCAGON 1 MG
1 KIT INJECTION
Status: DISCONTINUED | OUTPATIENT
Start: 2024-03-09 | End: 2024-03-09

## 2024-03-09 RX ORDER — IPRATROPIUM BROMIDE AND ALBUTEROL SULFATE 2.5; .5 MG/3ML; MG/3ML
3 SOLUTION RESPIRATORY (INHALATION) EVERY 6 HOURS PRN
Status: DISCONTINUED | OUTPATIENT
Start: 2024-03-09 | End: 2024-03-10 | Stop reason: HOSPADM

## 2024-03-09 RX ORDER — IBUPROFEN 200 MG
16 TABLET ORAL
Status: DISCONTINUED | OUTPATIENT
Start: 2024-03-09 | End: 2024-03-09

## 2024-03-09 RX ORDER — SODIUM CHLORIDE 9 MG/ML
INJECTION, SOLUTION INTRAVENOUS CONTINUOUS
Status: DISCONTINUED | OUTPATIENT
Start: 2024-03-09 | End: 2024-03-10 | Stop reason: HOSPADM

## 2024-03-09 RX ORDER — SODIUM CHLORIDE 9 MG/ML
INJECTION, SOLUTION INTRAVENOUS CONTINUOUS
Status: DISCONTINUED | OUTPATIENT
Start: 2024-03-09 | End: 2024-03-09

## 2024-03-09 RX ORDER — NALOXONE HCL 0.4 MG/ML
0.02 VIAL (ML) INJECTION
Status: DISCONTINUED | OUTPATIENT
Start: 2024-03-09 | End: 2024-03-10 | Stop reason: HOSPADM

## 2024-03-09 RX ORDER — ONDANSETRON HYDROCHLORIDE 2 MG/ML
4 INJECTION, SOLUTION INTRAVENOUS EVERY 6 HOURS PRN
Status: DISCONTINUED | OUTPATIENT
Start: 2024-03-09 | End: 2024-03-10 | Stop reason: HOSPADM

## 2024-03-09 RX ORDER — PANTOPRAZOLE SODIUM 40 MG/10ML
40 INJECTION, POWDER, LYOPHILIZED, FOR SOLUTION INTRAVENOUS 2 TIMES DAILY
Status: DISCONTINUED | OUTPATIENT
Start: 2024-03-09 | End: 2024-03-10 | Stop reason: HOSPADM

## 2024-03-09 RX ORDER — ACETAMINOPHEN 325 MG/1
650 TABLET ORAL EVERY 4 HOURS PRN
Status: DISCONTINUED | OUTPATIENT
Start: 2024-03-09 | End: 2024-03-10 | Stop reason: HOSPADM

## 2024-03-09 RX ORDER — POLYETHYLENE GLYCOL 3350, SODIUM SULFATE ANHYDROUS, SODIUM BICARBONATE, SODIUM CHLORIDE, POTASSIUM CHLORIDE 236; 22.74; 6.74; 5.86; 2.97 G/4L; G/4L; G/4L; G/4L; G/4L
4000 POWDER, FOR SOLUTION ORAL ONCE
Status: COMPLETED | OUTPATIENT
Start: 2024-03-09 | End: 2024-03-09

## 2024-03-09 RX ORDER — POLYETHYLENE GLYCOL 3350, SODIUM CHLORIDE, SODIUM BICARBONATE, POTASSIUM CHLORIDE 420; 11.2; 5.72; 1.48 G/4L; G/4L; G/4L; G/4L
1 POWDER, FOR SOLUTION ORAL ONCE
Status: DISCONTINUED | OUTPATIENT
Start: 2024-03-09 | End: 2024-03-09

## 2024-03-09 RX ORDER — HYDROCODONE BITARTRATE AND ACETAMINOPHEN 5; 325 MG/1; MG/1
1 TABLET ORAL EVERY 6 HOURS PRN
Status: DISCONTINUED | OUTPATIENT
Start: 2024-03-09 | End: 2024-03-10 | Stop reason: HOSPADM

## 2024-03-09 RX ORDER — METOPROLOL SUCCINATE 25 MG/1
25 TABLET, EXTENDED RELEASE ORAL DAILY
Status: DISCONTINUED | OUTPATIENT
Start: 2024-03-10 | End: 2024-03-10 | Stop reason: HOSPADM

## 2024-03-09 RX ADMIN — PANTOPRAZOLE SODIUM 40 MG: 40 INJECTION, POWDER, FOR SOLUTION INTRAVENOUS at 09:03

## 2024-03-09 RX ADMIN — POLYETHYLENE GLYCOL 3350, SODIUM SULFATE ANHYDROUS, SODIUM BICARBONATE, SODIUM CHLORIDE, POTASSIUM CHLORIDE 4000 ML: 236; 22.74; 6.74; 5.86; 2.97 POWDER, FOR SOLUTION ORAL at 09:03

## 2024-03-09 RX ADMIN — SODIUM CHLORIDE: 9 INJECTION, SOLUTION INTRAVENOUS at 09:03

## 2024-03-09 NOTE — HPI
57 y/o female with pMHx of hypertension, constipation, and arthritis who presented to the ED with complaints of BRBPR x1 week.  Patient reported that over the last week she has had episodes from 1-3 times daily.  Blood work drawn in the ED revealed hemoglobin drop from 13.7-8.9.  ED MD spoke with GI who recommends new lightly with colonoscopy in a.m..  We will hold patient's meloxicam and tramadol at this time.

## 2024-03-09 NOTE — SUBJECTIVE & OBJECTIVE
Past Medical History:   Diagnosis Date    Constipation 2017    COVID-19 virus infection     pos 20    Family history of colon cancer in mother 2016    History of chicken pox     Hypertension     Palindromic rheumatism involving tarsus     Psoas tendinitis of both sides 2019    Shoulder pain, bilateral 2016    Spinal stenosis of lumbar region 2019       Past Surgical History:   Procedure Laterality Date    ARTHROSCOPIC REPAIR OF ROTATOR CUFF OF SHOULDER Left 2020    Procedure: REPAIR, ROTATOR CUFF, ARTHROSCOPIC;  Surgeon: Sb Reis II, MD;  Location: [facility];  Service: Orthopedics;  Laterality: Left;    ARTHROSCOPY OF SHOULDER WITH DECOMPRESSION OF SUBACROMIAL SPACE Left 2020    Procedure: ARTHROSCOPY, SHOULDER, WITH SUBACROMIAL SPACE DECOMPRESSION;  Surgeon: bS Reis II, MD;  Location: [facility];  Service: Orthopedics;  Laterality: Left;     SECTION      CHOLECYSTECTOMY      COLONOSCOPY N/A 2016    Procedure: COLONOSCOPY;  Surgeon: Hesham Enriquez MD;  Location: [facility];  Service: Endoscopy;  Laterality: N/A;    COLONOSCOPY N/A 2017    Procedure: COLONOSCOPY;  Surgeon: Hesham Enriquez MD;  Location: [facility];  Service: Endoscopy;  Laterality: N/A;    COLONOSCOPY N/A 10/15/2021    Procedure: COLONOSCOPY;  Surgeon: Hesham Enriquez MD;  Location: [facility];  Service: Endoscopy;  Laterality: N/A;    EPIDURAL STEROID INJECTION INTO LUMBAR SPINE N/A 2019    Procedure: Injection-steroid-epidural-lumbar L4/5;  Surgeon: Cristhian Gee MD;  Location: [facility] OR;  Service: Pain Management;  Laterality: N/A;    EPIDURAL STEROID INJECTION INTO LUMBAR SPINE N/A 2019    Procedure: Injection-steroid-epidural-lumbar;  Surgeon: Nigel Zuniga MD;  Location: [facility] OR;  Service: Pain Management;  Laterality: N/A;  L4/5 interlaminar    EXTREME LATERAL INTERBODY FUSION (XLIF) OF SPINE Left 2019    Procedure: FUSION, SPINE,  XLIF L3-4;  Surgeon: Cleve Bowers MD;  Location: [facility] OR;  Service: Orthopedics;  Laterality: Left;    HAND ARTHROPLASTY Left 10/4/2022    Procedure: Left thumb CMC arthroplasty;  Surgeon: Stanley Rivero MD;  Location: [facility] OR;  Service: Orthopedics;  Laterality: Left;  Anesthesia:  General with a single shot supraclavicular Exparel block    HAND ARTHROPLASTY Right 12/12/2023    Procedure: Right thumb CMC arthroplasty;  Surgeon: Stanley Rivero MD;  Location: [facility] OR;  Service: Orthopedics;  Laterality: Right;  Anesthesia: General with a single-shot supraclavicular Exparel block    LAMINECTOMY USING MINIMALLY INVASIVE TECHNIQUE N/A 11/18/2019    Procedure: LAMINECTOMY, SPINE, MINIMALLY INVASIVE L3-L4;  Surgeon: Cleve Bowers MD;  Location: [facility];  Service: Orthopedics;  Laterality: N/A;    MINIMALLY INVASIVE FUSION OF SPINE N/A 11/18/2019    Procedure: FUSION, SPINE, MINIMALLY INVASIVE L3-L4;  Surgeon: Cleve Bowers MD;  Location: [facility] OR;  Service: Orthopedics;  Laterality: N/A;    TUBAL LIGATION N/A 1986       Review of patient's allergies indicates:   Allergen Reactions    Aspirin Itching    Penicillins Hives    Adhesive Rash       Current Facility-Administered Medications on File Prior to Encounter   Medication    electrolyte-S (ISOLYTE)    LIDOcaine (PF) 10 mg/ml (1%) injection 10 mg    oxyCODONE immediate release tablet 5 mg    sodium chloride 0.9% flush 3 mL     Current Outpatient Medications on File Prior to Encounter   Medication Sig    cholecalciferol, vitamin D3, (VITAMIN D3) 50 mcg (2,000 unit) Cap Take 1 capsule by mouth once daily.    levothyroxine (SYNTHROID) 50 MCG tablet Take 1 tablet (50 mcg total) by mouth before breakfast.    meloxicam (MOBIC) 15 MG tablet Take 1 tablet (15 mg total) by mouth once daily.    metoprolol succinate (TOPROL-XL) 25 MG 24 hr tablet TAKE 1 TABLET BY MOUTH once daily    RESTASIS 0.05 % ophthalmic emulsion Place 1 drop into both eyes 2 (two) times  daily.    traMADoL (ULTRAM) 50 mg tablet TAKE 1 TABLET BY MOUTH EVERY 6 HOURS AS NEEDED FOR PAIN    [DISCONTINUED] ondansetron (ZOFRAN-ODT) 8 MG TbDL Take 1 tablet (8 mg total) by mouth every 8 (eight) hours as needed (Nausea).    [DISCONTINUED] oxyCODONE (ROXICODONE) 10 mg Tab immediate release tablet Take 1 tablet (10 mg total) by mouth every 4 (four) hours as needed for Pain.     Family History       Problem Relation (Age of Onset)    Cancer Mother    Diabetes Father    Heart disease Father    Hypertension Mother, Father    No Known Problems Brother          Tobacco Use    Smoking status: Never    Smokeless tobacco: Never   Substance and Sexual Activity    Alcohol use: Not Currently    Drug use: No    Sexual activity: Yes     Birth control/protection: Surgical     Comment: tubal ligation     Review of Systems   Constitutional:  Negative for activity change, chills, fatigue, fever and unexpected weight change.   HENT:  Negative for congestion, sneezing and sore throat.    Eyes:  Negative for visual disturbance.   Respiratory:  Negative for cough and shortness of breath.    Cardiovascular:  Positive for leg swelling (recent return to work). Negative for chest pain and palpitations.   Gastrointestinal:  Positive for anal bleeding. Negative for abdominal pain, nausea and vomiting.   Genitourinary:  Negative for decreased urine volume, difficulty urinating, flank pain and frequency.   Musculoskeletal:  Positive for back pain. Negative for arthralgias and myalgias.   Skin:  Negative for wound.   Neurological:  Negative for dizziness, weakness, numbness and headaches.   Psychiatric/Behavioral:  Negative for suicidal ideas. The patient is not nervous/anxious.      Objective:     Vital Signs (Most Recent):  Temp: 98.2 °F (36.8 °C) (03/09/24 1455)  Pulse: 65 (03/09/24 1530)  Resp: 20 (03/09/24 1530)  BP: 126/71 (03/09/24 1530)  SpO2: 100 % (03/09/24 1530) Vital Signs (24h Range):  Temp:  [98.2 °F (36.8 °C)] 98.2 °F (36.8  °C)  Pulse:  [65-76] 65  Resp:  [18-20] 20  SpO2:  [100 %] 100 %  BP: (126-142)/(71-99) 126/71     Weight: 72.6 kg (160 lb)  Body mass index is 27.46 kg/m².     Physical Exam  Vitals reviewed.   Constitutional:       General: She is not in acute distress.     Appearance: Normal appearance. She is not toxic-appearing.   HENT:      Head: Normocephalic and atraumatic.      Mouth/Throat:      Mouth: Mucous membranes are moist.      Pharynx: Oropharynx is clear.   Eyes:      Extraocular Movements: Extraocular movements intact.      Pupils: Pupils are equal, round, and reactive to light.   Cardiovascular:      Rate and Rhythm: Normal rate and regular rhythm.      Pulses: Normal pulses.      Heart sounds: Normal heart sounds.   Pulmonary:      Effort: Pulmonary effort is normal.      Breath sounds: Normal breath sounds.   Abdominal:      General: Bowel sounds are normal. There is no distension.      Palpations: Abdomen is soft.      Tenderness: There is abdominal tenderness.   Musculoskeletal:         General: No swelling. Normal range of motion.      Cervical back: Normal range of motion and neck supple.   Skin:     General: Skin is warm and dry.      Capillary Refill: Capillary refill takes less than 2 seconds.   Neurological:      General: No focal deficit present.      Mental Status: She is alert and oriented to person, place, and time. Mental status is at baseline.   Psychiatric:         Mood and Affect: Mood normal.         Behavior: Behavior normal.         Judgment: Judgment normal.              CRANIAL NERVES     CN III, IV, VI   Pupils are equal, round, and reactive to light.       Significant Labs: All pertinent labs within the past 24 hours have been reviewed.  Recent Lab Results         03/09/24  1544   03/09/24  1517        Albumin   4.0       ALP   62       ALT   16       Anion Gap   5       AST   23       Baso #   0.07       Basophil %   0.8       BILIRUBIN TOTAL   0.3  Comment: For infants and newborns,  interpretation of results should be based  on gestational age, weight and in agreement with clinical  observations.    Premature Infant recommended reference ranges:  Up to 24 hours.............<8.0 mg/dL  Up to 48 hours............<12.0 mg/dL  3-5 days..................<15.0 mg/dL  6-29 days.................<15.0 mg/dL         BUN   12       Calcium   8.6       Chloride   107       CO2   29       Creatinine   0.7       Differential Method   Automated       eGFR   >60.0       Eos #   0.1       Eos %   1.3       Glucose   118       Gran # (ANC)   5.4       Gran %   65.9       Group & Rh   A POS       Hematocrit   28.2       Hemoglobin   8.9       Immature Grans (Abs)   0.03  Comment: Mild elevation in immature granulocytes is non specific and   can be seen in a variety of conditions including stress response,   acute inflammation, trauma and pregnancy. Correlation with other   laboratory and clinical findings is essential.         Immature Granulocytes   0.4       INDIRECT MARITZA   NEG       Lipase   33       Lymph #   2.2       Lymph %   26.5       MCH   29.5       MCHC   31.6       MCV   93       Mono #   0.4       Mono %   5.1       MPV   10.0       nRBC   0       Occult Blood Positive         Platelet Count   260       Potassium   4.0       PROTEIN TOTAL   5.8       RBC   3.02       RDW   14.0       Sodium   141       Specimen Outdate   03/12/2024 23:59       WBC   8.26               Significant Imaging: I have reviewed all pertinent imaging results/findings within the past 24 hours.

## 2024-03-09 NOTE — ASSESSMENT & PLAN NOTE
Chronic, controlled. Latest blood pressure and vitals reviewed-     Temp:  [98.2 °F (36.8 °C)]   Pulse:  [65-76]   Resp:  [18-20]   BP: (126-142)/(71-99)   SpO2:  [100 %] .   Home meds for hypertension were reviewed and noted below.   Hypertension Medications               metoprolol succinate (TOPROL-XL) 25 MG 24 hr tablet TAKE 1 TABLET BY MOUTH once daily            While in the hospital, will manage blood pressure as follows; Continue home antihypertensive regimen    Will utilize p.r.n. blood pressure medication only if patient's blood pressure greater than 160/100 and she develops symptoms such as worsening chest pain or shortness of breath.

## 2024-03-09 NOTE — H&P
[facility] - Emergency Dept  [facility] Medicine  History & Physical    Patient Name: Lashell Stroud  MRN: [id number]  Patient Class: OP- Observation  Admission Date: 3/9/2024  Attending Physician: Tyler Vigil MD   Primary Care Provider: Gigi Iverson MD         Patient information was obtained from patient and ER records.     Subjective:     Principal Problem:Lower GI bleed    Chief Complaint:   Chief Complaint   Patient presents with    Melena    Back Pain     Pt c/o bloody stool x 1 week and lower back pain.        HPI: 57 y/o female with pMHx of hypertension, constipation, and arthritis who presented to the ED with complaints of BRBPR x1 week.  Patient reported that over the last week she has had episodes from 1-3 times daily.  Blood work drawn in the ED revealed hemoglobin drop from 13.7-8.9.  ED MD spoke with GI who recommends new lightly with colonoscopy in a.m..  We will hold patient's meloxicam and tramadol at this time.    Past Medical History:   Diagnosis Date    Constipation 2017    COVID-19 virus infection     pos 20    Family history of colon cancer in mother 2016    History of chicken pox     Hypertension     Palindromic rheumatism involving tarsus     Psoas tendinitis of both sides 2019    Shoulder pain, bilateral 2016    Spinal stenosis of lumbar region 2019       Past Surgical History:   Procedure Laterality Date    ARTHROSCOPIC REPAIR OF ROTATOR CUFF OF SHOULDER Left 2020    Procedure: REPAIR, ROTATOR CUFF, ARTHROSCOPIC;  Surgeon: Sb Reis II, MD;  Location: [facility] OR;  Service: Orthopedics;  Laterality: Left;    ARTHROSCOPY OF SHOULDER WITH DECOMPRESSION OF SUBACROMIAL SPACE Left 2020    Procedure: ARTHROSCOPY, SHOULDER, WITH SUBACROMIAL SPACE DECOMPRESSION;  Surgeon: Sb Reis II, MD;  Location: [facility] OR;  Service: Orthopedics;  Laterality: Left;     SECTION      CHOLECYSTECTOMY      COLONOSCOPY N/A  8/17/2016    Procedure: COLONOSCOPY;  Surgeon: Hesham Enriquez MD;  Location[email or web address] ENDO;  Service: Endoscopy;  Laterality: N/A;    COLONOSCOPY N/A 12/6/2017    Procedure: COLONOSCOPY;  Surgeon: Hesham Enriquez MD;  Location: [email or web address] ENDO;  Service: Endoscopy;  Laterality: N/A;    COLONOSCOPY N/A 10/15/2021    Procedure: COLONOSCOPY;  Surgeon: Hesham Enriquez MD;  Location: [email or web address] ENDO;  Service: Endoscopy;  Laterality: N/A;    EPIDURAL STEROID INJECTION INTO LUMBAR SPINE N/A 5/22/2019    Procedure: Injection-steroid-epidural-lumbar L4/5;  Surgeon: Cristhian Gee MD;  Location: [facility] OR;  Service: Pain Management;  Laterality: N/A;    EPIDURAL STEROID INJECTION INTO LUMBAR SPINE N/A 7/31/2019    Procedure: Injection-steroid-epidural-lumbar;  Surgeon: Nigel Zuniga MD;  Location: [facility] OR;  Service: Pain Management;  Laterality: N/A;  L4/5 interlaminar    EXTREME LATERAL INTERBODY FUSION (XLIF) OF SPINE Left 11/18/2019    Procedure: FUSION, SPINE, XLIF L3-4;  Surgeon: Cleve Bowers MD;  Location: [facility];  Service: Orthopedics;  Laterality: Left;    HAND ARTHROPLASTY Left 10/4/2022    Procedure: Left thumb CMC arthroplasty;  Surgeon: Stanley Rivero MD;  Location: [facility];  Service: Orthopedics;  Laterality: Left;  Anesthesia:  General with a single shot supraclavicular Exparel block    HAND ARTHROPLASTY Right 12/12/2023    Procedure: Right thumb CMC arthroplasty;  Surgeon: Stanley Rivero MD;  Location: [facility] OR;  Service: Orthopedics;  Laterality: Right;  Anesthesia: General with a single-shot supraclavicular Exparel block    LAMINECTOMY USING MINIMALLY INVASIVE TECHNIQUE N/A 11/18/2019    Procedure: LAMINECTOMY, SPINE, MINIMALLY INVASIVE L3-L4;  Surgeon: Cleve Bowers MD;  Location: [facility];  Service: Orthopedics;  Laterality: N/A;    MINIMALLY INVASIVE FUSION OF SPINE N/A 11/18/2019    Procedure: FUSION, SPINE, MINIMALLY INVASIVE L3-L4;  Surgeon: Cleve Bowers MD;  Location: [facility];   Service: Orthopedics;  Laterality: N/A;    TUBAL LIGATION N/A 1986       Review of patient's allergies indicates:   Allergen Reactions    Aspirin Itching    Penicillins Hives    Adhesive Rash       Current Facility-Administered Medications on File Prior to Encounter   Medication    electrolyte-S (ISOLYTE)    LIDOcaine (PF) 10 mg/ml (1%) injection 10 mg    oxyCODONE immediate release tablet 5 mg    sodium chloride 0.9% flush 3 mL     Current Outpatient Medications on File Prior to Encounter   Medication Sig    cholecalciferol, vitamin D3, (VITAMIN D3) 50 mcg (2,000 unit) Cap Take 1 capsule by mouth once daily.    levothyroxine (SYNTHROID) 50 MCG tablet Take 1 tablet (50 mcg total) by mouth before breakfast.    meloxicam (MOBIC) 15 MG tablet Take 1 tablet (15 mg total) by mouth once daily.    metoprolol succinate (TOPROL-XL) 25 MG 24 hr tablet TAKE 1 TABLET BY MOUTH once daily    RESTASIS 0.05 % ophthalmic emulsion Place 1 drop into both eyes 2 (two) times daily.    traMADoL (ULTRAM) 50 mg tablet TAKE 1 TABLET BY MOUTH EVERY 6 HOURS AS NEEDED FOR PAIN    [DISCONTINUED] ondansetron (ZOFRAN-ODT) 8 MG TbDL Take 1 tablet (8 mg total) by mouth every 8 (eight) hours as needed (Nausea).    [DISCONTINUED] oxyCODONE (ROXICODONE) 10 mg Tab immediate release tablet Take 1 tablet (10 mg total) by mouth every 4 (four) hours as needed for Pain.     Family History       Problem Relation (Age of Onset)    Cancer Mother    Diabetes Father    Heart disease Father    Hypertension Mother, Father    No Known Problems Brother          Tobacco Use    Smoking status: Never    Smokeless tobacco: Never   Substance and Sexual Activity    Alcohol use: Not Currently    Drug use: No    Sexual activity: Yes     Birth control/protection: Surgical     Comment: tubal ligation     Review of Systems   Constitutional:  Negative for activity change, chills, fatigue, fever and unexpected weight change.   HENT:  Negative for congestion, sneezing and sore  throat.    Eyes:  Negative for visual disturbance.   Respiratory:  Negative for cough and shortness of breath.    Cardiovascular:  Positive for leg swelling (recent return to work). Negative for chest pain and palpitations.   Gastrointestinal:  Positive for anal bleeding. Negative for abdominal pain, nausea and vomiting.   Genitourinary:  Negative for decreased urine volume, difficulty urinating, flank pain and frequency.   Musculoskeletal:  Positive for back pain. Negative for arthralgias and myalgias.   Skin:  Negative for wound.   Neurological:  Negative for dizziness, weakness, numbness and headaches.   Psychiatric/Behavioral:  Negative for suicidal ideas. The patient is not nervous/anxious.      Objective:     Vital Signs (Most Recent):  Temp: 98.2 °F (36.8 °C) (03/09/24 1455)  Pulse: 65 (03/09/24 1530)  Resp: 20 (03/09/24 1530)  BP: 126/71 (03/09/24 1530)  SpO2: 100 % (03/09/24 1530) Vital Signs (24h Range):  Temp:  [98.2 °F (36.8 °C)] 98.2 °F (36.8 °C)  Pulse:  [65-76] 65  Resp:  [18-20] 20  SpO2:  [100 %] 100 %  BP: (126-142)/(71-99) 126/71     Weight: 72.6 kg (160 lb)  Body mass index is 27.46 kg/m².     Physical Exam  Vitals reviewed.   Constitutional:       General: She is not in acute distress.     Appearance: Normal appearance. She is not toxic-appearing.   HENT:      Head: Normocephalic and atraumatic.      Mouth/Throat:      Mouth: Mucous membranes are moist.      Pharynx: Oropharynx is clear.   Eyes:      Extraocular Movements: Extraocular movements intact.      Pupils: Pupils are equal, round, and reactive to light.   Cardiovascular:      Rate and Rhythm: Normal rate and regular rhythm.      Pulses: Normal pulses.      Heart sounds: Normal heart sounds.   Pulmonary:      Effort: Pulmonary effort is normal.      Breath sounds: Normal breath sounds.   Abdominal:      General: Bowel sounds are normal. There is no distension.      Palpations: Abdomen is soft.      Tenderness: There is abdominal  tenderness.   Musculoskeletal:         General: No swelling. Normal range of motion.      Cervical back: Normal range of motion and neck supple.   Skin:     General: Skin is warm and dry.      Capillary Refill: Capillary refill takes less than 2 seconds.   Neurological:      General: No focal deficit present.      Mental Status: She is alert and oriented to person, place, and time. Mental status is at baseline.   Psychiatric:         Mood and Affect: Mood normal.         Behavior: Behavior normal.         Judgment: Judgment normal.              CRANIAL NERVES     CN III, IV, VI   Pupils are equal, round, and reactive to light.       Significant Labs: All pertinent labs within the past 24 hours have been reviewed.  Recent Lab Results         03/09/24  1544   03/09/24  1517        Albumin   4.0       ALP   62       ALT   16       Anion Gap   5       AST   23       Baso #   0.07       Basophil %   0.8       BILIRUBIN TOTAL   0.3  Comment: For infants and newborns, interpretation of results should be based  on gestational age, weight and in agreement with clinical  observations.    Premature Infant recommended reference ranges:  Up to 24 hours.............<8.0 mg/dL  Up to 48 hours............<12.0 mg/dL  3-5 days..................<15.0 mg/dL  6-29 days.................<15.0 mg/dL         BUN   12       Calcium   8.6       Chloride   107       CO2   29       Creatinine   0.7       Differential Method   Automated       eGFR   >60.0       Eos #   0.1       Eos %   1.3       Glucose   118       Gran # (ANC)   5.4       Gran %   65.9       Group & Rh   A POS       Hematocrit   28.2       Hemoglobin   8.9       Immature Grans (Abs)   0.03  Comment: Mild elevation in immature granulocytes is non specific and   can be seen in a variety of conditions including stress response,   acute inflammation, trauma and pregnancy. Correlation with other   laboratory and clinical findings is essential.         Immature Granulocytes   0.4        INDIRECT MARITZA   NEG       Lipase   33       Lymph #   2.2       Lymph %   26.5       MCH   29.5       MCHC   31.6       MCV   93       Mono #   0.4       Mono %   5.1       MPV   10.0       nRBC   0       Occult Blood Positive         Platelet Count   260       Potassium   4.0       PROTEIN TOTAL   5.8       RBC   3.02       RDW   14.0       Sodium   141       Specimen Outdate   03/12/2024 23:59       WBC   8.26               Significant Imaging: I have reviewed all pertinent imaging results/findings within the past 24 hours.  Assessment/Plan:     * Lower GI bleed  Type and Screen  Start prep of Nu-Lytely  Consult GI-Nicaud  Serial CBC  NPO  Hold Nsaids for now      Essential hypertension  Chronic, controlled. Latest blood pressure and vitals reviewed-     Temp:  [98.2 °F (36.8 °C)]   Pulse:  [65-76]   Resp:  [18-20]   BP: (126-142)/(71-99)   SpO2:  [100 %] .   Home meds for hypertension were reviewed and noted below.   Hypertension Medications               metoprolol succinate (TOPROL-XL) 25 MG 24 hr tablet TAKE 1 TABLET BY MOUTH once daily            While in the hospital, will manage blood pressure as follows; Continue home antihypertensive regimen    Will utilize p.r.n. blood pressure medication only if patient's blood pressure greater than 160/100 and she develops symptoms such as worsening chest pain or shortness of breath.    Inflammatory arthritis  Patient with recent return to work with reports of required need for increase in home anti-inflammatories, with reports of BLE edema  Hold NSAIDs for now          VTE Risk Mitigation (From admission, onward)           Ordered     Reason for No Pharmacological VTE Prophylaxis  Once        Question:  Reasons:  Answer:  Active Bleeding    03/09/24 1617     IP VTE HIGH RISK PATIENT  Once         03/09/24 1617     Place sequential compression device  Until discontinued         03/09/24 1617                         On 03/09/2024, patient should be placed in  hospital observation services under my care in collaboration with [facility].           Kalyani Yin NP  Department of Hospital Medicine  [facility] - Emergency Dept

## 2024-03-09 NOTE — ED NOTES
Pt describes BM as small hard stool over the last week with only one occurrence of soft stool.  Pt denies any black colored stool but says there is bright red blood with no clots.

## 2024-03-09 NOTE — ASSESSMENT & PLAN NOTE
Type and Screen  Start prep of Nu-Lytely  Consult GI-Nicaud  Serial CBC  NPO  Hold Nsaids for now

## 2024-03-09 NOTE — ED PROVIDER NOTES
Encounter Date: 3/9/2024       History     Chief Complaint   Patient presents with    Melena    Back Pain     Pt c/o bloody stool x 1 week and lower back pain.     58-year-old female who has a past medical history of hypertension, constipation, presents emergency department GI bleeding.  She says for the last week she has had bright red blood in stool.  She has had some straining with stool but she says it is sometimes she will have bleeding despite going to the bathroom and just blood will come out.  In his bright red.  Said that this has been about 1 week of time frame.  Sometimes 2-3 episodes of bloody stool per day.  Patient in the emergency department does not have any associated abdominal pain.  She has not vomiting any blood.  She has not on any blood thinners.  Says that she is due for colonoscopy.  She gets them every 5 years.      Review of patient's allergies indicates:   Allergen Reactions    Aspirin Itching    Penicillins Hives    Adhesive Rash     Past Medical History:   Diagnosis Date    Constipation 2017    COVID-19 virus infection     pos 20    Family history of colon cancer in mother 2016    History of chicken pox     Hypertension     Palindromic rheumatism involving tarsus     Psoas tendinitis of both sides 2019    Shoulder pain, bilateral 2016    Spinal stenosis of lumbar region 2019     Past Surgical History:   Procedure Laterality Date    ARTHROSCOPIC REPAIR OF ROTATOR CUFF OF SHOULDER Left 2020    Procedure: REPAIR, ROTATOR CUFF, ARTHROSCOPIC;  Surgeon: Sb Reis II, MD;  Location: [facility] OR;  Service: Orthopedics;  Laterality: Left;    ARTHROSCOPY OF SHOULDER WITH DECOMPRESSION OF SUBACROMIAL SPACE Left 2020    Procedure: ARTHROSCOPY, SHOULDER, WITH SUBACROMIAL SPACE DECOMPRESSION;  Surgeon: Sb Reis II, MD;  Location: [facility] OR;  Service: Orthopedics;  Laterality: Left;     SECTION      CHOLECYSTECTOMY      COLONOSCOPY N/A  8/17/2016    Procedure: COLONOSCOPY;  Surgeon: Hesham Enriquez MD;  Location: [facility] ENDO;  Service: Endoscopy;  Laterality: N/A;    COLONOSCOPY N/A 12/6/2017    Procedure: COLONOSCOPY;  Surgeon: Hesham Enriquez MD;  Location: [facility] ENDO;  Service: Endoscopy;  Laterality: N/A;    COLONOSCOPY N/A 10/15/2021    Procedure: COLONOSCOPY;  Surgeon: Hesham Enriquez MD;  Location: [facility] ENDO;  Service: Endoscopy;  Laterality: N/A;    EPIDURAL STEROID INJECTION INTO LUMBAR SPINE N/A 5/22/2019    Procedure: Injection-steroid-epidural-lumbar L4/5;  Surgeon: Cristhian Gee MD;  Location: [facility] OR;  Service: Pain Management;  Laterality: N/A;    EPIDURAL STEROID INJECTION INTO LUMBAR SPINE N/A 7/31/2019    Procedure: Injection-steroid-epidural-lumbar;  Surgeon: Nigel Zuniga MD;  Location: [facility] OR;  Service: Pain Management;  Laterality: N/A;  L4/5 interlaminar    EXTREME LATERAL INTERBODY FUSION (XLIF) OF SPINE Left 11/18/2019    Procedure: FUSION, SPINE, XLIF L3-4;  Surgeon: Cleve Bowers MD;  Location: [facility];  Service: Orthopedics;  Laterality: Left;    HAND ARTHROPLASTY Left 10/4/2022    Procedure: Left thumb CMC arthroplasty;  Surgeon: Stanley Rivero MD;  Location: [facility];  Service: Orthopedics;  Laterality: Left;  Anesthesia:  General with a single shot supraclavicular Exparel block    HAND ARTHROPLASTY Right 12/12/2023    Procedure: Right thumb CMC arthroplasty;  Surgeon: Stanley Rivero MD;  Location: [facility] OR;  Service: Orthopedics;  Laterality: Right;  Anesthesia: General with a single-shot supraclavicular Exparel block    LAMINECTOMY USING MINIMALLY INVASIVE TECHNIQUE N/A 11/18/2019    Procedure: LAMINECTOMY, SPINE, MINIMALLY INVASIVE L3-L4;  Surgeon: Cleve Bowers MD;  Location: [facility];  Service: Orthopedics;  Laterality: N/A;    MINIMALLY INVASIVE FUSION OF SPINE N/A 11/18/2019    Procedure: FUSION, SPINE, MINIMALLY INVASIVE L3-L4;  Surgeon: Cleve Bowers MD;  Location: [facility];   Service: Orthopedics;  Laterality: N/A;    TUBAL LIGATION N/A 1986     Family History   Problem Relation Age of Onset    Cancer Mother     Hypertension Mother     Heart disease Father     Hypertension Father     Diabetes Father     No Known Problems Brother      Social History     Tobacco Use    Smoking status: Never    Smokeless tobacco: Never   Substance Use Topics    Alcohol use: Not Currently    Drug use: No     Review of Systems   Constitutional:  Positive for fatigue. Negative for chills and fever.   HENT:  Negative for congestion and sore throat.    Respiratory:  Negative for shortness of breath.    Cardiovascular:  Negative for chest pain.   Gastrointestinal:  Positive for blood in stool. Negative for abdominal pain, nausea and vomiting.   Genitourinary:  Negative for dysuria.   Musculoskeletal:  Negative for back pain.   Skin:  Negative for rash.   Neurological:  Negative for weakness and headaches.   Hematological:  Does not bruise/bleed easily.   All other systems reviewed and are negative.      Physical Exam     Initial Vitals [03/09/24 1455]   BP Pulse Resp Temp SpO2   (!) 142/99 76 18 98.2 °F (36.8 °C) 100 %      MAP       --         Physical Exam    Nursing note and vitals reviewed.  Constitutional: She appears well-developed and well-nourished. No distress.   HENT:   Head: Normocephalic and atraumatic.   Mouth/Throat: No oropharyngeal exudate.   Eyes: Conjunctivae and EOM are normal. Pupils are equal, round, and reactive to light.   Neck: Neck supple. No tracheal deviation present.   Cardiovascular:  Normal rate, regular rhythm, normal heart sounds and intact distal pulses.           No murmur heard.  Pulmonary/Chest: Breath sounds normal. No stridor. No respiratory distress. She has no wheezes. She has no rhonchi. She has no rales.   Abdominal: Abdomen is soft. She exhibits no distension. There is no abdominal tenderness. There is no rebound.   Genitourinary: Rectum:      Guaiac result positive.    Guaiac positive stool. : Acceptable.   Genitourinary Comments: Rectal exam with maroonish colored stool.  Heme-positive.  Skin tag present on her rectum noted.     Musculoskeletal:         General: No tenderness or edema. Normal range of motion.      Cervical back: Neck supple.     Neurological: She is alert and oriented to person, place, and time. She has normal strength. No cranial nerve deficit or sensory deficit.   Skin: Skin is warm and dry. Capillary refill takes less than 2 seconds. No rash noted. No erythema. No pallor.   Psychiatric: She has a normal mood and affect. Her behavior is normal. Judgment and thought content normal.         ED Course   Procedures  Labs Reviewed   CBC W/ AUTO DIFFERENTIAL - Abnormal; Notable for the following components:       Result Value    RBC 3.02 (*)     Hemoglobin 8.9 (*)     Hematocrit 28.2 (*)     MCHC 31.6 (*)     All other components within normal limits   COMPREHENSIVE METABOLIC PANEL - Abnormal; Notable for the following components:    Glucose 118 (*)     Calcium 8.6 (*)     Total Protein 5.8 (*)     Anion Gap 5 (*)     All other components within normal limits   OCCULT BLOOD X 1, STOOL - Abnormal; Notable for the following components:    Occult Blood Positive (*)     All other components within normal limits   LIPASE   LIPASE   TYPE & SCREEN          Results for orders placed or performed during the hospital encounter of 03/09/24   CBC auto differential   Result Value Ref Range    WBC 8.26 3.90 - 12.70 K/uL    RBC 3.02 (L) 4.00 - 5.40 M/uL    Hemoglobin 8.9 (L) 12.0 - 16.0 g/dL    Hematocrit 28.2 (L) 37.0 - 48.5 %    MCV 93 82 - 98 fL    MCH 29.5 27.0 - 31.0 pg    MCHC 31.6 (L) 32.0 - 36.0 g/dL    RDW 14.0 11.5 - 14.5 %    Platelets 260 150 - 450 K/uL    MPV 10.0 9.2 - 12.9 fL    Immature Granulocytes 0.4 0.0 - 0.5 %    Gran # (ANC) 5.4 1.8 - 7.7 K/uL    Immature Grans (Abs) 0.03 0.00 - 0.04 K/uL    Lymph # 2.2 1.0 - 4.8 K/uL    Mono # 0.4 0.3 - 1.0  K/uL    Eos # 0.1 0.0 - 0.5 K/uL    Baso # 0.07 0.00 - 0.20 K/uL    nRBC 0 0 /100 WBC    Gran % 65.9 38.0 - 73.0 %    Lymph % 26.5 18.0 - 48.0 %    Mono % 5.1 4.0 - 15.0 %    Eosinophil % 1.3 0.0 - 8.0 %    Basophil % 0.8 0.0 - 1.9 %    Differential Method Automated    Comprehensive metabolic panel   Result Value Ref Range    Sodium 141 136 - 145 mmol/L    Potassium 4.0 3.5 - 5.1 mmol/L    Chloride 107 95 - 110 mmol/L    CO2 29 23 - 29 mmol/L    Glucose 118 (H) 70 - 110 mg/dL    BUN 12 6 - 20 mg/dL    Creatinine 0.7 0.5 - 1.4 mg/dL    Calcium 8.6 (L) 8.7 - 10.5 mg/dL    Total Protein 5.8 (L) 6.0 - 8.4 g/dL    Albumin 4.0 3.5 - 5.2 g/dL    Total Bilirubin 0.3 0.1 - 1.0 mg/dL    Alkaline Phosphatase 62 55 - 135 U/L    AST 23 10 - 40 U/L    ALT 16 10 - 44 U/L    eGFR >60.0 >60 mL/min/1.73 m^2    Anion Gap 5 (L) 8 - 16 mmol/L   Occult blood x 1, stool    Specimen: Stool   Result Value Ref Range    Occult Blood Positive (A) Negative   Lipase   Result Value Ref Range    Lipase 33 4 - 60 U/L   Type & Screen   Result Value Ref Range    Group & Rh A POS     Indirect Maryuri NEG     Specimen Outdate 03/12/2024 23:59        Imaging Results    None          Medications   metoprolol succinate (TOPROL-XL) 24 hr tablet 25 mg (has no administration in time range)   levothyroxine tablet 50 mcg (has no administration in time range)   carboxymethylcellulose 0.5 % ophthalmic solution 2 drop (2 drops Both Eyes Not Given 3/9/24 2200)   albuterol-ipratropium 2.5 mg-0.5 mg/3 mL nebulizer solution 3 mL (has no administration in time range)   ondansetron injection 4 mg (has no administration in time range)   acetaminophen tablet 650 mg (has no administration in time range)   HYDROcodone-acetaminophen 5-325 mg per tablet 1 tablet (has no administration in time range)   naloxone 0.4 mg/mL injection 0.02 mg (has no administration in time range)   potassium bicarbonate disintegrating tablet 50 mEq (has no administration in time range)   potassium  bicarbonate disintegrating tablet 35 mEq (has no administration in time range)   potassium bicarbonate disintegrating tablet 60 mEq (has no administration in time range)   magnesium oxide tablet 800 mg (has no administration in time range)   magnesium oxide tablet 800 mg (has no administration in time range)   potassium, sodium phosphates 280-160-250 mg packet 2 packet (has no administration in time range)   potassium, sodium phosphates 280-160-250 mg packet 2 packet (has no administration in time range)   potassium, sodium phosphates 280-160-250 mg packet 2 packet (has no administration in time range)   pantoprazole injection 40 mg (40 mg Intravenous Given by Other 3/9/24 2124)   0.9%  NaCl infusion ( Intravenous New Bag 3/9/24 2125)   polyethylene glycol (GoLYTELY) solution (4,000 mLs Oral Given 3/9/24 2125)     Medical Decision Making  Differential includes but not limited to GI bleeding, anemia, symptomatic anemia, electrolyte abnormality,    Emergent evaluation of a 58-year-old female presents emergency department with rectal bleeding.  Patient emergency department has evidence of lower GI bleeding.  Patient is hemodynamically stable.  Patient has had a significant drop in her hemoglobin and hematocrit however.  She has not on any blood thinners.  Patient likely with significant bleed could be diverticular, needs colonoscopy.  I discussed case with GI who will perform colonoscopy on the patient tomorrow.  At this point patient does not need blood transfusion, needs serial H&Hs.  Patient hemodynamically stable.    A dictation software program was used for this note.  Please expect some simple typographical  errors in this note.         Amount and/or Complexity of Data Reviewed  External Data Reviewed: labs and notes.  Labs: ordered. Decision-making details documented in ED Course.  Radiology: ordered and independent interpretation performed. Decision-making details documented in ED Course.  ECG/medicine tests:  ordered and independent interpretation performed. Decision-making details documented in ED Course.    Risk  Prescription drug management.  Decision regarding hospitalization.               ED Course as of 03/09/24 2215   Sat Mar 09, 2024   3559 I discussed the case with Dr. Rodriguez who states to give the patient 1 gal New lightly and he will scope the patient tomorrow.  NPO after midnight. [JR]   1611 I discussed the case with Kalyani from Hospital Medicine who will admit the patient to the hospital. [JR]      ED Course User Index  [JR] Husam Maya DO                           Clinical Impression:  Final diagnoses:  [K92.2] Lower GI bleed          ED Disposition Condition    Observation                 Husam Maya DO  03/09/24 2215

## 2024-03-09 NOTE — ASSESSMENT & PLAN NOTE
Patient with recent return to work with reports of required need for increase in home anti-inflammatories, with reports of BLE edema  Hold NSAIDs for now

## 2024-03-10 ENCOUNTER — ANESTHESIA (OUTPATIENT)
Dept: SURGERY | Facility: HOSPITAL | Age: 59
End: 2024-03-10
Payer: COMMERCIAL

## 2024-03-10 ENCOUNTER — ANESTHESIA EVENT (OUTPATIENT)
Dept: SURGERY | Facility: HOSPITAL | Age: 59
End: 2024-03-10
Payer: COMMERCIAL

## 2024-03-10 VITALS
DIASTOLIC BLOOD PRESSURE: 69 MMHG | HEART RATE: 75 BPM | SYSTOLIC BLOOD PRESSURE: 125 MMHG | OXYGEN SATURATION: 100 % | WEIGHT: 160 LBS | RESPIRATION RATE: 18 BRPM | TEMPERATURE: 97 F | BODY MASS INDEX: 27.31 KG/M2 | HEIGHT: 64 IN

## 2024-03-10 LAB
ALBUMIN SERPL BCP-MCNC: 3.7 G/DL (ref 3.5–5.2)
ALP SERPL-CCNC: 56 U/L (ref 55–135)
ALT SERPL W/O P-5'-P-CCNC: 14 U/L (ref 10–44)
ANION GAP SERPL CALC-SCNC: 5 MMOL/L (ref 8–16)
AST SERPL-CCNC: 18 U/L (ref 10–40)
BILIRUB SERPL-MCNC: 0.4 MG/DL (ref 0.1–1)
BUN SERPL-MCNC: 9 MG/DL (ref 6–20)
CALCIUM SERPL-MCNC: 8.4 MG/DL (ref 8.7–10.5)
CHLORIDE SERPL-SCNC: 110 MMOL/L (ref 95–110)
CO2 SERPL-SCNC: 29 MMOL/L (ref 23–29)
CREAT SERPL-MCNC: 0.6 MG/DL (ref 0.5–1.4)
ERYTHROCYTE [DISTWIDTH] IN BLOOD BY AUTOMATED COUNT: 13.9 % (ref 11.5–14.5)
EST. GFR  (NO RACE VARIABLE): >60 ML/MIN/1.73 M^2
GLUCOSE SERPL-MCNC: 103 MG/DL (ref 70–110)
HCT VFR BLD AUTO: 26.1 % (ref 37–48.5)
HGB BLD-MCNC: 8.3 G/DL (ref 12–16)
MAGNESIUM SERPL-MCNC: 1.9 MG/DL (ref 1.6–2.6)
MCH RBC QN AUTO: 29.6 PG (ref 27–31)
MCHC RBC AUTO-ENTMCNC: 31.8 G/DL (ref 32–36)
MCV RBC AUTO: 93 FL (ref 82–98)
PLATELET # BLD AUTO: 222 K/UL (ref 150–450)
PMV BLD AUTO: 9.7 FL (ref 9.2–12.9)
POTASSIUM SERPL-SCNC: 3.9 MMOL/L (ref 3.5–5.1)
PROT SERPL-MCNC: 5.4 G/DL (ref 6–8.4)
RBC # BLD AUTO: 2.8 M/UL (ref 4–5.4)
SODIUM SERPL-SCNC: 144 MMOL/L (ref 136–145)
WBC # BLD AUTO: 5.67 K/UL (ref 3.9–12.7)

## 2024-03-10 PROCEDURE — 96376 TX/PRO/DX INJ SAME DRUG ADON: CPT

## 2024-03-10 PROCEDURE — 85027 COMPLETE CBC AUTOMATED: CPT | Performed by: INTERNAL MEDICINE

## 2024-03-10 PROCEDURE — 43239 EGD BIOPSY SINGLE/MULTIPLE: CPT | Performed by: INTERNAL MEDICINE

## 2024-03-10 PROCEDURE — C9113 INJ PANTOPRAZOLE SODIUM, VIA: HCPCS | Performed by: NURSE PRACTITIONER

## 2024-03-10 PROCEDURE — 63600175 PHARM REV CODE 636 W HCPCS: Performed by: NURSE ANESTHETIST, CERTIFIED REGISTERED

## 2024-03-10 PROCEDURE — D9220A PRA ANESTHESIA: Mod: ANES,,, | Performed by: ANESTHESIOLOGY

## 2024-03-10 PROCEDURE — 37000009 HC ANESTHESIA EA ADD 15 MINS: Performed by: INTERNAL MEDICINE

## 2024-03-10 PROCEDURE — 83735 ASSAY OF MAGNESIUM: CPT | Performed by: INTERNAL MEDICINE

## 2024-03-10 PROCEDURE — 99900035 HC TECH TIME PER 15 MIN (STAT)

## 2024-03-10 PROCEDURE — 27200043 HC FORCEPS, BIOPSY: Performed by: INTERNAL MEDICINE

## 2024-03-10 PROCEDURE — D9220A PRA ANESTHESIA: Mod: CRNA,,, | Performed by: NURSE ANESTHETIST, CERTIFIED REGISTERED

## 2024-03-10 PROCEDURE — 63600175 PHARM REV CODE 636 W HCPCS: Performed by: NURSE PRACTITIONER

## 2024-03-10 PROCEDURE — 45378 DIAGNOSTIC COLONOSCOPY: CPT | Performed by: INTERNAL MEDICINE

## 2024-03-10 PROCEDURE — 80053 COMPREHEN METABOLIC PANEL: CPT | Performed by: INTERNAL MEDICINE

## 2024-03-10 PROCEDURE — 25000003 PHARM REV CODE 250: Performed by: NURSE ANESTHETIST, CERTIFIED REGISTERED

## 2024-03-10 PROCEDURE — 37000008 HC ANESTHESIA 1ST 15 MINUTES: Performed by: INTERNAL MEDICINE

## 2024-03-10 PROCEDURE — G0378 HOSPITAL OBSERVATION PER HR: HCPCS

## 2024-03-10 PROCEDURE — 36415 COLL VENOUS BLD VENIPUNCTURE: CPT | Performed by: INTERNAL MEDICINE

## 2024-03-10 PROCEDURE — 25000003 PHARM REV CODE 250: Performed by: NURSE PRACTITIONER

## 2024-03-10 RX ORDER — PANTOPRAZOLE SODIUM 40 MG/1
40 TABLET, DELAYED RELEASE ORAL DAILY
Qty: 30 TABLET | Refills: 0 | Status: SHIPPED | OUTPATIENT
Start: 2024-03-10 | End: 2024-05-28 | Stop reason: SDUPTHER

## 2024-03-10 RX ORDER — PROPOFOL 10 MG/ML
VIAL (ML) INTRAVENOUS
Status: DISCONTINUED | OUTPATIENT
Start: 2024-03-10 | End: 2024-03-10

## 2024-03-10 RX ORDER — FERROUS SULFATE 325(65) MG
325 TABLET ORAL DAILY
Qty: 30 TABLET | Refills: 0 | Status: SHIPPED | OUTPATIENT
Start: 2024-03-10 | End: 2024-04-03 | Stop reason: SDUPTHER

## 2024-03-10 RX ORDER — PROPOFOL 10 MG/ML
VIAL (ML) INTRAVENOUS CONTINUOUS PRN
Status: DISCONTINUED | OUTPATIENT
Start: 2024-03-10 | End: 2024-03-10

## 2024-03-10 RX ADMIN — PROPOFOL 50 MG: 10 INJECTION, EMULSION INTRAVENOUS at 11:03

## 2024-03-10 RX ADMIN — PANTOPRAZOLE SODIUM 40 MG: 40 INJECTION, POWDER, FOR SOLUTION INTRAVENOUS at 08:03

## 2024-03-10 RX ADMIN — PROPOFOL 150 MCG/KG/MIN: 10 INJECTION, EMULSION INTRAVENOUS at 11:03

## 2024-03-10 RX ADMIN — CARBOXYMETHYLCELLULOSE SODIUM 2 DROP: 5 SOLUTION/ DROPS OPHTHALMIC at 10:03

## 2024-03-10 RX ADMIN — SODIUM CHLORIDE: 0.9 INJECTION, SOLUTION INTRAVENOUS at 11:03

## 2024-03-10 RX ADMIN — PROPOFOL 30 MG: 10 INJECTION, EMULSION INTRAVENOUS at 11:03

## 2024-03-10 NOTE — PLAN OF CARE
Problem: Adjustment to Illness (Gastrointestinal Bleeding)  Goal: Optimal Coping with Acute Illness  Outcome: Met     Problem: Bleeding (Gastrointestinal Bleeding)  Goal: Hemostasis  Outcome: Met     Problem: Adult Inpatient Plan of Care  Goal: Plan of Care Review  Outcome: Met  Goal: Patient-Specific Goal (Individualized)  Outcome: Met  Goal: Absence of Hospital-Acquired Illness or Injury  Outcome: Met  Goal: Optimal Comfort and Wellbeing  Outcome: Met  Goal: Readiness for Transition of Care  Outcome: Met

## 2024-03-10 NOTE — ANESTHESIA PREPROCEDURE EVALUATION
03/10/2024  Lashell Stroud is a 58 y.o., female.      Pre-op Assessment    I have reviewed the Patient Summary Reports.     I have reviewed the Nursing Notes. I have reviewed the NPO Status.   I have reviewed the Medications.     Review of Systems  Anesthesia Hx:             Denies Family Hx of Anesthesia complications.    Denies Personal Hx of Anesthesia complications.                    Social:  Non-Smoker, No Alcohol Use       Hematology/Oncology:  Hematology Normal   Oncology Normal                                   EENT/Dental:  EENT/Dental Normal           Cardiovascular:     Hypertension                                        Pulmonary:  Pulmonary Normal                       Renal/:  Renal/ Normal                 Hepatic/GI:        Rectal bleeding          Musculoskeletal:  Arthritis          Spine Disorders: (spinal stenosis) lumbar            Neurological:  Neurology Normal                                      Endocrine:  Endocrine Normal            Psych:  Psychiatric Normal                    Physical Exam  General: Well nourished, Cooperative, Alert and Oriented    Airway:  Mallampati: II / I  Mouth Opening: Normal  TM Distance: > 6 cm  Tongue: Normal  Neck ROM: Normal ROM    Dental:  Intact    Chest/Lungs:  Clear to auscultation, Normal Respiratory Rate    Heart:  Rate: Normal  Rhythm: Regular Rhythm  Sounds: Normal        Anesthesia Plan  Type of Anesthesia, risks & benefits discussed:    Anesthesia Type: Gen Natural Airway  Intra-op Monitoring Plan: Standard ASA Monitors  Induction:  IV  Informed Consent: Informed consent signed with the Patient and all parties understand the risks and agree with anesthesia plan.  All questions answered.   ASA Score: 2    Ready For Surgery From Anesthesia Perspective.     .

## 2024-03-10 NOTE — TRANSFER OF CARE
"Anesthesia Transfer of Care Note    Patient: Lashell Stroud    Procedure(s) Performed: Procedure(s) (LRB):  EGD (ESOPHAGOGASTRODUODENOSCOPY) (N/A)  COLONOSCOPY (N/A)    Patient location: GI    Anesthesia Type: general    Transport from OR: Transported from OR on 2-3 L/min O2 by NC with adequate spontaneous ventilation    Post pain: adequate analgesia    Post assessment: no apparent anesthetic complications    Post vital signs: stable    Level of consciousness: awake    Nausea/Vomiting: no nausea/vomiting    Complications: none    Transfer of care protocol was followed    Last vitals: Visit Vitals  /79 (BP Location: Right arm, Patient Position: Lying)   Pulse 82   Temp 36.8 °C (98.2 °F) (Temporal)   Resp 18   Ht 5' 4" (1.626 m)   Wt 72.6 kg (160 lb)   LMP 10/02/2015   SpO2 95%   Breastfeeding No   BMI 27.46 kg/m²     "

## 2024-03-10 NOTE — DISCHARGE SUMMARY
"[facility] Medicine  Discharge Summary      Patient Name: Lashell Stroud  MRN: [id number]  KIZZY: [id number]  Patient Class: OP- Observation  Admission Date: 3/9/2024  Hospital Length of Stay: 0 days  Discharge Date and Time: No discharge date for patient encounter.  Attending Physician: Nadeem Barbosa MD   Discharging Provider: Lani Fuller NP  Primary Care Provider: Gigi Iverson MD    Primary Care Team: Networked reference to record PCT     HPI:   57 y/o female with pMHx of hypertension, constipation, and arthritis who presented to the ED with complaints of BRBPR x1 week.  Patient reported that over the last week she has had episodes from 1-3 times daily.  Blood work drawn in the ED revealed hemoglobin drop from 13.7-8.9.  ED MD spoke with GI who recommends new lightly with colonoscopy in a.m..  We will hold patient's meloxicam and tramadol at this time.    Procedure(s) (LRB):  EGD (ESOPHAGOGASTRODUODENOSCOPY) (N/A)  COLONOSCOPY (N/A)      Hospital Course:   Ms. Stroud was monitored closely during her hospitalization. She was admitted on 3/9/24 with concerns of GI bleed. She noticed dark stool and blood in the toilet noting the toilet water was red. Her baseline H&H 4 months ago was 13.7/42 and on admission it was 8.9/28.2. GI was consulted and she was prepped on 3/9/24 with nulytely and underwent a colonoscopy and EGD on 3/10/24 that revealed diverticulum without active bleeding, hemorrhoids, and EGD revealed: "A few dispersed diminutive erosions with no stigmata of recent bleeding were found in the gastric antrum". GI suspects bleeding was d/t recent diverticular bleed. Given erosions on EGD, will start protonix. H&H on 3/10/24 8.3/26.1 with normocytic mildly hypochromic indices. Recommend outpt CBC on Friday with close outpt follow up with her PCP. Recommend she stop mobic. She was seen an examined on the date of discharge.      Goals of Care Treatment " Preferences:  Code Status: Full Code      Consults:   Consults (From admission, onward)          Status Ordering Provider     Inpatient consult to Gastroenterology  Once        Provider:  Gwyn Rodriguez MD    Completed CONSTANZA SUTTON            No new Assessment & Plan notes have been filed under this hospital service since the last note was generated.  Service: Hospital Medicine    Final Active Diagnoses:    Diagnosis Date Noted POA    PRINCIPAL PROBLEM:  Lower GI bleed [K92.2] 03/09/2024 Yes    Essential hypertension [I10] 11/08/2016 Yes    Inflammatory arthritis [M19.90] 08/30/2016 Yes      Problems Resolved During this Admission:       Discharged Condition: stable    Disposition: Home or Self Care    Follow Up:   Follow-up Information       Gigi Iverson MD Follow up in 1 week(s).    Specialty: Internal Medicine  Contact information:  [address] [place] [address] [id number] [phone]                           Patient Instructions:      CBC auto differential   Standing Status: Future Standing Exp. Date: 06/08/25     Diet Adult Regular     Notify your health care provider if you experience any of the following:  temperature >100.4     Notify your health care provider if you experience any of the following:  persistent nausea and vomiting or diarrhea     Notify your health care provider if you experience any of the following:  persistent dizziness, light-headedness, or visual disturbances     Notify your health care provider if you experience any of the following:  increased confusion or weakness     Activity as tolerated       Significant Diagnostic Studies: Labs: CMP   Recent Labs   Lab 03/09/24  1517 03/10/24  0445    144   K 4.0 3.9    110   CO2 29 29   * 103   BUN 12 9   CREATININE 0.7 0.6   CALCIUM 8.6* 8.4*   PROT 5.8* 5.4*   ALBUMIN 4.0 3.7   BILITOT 0.3 0.4   ALKPHOS 62 56   AST 23 18   ALT 16 14   ANIONGAP 5* 5*    and CBC   Recent Labs   Lab 03/09/24  1517 03/10/24  0445    WBC 8.26 5.67   HGB 8.9* 8.3*   HCT 28.2* 26.1*    222       Pending Diagnostic Studies:       Procedure Component Value Units Date/Time    Specimen to Pathology - Surgery [[id number]] Collected: 03/10/24 1139    Order Status: Sent Lab Status: No result     Specimen: Tissue            Medications:  Reconciled Home Medications:      Medication List        START taking these medications      ferrous sulfate 325 mg (65 mg iron) Tab tablet  Commonly known as: IRON (FERROUS SULFATE)  Take 1 tablet (325 mg total) by mouth once daily.     pantoprazole 40 MG tablet  Commonly known as: PROTONIX  Take 1 tablet (40 mg total) by mouth once daily.            CHANGE how you take these medications      metoprolol succinate 25 MG 24 hr tablet  Commonly known as: TOPROL-XL  TAKE 1 TABLET BY MOUTH once daily  What changed:   when to take this  additional instructions            CONTINUE taking these medications      cholecalciferol (vitamin D3) 50 mcg (2,000 unit) Cap capsule  Commonly known as: VITAMIN D3  Take 1 capsule by mouth once daily.     levothyroxine 50 MCG tablet  Commonly known as: SYNTHROID  Take 1 tablet (50 mcg total) by mouth before breakfast.     RESTASIS 0.05 % ophthalmic emulsion  Generic drug: cycloSPORINE  Place 1 drop into both eyes 2 (two) times daily.     traMADoL 50 mg tablet  Commonly known as: ULTRAM  TAKE 1 TABLET BY MOUTH EVERY 6 HOURS AS NEEDED FOR PAIN            STOP taking these medications      meloxicam 15 MG tablet  Commonly known as: MOBIC              Indwelling Lines/Drains at time of discharge:   Lines/Drains/Airways       None                   Time spent on the discharge of patient: 41 minutes         Lani Fuller NP  Department of Hospital Medicine  [facility]

## 2024-03-10 NOTE — CONSULTS
GASTROENTEROLOGY INPATIENT CONSULT NOTE  Patient Name: Lashell Stroud  Patient MRN: [id number]  Patient : 1965    Admit Date: 3/9/2024  Service date: 3/10/2024    Reason for Consult: rectal bleeding    PCP: Gigi Iverson MD    Chief Complaint   Patient presents with    Melena    Back Pain     Pt c/o bloody stool x 1 week and lower back pain.       HPI: Patient is a 58 y.o. female with PMHx  acute onset bright red blood per rectum. Reports no similar events. Started about 5-7 days ago.  Hgb dropped from 13.7-->8.9.  No heavy nsaids but uses meloxicam.  Denies abd pain. No dysphagia.        Colon  Gensler  Findings:       The perianal and digital rectal examinations were normal.        An 8 mm polyp was found in the sigmoid colon. The polyp was        semi-sessile. The polyp was removed with a hot biopsy forceps.        Resection and retrieval were complete.        Many diverticula were found in the sigmoid colon, descending colon,        transverse colon and ascending colon.        The exam was otherwise without abnormality.        The retroflexed view of the distal rectum and anal verge was normal        and showed no anal or rectal abnormalities.        The ileocecal valve appeared normal. Biopsies were taken with a cold        forceps for histology. bx of icv given past abnormal however now        looks completely normal bx to further assess   Impression:            - One 8 mm polyp in the sigmoid colon, removed                          with a hot biopsy forceps. Resected and retrieved.                          SUSPECT GRANNULATION TISSUE                          - Diverticulosis in the sigmoid colon, in the                          descending colon, in the transverse colon and in                          the ascending colon.                          - The examination was otherwise normal.                          - The distal rectum and anal verge are normal on                           retroflexion view.                          - The ileocecal valve is normal. Biopsied.           Past Medical History:  Past Medical History:   Diagnosis Date    Constipation 2017    COVID-19 virus infection     pos 20    Family history of colon cancer in mother 2016    History of chicken pox     Hypertension     Palindromic rheumatism involving tarsus     Psoas tendinitis of both sides 2019    Shoulder pain, bilateral 2016    Spinal stenosis of lumbar region 2019        Past Surgical History:  Past Surgical History:   Procedure Laterality Date    ARTHROSCOPIC REPAIR OF ROTATOR CUFF OF SHOULDER Left 2020    Procedure: REPAIR, ROTATOR CUFF, ARTHROSCOPIC;  Surgeon: Sb Reis II, MD;  Location: [facility];  Service: Orthopedics;  Laterality: Left;    ARTHROSCOPY OF SHOULDER WITH DECOMPRESSION OF SUBACROMIAL SPACE Left 2020    Procedure: ARTHROSCOPY, SHOULDER, WITH SUBACROMIAL SPACE DECOMPRESSION;  Surgeon: Sb Reis II, MD;  Location: [facility] OR;  Service: Orthopedics;  Laterality: Left;     SECTION      CHOLECYSTECTOMY      COLONOSCOPY N/A 2016    Procedure: COLONOSCOPY;  Surgeon: Hesham Enriquez MD;  Location: [facility];  Service: Endoscopy;  Laterality: N/A;    COLONOSCOPY N/A 2017    Procedure: COLONOSCOPY;  Surgeon: Hesham Enriquez MD;  Location: [facility];  Service: Endoscopy;  Laterality: N/A;    COLONOSCOPY N/A 10/15/2021    Procedure: COLONOSCOPY;  Surgeon: Hesham Enriquez MD;  Location: [facility];  Service: Endoscopy;  Laterality: N/A;    EPIDURAL STEROID INJECTION INTO LUMBAR SPINE N/A 2019    Procedure: Injection-steroid-epidural-lumbar L4/5;  Surgeon: Cristhian Gee MD;  Location: [facility] OR;  Service: Pain Management;  Laterality: N/A;    EPIDURAL STEROID INJECTION INTO LUMBAR SPINE N/A 2019    Procedure: Injection-steroid-epidural-lumbar;  Surgeon: Nigel Zuniga MD;  Location: [facility] OR;  Service: Pain  Management;  Laterality: N/A;  L4/5 interlaminar    EXTREME LATERAL INTERBODY FUSION (XLIF) OF SPINE Left 11/18/2019    Procedure: FUSION, SPINE, XLIF L3-4;  Surgeon: Cleve Bowers MD;  Location: [facility] OR;  Service: Orthopedics;  Laterality: Left;    HAND ARTHROPLASTY Left 10/4/2022    Procedure: Left thumb CMC arthroplasty;  Surgeon: Stanley Rivero MD;  Location: [facility] OR;  Service: Orthopedics;  Laterality: Left;  Anesthesia:  General with a single shot supraclavicular Exparel block    HAND ARTHROPLASTY Right 12/12/2023    Procedure: Right thumb CMC arthroplasty;  Surgeon: Stanley Rivero MD;  Location: [facility] OR;  Service: Orthopedics;  Laterality: Right;  Anesthesia: General with a single-shot supraclavicular Exparel block    LAMINECTOMY USING MINIMALLY INVASIVE TECHNIQUE N/A 11/18/2019    Procedure: LAMINECTOMY, SPINE, MINIMALLY INVASIVE L3-L4;  Surgeon: Cleve Bowers MD;  Location: [facility] OR;  Service: Orthopedics;  Laterality: N/A;    MINIMALLY INVASIVE FUSION OF SPINE N/A 11/18/2019    Procedure: FUSION, SPINE, MINIMALLY INVASIVE L3-L4;  Surgeon: Cleve Bowers MD;  Location: [facility] OR;  Service: Orthopedics;  Laterality: N/A;    TUBAL LIGATION N/A [facility]        Home Medications:  Medications Prior to Admission   Medication Sig Dispense Refill Last Dose    cholecalciferol, vitamin D3, (VITAMIN D3) 50 mcg (2,000 unit) Cap Take 1 capsule by mouth once daily.   3/9/2024 at 08:00    levothyroxine (SYNTHROID) 50 MCG tablet Take 1 tablet (50 mcg total) by mouth before breakfast. 30 tablet 11 3/9/2024 at 08:00    meloxicam (MOBIC) 15 MG tablet Take 1 tablet (15 mg total) by mouth once daily. (Patient taking differently: Take 15 mg by mouth once daily. 12:00) 90 tablet 3 3/9/2024 at 12:00    metoprolol succinate (TOPROL-XL) 25 MG 24 hr tablet TAKE 1 TABLET BY MOUTH once daily (Patient taking differently: Take 25 mg by mouth once daily. 12:00) 90 tablet 1 3/9/2024 at 12:00    RESTASIS 0.05 % ophthalmic  emulsion Place 1 drop into both eyes 2 (two) times daily.   3/9/2024 at 08:00    traMADoL (ULTRAM) 50 mg tablet TAKE 1 TABLET BY MOUTH EVERY 6 HOURS AS NEEDED FOR PAIN (Patient taking differently: Take 50 mg by mouth every 6 (six) hours as needed for Pain.) 120 tablet 5 3/9/2024 at 11:00       Inpatient Medications:   carboxymethylcellulose  2 drop Both Eyes Q4H While awake    levothyroxine  50 mcg Oral Before breakfast    metoprolol succinate  25 mg Oral Daily    pantoprazole  40 mg Intravenous BID     acetaminophen, albuterol-ipratropium, HYDROcodone-acetaminophen, magnesium oxide, magnesium oxide, naloxone, ondansetron, potassium bicarbonate, potassium bicarbonate, potassium bicarbonate, potassium, sodium phosphates, potassium, sodium phosphates, potassium, sodium phosphates    Review of patient's allergies indicates:   Allergen Reactions    Aspirin Itching    Penicillins Hives    Adhesive Rash       Social History:   Social History     Occupational History    Not on file   Tobacco Use    Smoking status: Never    Smokeless tobacco: Never   Substance and Sexual Activity    Alcohol use: Not Currently    Drug use: No    Sexual activity: Yes     Birth control/protection: Surgical     Comment: tubal ligation       Family History:   Family History   Problem Relation Age of Onset    Cancer Mother     Hypertension Mother     Heart disease Father     Hypertension Father     Diabetes Father     No Known Problems Brother        Review of Systems:  A 10 point review of systems was performed and was normal, except as mentioned in the HPI, including constitutional, HEENT, heme, lymph, cardiovascular, respiratory, gastrointestinal, genitourinary, neurologic, endocrine, psychiatric and musculoskeletal.      OBJECTIVE:    Physical Exam:  24 Hour Vital Sign Ranges: Temp:  [97.4 °F (36.3 °C)-98.2 °F (36.8 °C)] 97.8 °F (36.6 °C)  Pulse:  [50-80] 53  Resp:  [18-20] 18  SpO2:  [97 %-100 %] 98 %  BP: (114-142)/(62-99) 137/72  Most  "recent vitals: /72 (BP Location: Right arm, Patient Position: Lying)   Pulse (!) 53   Temp 97.8 °F (36.6 °C) (Oral)   Resp 18   Ht 5' 4" (1.626 m)   Wt 73.3 kg (161 lb 8 oz)   LMP 10/02/2015   SpO2 98%   Breastfeeding No   BMI 27.72 kg/m²    GEN: well-developed, well-nourished, awake and alert, non-toxic appearing adult  HEENT: PERRL, sclera anicteric, oral mucosa pink and moist without lesion  NECK: trachea midline; Good ROM  CV: regular rate and rhythm, no murmurs or gallops  RESP: clear to auscultation bilaterally, no wheezes, rhonci or rales  ABD: soft, non-tender, non-distended, normal bowel sounds  EXT: no swelling or edema, 2+ pulses distally  SKIN: no rashes or jaundice  PSYCH: normal affect    Labs:   Recent Labs     03/09/24  1517 03/10/24  0445   WBC 8.26 5.67   MCV 93 93    222     Recent Labs     03/09/24  1517 03/10/24  0445    144   K 4.0 3.9    110   CO2 29 29   BUN 12 9   * 103     No results for input(s): "ALB" in the last 72 hours.    Invalid input(s): "ALKP", "SGOT", "SGPT", "TBIL", "DBIL", "TPRO"  No results for input(s): "PT", "INR", "PTT" in the last 72 hours.      Radiology Review:  No orders to display         IMPRESSION / RECOMMENDATIONS:  Suspected diverticular bleed  H/o meloxicam use  Acute blood loss anemia  -EGD/Colon now  -risks of procedure discussed at length with patient including but not limited to perforation, bleeding, morbidity/mortality, aspiration. Pt agreeable to procedure accepting of involved risks.    Thank you for this consult.    Gwyn Rodriguez  3/10/2024  11:02 AM        "

## 2024-03-10 NOTE — PROVATION PATIENT INSTRUCTIONS
Discharge Summary/Instructions after an Endoscopic Procedure  Patient Name: Lashell Stroud  Patient MRN: [id number]  Patient YOB: 1965  Raymundo, March 10, 2024  Gwyn Rodriguez MD  RESTRICTIONS:  During your procedure today, you received medications for sedation.  These   medications may affect your judgment, balance and coordination.  Therefore,   for 24 hours, you have the following restrictions:   - DO NOT drive a car, operate machinery, make legal/financial decisions,   sign important papers or drink alcohol.    ACTIVITY:  Today: no heavy lifting, straining or running due to procedural   sedation/anesthesia.  The following day: return to full activity including work.  DIET:  Eat and drink normally unless instructed otherwise.     TREATMENT FOR COMMON SIDE EFFECTS:  - Mild abdominal pain, nausea, belching, bloating or excessive gas:  rest,   eat lightly and use a heating pad.  - Sore Throat: treat with throat lozenges and/or gargle with warm salt   water.  - Because air was used during the procedure, expelling large amounts of air   from your rectum or belching is normal.  - If a bowel prep was taken, you may not have a bowel movement for 1-3 days.    This is normal.  SYMPTOMS TO WATCH FOR AND REPORT TO YOUR PHYSICIAN:  1. Abdominal pain or bloating, other than gas cramps.  2. Chest pain.  3. Back pain.  4. Signs of infection such as: chills or fever occurring within 24 hours   after the procedure.  5. Rectal bleeding, which would show as bright red, maroon, or black stools.   (A tablespoon of blood from the rectum is not serious, especially if   hemorrhoids are present.)  6. Vomiting.  7. Weakness or dizziness.  GO DIRECTLY TO THE NEAREST EMERGENCY ROOM IF YOU HAVE ANY OF THE FOLLOWING:      Difficulty breathing              Chills and/or fever over 101 F   Persistent vomiting and/or vomiting blood   Severe abdominal pain   Severe chest pain   Black, tarry stools   Bleeding- more than one  tablespoon   Any other symptom or condition that you feel may need urgent attention  Your doctor recommends these additional instructions:  If any biopsies were taken, your doctors clinic will contact you in 1 to 2   weeks with any results.  - Patient has a contact number available for emergencies.  The signs and   symptoms of potential delayed complications were discussed with the   patient.  Return to normal activities tomorrow.  Written discharge   instructions were provided to the patient.   - Resume previous diet.   - Continue present medications.   - Repeat colonoscopy at next available appointment (within 3 months) because   the bowel preparation was suboptimal.   - Return to GI clinic in 2 weeks.   - Discharge patient to home (with escort).  For questions, problems or results please call your physician - Gwyn Rodriguez MD at Work:  [phone].  [facility], EMERGENCY ROOM PHONE NUMBER: [phone]  IF A COMPLICATION OR EMERGENCY SITUATION ARISES AND YOU ARE UNABLE TO REACH   YOUR PHYSICIAN - GO DIRECTLY TO THE EMERGENCY ROOM.  MD Gwyn Paulson MD  3/10/2024 11:59:44 AM  This report has been verified and signed electronically.  Dear patient,  As a result of recent federal legislation (The Federal Cures Act), you may   receive lab or pathology results from your procedure in your [facility]   account before your physician is able to contact you. Your physician or   their representative will relay the results to you with their   recommendations at their soonest availability.  Thank you,  PROVATION

## 2024-03-10 NOTE — HOSPITAL COURSE
"Ms. Stroud was monitored closely during her hospitalization. She was admitted on 3/9/24 with concerns of GI bleed. She noticed dark stool and blood in the toilet noting the toilet water was red. Her baseline H&H 4 months ago was 13.7/42 and on admission it was 8.9/28.2. GI was consulted and she was prepped on 3/9/24 with nulytely and underwent a colonoscopy and EGD on 3/10/24 that revealed diverticulum without active bleeding, hemorrhoids, and EGD revealed: "A few dispersed diminutive erosions with no stigmata of recent bleeding were found in the gastric antrum". GI suspects bleeding was d/t recent diverticular bleed. Given erosions on EGD, will start protonix. H&H on 3/10/24 8.3/26.1 with normocytic mildly hypochromic indices. Recommend outpt CBC on Friday with close outpt follow up with her PCP. Recommend she stop mobic. She was seen an examined on the date of discharge.   "

## 2024-03-10 NOTE — NURSING
Pt back from ENDO. Vital signs taken, lunch tray ordered. Spouse at bedside. Will continue to monitor.

## 2024-03-10 NOTE — ANESTHESIA POSTPROCEDURE EVALUATION
Anesthesia Post Evaluation    Patient: Lashell Stroud    Procedure(s) Performed: Procedure(s) (LRB):  EGD (ESOPHAGOGASTRODUODENOSCOPY) (N/A)  COLONOSCOPY (N/A)    Final Anesthesia Type: general      Patient location during evaluation: GI PACU  Patient participation: Yes- Able to Participate  Level of consciousness: awake and alert  Post-procedure vital signs: reviewed and stable  Pain management: adequate  Airway patency: patent    PONV status at discharge: No PONV  Anesthetic complications: no      Cardiovascular status: stable  Respiratory status: unassisted  Hydration status: euvolemic  Follow-up not needed.              Vitals Value Taken Time   /69 03/10/24 1214   Temp 36.3 °C (97.4 °F) 03/10/24 1214   Pulse 75 03/10/24 1214   Resp 18 03/10/24 1214   SpO2 100 % 03/10/24 1214         Event Time   Out of Recovery 03/10/2024 11:59:45         Pain/Maverick Score: No data recorded

## 2024-03-10 NOTE — DISCHARGE INSTRUCTIONS
Take iron with vitamin c that can be purchased over the counter 250mg is fine.   Take protonix on an empty stomach 1 hour before your meal and/or at least 2 hours after your meal, take at the same time each day   Stop taking mobic or meloxicam  You may take tylenol (acetaminophen) for pain as needed   Do not take ibuprofen (motrin) or naproxen (aleve) aspirin (BC powders) as these can increase your risk of bleeding

## 2024-03-10 NOTE — PROVATION PATIENT INSTRUCTIONS
Discharge Summary/Instructions after an Endoscopic Procedure  Patient Name: Lashell Stroud  Patient MRN: [id number]  Patient YOB: 1965  Raymundo, March 10, 2024  Gwyn Rodriguez MD  RESTRICTIONS:  During your procedure today, you received medications for sedation.  These   medications may affect your judgment, balance and coordination.  Therefore,   for 24 hours, you have the following restrictions:   - DO NOT drive a car, operate machinery, make legal/financial decisions,   sign important papers or drink alcohol.    ACTIVITY:  Today: no heavy lifting, straining or running due to procedural   sedation/anesthesia.  The following day: return to full activity including work.  DIET:  Eat and drink normally unless instructed otherwise.     TREATMENT FOR COMMON SIDE EFFECTS:  - Mild abdominal pain, nausea, belching, bloating or excessive gas:  rest,   eat lightly and use a heating pad.  - Sore Throat: treat with throat lozenges and/or gargle with warm salt   water.  - Because air was used during the procedure, expelling large amounts of air   from your rectum or belching is normal.  - If a bowel prep was taken, you may not have a bowel movement for 1-3 days.    This is normal.  SYMPTOMS TO WATCH FOR AND REPORT TO YOUR PHYSICIAN:  1. Abdominal pain or bloating, other than gas cramps.  2. Chest pain.  3. Back pain.  4. Signs of infection such as: chills or fever occurring within 24 hours   after the procedure.  5. Rectal bleeding, which would show as bright red, maroon, or black stools.   (A tablespoon of blood from the rectum is not serious, especially if   hemorrhoids are present.)  6. Vomiting.  7. Weakness or dizziness.  GO DIRECTLY TO THE NEAREST EMERGENCY ROOM IF YOU HAVE ANY OF THE FOLLOWING:      Difficulty breathing              Chills and/or fever over 101 F   Persistent vomiting and/or vomiting blood   Severe abdominal pain   Severe chest pain   Black, tarry stools   Bleeding- more than one  tablespoon   Any other symptom or condition that you feel may need urgent attention  Your doctor recommends these additional instructions:  If any biopsies were taken, your doctors clinic will contact you in 1 to 2   weeks with any results.  - Patient has a contact number available for emergencies.  The signs and   symptoms of potential delayed complications were discussed with the   patient.  Return to normal activities tomorrow.  Written discharge   instructions were provided to the patient.   - Resume previous diet.   - Continue present medications.   - Await pathology results.  - Pantoprazole daily.   - [facility].  For questions, problems or results please call your physician - Gwyn Rodriguez MD at Work:  [phone].  [facility], EMERGENCY ROOM PHONE NUMBER: [phone]  IF A COMPLICATION OR EMERGENCY SITUATION ARISES AND YOU ARE UNABLE TO REACH   YOUR PHYSICIAN - GO DIRECTLY TO THE EMERGENCY ROOM.  MD Gwyn Paulson MD  3/10/2024 11:56:33 AM  This report has been verified and signed electronically.  Dear patient,  As a result of recent federal legislation (The Federal Cures Act), you may   receive lab or pathology results from your procedure in your [facility]   account before your physician is able to contact you. Your physician or   their representative will relay the results to you with their   recommendations at their soonest availability.  Thank you,  PROVATION

## 2024-03-10 NOTE — PLAN OF CARE
03/10/24 1353   Final Note   Assessment Type Final Discharge Note   Anticipated Discharge Disposition Home   What phone number can be called within the next 1-3 days to see how you are doing after discharge? [phone]   Post-Acute Status   Discharge Delays None known at this time     Patient cleared for discharge from case management standpoint.   Patient discharged home with no further case management needs.

## 2024-03-10 NOTE — PLAN OF CARE
[facility]  Initial Discharge Assessment       Primary Care Provider: Gigi Iverson MD    Admission Diagnosis: Lower GI bleed [K92.2]    Admission Date: 3/9/2024  Expected Discharge Date: 3/11/2024    Assessment completed at pt's bedside.. Pt AAOx4s. Demographics, PCP, pharmacy and insurance verified. No home health. No dialysis. Pt reports ability to complete ADLs without any assistance Pt verbalized plan to discharge home via spouse transport. Pt has no other needs to be addressed at this time.     Transition of Care Barriers: None    Payor: BLUE CROSS BLUE SHIELD / Plan: [facility] EMPLOYEE [place] / Product Type: Commercial /     Extended Emergency Contact Information  Primary Emergency Contact: Inocencio Stroud  Address: [address] [place] [address]           [place] [address] [facility]  Home Phone: [phone]  Mobile Phone: [phone]  Relation: Spouse  Preferred language: English   needed? No    Discharge Plan A: Home with family  Discharge Plan B: Home with family      [facility] [address] [address]  Phone: [phone] Fax: [phone]      Initial Assessment (most recent)       Adult Discharge Assessment - 03/10/24 1013          Discharge Assessment    Assessment Type Discharge Planning Assessment     Confirmed/corrected address, phone number and insurance Yes     Confirmed Demographics Correct on Facesheet     Source of Information patient;health record     Reason For Admission Lower GI bleed     People in Home spouse     Facility Arrived From: Home     Do you expect to return to your current living situation? Yes     Do you have help at home or someone to help you manage your care at home? Yes     Who are your caregiver(s) and their phone number(s)? Inocencio Stroud (Spouse) [phone] (Mobile)     Prior to hospitilization cognitive status: Alert/Oriented     Current  cognitive status: Alert/Oriented     Walking or Climbing Stairs Difficulty no     Dressing/Bathing Difficulty no     Home Layout Able to live on 1st floor     Equipment Currently Used at Home none     Readmission within 30 days? No     Patient currently being followed by outpatient case management? No     Do you currently have service(s) that help you manage your care at home? No     Do you take prescription medications? Yes     Do you have prescription coverage? Yes     Coverage [facility] [employer]EE [place] [place] -     Do you have any problems affording any of your prescribed medications? No     Is the patient taking medications as prescribed? yes     Who is going to help you get home at discharge? Inocencio Stroud (Spouse) [phone] (Mobile)     How do you get to doctors appointments? car, drives self     Are you on dialysis? No     Do you take coumadin? No     Discharge Plan A Home with family     Discharge Plan B Home with family     DME Needed Upon Discharge  none     Discharge Plan discussed with: Patient     Transition of Care Barriers None

## 2024-03-10 NOTE — NURSING
Pt verbalized understanding of discharge instructions. IV and tele box removed. Box returned to cardio B. Pt ambulated down to private vehicle with all personal belongings

## 2024-03-10 NOTE — CARE UPDATE
03/09/24 2100   Patient Assessment/Suction   Level of Consciousness (AVPU) alert   Respiratory Effort Normal;Unlabored   PRE-TX-O2   Device (Oxygen Therapy) room air   SpO2 99 %   Pulse Oximetry Type Intermittent   $ Pulse Oximetry - Single Charge Pulse Oximetry - Single   $ Pulse Oximetry - Multiple Charge Pulse Oximetry - Multiple   Pulse (!) 50   Positioning HOB elevated 30 degrees   Positioning   Body Position position changed independently   Head of Bed (HOB) Positioning HOB elevated   Positioning/Transfer Devices pillows   Education   $ Education Other (see comment);15 min

## 2024-03-15 ENCOUNTER — LAB VISIT (OUTPATIENT)
Dept: LAB | Facility: HOSPITAL | Age: 59
End: 2024-03-15
Attending: NURSE PRACTITIONER
Payer: COMMERCIAL

## 2024-03-15 DIAGNOSIS — K92.2 LOWER GI BLEED: ICD-10-CM

## 2024-03-15 LAB
BASOPHILS # BLD AUTO: 0.07 K/UL (ref 0–0.2)
BASOPHILS NFR BLD: 1.3 % (ref 0–1.9)
DIFFERENTIAL METHOD BLD: ABNORMAL
EOSINOPHIL # BLD AUTO: 0.2 K/UL (ref 0–0.5)
EOSINOPHIL NFR BLD: 3.3 % (ref 0–8)
ERYTHROCYTE [DISTWIDTH] IN BLOOD BY AUTOMATED COUNT: 14.3 % (ref 11.5–14.5)
HCT VFR BLD AUTO: 31.6 % (ref 37–48.5)
HGB BLD-MCNC: 10 G/DL (ref 12–16)
IMM GRANULOCYTES # BLD AUTO: 0.02 K/UL (ref 0–0.04)
IMM GRANULOCYTES NFR BLD AUTO: 0.4 % (ref 0–0.5)
LYMPHOCYTES # BLD AUTO: 2.3 K/UL (ref 1–4.8)
LYMPHOCYTES NFR BLD: 42.2 % (ref 18–48)
MCH RBC QN AUTO: 29.4 PG (ref 27–31)
MCHC RBC AUTO-ENTMCNC: 31.6 G/DL (ref 32–36)
MCV RBC AUTO: 93 FL (ref 82–98)
MONOCYTES # BLD AUTO: 0.4 K/UL (ref 0.3–1)
MONOCYTES NFR BLD: 7.8 % (ref 4–15)
NEUTROPHILS # BLD AUTO: 2.5 K/UL (ref 1.8–7.7)
NEUTROPHILS NFR BLD: 45 % (ref 38–73)
NRBC BLD-RTO: 0 /100 WBC
PLATELET # BLD AUTO: 359 K/UL (ref 150–450)
PLATELET BLD QL SMEAR: ABNORMAL
PMV BLD AUTO: 9.1 FL (ref 9.2–12.9)
RBC # BLD AUTO: 3.4 M/UL (ref 4–5.4)
WBC # BLD AUTO: 5.48 K/UL (ref 3.9–12.7)

## 2024-03-15 PROCEDURE — 85025 COMPLETE CBC W/AUTO DIFF WBC: CPT | Performed by: NURSE PRACTITIONER

## 2024-03-15 PROCEDURE — 36415 COLL VENOUS BLD VENIPUNCTURE: CPT | Performed by: NURSE PRACTITIONER

## 2024-03-26 DIAGNOSIS — M18.11 ARTHRITIS OF CARPOMETACARPAL (CMC) JOINT OF RIGHT THUMB: Primary | ICD-10-CM

## 2024-03-26 DIAGNOSIS — Z79.899 LONG-TERM USE OF PLAQUENIL: ICD-10-CM

## 2024-03-26 DIAGNOSIS — M19.90 INFLAMMATORY ARTHRITIS: ICD-10-CM

## 2024-03-26 DIAGNOSIS — G89.4 CHRONIC PAIN SYNDROME: ICD-10-CM

## 2024-03-26 DIAGNOSIS — M35.3 PMR (POLYMYALGIA RHEUMATICA): ICD-10-CM

## 2024-03-26 DIAGNOSIS — E03.9 HYPOTHYROIDISM, UNSPECIFIED TYPE: ICD-10-CM

## 2024-03-26 DIAGNOSIS — I10 ESSENTIAL HYPERTENSION: ICD-10-CM

## 2024-03-29 RX ORDER — TRAMADOL HYDROCHLORIDE 50 MG/1
50 TABLET ORAL EVERY 6 HOURS PRN
Qty: 120 TABLET | Refills: 5 | Status: SHIPPED | OUTPATIENT
Start: 2024-03-29 | End: 2024-05-28 | Stop reason: SDUPTHER

## 2024-04-03 ENCOUNTER — HOSPITAL ENCOUNTER (OUTPATIENT)
Dept: RADIOLOGY | Facility: HOSPITAL | Age: 59
Discharge: HOME | End: 2024-04-03
Attending: ORTHOPAEDIC SURGERY
Payer: COMMERCIAL

## 2024-04-03 ENCOUNTER — OFFICE VISIT (OUTPATIENT)
Dept: ORTHOPEDICS | Facility: CLINIC | Age: 59
End: 2024-04-03
Payer: COMMERCIAL

## 2024-04-03 DIAGNOSIS — M18.11 ARTHRITIS OF CARPOMETACARPAL (CMC) JOINT OF RIGHT THUMB: Primary | ICD-10-CM

## 2024-04-03 DIAGNOSIS — M18.11 ARTHRITIS OF CARPOMETACARPAL (CMC) JOINT OF RIGHT THUMB: ICD-10-CM

## 2024-04-03 PROCEDURE — 99999 PR PBB SHADOW E&M-EST. PATIENT-LVL II: CPT | Mod: PBBFAC,,, | Performed by: ORTHOPAEDIC SURGERY

## 2024-04-03 PROCEDURE — 1159F MED LIST DOCD IN RCRD: CPT | Mod: CPTII,S$GLB,, | Performed by: ORTHOPAEDIC SURGERY

## 2024-04-03 PROCEDURE — 73130 X-RAY EXAM OF HAND: CPT | Mod: 26,RT,, | Performed by: RADIOLOGY

## 2024-04-03 PROCEDURE — 73130 X-RAY EXAM OF HAND: CPT | Mod: TC,PO,RT

## 2024-04-03 PROCEDURE — 99213 OFFICE O/P EST LOW 20 MIN: CPT | Mod: S$GLB,,, | Performed by: ORTHOPAEDIC SURGERY

## 2024-04-03 NOTE — PROGRESS NOTES
Ms Stroud returns to clinic today.  Has a history of right thumb CMC suspension arthroplasty.  She is 3-1/2 months post surgery.  She did try to return to work and has had significantly more pain since that time.      Physical exam: Examination of the right hand reveals that there is no major edema.  The incisions are healing well.  Palpation does produce mild tenderness over the CMC joint.  He was able to flex and extend the thumb.      Radiology:  X-rays of the right hand were taken in clinic today.  He was noted have evidence of the CMC arthroplasty.  The suspension appears to be well-maintained     Assessment:  Status post right thumb CMC arthroplasty     Plan:    1. He was allowed to slowly increase activity as tolerated but will hold her out from any highly repetitive or activities which require more than 5-7 lb of lifting     2. She can continue to wear the Velcro brace for heavier activities if this makes her thumb feel better     3. She will follow up with me in 4 weeks for repeat evaluation.  Based at that evaluation I may consider instituting another course of therapy if she was still having any weakness or lack of ability to pinch wheeze or grasp

## 2024-05-28 ENCOUNTER — OFFICE VISIT (OUTPATIENT)
Dept: RHEUMATOLOGY | Facility: CLINIC | Age: 59
End: 2024-05-28
Payer: COMMERCIAL

## 2024-05-28 VITALS
HEIGHT: 64 IN | BODY MASS INDEX: 27.66 KG/M2 | WEIGHT: 162 LBS | DIASTOLIC BLOOD PRESSURE: 77 MMHG | SYSTOLIC BLOOD PRESSURE: 145 MMHG | HEART RATE: 70 BPM

## 2024-05-28 DIAGNOSIS — M19.041 OSTEOARTHRITIS OF BOTH HANDS, UNSPECIFIED OSTEOARTHRITIS TYPE: ICD-10-CM

## 2024-05-28 DIAGNOSIS — M19.042 OSTEOARTHRITIS OF BOTH HANDS, UNSPECIFIED OSTEOARTHRITIS TYPE: ICD-10-CM

## 2024-05-28 DIAGNOSIS — I10 ESSENTIAL HYPERTENSION: ICD-10-CM

## 2024-05-28 DIAGNOSIS — M19.90 INFLAMMATORY ARTHRITIS: ICD-10-CM

## 2024-05-28 DIAGNOSIS — M54.16 LUMBAR RADICULOPATHY: ICD-10-CM

## 2024-05-28 DIAGNOSIS — M48.061 SPINAL STENOSIS, LUMBAR REGION, WITHOUT NEUROGENIC CLAUDICATION: ICD-10-CM

## 2024-05-28 DIAGNOSIS — M35.3 PMR (POLYMYALGIA RHEUMATICA): ICD-10-CM

## 2024-05-28 DIAGNOSIS — D64.9 ANEMIA, UNSPECIFIED TYPE: ICD-10-CM

## 2024-05-28 DIAGNOSIS — E03.9 HYPOTHYROIDISM, UNSPECIFIED TYPE: Primary | ICD-10-CM

## 2024-05-28 DIAGNOSIS — M51.36 LUMBAR DEGENERATIVE DISC DISEASE: ICD-10-CM

## 2024-05-28 DIAGNOSIS — M25.50 ARTHRALGIA, UNSPECIFIED JOINT: ICD-10-CM

## 2024-05-28 DIAGNOSIS — Z79.899 LONG-TERM USE OF PLAQUENIL: ICD-10-CM

## 2024-05-28 DIAGNOSIS — G89.4 CHRONIC PAIN SYNDROME: ICD-10-CM

## 2024-05-28 PROCEDURE — 1159F MED LIST DOCD IN RCRD: CPT | Mod: CPTII,S$GLB,, | Performed by: INTERNAL MEDICINE

## 2024-05-28 PROCEDURE — 1160F RVW MEDS BY RX/DR IN RCRD: CPT | Mod: CPTII,S$GLB,, | Performed by: INTERNAL MEDICINE

## 2024-05-28 PROCEDURE — 3078F DIAST BP <80 MM HG: CPT | Mod: CPTII,S$GLB,, | Performed by: INTERNAL MEDICINE

## 2024-05-28 PROCEDURE — 99999 PR PBB SHADOW E&M-EST. PATIENT-LVL III: CPT | Mod: PBBFAC,,, | Performed by: INTERNAL MEDICINE

## 2024-05-28 PROCEDURE — 96372 THER/PROPH/DIAG INJ SC/IM: CPT | Mod: S$GLB,,, | Performed by: INTERNAL MEDICINE

## 2024-05-28 PROCEDURE — 3008F BODY MASS INDEX DOCD: CPT | Mod: CPTII,S$GLB,, | Performed by: INTERNAL MEDICINE

## 2024-05-28 PROCEDURE — 99215 OFFICE O/P EST HI 40 MIN: CPT | Mod: 25,S$GLB,, | Performed by: INTERNAL MEDICINE

## 2024-05-28 PROCEDURE — 3077F SYST BP >= 140 MM HG: CPT | Mod: CPTII,S$GLB,, | Performed by: INTERNAL MEDICINE

## 2024-05-28 RX ORDER — TRAMADOL HYDROCHLORIDE 50 MG/1
50 TABLET ORAL EVERY 6 HOURS PRN
Qty: 120 TABLET | Refills: 5 | Status: SHIPPED | OUTPATIENT
Start: 2024-05-28

## 2024-05-28 RX ORDER — MELOXICAM 15 MG/1
15 TABLET ORAL DAILY
Qty: 90 TABLET | Refills: 3 | Status: SHIPPED | OUTPATIENT
Start: 2024-05-28 | End: 2025-05-28

## 2024-05-28 RX ORDER — PANTOPRAZOLE SODIUM 40 MG/1
40 TABLET, DELAYED RELEASE ORAL DAILY
Qty: 90 TABLET | Refills: 3 | Status: SHIPPED | OUTPATIENT
Start: 2024-05-28 | End: 2025-05-28

## 2024-05-28 RX ORDER — KETOROLAC TROMETHAMINE 30 MG/ML
60 INJECTION, SOLUTION INTRAMUSCULAR; INTRAVENOUS
Status: COMPLETED | OUTPATIENT
Start: 2024-05-28 | End: 2024-05-28

## 2024-05-28 RX ORDER — ESOMEPRAZOLE MAGNESIUM 40 MG/1
40 CAPSULE, DELAYED RELEASE ORAL
COMMUNITY
Start: 2024-05-06 | End: 2024-05-28

## 2024-05-28 RX ORDER — PREGABALIN 50 MG/1
50 CAPSULE ORAL 3 TIMES DAILY
Qty: 90 CAPSULE | Refills: 6 | Status: SHIPPED | OUTPATIENT
Start: 2024-05-28 | End: 2024-11-26

## 2024-05-28 RX ORDER — METHYLPREDNISOLONE ACETATE 80 MG/ML
160 INJECTION, SUSPENSION INTRA-ARTICULAR; INTRALESIONAL; INTRAMUSCULAR; SOFT TISSUE
Status: COMPLETED | OUTPATIENT
Start: 2024-05-28 | End: 2024-05-28

## 2024-05-28 RX ADMIN — KETOROLAC TROMETHAMINE 60 MG: 30 INJECTION, SOLUTION INTRAMUSCULAR; INTRAVENOUS at 02:05

## 2024-05-28 RX ADMIN — METHYLPREDNISOLONE ACETATE 160 MG: 80 INJECTION, SUSPENSION INTRA-ARTICULAR; INTRALESIONAL; INTRAMUSCULAR; SOFT TISSUE at 02:05

## 2024-05-28 NOTE — PROGRESS NOTES
Two patient identifier used, allergies verified, see MAR for med, dose, and injection site. Patient tolerated procedure well.

## 2024-05-28 NOTE — PROGRESS NOTES
Subjective:     Patient ID:  Lashell Stroud    Chief Complaint:  Disease Management     History of Present Illness:  Pt is a 59 y.o. female with PMR and lumbar spinal stenosis, herniated disc hand osteoarthritis.  She had her L cmc joint repair. In the past she had L3 -L4 spinal fusion, her back pain is now severe, no loss bowel or bladder control,  but she has loss of sensation when sitting and severe pain when standingShe has noticed increased pain in her bilateral CMC joints and having difficulty with gripping and opening objects. No previous joint injections or occupational therapy. She has constant aching/stiffness in her PIP and DIP joints as well, which is unchanged. She is a  and does a lot of repetitive movements with her hands. No shoulder/hip pain/stiffness. She has been complaint with plaquenil, mobic, and tramadol tid PRN for severe pain with adequate control of her symptoms.. she requires assistance bathing, cooking, cleaning , dressing , shopping because of the severity of pain. At this time I recommend applying long term disability.  She had a GI bleed and her mobic was DC and required hospitalization.  Current tx  1.  2. mobic  3. Tramadol        - Infectious screening labs:  - Previous Treatments:  - Current Treatments:     Interval History:   Hospitalization since last office visit: No    Patient Active Problem List    Diagnosis Date Noted    Lower GI bleed 03/09/2024    Pain, wrist, right 01/23/2024    Pain of right thumb 01/23/2024    Stiffness of right wrist joint 01/23/2024    Arthritis of carpometacarpal (CMC) joint of right thumb 12/12/2023    Lumbar degenerative disc disease 04/19/2023    History of lumbar fusion 04/19/2023    Impaired mobility and ADLs 04/19/2023    Left hand pain 11/08/2022    Thumb joint stiffness 11/08/2022    Decreased  strength of left hand 11/08/2022    Decreased pinch strength 11/08/2022    Decreased activities of daily living (ADL) 11/08/2022     Arthritis of carpometacarpal (CMC) joint of left thumb 10/04/2022    Family history of colon cancer 10/15/2021    Rotator cuff syndrome, left 2020    COVID-19 virus infection 2020    LFTs abnormal 2019    Spinal stenosis, lumbar region, without neurogenic claudication 2019    History of lumbar spinal fusion 2019    Lumbar radiculopathy 2019    Chronic bilateral low back pain with bilateral sciatica 2019    Primary osteoarthritis involving multiple joints 2016    Essential hypertension 2016    Inflammatory arthritis 2016    PMR (polymyalgia rheumatica) 2015    ESR raised 2015    CRP elevated 2015    Arthralgia 2015    Chronic fatigue 2015    Myalgia 2015    Amy nodes (DJD hand) 2015    [place] nodes 2015     Past Surgical History:   Procedure Laterality Date    ARTHROSCOPIC REPAIR OF ROTATOR CUFF OF SHOULDER Left 2020    Procedure: REPAIR, ROTATOR CUFF, ARTHROSCOPIC;  Surgeon: Sb Reis II, MD;  Location: [facility];  Service: Orthopedics;  Laterality: Left;    ARTHROSCOPY OF SHOULDER WITH DECOMPRESSION OF SUBACROMIAL SPACE Left 2020    Procedure: ARTHROSCOPY, SHOULDER, WITH SUBACROMIAL SPACE DECOMPRESSION;  Surgeon: Sb Reis II, MD;  Location: [facility];  Service: Orthopedics;  Laterality: Left;     SECTION      CHOLECYSTECTOMY      COLONOSCOPY N/A 2016    Procedure: COLONOSCOPY;  Surgeon: Hesham Enriquez MD;  Location: [facility];  Service: Endoscopy;  Laterality: N/A;    COLONOSCOPY N/A 2017    Procedure: COLONOSCOPY;  Surgeon: Hesham Enriquez MD;  Location: [facility];  Service: Endoscopy;  Laterality: N/A;    COLONOSCOPY N/A 10/15/2021    Procedure: COLONOSCOPY;  Surgeon: Hesham Enriquez MD;  Location: [facility];  Service: Endoscopy;  Laterality: N/A;    COLONOSCOPY N/A 3/10/2024    Procedure: COLONOSCOPY;  Surgeon: Gwyn Rodriguez MD;   Location: [facility];  Service: Endoscopy;  Laterality: N/A;    EPIDURAL STEROID INJECTION INTO LUMBAR SPINE N/A 5/22/2019    Procedure: Injection-steroid-epidural-lumbar L4/5;  Surgeon: Cristhian Gee MD;  Location: [facility] OR;  Service: Pain Management;  Laterality: N/A;    EPIDURAL STEROID INJECTION INTO LUMBAR SPINE N/A 7/31/2019    Procedure: Injection-steroid-epidural-lumbar;  Surgeon: Nigel Zuniga MD;  Location: [facility] OR;  Service: Pain Management;  Laterality: N/A;  L4/5 interlaminar    ESOPHAGOGASTRODUODENOSCOPY N/A 3/10/2024    Procedure: EGD (ESOPHAGOGASTRODUODENOSCOPY);  Surgeon: Gwyn Rodriguez MD;  Location: [facility];  Service: Endoscopy;  Laterality: N/A;    EXTREME LATERAL INTERBODY FUSION (XLIF) OF SPINE Left 11/18/2019    Procedure: FUSION, SPINE, XLIF L3-4;  Surgeon: Cleve Bowers MD;  Location: [facility] OR;  Service: Orthopedics;  Laterality: Left;    HAND ARTHROPLASTY Left 10/4/2022    Procedure: Left thumb CMC arthroplasty;  Surgeon: Stanley Rivero MD;  Location: [facility] OR;  Service: Orthopedics;  Laterality: Left;  Anesthesia:  General with a single shot supraclavicular Exparel block    HAND ARTHROPLASTY Right 12/12/2023    Procedure: Right thumb CMC arthroplasty;  Surgeon: Stanley Rivero MD;  Location: [facility] OR;  Service: Orthopedics;  Laterality: Right;  Anesthesia: General with a single-shot supraclavicular Exparel block    LAMINECTOMY USING MINIMALLY INVASIVE TECHNIQUE N/A 11/18/2019    Procedure: LAMINECTOMY, SPINE, MINIMALLY INVASIVE L3-L4;  Surgeon: Cleve Bowers MD;  Location: [facility] OR;  Service: Orthopedics;  Laterality: N/A;    MINIMALLY INVASIVE FUSION OF SPINE N/A 11/18/2019    Procedure: FUSION, SPINE, MINIMALLY INVASIVE L3-L4;  Surgeon: Cleve Bowers MD;  Location: [facility] OR;  Service: Orthopedics;  Laterality: N/A;    TUBAL LIGATION N/A 1986     Social History     Tobacco Use    Smoking status: Never     Passive exposure: Never    Smokeless tobacco: Never  "  Substance Use Topics    Alcohol use: Not Currently    Drug use: No     Family History   Problem Relation Name Age of Onset    Cancer Mother      Hypertension Mother      Heart disease Father      Hypertension Father      Diabetes Father      No Known Problems Brother       Review of patient's allergies indicates:   Allergen Reactions    Aspirin Itching    Penicillins Hives    Adhesive Rash       Review of Systems   Review of Systems   Constitutional:  Negative for fever and unexpected weight change.   HENT:  Negative for mouth sores and trouble swallowing.    Eyes:  Negative for redness.   Respiratory:  Negative for cough and shortness of breath.    Cardiovascular:  Negative for chest pain.   Gastrointestinal:  Negative for constipation and diarrhea.   Genitourinary:  Negative for dysuria and genital sores.   Skin:  Negative for rash.   Neurological:  Negative for headaches.   Hematological:  Does not bruise/bleed easily.        Current Medications:  Current Outpatient Medications   Medication Instructions    cholecalciferol, vitamin D3, (VITAMIN D3) 50 mcg (2,000 unit) Cap 1 capsule, Oral, Daily    esomeprazole (NEXIUM) 40 mg, Oral    ferrous sulfate (IRON (FERROUS SULFATE)) 325 mg, Oral, With breakfast    levothyroxine (SYNTHROID) 50 mcg, Oral, Before breakfast    metoprolol succinate (TOPROL-XL) 25 MG 24 hr tablet TAKE 1 TABLET BY MOUTH once daily    pantoprazole (PROTONIX) 40 mg, Oral, Daily    RESTASIS 0.05 % ophthalmic emulsion 1 drop, Both Eyes, 2 times daily    traMADoL (ULTRAM) 50 mg, Oral, Every 6 hours PRN         Objective:     Vitals:    05/28/24 1249   BP: (!) 145/77   Pulse: 70   Weight: 73.5 kg (162 lb)   Height: 5' 4" (1.626 m)   PainSc:   4   PainLoc: Back      Body mass index is 27.81 kg/m².     Physical Examinations:  Physical Exam     Disease Assessment Scores:  Patient's Global Assessment of arthritis (0-10): 5  Physician's Global Assessment of arthritis (0-10): 5  Number of Tender Joints " "(0-28): 7  Number of Swollen Joints (0-28): 7        5/27/2024     9:32 AM   Rapid3 Question Responses and Scores   MDHAQ Score 0.6   Psychologic Score 1.1   Pain Score 4.5   When you awakened in the morning OVER THE LAST WEEK, did you feel stiff? Yes   If Yes, please indicate the number of hours until you are as limber as you will be for the day 1   Fatigue Score 0.5   Global Health Score 8   RAPID3 Score 4.83       Monitoring Lab Results:  Lab Results   Component Value Date    WBC 5.36 05/07/2024    RBC 4.82 05/07/2024    HGB 13.8 05/07/2024    HCT 42.9 05/07/2024    MCV 89 05/07/2024    MCH 28.6 05/07/2024    MCHC 32.2 05/07/2024    RDW 12.1 05/07/2024     05/07/2024        Lab Results   Component Value Date     04/03/2024    K 4.2 04/03/2024     04/03/2024    CO2 29 04/03/2024     04/03/2024    BUN 13 04/03/2024    CREATININE 0.71 04/03/2024    CALCIUM 9.4 04/03/2024    PROT 6.3 04/03/2024    ALBUMIN 4.2 04/03/2024    BILITOT 0.4 04/03/2024    ALKPHOS 72 04/03/2024    AST 30 04/03/2024    ALT 20 04/03/2024    ANIONGAP 4 (L) 04/03/2024    EGFRNORACEVR >60 04/03/2024       Lab Results   Component Value Date    SEDRATE 10 10/13/2023    CRP <0.50 10/13/2023        Lab Results   Component Value Date    DNAIRYEI98TG 37 10/13/2023        Lab Results   Component Value Date    CHOL 189 09/29/2023    HDL 93 (H) 09/29/2023    LDLCALC 83.2 09/29/2023    TRIG 64 09/29/2023       Lab Results   Component Value Date    RF <8.6 03/19/2021    CCPANTIBODIE <0.5 03/19/2021     Lab Results   Component Value Date    ANASCREEN Negative <1:160 12/20/2017    DSDNA Negative 1:10 12/20/2017     Lab Results   Component Value Date    HLABB27 Negative 12/20/2017       Infectious Disease Screening:  Lab Results   Component Value Date    HEPBSAG Negative 11/17/2015    HEPBIGM Negative 11/17/2015     Lab Results   Component Value Date    HEPCAB Negative 11/17/2015     No results found for: "TBGOLDPLUS", " ""QUANTTBGDPL"  No results found for: "QUANTIFERON", "SVCMT", "QUANTAGVALUE", "QUANTNILVALU", "QUANTMITOGEN", "QFTTBAG", "QINT"     Imaging: DEXA, Xrays, MRIs, CTs, etc  Details    Reading Physician Reading Date Result Priority   Nirav Eddy MD  [phone] 5/7/2024 Routine     Narrative & Impression     MRI LUMBAR SPINE WITHOUT CONTRAST:     CPT: 71998     History: Lumbar radiculopathy.  Patient complains of low back pain with bilateral lower extremity radiculopathy and history of prior lumbar spine surgery in 2019.     Comparison: 04/05/2019.     Technique: T1 and T2-weighted sequences were acquired through the lumbar spine in multiple planes without contrast administration.     Findings: There is susceptibility from fusion hardware at L3-L4.  There is some mild lower thoracic and lumbar dextroscoliosis.  No acute fractures or subluxations are identified. There is heterogeneous marrow signal due to asymmetric marrow conversion and small hemangiomata. No other nondegenerative signal changes are present.  The tip of the conus medullaris is at the L1 level.     At T12-L1, the intervertebral disc is unremarkable.  There are hypertrophic changes of the posterior elements including ligamentum flavum hypertrophy and early facet arthropathy, but the canal and foramina are patent.     At L1-L2, the intervertebral disc is unremarkable.  There are early hypertrophic posterior elements, but the canal and foramina are patent.     At L2-L3, there is interval loss of disc height more prominent posteriorly with development of a grade 1 retrolisthesis at uncovers a disc bulge-marginal osteophyte complex asymmetric to the left.  With hypertrophic posterior elements, there is mild left lateral recess stenosis with the rest the canal patent.  There are mild distal bilateral foraminal stenosis.     At L3-L4, there has been interval discectomy with interbody fusion and bilateral posterior metallic fusion with hemilaminotomies " versus hemilaminectomies and suspected at least partial or greater right facetectomy.  The canal and foramina are patent.     At L4-L5, the previously seen disc bulge now appears as a diffuse protrusion extending from both foramen across the canal slightly asymmetric to the left.  With hypertrophic posterior elements and dorsal epidural fat, there is mild-to-moderate central canal stenosis with right greater than left lateral recess stenosis.  There are mild bilateral foraminal stenosis.     At L5-S1, there is no significant disc bulge, protrusion or extrusion.  There are hypertrophic posterior elements, but the canal and foramina are patent.     Impression:        1. Interval discectomy with interbody fusion and bilateral posterior metallic fusion with hemilaminotomies versus hemilaminectomies and suspected at least partial or greater right facetectomy at L3-L4.  The canal and foramina are patent.  2. Changes from lumbosacral spondylosis at the nonsurgical levels are causing mild-to-moderate central canal stenosis with right greater than left lateral recess stenoses at L4-L5; mild left lateral recess stenosis at L2-L3; and multilevel bilateral foraminal stenoses, as above.        Electronically signed by:Nirav Eddy MD  Date:                                            05/07/2024  Time:                                           11:28          Old & Outside Medical Records:  Reviewed old and all outside medical records available in Care Everywhere     Assessment:     Encounter Diagnoses   Name Primary?    Hypothyroidism, unspecified type Yes    Arthralgia, unspecified joint     PMR (polymyalgia rheumatica)     Osteoarthritis of both hands, unspecified osteoarthritis type          Plan:      Encounter Diagnoses   Name Primary?    Hypothyroidism, unspecified type Yes    Arthralgia, unspecified joint     PMR (polymyalgia rheumatica)     Osteoarthritis of both hands, unspecified osteoarthritis type      Lashell was seen  today for disease management.    Diagnoses and all orders for this visit:    Hypothyroidism, unspecified type  -     methylPREDNISolone acetate injection 160 mg  -     ketorolac injection 60 mg  -     Transferrin; Future  -     Iron and TIBC; Future  -     Sedimentation rate; Future  -     C-Reactive Protein; Future  -     Comprehensive Metabolic Panel; Future  -     CBC Auto Differential; Future  -     pregabalin (LYRICA) 50 MG capsule; Take 1 capsule (50 mg total) by mouth 3 (three) times daily.  -     meloxicam (MOBIC) 15 MG tablet; Take 1 tablet (15 mg total) by mouth once daily.  -     pantoprazole (PROTONIX) 40 MG tablet; Take 1 tablet (40 mg total) by mouth once daily.  -     traMADoL (ULTRAM) 50 mg tablet; Take 1 tablet (50 mg total) by mouth every 6 (six) hours as needed.    Arthralgia, unspecified joint  -     methylPREDNISolone acetate injection 160 mg  -     ketorolac injection 60 mg  -     Transferrin; Future  -     Iron and TIBC; Future  -     Sedimentation rate; Future  -     C-Reactive Protein; Future  -     Comprehensive Metabolic Panel; Future  -     CBC Auto Differential; Future  -     pregabalin (LYRICA) 50 MG capsule; Take 1 capsule (50 mg total) by mouth 3 (three) times daily.  -     meloxicam (MOBIC) 15 MG tablet; Take 1 tablet (15 mg total) by mouth once daily.  -     pantoprazole (PROTONIX) 40 MG tablet; Take 1 tablet (40 mg total) by mouth once daily.    PMR (polymyalgia rheumatica)  -     methylPREDNISolone acetate injection 160 mg  -     ketorolac injection 60 mg  -     Transferrin; Future  -     Iron and TIBC; Future  -     Sedimentation rate; Future  -     C-Reactive Protein; Future  -     Comprehensive Metabolic Panel; Future  -     CBC Auto Differential; Future  -     pregabalin (LYRICA) 50 MG capsule; Take 1 capsule (50 mg total) by mouth 3 (three) times daily.  -     meloxicam (MOBIC) 15 MG tablet; Take 1 tablet (15 mg total) by mouth once daily.  -     pantoprazole (PROTONIX) 40 MG  tablet; Take 1 tablet (40 mg total) by mouth once daily.  -     traMADoL (ULTRAM) 50 mg tablet; Take 1 tablet (50 mg total) by mouth every 6 (six) hours as needed.    Osteoarthritis of both hands, unspecified osteoarthritis type  -     methylPREDNISolone acetate injection 160 mg  -     ketorolac injection 60 mg  -     Transferrin; Future  -     Iron and TIBC; Future  -     Sedimentation rate; Future  -     C-Reactive Protein; Future  -     Comprehensive Metabolic Panel; Future  -     CBC Auto Differential; Future    Anemia, unspecified type  -     Transferrin; Future  -     Iron and TIBC; Future  -     Sedimentation rate; Future  -     C-Reactive Protein; Future  -     Comprehensive Metabolic Panel; Future  -     CBC Auto Differential; Future  -     pregabalin (LYRICA) 50 MG capsule; Take 1 capsule (50 mg total) by mouth 3 (three) times daily.  -     meloxicam (MOBIC) 15 MG tablet; Take 1 tablet (15 mg total) by mouth once daily.  -     pantoprazole (PROTONIX) 40 MG tablet; Take 1 tablet (40 mg total) by mouth once daily.    Lumbar degenerative disc disease  -     pregabalin (LYRICA) 50 MG capsule; Take 1 capsule (50 mg total) by mouth 3 (three) times daily.  -     meloxicam (MOBIC) 15 MG tablet; Take 1 tablet (15 mg total) by mouth once daily.  -     pantoprazole (PROTONIX) 40 MG tablet; Take 1 tablet (40 mg total) by mouth once daily.    Lumbar radiculopathy  -     pregabalin (LYRICA) 50 MG capsule; Take 1 capsule (50 mg total) by mouth 3 (three) times daily.  -     meloxicam (MOBIC) 15 MG tablet; Take 1 tablet (15 mg total) by mouth once daily.  -     pantoprazole (PROTONIX) 40 MG tablet; Take 1 tablet (40 mg total) by mouth once daily.    Spinal stenosis, lumbar region, without neurogenic claudication  -     pregabalin (LYRICA) 50 MG capsule; Take 1 capsule (50 mg total) by mouth 3 (three) times daily.  -     meloxicam (MOBIC) 15 MG tablet; Take 1 tablet (15 mg total) by mouth once daily.  -     pantoprazole  (PROTONIX) 40 MG tablet; Take 1 tablet (40 mg total) by mouth once daily.    Chronic pain syndrome  -     traMADoL (ULTRAM) 50 mg tablet; Take 1 tablet (50 mg total) by mouth every 6 (six) hours as needed.    Inflammatory arthritis  -     traMADoL (ULTRAM) 50 mg tablet; Take 1 tablet (50 mg total) by mouth every 6 (six) hours as needed.    Essential hypertension  -     traMADoL (ULTRAM) 50 mg tablet; Take 1 tablet (50 mg total) by mouth every 6 (six) hours as needed.    Long-term use of Plaquenil  -     traMADoL (ULTRAM) 50 mg tablet; Take 1 tablet (50 mg total) by mouth every 6 (six) hours as needed.        1. Refill tramadol   2. Labs ordered  3. F/u 4 months     Follow-up 4 months  More than 50% of the   65 minute encounter was spent face to face counseling the patient regarding current status and future plan of care as well as side effects  of the medications. All questions were answered to patient's satisfaction also includes  non-face to face time preparing to see the patient (eg, review of tests), Obtaining and/or reviewing separately obtained history, Documenting clinical information in the electronic or other health record, Independently interpreting results

## 2024-05-30 ENCOUNTER — PATIENT MESSAGE (OUTPATIENT)
Dept: RHEUMATOLOGY | Facility: CLINIC | Age: 59
End: 2024-05-30
Payer: COMMERCIAL

## 2024-06-10 PROBLEM — K92.2 LOWER GI BLEED: Status: RESOLVED | Noted: 2024-03-09 | Resolved: 2024-06-10

## 2024-06-26 ENCOUNTER — TELEPHONE (OUTPATIENT)
Dept: PAIN MEDICINE | Facility: CLINIC | Age: 59
End: 2024-06-26
Payer: COMMERCIAL

## 2024-06-26 ENCOUNTER — OFFICE VISIT (OUTPATIENT)
Dept: PAIN MEDICINE | Facility: CLINIC | Age: 59
End: 2024-06-26
Payer: COMMERCIAL

## 2024-06-26 VITALS — WEIGHT: 162 LBS | HEIGHT: 64 IN | BODY MASS INDEX: 27.66 KG/M2

## 2024-06-26 DIAGNOSIS — M54.16 LUMBAR RADICULOPATHY: Primary | ICD-10-CM

## 2024-06-26 PROCEDURE — 1160F RVW MEDS BY RX/DR IN RCRD: CPT | Mod: CPTII,S$GLB,, | Performed by: STUDENT IN AN ORGANIZED HEALTH CARE EDUCATION/TRAINING PROGRAM

## 2024-06-26 PROCEDURE — 99204 OFFICE O/P NEW MOD 45 MIN: CPT | Mod: S$GLB,,, | Performed by: STUDENT IN AN ORGANIZED HEALTH CARE EDUCATION/TRAINING PROGRAM

## 2024-06-26 PROCEDURE — 99999 PR PBB SHADOW E&M-EST. PATIENT-LVL III: CPT | Mod: PBBFAC,,, | Performed by: STUDENT IN AN ORGANIZED HEALTH CARE EDUCATION/TRAINING PROGRAM

## 2024-06-26 PROCEDURE — 1159F MED LIST DOCD IN RCRD: CPT | Mod: CPTII,S$GLB,, | Performed by: STUDENT IN AN ORGANIZED HEALTH CARE EDUCATION/TRAINING PROGRAM

## 2024-06-26 PROCEDURE — 3008F BODY MASS INDEX DOCD: CPT | Mod: CPTII,S$GLB,, | Performed by: STUDENT IN AN ORGANIZED HEALTH CARE EDUCATION/TRAINING PROGRAM

## 2024-06-26 NOTE — H&P (VIEW-ONLY)
[facility] - Department    Gigi Iverson MD      First Office Visit: 6/26/24  Today' Date: 6/26/2024  Last Office Visit: None    Chief complaint: back pain      HPI: Pt is a pleasant 59 y.o., who presents for evaluation. Referred by self. Pt complains of back pain for years. Has had 1x L-spine fusion surgery with Dr. Bowers. Pain mostly stays in the lower back and is worse with any movement. Occasionally endorses having sharp, shooting pain going down the legs to mid thigh but states this happens infrequently. Recent MR L-spine shows some narrowing in L2-3 and L4-5. States she is not inclined for more surgery currently. No BB changes. Is doing HEP.         Pain disability index score: 30  Pain score: 2    Relevant Imaging/ Testing:   MR L-spine 5/24  CT L-spine 5/24  XR L-spine 3/24    Procedures: None    Date of board of pharmacy review:6/26/2024  Date of opioid risk screening/ pain psych: None  Date of opioid agreement and consent: None  Date of urine drug screen: None  Date of random pill count: None     was reviewed today: reviewed, tramadol use     Prescribed medications: None    See EHR for  PMH, PSH, FH, SH, Medications and Allergy    ROS:  Positive for pain  ROS     PE:  There were no vitals filed for this visit.  General: Pleasant, no distress  HEENT: NC/ AT. PERRLA  CV: Radial pulses intact  Pulm: No distress  Ext: No edema    Physical Exam     Neuromusculoskeletal:  Head: NC, AT. PERRLA  Neck: Intact range of motions  Shoulder: Intact range of motion  Lumbar: Intact range of motion. Bilat Facet loading. Min Tenderness. Neg SL. No pain with flexion. No pain with extension.   Hip: Intact range of motion  SI: Level, Min tenderness  Knee: Intact range of motion  Reflexes: normal Knee  Strength: 5/5 globally   Sensory: Grossly intact   Skin: No bruising, erythema  Gait: Normal      Impression:  Back pain  Bilateral leg pain  Hx of L3-4 fusion (Dr. Bowers)  Relevant History  BMI 27.81  Elevated  ESR/CRP  Osteoarthritis         Plan:  Discussed options  Imaging/ relevant records viewed/ reviewed/ discussed  Imaging results viewed and reviewed (noted above)/ reviewed with patient   reviewed  Cont HEP  ILESI L4-5  Re-eval after  Consider B MBB L4-5 and L5-S1        Prescribed medications:  1. None    The impression and plan were discussed and explained in detail. All the questions were answered. Education was provided accordingly.     The procedure was explained in detail, along with risks and potential side effects.      Follow-up:  For procedure     Fidelia Jeffery MD

## 2024-06-26 NOTE — PROGRESS NOTES
[place] - Department    Gigi Iverson MD      First Office Visit: 6/26/24  Today' Date: 6/26/2024  Last Office Visit: None    Chief complaint: back pain      HPI: Pt is a pleasant 59 y.o., who presents for evaluation. Referred by self. Pt complains of back pain for years. Has had 1x L-spine fusion surgery with Dr. Bowers. Pain mostly stays in the lower back and is worse with any movement. Occasionally endorses having sharp, shooting pain going down the legs to mid thigh but states this happens infrequently. Recent MR L-spine shows some narrowing in L2-3 and L4-5. States she is not inclined for more surgery currently. No BB changes. Is doing HEP.         Pain disability index score: 30  Pain score: 2    Relevant Imaging/ Testing:   MR L-spine 5/24  CT L-spine 5/24  XR L-spine 3/24    Procedures: None    Date of board of pharmacy review:6/26/2024  Date of opioid risk screening/ pain psych: None  Date of opioid agreement and consent: None  Date of urine drug screen: None  Date of random pill count: None     was reviewed today: reviewed, tramadol use     Prescribed medications: None    See EHR for  PMH, PSH, FH, SH, Medications and Allergy    ROS:  Positive for pain  ROS     PE:  There were no vitals filed for this visit.  General: Pleasant, no distress  HEENT: NC/ AT. PERRLA  CV: Radial pulses intact  Pulm: No distress  Ext: No edema    Physical Exam     Neuromusculoskeletal:  Head: NC, AT. PERRLA  Neck: Intact range of motions  Shoulder: Intact range of motion  Lumbar: Intact range of motion. Bilat Facet loading. Min Tenderness. Neg SL. No pain with flexion. No pain with extension.   Hip: Intact range of motion  SI: Level, Min tenderness  Knee: Intact range of motion  Reflexes: normal Knee  Strength: 5/5 globally   Sensory: Grossly intact   Skin: No bruising, erythema  Gait: Normal      Impression:  Back pain  Bilateral leg pain  Hx of L3-4 fusion (Dr. Bowers)  Relevant History  BMI 27.81  Elevated  ESR/CRP  Osteoarthritis         Plan:  Discussed options  Imaging/ relevant records viewed/ reviewed/ discussed  Imaging results viewed and reviewed (noted above)/ reviewed with patient   reviewed  Cont HEP  ILESI L4-5  Re-eval after  Consider B MBB L4-5 and L5-S1        Prescribed medications:  1. None    The impression and plan were discussed and explained in detail. All the questions were answered. Education was provided accordingly.     The procedure was explained in detail, along with risks and potential side effects.      Follow-up:  For procedure     Fidelia Jeffery MD

## 2024-06-26 NOTE — TELEPHONE ENCOUNTER
Types of orders made on 06/26/2024: Procedure Request      Order Date:6/26/2024   Ordering User:UGO VINSON [[id number]]   Encounter Provider:Ugo Vinson MD [[id number]]   Authorizing Pro   vider: Ugo Vinson MD [[id number]]   Department:[facility] PAIN MANAGEMENT[[id number]]      Common Order Information   Procedure -> Epidural Injection (specify level) Cmt: ILESI L4-5 (Cov)      Order Specific Information   Order: Procedure Order to Pain Management [Custom: [id number]]  Order #:          [id number]: 1 FUTURE     Priority: Routine  Class: Clinic Performed     Future Order Information       Expires on:0   6/26/2025            Expected by:06/26/2024                   Associated Diagnoses       M54.16 Lumbar radiculopathy       Facility Name: -> [place]          Follow-up: -> 2 weeks              Priority: Routine  Class: Clinic Performed     Future Order Information       Expires on:06/26/2025            Expected by:06/26/2024                   Associated Diagnoses       M54.16 Lumbar radiculopathy       Procedure -> Epi   dural Injection (specify level) Cmt: ILESI L4-5 (Cov)

## 2024-06-27 DIAGNOSIS — M54.16 LUMBAR RADICULOPATHY: Primary | ICD-10-CM

## 2024-06-27 RX ORDER — ALPRAZOLAM 1 MG/1
1 TABLET, ORALLY DISINTEGRATING ORAL ONCE AS NEEDED
OUTPATIENT
Start: 2024-06-27 | End: 2035-11-24

## 2024-07-11 ENCOUNTER — HOSPITAL ENCOUNTER (OUTPATIENT)
Facility: HOSPITAL | Age: 59
Discharge: HOME | End: 2024-07-11
Attending: STUDENT IN AN ORGANIZED HEALTH CARE EDUCATION/TRAINING PROGRAM | Admitting: STUDENT IN AN ORGANIZED HEALTH CARE EDUCATION/TRAINING PROGRAM
Payer: COMMERCIAL

## 2024-07-11 ENCOUNTER — HOSPITAL ENCOUNTER (OUTPATIENT)
Dept: RADIOLOGY | Facility: HOSPITAL | Age: 59
Discharge: HOME | End: 2024-07-11
Attending: STUDENT IN AN ORGANIZED HEALTH CARE EDUCATION/TRAINING PROGRAM | Admitting: STUDENT IN AN ORGANIZED HEALTH CARE EDUCATION/TRAINING PROGRAM
Payer: COMMERCIAL

## 2024-07-11 DIAGNOSIS — M54.16 LUMBAR RADICULOPATHY: ICD-10-CM

## 2024-07-11 DIAGNOSIS — M54.50 LOWER BACK PAIN: ICD-10-CM

## 2024-07-11 PROCEDURE — 76000 FLUOROSCOPY <1 HR PHYS/QHP: CPT | Mod: TC,PO

## 2024-07-11 PROCEDURE — 62323 NJX INTERLAMINAR LMBR/SAC: CPT | Mod: PO | Performed by: STUDENT IN AN ORGANIZED HEALTH CARE EDUCATION/TRAINING PROGRAM

## 2024-07-11 PROCEDURE — 62323 NJX INTERLAMINAR LMBR/SAC: CPT | Mod: ,,, | Performed by: STUDENT IN AN ORGANIZED HEALTH CARE EDUCATION/TRAINING PROGRAM

## 2024-07-11 PROCEDURE — 25000003 PHARM REV CODE 250: Mod: PO | Performed by: STUDENT IN AN ORGANIZED HEALTH CARE EDUCATION/TRAINING PROGRAM

## 2024-07-11 RX ORDER — ALPRAZOLAM 0.5 MG/1
1 TABLET, ORALLY DISINTEGRATING ORAL ONCE AS NEEDED
Status: COMPLETED | OUTPATIENT
Start: 2024-07-11 | End: 2024-07-11

## 2024-07-11 RX ADMIN — ALPRAZOLAM 0.5 MG: 0.5 TABLET, ORALLY DISINTEGRATING ORAL at 11:07

## 2024-07-11 NOTE — DISCHARGE SUMMARY
[place] - Surgery  Discharge Note  Short Stay    Procedure(s) (LRB):  Injection-steroid-epidural-lumbar l4-5 (N/A)      OUTCOME: Patient tolerated treatment/procedure well without complication and is now ready for discharge.    DISPOSITION: Home or Self Care    FINAL DIAGNOSIS:  <principal problem not specified>    FOLLOWUP: In clinic    DISCHARGE INSTRUCTIONS:    Discharge Procedure Orders   Notify your health care provider if you experience any of the following:  temperature >100.4     Notify your health care provider if you experience any of the following:  severe uncontrolled pain     Notify your health care provider if you experience any of the following:  redness, tenderness, or signs of infection (pain, swelling, redness, odor or green/yellow discharge around incision site)     Activity as tolerated        TIME SPENT ON DISCHARGE: 20 minutes

## 2024-07-11 NOTE — OP NOTE
Patient: Lashell Stroud                                                    MRN: [id number]  : 1965                                              Date of procedure: 2024    Pre Procedure Diagnosis: Lumbar, lumbosacral radiculopathy    Post Procedure Diagnosis: Same    Procedure: Lumbar Epidural Steroid Injection Under Fluoroscopy at L4-5    Attending: Fidelia Jeffery MD    Local Anesthetic Injected: Lidocaine 1% 3 ml    Sedation Medications: None    Estimated Blood Loss: None    Complication: None        Procedure:  After informed consent was obtained, patient was taken to the fluoroscopy suite and placed in a prone position.  The skin was prepped and draped in the usual sterile fashion using chlorhexidine. The skin and subcutaneous tissue overlying the Lumbar vertebral level was infiltrated with 3mLs of 1% Lidocaine using a 25 gauge 1.5 inch needle.  The 20-gauge Tuoehy needle was advanced with the aid of fluoroscopy using the water drop loss of resistance technique at the L4-5 interspinous space using an intralaminer approach.  Proper placement into the epidural space was confirmed by the loss of resistance.  There was no CSF, heme or paresthesias.  After negative aspiration, 0.5mL of contrast was injected.  The contrast confirmed the needle placement by spread delineating the epidural space in multiple views.  Then after negative aspiration, an injectate consisting of 6mLs of 0.5mL 10mg/mL of Dexamethasone, 0.5mL of preservative free lidocaine and 5mL of preservative free normal saline was injected.  Patient tolerated the procedure well and all needles were removed intact.  There were no complications.    Patient was observed in recovery and discharged home under supervision with discharge instructions in stable condition.

## 2024-07-12 ENCOUNTER — TELEPHONE (OUTPATIENT)
Dept: PAIN MEDICINE | Facility: CLINIC | Age: 59
End: 2024-07-12
Payer: COMMERCIAL

## 2024-07-12 VITALS
BODY MASS INDEX: 27.66 KG/M2 | HEIGHT: 64 IN | SYSTOLIC BLOOD PRESSURE: 126 MMHG | OXYGEN SATURATION: 98 % | RESPIRATION RATE: 16 BRPM | DIASTOLIC BLOOD PRESSURE: 69 MMHG | TEMPERATURE: 97 F | WEIGHT: 162 LBS | HEART RATE: 63 BPM

## 2024-07-12 NOTE — TELEPHONE ENCOUNTER
Called the patient and spoke with her because I could not get her [employer] person on the phone and I let her know how to get her medical records and she stated she will let them know because they do not answer their phone they always call you back.

## 2024-08-07 ENCOUNTER — PATIENT MESSAGE (OUTPATIENT)
Dept: PAIN MEDICINE | Facility: CLINIC | Age: 59
End: 2024-08-07
Payer: COMMERCIAL

## 2024-08-08 ENCOUNTER — TELEPHONE (OUTPATIENT)
Dept: PAIN MEDICINE | Facility: CLINIC | Age: 59
End: 2024-08-08
Payer: COMMERCIAL

## 2024-08-08 ENCOUNTER — PATIENT MESSAGE (OUTPATIENT)
Dept: PAIN MEDICINE | Facility: CLINIC | Age: 59
End: 2024-08-08
Payer: COMMERCIAL

## 2024-08-08 NOTE — TELEPHONE ENCOUNTER
Dr Jeffery, do you know anything about disability paperwork for this pt, she states someone told her you would fill this out for her on Monday. I can call her back but I would like information to have on hand before I return her call.

## 2024-08-19 ENCOUNTER — TELEPHONE (OUTPATIENT)
Dept: PAIN MEDICINE | Facility: CLINIC | Age: 59
End: 2024-08-19
Payer: COMMERCIAL

## 2024-08-19 NOTE — TELEPHONE ENCOUNTER
----- Message from Kell Chung sent at 8/19/2024 10:53 AM CDT -----  Contact: Patient  Type:  Needs Medical Advice    Who Called: Patient       Would the patient rather a call back or a response via [facility]? Call    Best Call Back Number: [phone] (home)     Additional Information: Patient would like to est care but would like to know first if Dain[place]ai[employer] fills out disability forms

## 2024-10-28 ENCOUNTER — TELEPHONE (OUTPATIENT)
Dept: PAIN MEDICINE | Facility: CLINIC | Age: 59
End: 2024-10-28
Payer: COMMERCIAL

## 2024-11-14 ENCOUNTER — OFFICE VISIT (OUTPATIENT)
Dept: PAIN MEDICINE | Facility: CLINIC | Age: 59
End: 2024-11-14
Payer: COMMERCIAL

## 2024-11-14 VITALS
DIASTOLIC BLOOD PRESSURE: 74 MMHG | BODY MASS INDEX: 29.08 KG/M2 | HEART RATE: 44 BPM | WEIGHT: 169.44 LBS | SYSTOLIC BLOOD PRESSURE: 150 MMHG

## 2024-11-14 DIAGNOSIS — M54.16 LUMBAR RADICULOPATHY: ICD-10-CM

## 2024-11-14 DIAGNOSIS — M47.816 LUMBAR SPONDYLOSIS: ICD-10-CM

## 2024-11-14 DIAGNOSIS — M51.361 DEGENERATION OF INTERVERTEBRAL DISC OF LUMBAR REGION WITH LOWER EXTREMITY PAIN: Primary | ICD-10-CM

## 2024-11-14 PROCEDURE — 1159F MED LIST DOCD IN RCRD: CPT | Mod: CPTII,S$GLB,, | Performed by: ANESTHESIOLOGY

## 2024-11-14 PROCEDURE — 3077F SYST BP >= 140 MM HG: CPT | Mod: CPTII,S$GLB,, | Performed by: ANESTHESIOLOGY

## 2024-11-14 PROCEDURE — 99999 PR PBB SHADOW E&M-EST. PATIENT-LVL III: CPT | Mod: PBBFAC,,, | Performed by: ANESTHESIOLOGY

## 2024-11-14 PROCEDURE — 3078F DIAST BP <80 MM HG: CPT | Mod: CPTII,S$GLB,, | Performed by: ANESTHESIOLOGY

## 2024-11-14 PROCEDURE — 99213 OFFICE O/P EST LOW 20 MIN: CPT | Mod: S$GLB,,, | Performed by: ANESTHESIOLOGY

## 2024-11-14 PROCEDURE — 3008F BODY MASS INDEX DOCD: CPT | Mod: CPTII,S$GLB,, | Performed by: ANESTHESIOLOGY

## 2024-11-14 NOTE — PROGRESS NOTES
This note was completed with dictation software and grammatical errors may exist.    Chief Complaint   Patient presents with    Back Pain        HPI: Lashell Stroud is a 59 y.o. year old female patient who has a past medical history of Constipation, COVID-19 virus infection, Family history of colon cancer in mother, History of chicken pox, Hypertension, Palindromic rheumatism involving tarsus, Psoas tendinitis of both sides, Shoulder pain, bilateral, and Spinal stenosis of lumbar region. She presents in referral from No ref. provider found for back pain.  She presents in follow-up today, I had seen the patient previously and she has recently seen several of my colleagues.  She was having severe back pain and is status post L4/5 interlaminar KATHRYN on 07/11/2024 with 90% relief.   After this injection she reports about 90% relief, the pain is slightly coming back but not as severe as previously.  Currently she feels that she can live with the symptoms, denies having any severe leg pain or weakness, denies any bowel or bladder incontinence.  She is able to do most of the things that she needs to do.    Pain intervention history:  L4/5 interlaminar KATHRYN on 07/11/2024 with 90% relief.    Spine surgeries:    Antineuropathics:  Does have Lyrica 50 mg but no longer takes this does not feel that it was helpful  NSAIDs:  Physical therapy:  Has done physical therapy in the last year and feels that this was helpful, continues with a home exercise program  Antidepressants:  Muscle relaxers:  Opioids:  Antiplatelets/Anticoagulants:        ROS:    Lab Results   Component Value Date    HGBA1C 5.9 (H) 08/25/2022       Lab Results   Component Value Date    WBC 5.52 06/07/2024    HGB 13.9 06/07/2024    HCT 44.0 06/07/2024    MCV 88 06/07/2024     06/07/2024             Past Medical History:   Diagnosis Date    Constipation 12/06/2017    COVID-19 virus infection     pos 7/20/20    Family history of colon cancer in mother  2016    History of chicken pox     Hypertension     Palindromic rheumatism involving tarsus     Psoas tendinitis of both sides 2019    Shoulder pain, bilateral 2016    Spinal stenosis of lumbar region 2019       Past Surgical History:   Procedure Laterality Date    ARTHROSCOPIC REPAIR OF ROTATOR CUFF OF SHOULDER Left 2020    Procedure: REPAIR, ROTATOR CUFF, ARTHROSCOPIC;  Surgeon: Sb Reis II, MD;  Location: [facility];  Service: Orthopedics;  Laterality: Left;    ARTHROSCOPY OF SHOULDER WITH DECOMPRESSION OF SUBACROMIAL SPACE Left 2020    Procedure: ARTHROSCOPY, SHOULDER, WITH SUBACROMIAL SPACE DECOMPRESSION;  Surgeon: Sb Reis II, MD;  Location: [facility] OR;  Service: Orthopedics;  Laterality: Left;     SECTION      CHOLECYSTECTOMY      COLONOSCOPY N/A 2016    Procedure: COLONOSCOPY;  Surgeon: Hesham Enriquez MD;  Location: [facility];  Service: Endoscopy;  Laterality: N/A;    COLONOSCOPY N/A 2017    Procedure: COLONOSCOPY;  Surgeon: Hesham Enriquez MD;  Location: [facility];  Service: Endoscopy;  Laterality: N/A;    COLONOSCOPY N/A 10/15/2021    Procedure: COLONOSCOPY;  Surgeon: Hesham Enriquez MD;  Location: [facility];  Service: Endoscopy;  Laterality: N/A;    COLONOSCOPY N/A 3/10/2024    Procedure: COLONOSCOPY;  Surgeon: Gwyn Rodriguez MD;  Location: [facility];  Service: Endoscopy;  Laterality: N/A;    EPIDURAL STEROID INJECTION INTO LUMBAR SPINE N/A 2019    Procedure: Injection-steroid-epidural-lumbar L4/5;  Surgeon: Cristhian Gee MD;  Location: [facility] OR;  Service: Pain Management;  Laterality: N/A;    EPIDURAL STEROID INJECTION INTO LUMBAR SPINE N/A 2019    Procedure: Injection-steroid-epidural-lumbar;  Surgeon: Nigel Zuniga MD;  Location: [facility] OR;  Service: Pain Management;  Laterality: N/A;  L4/5 interlaminar    EPIDURAL STEROID INJECTION INTO LUMBAR SPINE N/A 2024    Procedure:  Injection-steroid-epidural-lumbar l4-5;  Surgeon: Fidelia Jeffery MD;  Location: [facility] OR;  Service: Anesthesiology;  Laterality: N/A;    ESOPHAGOGASTRODUODENOSCOPY N/A 3/10/2024    Procedure: EGD (ESOPHAGOGASTRODUODENOSCOPY);  Surgeon: Gwyn Rodriguez MD;  Location: [facility];  Service: Endoscopy;  Laterality: N/A;    EXTREME LATERAL INTERBODY FUSION (XLIF) OF SPINE Left 11/18/2019    Procedure: FUSION, SPINE, XLIF L3-4;  Surgeon: Cleve Bowers MD;  Location: [facility] OR;  Service: Orthopedics;  Laterality: Left;    HAND ARTHROPLASTY Left 10/4/2022    Procedure: Left thumb CMC arthroplasty;  Surgeon: Stanley Rivero MD;  Location: [facility];  Service: Orthopedics;  Laterality: Left;  Anesthesia:  General with a single shot supraclavicular Exparel block    HAND ARTHROPLASTY Right 12/12/2023    Procedure: Right thumb CMC arthroplasty;  Surgeon: Stanley Rivero MD;  Location: [facility];  Service: Orthopedics;  Laterality: Right;  Anesthesia: General with a single-shot supraclavicular Exparel block    LAMINECTOMY USING MINIMALLY INVASIVE TECHNIQUE N/A 11/18/2019    Procedure: LAMINECTOMY, SPINE, MINIMALLY INVASIVE L3-L4;  Surgeon: Cleve Bowers MD;  Location: [facility];  Service: Orthopedics;  Laterality: N/A;    MINIMALLY INVASIVE FUSION OF SPINE N/A 11/18/2019    Procedure: FUSION, SPINE, MINIMALLY INVASIVE L3-L4;  Surgeon: Cleve Bowers MD;  Location: [facility];  Service: Orthopedics;  Laterality: N/A;    TUBAL LIGATION N/A 1986       Social History     Socioeconomic History    Marital status:    Tobacco Use    Smoking status: Never     Passive exposure: Never    Smokeless tobacco: Never   Substance and Sexual Activity    Alcohol use: Not Currently    Drug use: No    Sexual activity: Yes     Birth control/protection: Surgical     Comment: tubal ligation     Social Drivers of Health     Financial Resource Strain: Patient Declined (12/26/2023)    Overall Financial Resource Strain (CARDIA)     Difficulty of  Paying Living Expenses: Patient declined   Food Insecurity: Patient Declined (12/26/2023)    Hunger Vital Sign     Worried About Running Out of Food in the Last Year: Patient declined     Ran Out of Food in the Last Year: Patient declined   Transportation Needs: Patient Declined (12/26/2023)    PRAPARE - Transportation     Lack of Transportation (Medical): Patient declined     Lack of Transportation (Non-Medical): Patient declined   Physical Activity: Insufficiently Active (12/26/2023)    Exercise Vital Sign     Days of Exercise per Week: 3 days     Minutes of Exercise per Session: 10 min   Stress: No Stress Concern Present (12/26/2023)    [employer] - Occupational Stress Questionnaire     Feeling of Stress : Not at all   Housing Stability: Patient Declined (12/26/2023)    Housing Stability Vital Sign     Unable to Pay for Housing in the Last Year: Patient declined     Number of Places Lived in the Last Year: 1     Unstable Housing in the Last Year: Patient declined         Medications/Allergies: See med card    Vitals:    11/14/24 0940   BP: (!) 150/74   Pulse: (!) 44   Weight: 76.9 kg (169 lb 6.8 oz)   PainSc:   4   PainLoc: Back     Body mass index is 29.08 kg/m².      11/14/2024     9:42 AM 6/19/2019    11:26 AM 5/2/2019    10:58 AM   Last 3 PDI Scores   Pain Disability Index (PDI) 42 0 0       Physical exam:  Gen: A and O x3, pleasant, well-groomed  Musculoskeletal:  No antalgic gait.   Neuro:    Iliopsoas Quadriceps Knee  Flexion Tibialis  anterior Gastro- cnemius EHL   Lower: R 5/5 5/5 5/5 5/5 5/5 5/5    L 5/5 5/5 5/5 5/5 5/5 5/5      Left  Right    Patellar DTR 2+ 2+   Achilles DTR 2+ 2+   Peters Absent  Absent   Clonus Absent Absent   Babinski Absent Absent     Intact and symmetrical to light touch and pinprick in L1-S1 dermatomes bilaterally.    Lumbar spine:  Lumbar spine: ROM is mildly reduced with flexion extension and oblique extension mild increased pain.    Jesus's  test causes no increased pain on either side.    Supine straight leg raise is negative bilaterally.    Internal and external rotation of the hip causes no increased pain on either side.  Myofascial exam: No tenderness to palpation across lumbar paraspinous muscles.    Imaging:  MRI L-spine:  Findings: There is susceptibility from fusion hardware at L3-L4.  There is some mild lower thoracic and lumbar dextroscoliosis.  No acute fractures or subluxations are identified. There is heterogeneous marrow signal due to asymmetric marrow conversion and small hemangiomata. No other nondegenerative signal changes are present.  The tip of the conus medullaris is at the L1 level.   At T12-L1, the intervertebral disc is unremarkable.  There are hypertrophic changes of the posterior elements including ligamentum flavum hypertrophy and early facet arthropathy, but the canal and foramina are patent.   At L1-L2, the intervertebral disc is unremarkable.  There are early hypertrophic posterior elements, but the canal and foramina are patent.   At L2-L3, there is interval loss of disc height more prominent posteriorly with development of a grade 1 retrolisthesis at uncovers a disc bulge-marginal osteophyte complex asymmetric to the left.  With hypertrophic posterior elements, there is mild left lateral recess stenosis with the rest the canal patent.  There are mild distal bilateral foraminal stenosis.   At L3-L4, there has been interval discectomy with interbody fusion and bilateral posterior metallic fusion with hemilaminotomies versus hemilaminectomies and suspected at least partial or greater right facetectomy.  The canal and foramina are patent.   At L4-L5, the previously seen disc bulge now appears as a diffuse protrusion extending from both foramen across the canal slightly asymmetric to the left.  With hypertrophic posterior elements and dorsal epidural fat, there is mild-to-moderate central canal stenosis with right greater than  left lateral recess stenosis.  There are mild bilateral foraminal stenosis.   At L5-S1, there is no significant disc bulge, protrusion or extrusion.  There are hypertrophic posterior elements, but the canal and foramina are patent.    Assessment:  Lashell Stroud is a 59 y.o. year old female patient who has a past medical history of Constipation, COVID-19 virus infection, Family history of colon cancer in mother, History of chicken pox, Hypertension, Palindromic rheumatism involving tarsus, Psoas tendinitis of both sides, Shoulder pain, bilateral, and Spinal stenosis of lumbar region. She presents in referral from No ref. provider found for back pain.      1. Degeneration of intervertebral disc of lumbar region with lower extremity pain        2. Lumbar radiculopathy        3. Lumbar spondylosis            Plan:    She would like to try an injection again in the future but at this point she feels that she does not need this, she is going to contact me and we can always set this up.  Thank you for referring this interesting patient, and I look forward to continuing to collaborate in her care.

## 2024-12-12 DIAGNOSIS — M35.3 PMR (POLYMYALGIA RHEUMATICA): ICD-10-CM

## 2024-12-12 DIAGNOSIS — Z79.899 LONG-TERM USE OF PLAQUENIL: ICD-10-CM

## 2024-12-12 DIAGNOSIS — E03.9 HYPOTHYROIDISM, UNSPECIFIED TYPE: ICD-10-CM

## 2024-12-12 DIAGNOSIS — M19.90 INFLAMMATORY ARTHRITIS: ICD-10-CM

## 2024-12-12 DIAGNOSIS — I10 ESSENTIAL HYPERTENSION: ICD-10-CM

## 2024-12-12 DIAGNOSIS — G89.4 CHRONIC PAIN SYNDROME: ICD-10-CM

## 2024-12-13 RX ORDER — TRAMADOL HYDROCHLORIDE 50 MG/1
50 TABLET ORAL EVERY 6 HOURS PRN
Qty: 120 TABLET | Refills: 5 | Status: SHIPPED | OUTPATIENT
Start: 2024-12-13

## 2025-01-03 ENCOUNTER — OFFICE VISIT (OUTPATIENT)
Dept: RHEUMATOLOGY | Facility: CLINIC | Age: 60
End: 2025-01-03
Payer: COMMERCIAL

## 2025-01-03 VITALS
HEIGHT: 64 IN | HEART RATE: 72 BPM | BODY MASS INDEX: 29.06 KG/M2 | WEIGHT: 170.19 LBS | OXYGEN SATURATION: 99 % | SYSTOLIC BLOOD PRESSURE: 119 MMHG | DIASTOLIC BLOOD PRESSURE: 80 MMHG

## 2025-01-03 DIAGNOSIS — M19.90 INFLAMMATORY ARTHRITIS: ICD-10-CM

## 2025-01-03 DIAGNOSIS — E03.9 HYPOTHYROIDISM, UNSPECIFIED TYPE: ICD-10-CM

## 2025-01-03 DIAGNOSIS — G89.4 CHRONIC PAIN SYNDROME: ICD-10-CM

## 2025-01-03 DIAGNOSIS — M35.3 PMR (POLYMYALGIA RHEUMATICA): ICD-10-CM

## 2025-01-03 DIAGNOSIS — I10 ESSENTIAL HYPERTENSION: ICD-10-CM

## 2025-01-03 DIAGNOSIS — K21.00 GASTROESOPHAGEAL REFLUX DISEASE WITH ESOPHAGITIS, UNSPECIFIED WHETHER HEMORRHAGE: Primary | ICD-10-CM

## 2025-01-03 DIAGNOSIS — Z79.899 LONG-TERM USE OF PLAQUENIL: ICD-10-CM

## 2025-01-03 DIAGNOSIS — R10.13 DYSPEPSIA: ICD-10-CM

## 2025-01-03 PROCEDURE — 3079F DIAST BP 80-89 MM HG: CPT | Mod: CPTII,S$GLB,, | Performed by: INTERNAL MEDICINE

## 2025-01-03 PROCEDURE — 3074F SYST BP LT 130 MM HG: CPT | Mod: CPTII,S$GLB,, | Performed by: INTERNAL MEDICINE

## 2025-01-03 PROCEDURE — 1159F MED LIST DOCD IN RCRD: CPT | Mod: CPTII,S$GLB,, | Performed by: INTERNAL MEDICINE

## 2025-01-03 PROCEDURE — 99999 PR PBB SHADOW E&M-EST. PATIENT-LVL III: CPT | Mod: PBBFAC,,, | Performed by: INTERNAL MEDICINE

## 2025-01-03 PROCEDURE — 1160F RVW MEDS BY RX/DR IN RCRD: CPT | Mod: CPTII,S$GLB,, | Performed by: INTERNAL MEDICINE

## 2025-01-03 PROCEDURE — 99215 OFFICE O/P EST HI 40 MIN: CPT | Mod: S$GLB,,, | Performed by: INTERNAL MEDICINE

## 2025-01-03 PROCEDURE — 3008F BODY MASS INDEX DOCD: CPT | Mod: CPTII,S$GLB,, | Performed by: INTERNAL MEDICINE

## 2025-01-03 RX ORDER — CELECOXIB 200 MG/1
200 CAPSULE ORAL DAILY
Qty: 30 CAPSULE | Refills: 5 | Status: SHIPPED | OUTPATIENT
Start: 2025-01-03 | End: 2025-07-02

## 2025-01-03 RX ORDER — TRAMADOL HYDROCHLORIDE 50 MG/1
50 TABLET ORAL EVERY 6 HOURS PRN
Qty: 120 TABLET | Refills: 5 | Status: SHIPPED | OUTPATIENT
Start: 2025-01-03

## 2025-01-03 RX ORDER — FAMOTIDINE 40 MG/1
40 TABLET, FILM COATED ORAL EVERY MORNING
Qty: 30 TABLET | Refills: 11 | Status: SHIPPED | OUTPATIENT
Start: 2025-01-03 | End: 2026-01-03

## 2025-01-03 RX ORDER — METOCLOPRAMIDE 10 MG/1
10 TABLET ORAL
Qty: 120 TABLET | Refills: 5 | Status: SHIPPED | OUTPATIENT
Start: 2025-01-03 | End: 2025-07-02

## 2025-01-03 NOTE — PROGRESS NOTES
Subjective:     Patient ID:  Lashell Stroud    Chief Complaint:  Disease Management     History of Present Illness:  Pt is a 59 y.o. female with PMR and lumbar spinal stenosis, herniated disc hand osteoarthritis.  She had her L cmc joint repair. In the past she had L3 -L4 spinal fusion, her back pain is now severe, no loss bowel or bladder control,  but she has loss of sensation when sitting and severe pain when standing. She has noticed increased pain in her bilateral CMC joints and having difficulty with gripping and opening objects. No previous joint injections or occupational therapy. She has constant aching/stiffness in her PIP and DIP joints as well, which is unchanged. She is a  and does a lot of repetitive movements with her hands. No shoulder/hip pain/stiffness. She has been complaint with plaquenil, mobic, and tramadol tid PRN for severe pain with adequate control of her symptoms.. she requires assistance bathing, cooking, cleaning , dressing , shopping because of the severity of pain. At this time I recommend applying long term disability.  She had a GI bleed and her mobic was DC and required hospitalization.    Current tx  1.mobic  2. Tramadol     Rheumatologic History:   - Diagnosis/es:  - Positive serologies:  - Infectious screening labs:  - Previous Treatments:  - Current Treatments:     Interval History:   Hospitalization since last office visit: No    Patient Active Problem List    Diagnosis Date Noted    Pain, wrist, right 01/23/2024    Pain of right thumb 01/23/2024    Stiffness of right wrist joint 01/23/2024    Arthritis of carpometacarpal (CMC) joint of right thumb 12/12/2023    Lumbar degenerative disc disease 04/19/2023    History of lumbar fusion 04/19/2023    Impaired mobility and ADLs 04/19/2023    Left hand pain 11/08/2022    Thumb joint stiffness 11/08/2022    Decreased  strength of left hand 11/08/2022    Decreased pinch strength 11/08/2022    Decreased activities  of daily living (ADL) 2022    Arthritis of carpometacarpal (CMC) joint of left thumb 10/04/2022    Family history of colon cancer 10/15/2021    Rotator cuff syndrome, left 2020    COVID-19 virus infection 2020    LFTs abnormal 2019    Spinal stenosis, lumbar region, without neurogenic claudication 2019    History of lumbar spinal fusion 2019    Lumbar radiculopathy 2019    Chronic bilateral low back pain with bilateral sciatica 2019    Primary osteoarthritis involving multiple joints 2016    Essential hypertension 2016    Inflammatory arthritis 2016    PMR (polymyalgia rheumatica) 2015    ESR raised 2015    CRP elevated 2015    Arthralgia 2015    Chronic fatigue 2015    Myalgia 2015    Amy nodes (DJD hand) 2015    [place] nodes 2015     Past Surgical History:   Procedure Laterality Date    ARTHROSCOPIC REPAIR OF ROTATOR CUFF OF SHOULDER Left 2020    Procedure: REPAIR, ROTATOR CUFF, ARTHROSCOPIC;  Surgeon: Sb Reis II, MD;  Location: [facility];  Service: Orthopedics;  Laterality: Left;    ARTHROSCOPY OF SHOULDER WITH DECOMPRESSION OF SUBACROMIAL SPACE Left 2020    Procedure: ARTHROSCOPY, SHOULDER, WITH SUBACROMIAL SPACE DECOMPRESSION;  Surgeon: Sb Reis II, MD;  Location: [facility];  Service: Orthopedics;  Laterality: Left;     SECTION      CHOLECYSTECTOMY      COLONOSCOPY N/A 2016    Procedure: COLONOSCOPY;  Surgeon: Hesham Enriquez MD;  Location: [facility];  Service: Endoscopy;  Laterality: N/A;    COLONOSCOPY N/A 2017    Procedure: COLONOSCOPY;  Surgeon: Hesham Enriquez MD;  Location: [facility];  Service: Endoscopy;  Laterality: N/A;    COLONOSCOPY N/A 10/15/2021    Procedure: COLONOSCOPY;  Surgeon: Hesham Enriquez MD;  Location: [facility];  Service: Endoscopy;  Laterality: N/A;    COLONOSCOPY N/A 3/10/2024    Procedure:  COLONOSCOPY;  Surgeon: Gwyn Rodriguez MD;  Location: [facility];  Service: Endoscopy;  Laterality: N/A;    EPIDURAL STEROID INJECTION INTO LUMBAR SPINE N/A 5/22/2019    Procedure: Injection-steroid-epidural-lumbar L4/5;  Surgeon: Cristhian Gee MD;  Location: [facility] OR;  Service: Pain Management;  Laterality: N/A;    EPIDURAL STEROID INJECTION INTO LUMBAR SPINE N/A 7/31/2019    Procedure: Injection-steroid-epidural-lumbar;  Surgeon: Nigel Zuniga MD;  Location: [facility] OR;  Service: Pain Management;  Laterality: N/A;  L4/5 interlaminar    EPIDURAL STEROID INJECTION INTO LUMBAR SPINE N/A 7/11/2024    Procedure: Injection-steroid-epidural-lumbar l4-5;  Surgeon: Fidelia Jeffery MD;  Location: [facility] OR;  Service: Anesthesiology;  Laterality: N/A;    ESOPHAGOGASTRODUODENOSCOPY N/A 3/10/2024    Procedure: EGD (ESOPHAGOGASTRODUODENOSCOPY);  Surgeon: Gwyn Rodriguez MD;  Location: [facility];  Service: Endoscopy;  Laterality: N/A;    EXTREME LATERAL INTERBODY FUSION (XLIF) OF SPINE Left 11/18/2019    Procedure: FUSION, SPINE, XLIF L3-4;  Surgeon: Cleve Bowers MD;  Location: [facility] OR;  Service: Orthopedics;  Laterality: Left;    HAND ARTHROPLASTY Left 10/4/2022    Procedure: Left thumb CMC arthroplasty;  Surgeon: Stanley Rivero MD;  Location: [facility] OR;  Service: Orthopedics;  Laterality: Left;  Anesthesia:  General with a single shot supraclavicular Exparel block    HAND ARTHROPLASTY Right 12/12/2023    Procedure: Right thumb CMC arthroplasty;  Surgeon: Stanley Rivero MD;  Location: [facility] OR;  Service: Orthopedics;  Laterality: Right;  Anesthesia: General with a single-shot supraclavicular Exparel block    LAMINECTOMY USING MINIMALLY INVASIVE TECHNIQUE N/A 11/18/2019    Procedure: LAMINECTOMY, SPINE, MINIMALLY INVASIVE L3-L4;  Surgeon: Cleve Bowers MD;  Location: [facility] OR;  Service: Orthopedics;  Laterality: N/A;    MINIMALLY INVASIVE FUSION OF SPINE N/A 11/18/2019    Procedure: FUSION, SPINE, MINIMALLY INVASIVE  L3-L4;  Surgeon: Cleve Bowers MD;  Location: [facility];  Service: Orthopedics;  Laterality: N/A;    TUBAL LIGATION N/A 1986     Social History     Tobacco Use    Smoking status: Never     Passive exposure: Never    Smokeless tobacco: Never   Substance Use Topics    Alcohol use: Not Currently    Drug use: No     Family History   Problem Relation Name Age of Onset    Cancer Mother      Hypertension Mother      Heart disease Father      Hypertension Father      Diabetes Father      No Known Problems Brother       Review of patient's allergies indicates:   Allergen Reactions    Aspirin Itching    Penicillins Hives    Adhesive Rash       Review of Systems   Review of Systems   Constitutional:  Positive for chills and fatigue. Negative for activity change, appetite change, diaphoresis, fever and unexpected weight change.   HENT:  Negative for congestion, dental problem, ear discharge, ear pain, facial swelling, mouth sores, nosebleeds, postnasal drip, rhinorrhea, sinus pressure, sneezing, sore throat, tinnitus, trouble swallowing and voice change.    Eyes:  Negative for photophobia, pain, discharge, redness and itching.   Respiratory:  Positive for cough. Negative for apnea, chest tightness, shortness of breath and wheezing.    Cardiovascular:  Positive for leg swelling. Negative for chest pain and palpitations.   Gastrointestinal:  Positive for abdominal pain. Negative for abdominal distention, constipation, diarrhea, nausea and vomiting.   Endocrine: Negative for cold intolerance, heat intolerance, polydipsia and polyuria.   Genitourinary:  Negative for decreased urine volume, difficulty urinating, dysuria, flank pain, frequency, hematuria and urgency.   Musculoskeletal:  Positive for arthralgias, back pain, gait problem, joint swelling, myalgias, neck pain and neck stiffness.   Skin:  Negative for pallor, rash and wound.   Allergic/Immunologic: Negative for immunocompromised state.   Neurological:  Negative for  "dizziness, tremors, numbness and headaches.   Hematological:  Negative for adenopathy. Does not bruise/bleed easily.   Psychiatric/Behavioral:  Negative for sleep disturbance. The patient is not nervous/anxious.         Current Medications:  Current Outpatient Medications   Medication Instructions    celecoxib (CELEBREX) 200 mg, Oral, Daily    cholecalciferol, vitamin D3, (VITAMIN D3) 50 mcg (2,000 unit) Cap 1 capsule, Daily    famotidine (PEPCID) 40 mg, Oral, Every morning    levothyroxine (SYNTHROID) 50 mcg, Oral, Before breakfast    meloxicam (MOBIC) 15 mg, Oral, Daily    metoclopramide HCl (REGLAN) 10 mg, Oral, Before meals & nightly    metoprolol succinate (TOPROL-XL) 25 MG 24 hr tablet TAKE 1 TABLET BY MOUTH once daily    pantoprazole (PROTONIX) 40 mg, Oral, Daily    RESTASIS 0.05 % ophthalmic emulsion 1 drop, 2 times daily    traMADoL (ULTRAM) 50 mg, Oral, Every 6 hours PRN         Objective:     Vitals:    01/03/25 1107   BP: 119/80   Pulse: 72   SpO2: 99%   Weight: 77.2 kg (170 lb 3.1 oz)   Height: 5' 4" (1.626 m)   PainSc:   4   PainLoc: Back      Body mass index is 29.21 kg/m².     Physical Examinations:  Physical Exam   Constitutional: She is oriented to person, place, and time. No distress.   HENT:   Head: Normocephalic and atraumatic.   Mouth/Throat: Oropharynx is clear and moist.   Eyes: Pupils are equal, round, and reactive to light.   Neck: No thyromegaly present.   Cardiovascular: Normal rate, regular rhythm and normal heart sounds. Exam reveals no gallop and no friction rub.   No murmur heard.  Pulmonary/Chest: Breath sounds normal. She has no wheezes. She has no rales. She exhibits no tenderness.   Abdominal: There is no abdominal tenderness. There is no rebound and no guarding.   Musculoskeletal:         General: Tenderness and deformity present.      Right shoulder: Tenderness present.      Left shoulder: Tenderness present.      Right elbow: Tenderness present.      Left elbow: Tenderness " present.      Right wrist: Tenderness present.      Left wrist: Tenderness present.      Cervical back: Neck supple.      Right knee: No effusion. Tenderness present.      Left knee: No effusion. Tenderness present.   Lymphadenopathy:     She has no cervical adenopathy.   Neurological: She is alert and oriented to person, place, and time.   Skin: No rash noted. No erythema. No pallor.   Psychiatric: Mood and affect normal.   Nursing note and vitals reviewed.      Right Side Rheumatological Exam     The patient is tender to palpation of the shoulder, elbow, wrist, knee, 1st PIP, 1st MCP, 2nd PIP, 2nd MCP, 3rd PIP, 3rd MCP, 4th PIP, 4th MCP, 5th PIP and 5th MCP    She has swelling of the 1st PIP, 1st MCP, 2nd PIP, 2nd MCP, 3rd PIP, 3rd MCP, 4th PIP, 4th MCP, 5th PIP and 5th MCP    Shoulder Exam   Tenderness Location: biceps tendon and clavicle    Range of Motion   Active abduction:  abnormal   Adduction: abnormal  Sensation: normal    Knee Exam   Patellofemoral Crepitus: positive  Effusion: negative  Sensation: normal    Hip Exam   Tenderness Location: posterior, greater trochanter and lateral  Sensation: normal    Elbow/Wrist Exam   Tenderness Location: lateral epicondyle and medial epicondyle  Sensation: normal    Foot Exam   Right foot exam exhibits signs of inflamed dorsum  Right foot exam exhibits signs of no podagra, no tophus and no plantar fasciitis    Muscle Strength (0-5 scale):  : 4/5     Left Side Rheumatological Exam     The patient is tender to palpation of the shoulder, elbow, wrist, knee, 1st PIP, 1st MCP, 2nd PIP, 2nd MCP, 3rd PIP, 3rd MCP, 4th PIP, 4th MCP, 5th PIP and 5th MCP.    She has swelling of the 1st PIP, 1st MCP, 2nd PIP, 2nd MCP, 3rd PIP, 3rd MCP, 4th PIP, 4th MCP, 5th PIP and 5th MCP    Shoulder Exam   Tenderness Location: biceps tendon and clavicle    Range of Motion   Active abduction:  abnormal   Sensation: normal    Knee Exam     Patellofemoral Crepitus: positive  Effusion:  negative  Sensation: normal    Hip Exam   Tenderness Location: posterior, greater trochanter and lateral  Sensation: normal    Elbow/Wrist Exam   Tenderness Location: lateral epicondyle and medial epicondyle  Sensation: normal    Foot Exam   Left foot exam exhibits signs of inflamed dorsum  Left foot exam exhibits signs of no podagra and no plantar fasciitis    Muscle Strength (0-5 scale):  :  4/5       Back/Neck Exam   General Inspection   Gait: normal       Tenderness Right paramedian tenderness of the Occ, Upper C-Spine, Lower L-Spine and SI Joint.Left paramedian tenderness of the Occ, Upper C-Spine, Lower L-Spine and SI Joint.      Comments:  15 out of 18 tender points         Disease Assessment Scores:  Patient's Global Assessment of arthritis (0-10): 1  Physician's Global Assessment of arthritis (0-10): 1  Number of Tender Joints (0-28): 2  Number of Swollen Joints (0-28): 2        5/27/2024     9:32 AM   Rapid3 Question Responses and Scores   MDHAQ Score 0.6   Psychologic Score 1.1   Pain Score 4.5   When you awakened in the morning OVER THE LAST WEEK, did you feel stiff? Yes   If Yes, please indicate the number of hours until you are as limber as you will be for the day 1   Fatigue Score 0.5   Global Health Score 8   RAPID3 Score 4.83       Monitoring Lab Results:  Lab Results   Component Value Date    WBC 5.52 06/07/2024    RBC 5.00 06/07/2024    HGB 13.9 06/07/2024    HCT 44.0 06/07/2024    MCV 88 06/07/2024    MCH 27.8 06/07/2024    MCHC 31.6 (L) 06/07/2024    RDW 12.6 06/07/2024     06/07/2024        Lab Results   Component Value Date     06/07/2024    K 4.6 06/07/2024     06/07/2024    CO2 25 06/07/2024    GLU 86 06/07/2024    BUN 19 (H) 06/07/2024    CREATININE 0.79 06/07/2024    CALCIUM 9.5 06/07/2024    PROT 7.0 06/07/2024    ALBUMIN 4.6 06/07/2024    BILITOT 0.6 06/07/2024    ALKPHOS 83 06/07/2024    AST 34 06/07/2024    ALT 23 06/07/2024    ANIONGAP 10 06/07/2024     "EGFRNORACEVR >60 06/07/2024       Lab Results   Component Value Date    SEDRATE 18 06/07/2024    CRP <0.50 06/07/2024        Lab Results   Component Value Date    JCQOKXOA12YU 37 10/13/2023        Lab Results   Component Value Date    CHOL 189 09/29/2023    HDL 93 (H) 09/29/2023    LDLCALC 83.2 09/29/2023    TRIG 64 09/29/2023       Lab Results   Component Value Date    RF <8.6 03/19/2021    CCPANTIBODIE <0.5 03/19/2021     Lab Results   Component Value Date    ANASCREEN Negative <1:160 12/20/2017    DSDNA Negative 1:10 12/20/2017     Lab Results   Component Value Date    HLABB27 Negative 12/20/2017       Infectious Disease Screening:  Lab Results   Component Value Date    HEPBSAG Negative 11/17/2015    HEPBIGM Negative 11/17/2015     Lab Results   Component Value Date    HEPCAB Negative 11/17/2015     No results found for: "TBGOLDPLUS", "QUANTTBGDPL"  No results found for: "QUANTIFERON", "SVCMT", "QUANTAGVALUE", "QUANTNILVALU", "QUANTMITOGEN", "QFTTBAG", "QINT"     Imaging: DEXA, Xrays, MRIs, CTs, etc    Old & Outside Medical Records:  Reviewed old and all outside medical records available in Care Everywhere     Assessment:      Plan:      Encounter Diagnoses   Name Primary?    Gastroesophageal reflux disease with esophagitis, unspecified whether hemorrhage Yes    Dyspepsia     Hypothyroidism, unspecified type     PMR (polymyalgia rheumatica)     Chronic pain syndrome     Inflammatory arthritis     Essential hypertension     Long-term use of Plaquenil      Elesa was seen today for follow-up.    Diagnoses and all orders for this visit:    Gastroesophageal reflux disease with esophagitis, unspecified whether hemorrhage  -     metoclopramide HCl (REGLAN) 10 MG tablet; Take 1 tablet (10 mg total) by mouth 4 (four) times daily before meals and nightly.  -     famotidine (PEPCID) 40 MG tablet; Take 1 tablet (40 mg total) by mouth every morning.  -     celecoxib (CELEBREX) 200 MG capsule; Take 1 capsule (200 mg total) by " mouth once daily.    Dyspepsia  -     metoclopramide HCl (REGLAN) 10 MG tablet; Take 1 tablet (10 mg total) by mouth 4 (four) times daily before meals and nightly.  -     Sedimentation rate; Future  -     C-Reactive Protein; Future  -     Comprehensive Metabolic Panel; Future  -     CBC Auto Differential; Future    Hypothyroidism, unspecified type  -     traMADoL (ULTRAM) 50 mg tablet; Take 1 tablet (50 mg total) by mouth every 6 (six) hours as needed for Pain.  -     celecoxib (CELEBREX) 200 MG capsule; Take 1 capsule (200 mg total) by mouth once daily.  -     Sedimentation rate; Future  -     C-Reactive Protein; Future  -     Comprehensive Metabolic Panel; Future  -     CBC Auto Differential; Future    PMR (polymyalgia rheumatica)  -     traMADoL (ULTRAM) 50 mg tablet; Take 1 tablet (50 mg total) by mouth every 6 (six) hours as needed for Pain.  -     celecoxib (CELEBREX) 200 MG capsule; Take 1 capsule (200 mg total) by mouth once daily.  -     Sedimentation rate; Future  -     C-Reactive Protein; Future  -     Comprehensive Metabolic Panel; Future  -     CBC Auto Differential; Future    Chronic pain syndrome  -     traMADoL (ULTRAM) 50 mg tablet; Take 1 tablet (50 mg total) by mouth every 6 (six) hours as needed for Pain.  -     Sedimentation rate; Future  -     C-Reactive Protein; Future  -     Comprehensive Metabolic Panel; Future  -     CBC Auto Differential; Future    Inflammatory arthritis  -     traMADoL (ULTRAM) 50 mg tablet; Take 1 tablet (50 mg total) by mouth every 6 (six) hours as needed for Pain.  -     Sedimentation rate; Future  -     C-Reactive Protein; Future  -     Comprehensive Metabolic Panel; Future  -     CBC Auto Differential; Future    Essential hypertension  -     traMADoL (ULTRAM) 50 mg tablet; Take 1 tablet (50 mg total) by mouth every 6 (six) hours as needed for Pain.  -     Sedimentation rate; Future  -     C-Reactive Protein; Future  -     Comprehensive Metabolic Panel; Future  -      CBC Auto Differential; Future    Long-term use of Plaquenil  -     traMADoL (ULTRAM) 50 mg tablet; Take 1 tablet (50 mg total) by mouth every 6 (six) hours as needed for Pain.  -     Sedimentation rate; Future  -     C-Reactive Protein; Future  -     Comprehensive Metabolic Panel; Future  -     CBC Auto Differential; Future          labs  2. Try celebrex   3. Follow up 7 month     Follow-up 7 months    More than 50% of the  47 minute encounter was spent face to face counseling the patient regarding current status and future plan of care as well as side effects  of the medications. All questions were answered to patient's satisfaction also includes  non-face to face time preparing to see the patient (eg, review of tests), Obtaining and/or reviewing separately obtained history, Documenting clinical information in the electronic or other health record, Independently interpreting results

## 2025-01-12 ENCOUNTER — PATIENT MESSAGE (OUTPATIENT)
Dept: RHEUMATOLOGY | Facility: CLINIC | Age: 60
End: 2025-01-12
Payer: COMMERCIAL

## 2025-01-29 ENCOUNTER — PATIENT MESSAGE (OUTPATIENT)
Dept: RHEUMATOLOGY | Facility: CLINIC | Age: 60
End: 2025-01-29
Payer: COMMERCIAL

## 2025-01-31 ENCOUNTER — TELEPHONE (OUTPATIENT)
Dept: RHEUMATOLOGY | Facility: CLINIC | Age: 60
End: 2025-01-31
Payer: COMMERCIAL

## 2025-01-31 NOTE — TELEPHONE ENCOUNTER
Pt said she doesn't have a rash and has taken celebrex in the past. Pt says the tops of her feet are just swollen, red, and warm to the touch. Pt says she is struggling with restless leg syndrome. Please advise.

## 2025-01-31 NOTE — TELEPHONE ENCOUNTER
She should be evaluated to make sure she doesn't have an infection. Unfortunately, we do not have any urgent visits available and I would direct her to PCP

## 2025-02-03 DIAGNOSIS — E03.9 HYPOTHYROIDISM, UNSPECIFIED TYPE: ICD-10-CM

## 2025-02-03 DIAGNOSIS — G25.81 RESTLESS LEGS: Primary | ICD-10-CM

## 2025-02-04 RX ORDER — LEVOTHYROXINE SODIUM 50 UG/1
50 TABLET ORAL
Qty: 30 TABLET | Refills: 5 | Status: SHIPPED | OUTPATIENT
Start: 2025-02-04 | End: 2026-02-04

## 2025-02-04 RX ORDER — PREGABALIN 50 MG/1
50 CAPSULE ORAL 3 TIMES DAILY
Qty: 90 CAPSULE | Refills: 3 | Status: SHIPPED | OUTPATIENT
Start: 2025-02-04 | End: 2025-08-05

## 2025-03-16 ENCOUNTER — PATIENT MESSAGE (OUTPATIENT)
Dept: PAIN MEDICINE | Facility: CLINIC | Age: 60
End: 2025-03-16
Payer: COMMERCIAL

## 2025-03-17 ENCOUNTER — TELEPHONE (OUTPATIENT)
Dept: PAIN MEDICINE | Facility: CLINIC | Age: 60
End: 2025-03-17
Payer: COMMERCIAL

## 2025-03-17 DIAGNOSIS — M54.16 LUMBAR RADICULOPATHY: Primary | ICD-10-CM

## 2025-03-17 RX ORDER — ALPRAZOLAM 1 MG/1
1 TABLET, ORALLY DISINTEGRATING ORAL ONCE AS NEEDED
OUTPATIENT
Start: 2025-03-17 | End: 2036-08-13

## 2025-03-17 NOTE — TELEPHONE ENCOUNTER
Spoke with patient and scheduled.Advised to hold NSAIDS x 2 days prior. Pre op information given and follow up appointment scheduled.

## 2025-03-17 NOTE — TELEPHONE ENCOUNTER
Physician - Dr Zuniga    Type of Procedure/Injection - Lumbar Epidural  L4/5           Laterality - NA      Priority - Normal      Anxiolysis- Local      Need to hold medication - Yes      NSAIDs for 2 days    None      Clearance needed - No      Follow up - 4 week

## 2025-03-24 ENCOUNTER — HOSPITAL ENCOUNTER (OUTPATIENT)
Dept: RADIOLOGY | Facility: HOSPITAL | Age: 60
Discharge: HOME | End: 2025-03-24
Attending: ANESTHESIOLOGY | Admitting: ANESTHESIOLOGY
Payer: COMMERCIAL

## 2025-03-24 ENCOUNTER — HOSPITAL ENCOUNTER (OUTPATIENT)
Facility: HOSPITAL | Age: 60
Discharge: HOME | End: 2025-03-24
Attending: ANESTHESIOLOGY | Admitting: ANESTHESIOLOGY
Payer: COMMERCIAL

## 2025-03-24 DIAGNOSIS — M54.16 LUMBAR RADICULOPATHY: ICD-10-CM

## 2025-03-24 DIAGNOSIS — M54.50 LOWER BACK PAIN: ICD-10-CM

## 2025-03-24 PROCEDURE — 25500020 PHARM REV CODE 255: Mod: PO | Performed by: ANESTHESIOLOGY

## 2025-03-24 PROCEDURE — A4216 STERILE WATER/SALINE, 10 ML: HCPCS | Mod: PO | Performed by: ANESTHESIOLOGY

## 2025-03-24 PROCEDURE — 62323 NJX INTERLAMINAR LMBR/SAC: CPT | Mod: PO | Performed by: ANESTHESIOLOGY

## 2025-03-24 PROCEDURE — 25000003 PHARM REV CODE 250: Mod: PO | Performed by: ANESTHESIOLOGY

## 2025-03-24 PROCEDURE — 62323 NJX INTERLAMINAR LMBR/SAC: CPT | Mod: ,,, | Performed by: ANESTHESIOLOGY

## 2025-03-24 PROCEDURE — 63600175 PHARM REV CODE 636 W HCPCS: Mod: PO | Performed by: ANESTHESIOLOGY

## 2025-03-24 RX ORDER — ALPRAZOLAM 0.5 MG/1
1 TABLET, ORALLY DISINTEGRATING ORAL ONCE AS NEEDED
Status: COMPLETED | OUTPATIENT
Start: 2025-03-24 | End: 2025-03-24

## 2025-03-24 RX ORDER — LIDOCAINE HYDROCHLORIDE 10 MG/ML
INJECTION, SOLUTION EPIDURAL; INFILTRATION; INTRACAUDAL; PERINEURAL
Status: DISCONTINUED | OUTPATIENT
Start: 2025-03-24 | End: 2025-03-24 | Stop reason: HOSPADM

## 2025-03-24 RX ORDER — SODIUM CHLORIDE 9 MG/ML
INJECTION, SOLUTION INTRAMUSCULAR; INTRAVENOUS; SUBCUTANEOUS
Status: DISCONTINUED | OUTPATIENT
Start: 2025-03-24 | End: 2025-03-24 | Stop reason: HOSPADM

## 2025-03-24 RX ORDER — METHYLPREDNISOLONE ACETATE 80 MG/ML
INJECTION, SUSPENSION INTRA-ARTICULAR; INTRALESIONAL; INTRAMUSCULAR; SOFT TISSUE
Status: DISCONTINUED | OUTPATIENT
Start: 2025-03-24 | End: 2025-03-24 | Stop reason: HOSPADM

## 2025-03-24 RX ADMIN — ALPRAZOLAM 0.5 MG: 0.5 TABLET, ORALLY DISINTEGRATING ORAL at 03:03

## 2025-03-24 NOTE — PLAN OF CARE
Pt VSS. Pt denies recent fever or illness. Belongings placed under stretcher. No valuables per pt. Consent to be signed by pt. Pt/family states ready for procedure.

## 2025-03-24 NOTE — H&P
CC: Back pain    HPI: The patient is a 58yo woman with a history of lumbar radiculopathy here for L4/5 KATHRYN. There are no major changes in history and physical from 24.    Past Medical History:   Diagnosis Date    Constipation 2017    COVID-19 virus infection     pos 20    Family history of colon cancer in mother 2016    History of chicken pox     Hypertension     Palindromic rheumatism involving tarsus     Psoas tendinitis of both sides 2019    Shoulder pain, bilateral 2016    Spinal stenosis of lumbar region 2019       Past Surgical History:   Procedure Laterality Date    ARTHROSCOPIC REPAIR OF ROTATOR CUFF OF SHOULDER Left 2020    Procedure: REPAIR, ROTATOR CUFF, ARTHROSCOPIC;  Surgeon: Sb Reis II, MD;  Location: [facility];  Service: Orthopedics;  Laterality: Left;    ARTHROSCOPY OF SHOULDER WITH DECOMPRESSION OF SUBACROMIAL SPACE Left 2020    Procedure: ARTHROSCOPY, SHOULDER, WITH SUBACROMIAL SPACE DECOMPRESSION;  Surgeon: Sb Reis II, MD;  Location: [facility];  Service: Orthopedics;  Laterality: Left;     SECTION      CHOLECYSTECTOMY      COLONOSCOPY N/A 2016    Procedure: COLONOSCOPY;  Surgeon: Hesham Enriquez MD;  Location: [facility];  Service: Endoscopy;  Laterality: N/A;    COLONOSCOPY N/A 2017    Procedure: COLONOSCOPY;  Surgeon: Hesham Enriquez MD;  Location: [facility];  Service: Endoscopy;  Laterality: N/A;    COLONOSCOPY N/A 10/15/2021    Procedure: COLONOSCOPY;  Surgeon: Hesham Enriquez MD;  Location: [facility];  Service: Endoscopy;  Laterality: N/A;    COLONOSCOPY N/A 3/10/2024    Procedure: COLONOSCOPY;  Surgeon: Gwyn Rodriguez MD;  Location: [facility];  Service: Endoscopy;  Laterality: N/A;    EPIDURAL STEROID INJECTION INTO LUMBAR SPINE N/A 2019    Procedure: Injection-steroid-epidural-lumbar L4/5;  Surgeon: Cristhian Gee MD;  Location: [facility];  Service: Pain Management;  Laterality: N/A;     EPIDURAL STEROID INJECTION INTO LUMBAR SPINE N/A 7/31/2019    Procedure: Injection-steroid-epidural-lumbar;  Surgeon: Nigel Zuniga MD;  Location: [facility] OR;  Service: Pain Management;  Laterality: N/A;  L4/5 interlaminar    EPIDURAL STEROID INJECTION INTO LUMBAR SPINE N/A 7/11/2024    Procedure: Injection-steroid-epidural-lumbar l4-5;  Surgeon: Fidelia Jeffery MD;  Location: [facility] OR;  Service: Anesthesiology;  Laterality: N/A;    ESOPHAGOGASTRODUODENOSCOPY N/A 3/10/2024    Procedure: EGD (ESOPHAGOGASTRODUODENOSCOPY);  Surgeon: Gwyn Rodriguez MD;  Location: [facility];  Service: Endoscopy;  Laterality: N/A;    EXTREME LATERAL INTERBODY FUSION (XLIF) OF SPINE Left 11/18/2019    Procedure: FUSION, SPINE, XLIF L3-4;  Surgeon: Cleve Bowers MD;  Location: [facility] OR;  Service: Orthopedics;  Laterality: Left;    HAND ARTHROPLASTY Left 10/4/2022    Procedure: Left thumb CMC arthroplasty;  Surgeon: Stanley Rivero MD;  Location: [facility] OR;  Service: Orthopedics;  Laterality: Left;  Anesthesia:  General with a single shot supraclavicular Exparel block    HAND ARTHROPLASTY Right 12/12/2023    Procedure: Right thumb CMC arthroplasty;  Surgeon: Stanley Rivero MD;  Location: [facility] OR;  Service: Orthopedics;  Laterality: Right;  Anesthesia: General with a single-shot supraclavicular Exparel block    LAMINECTOMY USING MINIMALLY INVASIVE TECHNIQUE N/A 11/18/2019    Procedure: LAMINECTOMY, SPINE, MINIMALLY INVASIVE L3-L4;  Surgeon: Cleve Bowers MD;  Location: [facility] OR;  Service: Orthopedics;  Laterality: N/A;    MINIMALLY INVASIVE FUSION OF SPINE N/A 11/18/2019    Procedure: FUSION, SPINE, MINIMALLY INVASIVE L3-L4;  Surgeon: Cleve Bowers MD;  Location: [facility] OR;  Service: Orthopedics;  Laterality: N/A;    TUBAL LIGATION N/A 1986       Family History   Problem Relation Name Age of Onset    Cancer Mother      Hypertension Mother      Heart disease Father      Hypertension Father      Diabetes Father      No Known  "Problems Brother         Social History     Socioeconomic History    Marital status:    Tobacco Use    Smoking status: Never     Passive exposure: Never    Smokeless tobacco: Never   Substance and Sexual Activity    Alcohol use: Not Currently    Drug use: No    Sexual activity: Yes     Birth control/protection: Surgical     Comment: tubal ligation     Social Drivers of Health     Financial Resource Strain: Patient Declined (12/26/2023)    Overall Financial Resource Strain (CARDIA)     Difficulty of Paying Living Expenses: Patient declined   Food Insecurity: Patient Declined (12/26/2023)    Hunger Vital Sign     Worried About Running Out of Food in the Last Year: Patient declined     Ran Out of Food in the Last Year: Patient declined   Transportation Needs: Patient Declined (12/26/2023)    PRAPARE - Transportation     Lack of Transportation (Medical): Patient declined     Lack of Transportation (Non-Medical): Patient declined   Physical Activity: Insufficiently Active (12/26/2023)    Exercise Vital Sign     Days of Exercise per Week: 3 days     Minutes of Exercise per Session: 10 min   Stress: No Stress Concern Present (12/26/2023)    [employer] [place] [employer] - Occupational Stress Questionnaire     Feeling of Stress : Not at all   Housing Stability: Patient Declined (12/26/2023)    Housing Stability Vital Sign     Unable to Pay for Housing in the Last Year: Patient declined     Number of Places Lived in the Last Year: 1     Unstable Housing in the Last Year: Patient declined       Current Medications[1]    Review of patient's allergies indicates:   Allergen Reactions    Aspirin Itching    Penicillins Hives    Adhesive Rash       Vitals:    03/19/25 1258 03/24/25 1429   BP:  (!) 145/91   Pulse:  (!) 57   Resp:  14   Temp:  97.5 °F (36.4 °C)   SpO2:  100%   Weight: 77.1 kg (170 lb)    Height: 5' 4" (1.626 m)        ASA 2, Mallampati 2    REVIEW OF SYSTEMS:     GENERAL: No weight loss, malaise or " fevers.  HEENT:  No recent changes in vision or hearing  NECK: Negative for lumps, no difficulty with swallowing.  RESPIRATORY: Negative for cough, wheezing or shortness of breath, patient denies any recent URI.  CARDIOVASCULAR: Negative for chest pain, leg swelling or palpitations.  GI: Negative for abdominal discomfort, blood in stools or black stools or change in bowel habits.  MUSCULOSKELETAL: See HPI.  SKIN: Negative for lesions, rash, and itching.  PSYCH: No suicidal or homicidal ideations, no current mood disturbances.  HEMATOLOGY/LYMPHOLOGY: Negative for prolonged bleeding, bruising easily or swollen nodes. Patient is not currently taking any anti-coagulants  ENDO: No history of diabetes or thyroid dysfunction  NEURO: No history of syncope, paralysis, seizures or tremors.All other reviewed and negative other than HPI.    Physical exam:  Gen: A and O x3, pleasant, well-groomed  Skin: No rashes or obvious lesions  HEENT: PERRLA, no obvious deformities on ears or in canals. No thyroid masses, trachea midline, no palpable lymph nodes in neck, axilla.  CVS: Regular rate and rhythm, normal S1 and S2, no murmurs.  Resp: Clear to auscultation bilaterally.  Abdomen: Soft, NT/ND, normal bowel sounds present.  Musculoskeletal/Neuro: Moving all extremities    Assessment:  Lumbar radiculopathy  -     Place in Outpatient; Standing  -     Vital signs; Standing  -     Verify informed consent; Standing  -     Notify physician ; Standing  -     Notify physician ; Standing  -     Notify physician (specify); Standing  -     Diet NPO; Standing  -     alprazolam ODT dissolvable tablet 1 mg    Other orders  -     IP VTE LOW RISK PATIENT; Standing               [1]   No current facility-administered medications for this encounter.

## 2025-03-24 NOTE — DISCHARGE SUMMARY
[facility] - Surgery  Discharge Note  Short Stay    Procedure(s) (LRB):  Injection-steroid-epidural-lumbar    L4/5 (N/A)      OUTCOME: Patient tolerated treatment/procedure well without complication and is now ready for discharge.    DISPOSITION: Home or Self Care    FINAL DIAGNOSIS:  Lumbar radiculopathy    FOLLOWUP: In clinic    DISCHARGE INSTRUCTIONS:    Discharge Procedure Orders   Diet Adult Regular     No dressing needed     Notify your health care provider if you experience any of the following:  temperature >100.4     Activity as tolerated

## 2025-03-24 NOTE — OP NOTE
PROCEDURE DATE: 3/24/2025    Lumbar Interlaminar Epidural Steroid Injection under Fluoroscopic Guidance, AP Approach.    Procedure:   Interlaminar epidural steroid injection at L4/5 under fluoroscopic guidance.    Diagnosis: lUMBAR Radiculopathy    pOSTOP DIAGNOSIS: sAME    Physician: Nigel Zuniga M.D.    Medications injected:80 mg methylprednisone with 4 ml of preservative free NaCl    Local anesthetic injected:    Lidocaine 1% 4 ml total    Sedation Medications: none    Estimated blood loss:  none    Complications:  none    Technique:  Time-out taken to identify patient and procedure prior to starting the procedure.  With the patient laying in a prone position, the area was prepped and draped in the usual sterile fashion using ChloraPrep and a fenestrated drape.  After determining the target level with an AP fluoroscopic view, local anesthetic was given using a 25-gauge 1.5 inch needle by raising a wheal and then infiltrating toward the interlaminar entry space.  A 3.5inch 20-gauge Touhy needle was introduced under AP fluoroscopic guidance to the interlaminar space of L4/5. Once the trajectory was established, the needle was visualized in the lateral view and advanced using loss of resistance technique. Once in the desired position, Omnipaque contrast was injected to confirm placement and there was no vascular uptake nor intrathecal spread.  The medication was then injected slowly. The patient tolerated the procedure well.      The patient was monitored after the procedure.   They were given post-procedure and discharge instructions to follow at home.  The patient was discharged in a stable condition.

## 2025-03-25 VITALS
HEART RATE: 54 BPM | RESPIRATION RATE: 16 BRPM | DIASTOLIC BLOOD PRESSURE: 78 MMHG | BODY MASS INDEX: 29.02 KG/M2 | HEIGHT: 64 IN | TEMPERATURE: 98 F | SYSTOLIC BLOOD PRESSURE: 148 MMHG | OXYGEN SATURATION: 100 % | WEIGHT: 170 LBS

## 2025-04-28 ENCOUNTER — OFFICE VISIT (OUTPATIENT)
Dept: PAIN MEDICINE | Facility: CLINIC | Age: 60
End: 2025-04-28
Payer: COMMERCIAL

## 2025-04-28 VITALS
SYSTOLIC BLOOD PRESSURE: 170 MMHG | BODY MASS INDEX: 30.06 KG/M2 | HEIGHT: 64 IN | DIASTOLIC BLOOD PRESSURE: 72 MMHG | HEART RATE: 69 BPM | WEIGHT: 176.06 LBS

## 2025-04-28 DIAGNOSIS — M47.816 LUMBAR SPONDYLOSIS: Primary | ICD-10-CM

## 2025-04-28 DIAGNOSIS — Z98.1 HISTORY OF LUMBAR FUSION: ICD-10-CM

## 2025-04-28 PROCEDURE — 3078F DIAST BP <80 MM HG: CPT | Mod: CPTII,S$GLB,, | Performed by: PHYSICIAN ASSISTANT

## 2025-04-28 PROCEDURE — 3077F SYST BP >= 140 MM HG: CPT | Mod: CPTII,S$GLB,, | Performed by: PHYSICIAN ASSISTANT

## 2025-04-28 PROCEDURE — 99999 PR PBB SHADOW E&M-EST. PATIENT-LVL III: CPT | Mod: PBBFAC,,, | Performed by: PHYSICIAN ASSISTANT

## 2025-04-28 PROCEDURE — 99213 OFFICE O/P EST LOW 20 MIN: CPT | Mod: S$GLB,,, | Performed by: PHYSICIAN ASSISTANT

## 2025-04-28 PROCEDURE — 1159F MED LIST DOCD IN RCRD: CPT | Mod: CPTII,S$GLB,, | Performed by: PHYSICIAN ASSISTANT

## 2025-04-28 PROCEDURE — 1160F RVW MEDS BY RX/DR IN RCRD: CPT | Mod: CPTII,S$GLB,, | Performed by: PHYSICIAN ASSISTANT

## 2025-04-28 PROCEDURE — 3008F BODY MASS INDEX DOCD: CPT | Mod: CPTII,S$GLB,, | Performed by: PHYSICIAN ASSISTANT

## 2025-04-28 NOTE — PROGRESS NOTES
[facility] Follow Up    This note was completed with dictation software and grammatical errors may exist.    Chief Complaint   Patient presents with    Pain    Low-back Pain    Back Pain        HPI:   Lashell Stroud is a 59 y.o. year old female patient who has a past medical history of Constipation, COVID-19 virus infection, Family history of colon cancer in mother, History of chicken pox, Hypertension, Palindromic rheumatism involving tarsus, Psoas tendinitis of both sides, Shoulder pain, bilateral, and Spinal stenosis of lumbar region.     Ms. Lobo presents today for follow up of lower back pain after KATHRYN.  She has history of lumbar back pan.  She denies any buttock or leg pin.  She had July 2024 KATHRYN with 90% relief of pain and had repeat L4/5 IL KATHRYN 3/24/25 with 60-70% relief of back pain.  She continue with lower lumbar back pain.  No radicular symptoms. It is felt on the left greater than right side.  Pain increases with trnasitioning from sitting to standing.     HPI 11-14-24 Dr. Zuniga;  She presents in referral from No ref. provider found for back pain.  She presents in follow-up today, I had seen the patient previously and she has recently seen several of my colleagues.  She was having severe back pain and is status post L4/5 interlaminar KATHRYN on 07/11/2024 with 90% relief.   After this injection she reports about 90% relief, the pain is slightly coming back but not as severe as previously.  Currently she feels that she can live with the symptoms, denies having any severe leg pain or weakness, denies any bowel or bladder incontinence.  She is able to do most of the things that she needs to do.    Pain intervention history:  L4/5 interlaminar KATHRYN on 07/11/2024 with 90% relief.  L4/5 IL KATHRYN on 3/24/25 with 60-70% relief of back pain.     Spine surgeries:  11-18-19 L3/4 XLIF with Dr. Cleve Bowers    Antineuropathics:  Lyrica 50mg  NSAIDs: celebrex, mobic  Physical therapy:  Has done  physical therapy in the last year and feels that this was helpful, continues with a home exercise program  Antidepressants:  Muscle relaxers:  Opioids: tramadol  Antiplatelets/Anticoagulants:        ROS: lower back pain    Lab Results   Component Value Date    HGBA1C 5.9 (H) 2022       Lab Results   Component Value Date    WBC 6.19 2025    HGB 14.1 2025    HCT 43.8 2025    MCV 88 2025     2025             Past Medical History:   Diagnosis Date    Constipation 2017    COVID-19 virus infection     pos 20    Family history of colon cancer in mother 2016    History of chicken pox     Hypertension     Palindromic rheumatism involving tarsus     Psoas tendinitis of both sides 2019    Shoulder pain, bilateral 2016    Spinal stenosis of lumbar region 2019       Past Surgical History:   Procedure Laterality Date    ARTHROSCOPIC REPAIR OF ROTATOR CUFF OF SHOULDER Left 2020    Procedure: REPAIR, ROTATOR CUFF, ARTHROSCOPIC;  Surgeon: Sb Reis II, MD;  Location: [facility];  Service: Orthopedics;  Laterality: Left;    ARTHROSCOPY OF SHOULDER WITH DECOMPRESSION OF SUBACROMIAL SPACE Left 2020    Procedure: ARTHROSCOPY, SHOULDER, WITH SUBACROMIAL SPACE DECOMPRESSION;  Surgeon: Sb Reis II, MD;  Location: [facility] OR;  Service: Orthopedics;  Laterality: Left;     SECTION      CHOLECYSTECTOMY      COLONOSCOPY N/A 2016    Procedure: COLONOSCOPY;  Surgeon: Hesham Enriquez MD;  Location: [facility];  Service: Endoscopy;  Laterality: N/A;    COLONOSCOPY N/A 2017    Procedure: COLONOSCOPY;  Surgeon: Hesham Enriquez MD;  Location: [facility];  Service: Endoscopy;  Laterality: N/A;    COLONOSCOPY N/A 10/15/2021    Procedure: COLONOSCOPY;  Surgeon: Hesham Enriquez MD;  Location: [facility];  Service: Endoscopy;  Laterality: N/A;    COLONOSCOPY N/A 3/10/2024    Procedure: COLONOSCOPY;  Surgeon: Gwyn Rodriguez MD;   Location: [facility];  Service: Endoscopy;  Laterality: N/A;    EPIDURAL STEROID INJECTION INTO LUMBAR SPINE N/A 5/22/2019    Procedure: Injection-steroid-epidural-lumbar L4/5;  Surgeon: Cristhian Gee MD;  Location: [facility] OR;  Service: Pain Management;  Laterality: N/A;    EPIDURAL STEROID INJECTION INTO LUMBAR SPINE N/A 7/31/2019    Procedure: Injection-steroid-epidural-lumbar;  Surgeon: Nigel Zuniga MD;  Location: [facility] OR;  Service: Pain Management;  Laterality: N/A;  L4/5 interlaminar    EPIDURAL STEROID INJECTION INTO LUMBAR SPINE N/A 7/11/2024    Procedure: Injection-steroid-epidural-lumbar l4-5;  Surgeon: Fidelia Jeffery MD;  Location: [facility] OR;  Service: Anesthesiology;  Laterality: N/A;    EPIDURAL STEROID INJECTION INTO LUMBAR SPINE N/A 3/24/2025    Procedure: Injection-steroid-epidural-lumbar    L4/5;  Surgeon: Nigel Zuniga MD;  Location: [facility] OR;  Service: Pain Management;  Laterality: N/A;  normal    ESOPHAGOGASTRODUODENOSCOPY N/A 3/10/2024    Procedure: EGD (ESOPHAGOGASTRODUODENOSCOPY);  Surgeon: Gwyn Rodriguez MD;  Location: [facility];  Service: Endoscopy;  Laterality: N/A;    EXTREME LATERAL INTERBODY FUSION (XLIF) OF SPINE Left 11/18/2019    Procedure: FUSION, SPINE, XLIF L3-4;  Surgeon: Cleve Bowers MD;  Location: [facility];  Service: Orthopedics;  Laterality: Left;    HAND ARTHROPLASTY Left 10/4/2022    Procedure: Left thumb CMC arthroplasty;  Surgeon: Stanley Rivero MD;  Location: [facility] OR;  Service: Orthopedics;  Laterality: Left;  Anesthesia:  General with a single shot supraclavicular Exparel block    HAND ARTHROPLASTY Right 12/12/2023    Procedure: Right thumb CMC arthroplasty;  Surgeon: Stanley Rivero MD;  Location: [facility] OR;  Service: Orthopedics;  Laterality: Right;  Anesthesia: General with a single-shot supraclavicular Exparel block    LAMINECTOMY USING MINIMALLY INVASIVE TECHNIQUE N/A 11/18/2019    Procedure: LAMINECTOMY, SPINE, MINIMALLY INVASIVE L3-L4;  Surgeon:  Cleve Bowers MD;  Location: [facility];  Service: Orthopedics;  Laterality: N/A;    MINIMALLY INVASIVE FUSION OF SPINE N/A 11/18/2019    Procedure: FUSION, SPINE, MINIMALLY INVASIVE L3-L4;  Surgeon: Cleve Bowers MD;  Location: [facility];  Service: Orthopedics;  Laterality: N/A;    TUBAL LIGATION N/A 1986       Social History     Socioeconomic History    Marital status:    Tobacco Use    Smoking status: Never     Passive exposure: Never    Smokeless tobacco: Never   Substance and Sexual Activity    Alcohol use: Not Currently    Drug use: No    Sexual activity: Yes     Birth control/protection: Surgical     Comment: tubal ligation     Social Drivers of Health     Financial Resource Strain: Patient Declined (12/26/2023)    Overall Financial Resource Strain (CARDIA)     Difficulty of Paying Living Expenses: Patient declined   Food Insecurity: Patient Declined (12/26/2023)    Hunger Vital Sign     Worried About Running Out of Food in the Last Year: Patient declined     Ran Out of Food in the Last Year: Patient declined   Transportation Needs: Patient Declined (12/26/2023)    PRAPARE - Transportation     Lack of Transportation (Medical): Patient declined     Lack of Transportation (Non-Medical): Patient declined   Physical Activity: Insufficiently Active (12/26/2023)    Exercise Vital Sign     Days of Exercise per Week: 3 days     Minutes of Exercise per Session: 10 min   Stress: No Stress Concern Present (12/26/2023)    [place] [place] [employer] - Occupational Stress Questionnaire     Feeling of Stress : Not at all   Housing Stability: Patient Declined (12/26/2023)    Housing Stability Vital Sign     Unable to Pay for Housing in the Last Year: Patient declined     Number of Places Lived in the Last Year: 1     Unstable Housing in the Last Year: Patient declined         Medications/Allergies: See med card    Vitals:    04/28/25 1027   BP: (!) 170/72   Pulse: 69   Weight: 79.9 kg (176 lb 0.6 oz)  "  Height: 5' 4" (1.626 m)   PainSc:   5   PainLoc: Back     Body mass index is 30.22 kg/m².      4/28/2025    10:26 AM 11/14/2024     9:42 AM 6/19/2019    11:26 AM   Last 3 PDI Scores   Pain Disability Index (PDI) 21 42 0       Physical exam:  Gen: A and O x3, pleasant, well-groomed  Musculoskeletal:  No antalgic gait.   Neuro:    Iliopsoas Quadriceps Knee  Flexion Tibialis  anterior Gastro- cnemius EHL   Lower: R 5/5 5/5 5/5 5/5 5/5 5/5    L 5/5 5/5 5/5 5/5 5/5 5/5      Left  Right    Patellar DTR 2+ 2+   Achilles DTR 2+ 2+   Peters Absent  Absent   Clonus Absent Absent   Babinski Absent Absent     Intact and symmetrical to light touch and pinprick in L1-S1 dermatomes bilaterally.    Lumbar spine:  Lumbar spine: ROM is mildly reduced with flexion extension and oblique extension mild increased pain.    Jesus's test causes no increased pain on either side.    Supine straight leg raise is negative bilaterally.    Internal and external rotation of the hip causes no increased pain on either side.  Myofascial exam: No tenderness to palpation across lumbar paraspinous muscles. Pain with axial facet loading on the left.    Imaging:  MRI [id number]:  Findings: There is susceptibility from fusion hardware at L3-L4.  There is some mild lower thoracic and lumbar dextroscoliosis.  No acute fractures or subluxations are identified. There is heterogeneous marrow signal due to asymmetric marrow conversion and small hemangiomata. No other nondegenerative signal changes are present.  The tip of the conus medullaris is at the L1 level.   At T12-L1, the intervertebral disc is unremarkable.  There are hypertrophic changes of the posterior elements including ligamentum flavum hypertrophy and early facet arthropathy, but the canal and foramina are patent.   At L1-L2, the intervertebral disc is unremarkable.  There are early hypertrophic posterior elements, but the canal and foramina are patent.   At L2-L3, there is interval loss of " disc height more prominent posteriorly with development of a grade 1 retrolisthesis at uncovers a disc bulge-marginal osteophyte complex asymmetric to the left.  With hypertrophic posterior elements, there is mild left lateral recess stenosis with the rest the canal patent.  There are mild distal bilateral foraminal stenosis.   At L3-L4, there has been interval discectomy with interbody fusion and bilateral posterior metallic fusion with hemilaminotomies versus hemilaminectomies and suspected at least partial or greater right facetectomy.  The canal and foramina are patent.   At L4-L5, the previously seen disc bulge now appears as a diffuse protrusion extending from both foramen across the canal slightly asymmetric to the left.  With hypertrophic posterior elements and dorsal epidural fat, there is mild-to-moderate central canal stenosis with right greater than left lateral recess stenosis.  There are mild bilateral foraminal stenosis.   At L5-S1, there is no significant disc bulge, protrusion or extrusion.  There are hypertrophic posterior elements, but the canal and foramina are patent.    Assessment:  Lashell Stroud is a 59 y.o. year old female patient who has a past medical history of Constipation, COVID-19 virus infection, Family history of colon cancer in mother, History of chicken pox, Hypertension, Palindromic rheumatism involving tarsus, Psoas tendinitis of both sides, Shoulder pain, bilateral, and Spinal stenosis of lumbar region. She presents for follow up of lower back pain after KATHRYN.     She has chronic axial lower lumbar back pain on the left more than right side.  History of L3/4 fusion.  MRI shows L4/5 bilateral facet hypertrophy with some central canal narrowing and foramina narrowing.   KATHRYN helped 60-70% with pain.  Pain still persists some althoug she admits to being able to do more.       1. Lumbar spondylosis        2. History of lumbar fusion              Plan:  - to further help with  back pain, we discussed role of exercise, PT trial and updating MRI to consider L4/5 MBB/ RFA.  - she will thinnk about options.  Due to insurance coverage and financial concerns she does not want to puruse any further treatments right now.  After she is able to establish with medicaid, she wilil call back if she wants to proceed with any further care.  Follow up s needed

## 2025-06-10 DIAGNOSIS — D64.9 ANEMIA, UNSPECIFIED TYPE: ICD-10-CM

## 2025-06-10 DIAGNOSIS — M54.16 LUMBAR RADICULOPATHY: ICD-10-CM

## 2025-06-10 DIAGNOSIS — E03.9 HYPOTHYROIDISM, UNSPECIFIED TYPE: ICD-10-CM

## 2025-06-10 DIAGNOSIS — M25.50 ARTHRALGIA, UNSPECIFIED JOINT: ICD-10-CM

## 2025-06-10 DIAGNOSIS — M51.369 LUMBAR DEGENERATIVE DISC DISEASE: ICD-10-CM

## 2025-06-10 DIAGNOSIS — M35.3 PMR (POLYMYALGIA RHEUMATICA): ICD-10-CM

## 2025-06-10 DIAGNOSIS — M48.061 SPINAL STENOSIS, LUMBAR REGION, WITHOUT NEUROGENIC CLAUDICATION: ICD-10-CM

## 2025-06-10 RX ORDER — PANTOPRAZOLE SODIUM 40 MG/1
40 TABLET, DELAYED RELEASE ORAL
Qty: 90 TABLET | Refills: 3 | Status: SHIPPED | OUTPATIENT
Start: 2025-06-10

## 2025-07-12 DIAGNOSIS — K21.00 GASTROESOPHAGEAL REFLUX DISEASE WITH ESOPHAGITIS, UNSPECIFIED WHETHER HEMORRHAGE: ICD-10-CM

## 2025-07-12 DIAGNOSIS — M35.3 PMR (POLYMYALGIA RHEUMATICA): ICD-10-CM

## 2025-07-12 DIAGNOSIS — E03.9 HYPOTHYROIDISM, UNSPECIFIED TYPE: ICD-10-CM

## 2025-07-14 RX ORDER — CELECOXIB 200 MG/1
200 CAPSULE ORAL
Qty: 30 CAPSULE | Refills: 5 | Status: SHIPPED | OUTPATIENT
Start: 2025-07-14

## 2025-07-31 ENCOUNTER — OFFICE VISIT (OUTPATIENT)
Dept: RHEUMATOLOGY | Facility: CLINIC | Age: 60
End: 2025-07-31
Payer: COMMERCIAL

## 2025-07-31 VITALS
BODY MASS INDEX: 30.22 KG/M2 | SYSTOLIC BLOOD PRESSURE: 137 MMHG | WEIGHT: 177 LBS | HEART RATE: 71 BPM | HEIGHT: 64 IN | DIASTOLIC BLOOD PRESSURE: 85 MMHG

## 2025-07-31 DIAGNOSIS — G25.81 RESTLESS LEGS: ICD-10-CM

## 2025-07-31 DIAGNOSIS — M35.3 PMR (POLYMYALGIA RHEUMATICA): Primary | ICD-10-CM

## 2025-07-31 DIAGNOSIS — D22.30 CHANGE IN FACIAL MOLE: ICD-10-CM

## 2025-07-31 DIAGNOSIS — I10 ESSENTIAL HYPERTENSION: ICD-10-CM

## 2025-07-31 DIAGNOSIS — Z79.899 LONG-TERM USE OF PLAQUENIL: ICD-10-CM

## 2025-07-31 DIAGNOSIS — M19.90 INFLAMMATORY ARTHRITIS: ICD-10-CM

## 2025-07-31 DIAGNOSIS — E03.9 HYPOTHYROIDISM, UNSPECIFIED TYPE: ICD-10-CM

## 2025-07-31 DIAGNOSIS — G89.4 CHRONIC PAIN SYNDROME: ICD-10-CM

## 2025-07-31 DIAGNOSIS — K21.00 GASTROESOPHAGEAL REFLUX DISEASE WITH ESOPHAGITIS, UNSPECIFIED WHETHER HEMORRHAGE: ICD-10-CM

## 2025-07-31 PROCEDURE — 3075F SYST BP GE 130 - 139MM HG: CPT | Mod: CPTII,S$GLB,, | Performed by: INTERNAL MEDICINE

## 2025-07-31 PROCEDURE — 1160F RVW MEDS BY RX/DR IN RCRD: CPT | Mod: CPTII,S$GLB,, | Performed by: INTERNAL MEDICINE

## 2025-07-31 PROCEDURE — 3008F BODY MASS INDEX DOCD: CPT | Mod: CPTII,S$GLB,, | Performed by: INTERNAL MEDICINE

## 2025-07-31 PROCEDURE — 99214 OFFICE O/P EST MOD 30 MIN: CPT | Mod: S$GLB,,, | Performed by: INTERNAL MEDICINE

## 2025-07-31 PROCEDURE — 99999 PR PBB SHADOW E&M-EST. PATIENT-LVL III: CPT | Mod: PBBFAC,,, | Performed by: INTERNAL MEDICINE

## 2025-07-31 PROCEDURE — 3079F DIAST BP 80-89 MM HG: CPT | Mod: CPTII,S$GLB,, | Performed by: INTERNAL MEDICINE

## 2025-07-31 PROCEDURE — 1159F MED LIST DOCD IN RCRD: CPT | Mod: CPTII,S$GLB,, | Performed by: INTERNAL MEDICINE

## 2025-07-31 RX ORDER — PREGABALIN 50 MG/1
50 CAPSULE ORAL 3 TIMES DAILY
Qty: 90 CAPSULE | Refills: 3 | Status: SHIPPED | OUTPATIENT
Start: 2025-07-31 | End: 2026-01-29

## 2025-07-31 RX ORDER — FAMOTIDINE 40 MG/1
40 TABLET, FILM COATED ORAL EVERY MORNING
Qty: 90 TABLET | Refills: 3 | Status: SHIPPED | OUTPATIENT
Start: 2025-07-31 | End: 2026-07-31

## 2025-07-31 RX ORDER — TRAMADOL HYDROCHLORIDE 50 MG/1
50 TABLET, FILM COATED ORAL EVERY 6 HOURS PRN
Qty: 120 TABLET | Refills: 5 | Status: SHIPPED | OUTPATIENT
Start: 2025-07-31

## 2025-07-31 RX ORDER — CELECOXIB 200 MG/1
200 CAPSULE ORAL DAILY
Qty: 90 CAPSULE | Refills: 2 | Status: SHIPPED | OUTPATIENT
Start: 2025-07-31

## 2025-07-31 ASSESSMENT — ROUTINE ASSESSMENT OF PATIENT INDEX DATA (RAPID3)
PAIN SCORE: 4
PATIENT GLOBAL ASSESSMENT SCORE: 2
PSYCHOLOGICAL DISTRESS SCORE: 2.2
TOTAL RAPID3 SCORE: 3.11
MDHAQ FUNCTION SCORE: 1

## 2025-07-31 NOTE — PROGRESS NOTES
Subjective:     Patient ID:  Lashell Stroud    Chief Complaint:  Disease Management     History of Present Illness:  Pt is a 60 y.o. female with PMR and lumbar spinal stenosis, herniated disc hand osteoarthritis.  She had her L cmc joint repair. In the past she had L3 -L4 spinal fusion, her back pain is now severe she had injections with some relief, no loss bowel or bladder control,  but she has loss of sensation when sitting and severe pain when standing. She has noticed increased pain in her bilateral CMC joints and having difficulty with gripping and opening objects. No previous joint injections or occupational therapy. She has constant aching/stiffness in her PIP and DIP joints as well, which is unchanged. She is a  and does a lot of repetitive movements with her hands. No shoulder/hip pain/stiffness. She has been complaint celebrex, and tramadol tid PRN for severe pain with adequate control of her symptoms.. she requires assistance bathing, cooking, cleaning , dressing , shopping because of the severity of pain. At this time I recommend applying long term disability.  She had a GI bleed and her mobic was DC and required hospitalization.     Current tx  celebrex  2. Tramadol    Rheumatologic History:   - Diagnosis/es:  - Positive serologies:  - Infectious screening labs:  - Previous Treatments:  - Current Treatments:     Interval History:   Hospitalization since last office visit: No    Patient Active Problem List    Diagnosis Date Noted    Pain, wrist, right 01/23/2024    Pain of right thumb 01/23/2024    Stiffness of right wrist joint 01/23/2024    Arthritis of carpometacarpal (CMC) joint of right thumb 12/12/2023    Lumbar degenerative disc disease 04/19/2023    History of lumbar fusion 04/19/2023    Impaired mobility and ADLs 04/19/2023    Left hand pain 11/08/2022    Thumb joint stiffness 11/08/2022    Decreased  strength of left hand 11/08/2022    Decreased pinch strength 11/08/2022     Decreased activities of daily living (ADL) 2022    Arthritis of carpometacarpal (CMC) joint of left thumb 10/04/2022    Family history of colon cancer 10/15/2021    Rotator cuff syndrome, left 2020    COVID-19 virus infection 2020    LFTs abnormal 2019    Spinal stenosis, lumbar region, without neurogenic claudication 2019    History of lumbar spinal fusion 2019    Lumbar radiculopathy 2019    Chronic bilateral low back pain with bilateral sciatica 2019    Primary osteoarthritis involving multiple joints 2016    Essential hypertension 2016    Inflammatory arthritis 2016    PMR (polymyalgia rheumatica) 2015    ESR raised 2015    CRP elevated 2015    Arthralgia 2015    Chronic fatigue 2015    Myalgia 2015    Amy nodes (DJD hand) 2015    [place] nodes 2015     Past Surgical History:   Procedure Laterality Date    ARTHROSCOPIC REPAIR OF ROTATOR CUFF OF SHOULDER Left 2020    Procedure: REPAIR, ROTATOR CUFF, ARTHROSCOPIC;  Surgeon: Sb Reis II, MD;  Location: [facility];  Service: Orthopedics;  Laterality: Left;    ARTHROSCOPY OF SHOULDER WITH DECOMPRESSION OF SUBACROMIAL SPACE Left 2020    Procedure: ARTHROSCOPY, SHOULDER, WITH SUBACROMIAL SPACE DECOMPRESSION;  Surgeon: bS Reis II, MD;  Location: [facility];  Service: Orthopedics;  Laterality: Left;     SECTION      CHOLECYSTECTOMY      COLONOSCOPY N/A 2016    Procedure: COLONOSCOPY;  Surgeon: Hesham Enriquez MD;  Location: [facility] ENDO;  Service: Endoscopy;  Laterality: N/A;    COLONOSCOPY N/A 2017    Procedure: COLONOSCOPY;  Surgeon: Hesham Enriquez MD;  Location: [facility] ENDO;  Service: Endoscopy;  Laterality: N/A;    COLONOSCOPY N/A 10/15/2021    Procedure: COLONOSCOPY;  Surgeon: Hesham Enriquez MD;  Location: [facility] ENDO;  Service: Endoscopy;  Laterality: N/A;    COLONOSCOPY N/A 3/10/2024     Procedure: COLONOSCOPY;  Surgeon: Gwyn Rodriguez MD;  Location: [facility];  Service: Endoscopy;  Laterality: N/A;    EPIDURAL STEROID INJECTION INTO LUMBAR SPINE N/A 5/22/2019    Procedure: Injection-steroid-epidural-lumbar L4/5;  Surgeon: Cristhian Gee MD;  Location: [facility];  Service: Pain Management;  Laterality: N/A;    EPIDURAL STEROID INJECTION INTO LUMBAR SPINE N/A 7/31/2019    Procedure: Injection-steroid-epidural-lumbar;  Surgeon: Nigel Zuniga MD;  Location: [facility];  Service: Pain Management;  Laterality: N/A;  L4/5 interlaminar    EPIDURAL STEROID INJECTION INTO LUMBAR SPINE N/A 7/11/2024    Procedure: Injection-steroid-epidural-lumbar l4-5;  Surgeon: Fidelia Jeffery MD;  Location: [facility];  Service: Anesthesiology;  Laterality: N/A;    EPIDURAL STEROID INJECTION INTO LUMBAR SPINE N/A 3/24/2025    Procedure: Injection-steroid-epidural-lumbar    L4/5;  Surgeon: Nigel Zuniga MD;  Location: [facility];  Service: Pain Management;  Laterality: N/A;  normal    ESOPHAGOGASTRODUODENOSCOPY N/A 3/10/2024    Procedure: EGD (ESOPHAGOGASTRODUODENOSCOPY);  Surgeon: Gwyn Rodriguez MD;  Location: [facility];  Service: Endoscopy;  Laterality: N/A;    EXTREME LATERAL INTERBODY FUSION (XLIF) OF SPINE Left 11/18/2019    Procedure: FUSION, SPINE, XLIF L3-4;  Surgeon: Cleve Bowers MD;  Location: [facility];  Service: Orthopedics;  Laterality: Left;    HAND ARTHROPLASTY Left 10/4/2022    Procedure: Left thumb CMC arthroplasty;  Surgeon: Stanley Rivero MD;  Location: [facility] OR;  Service: Orthopedics;  Laterality: Left;  Anesthesia:  General with a single shot supraclavicular Exparel block    HAND ARTHROPLASTY Right 12/12/2023    Procedure: Right thumb CMC arthroplasty;  Surgeon: Stanley Rivero MD;  Location: [facility];  Service: Orthopedics;  Laterality: Right;  Anesthesia: General with a single-shot supraclavicular Exparel block    LAMINECTOMY USING MINIMALLY INVASIVE TECHNIQUE N/A 11/18/2019     "Procedure: LAMINECTOMY, SPINE, MINIMALLY INVASIVE L3-L4;  Surgeon: Cleve Bowers MD;  Location: [facility] OR;  Service: Orthopedics;  Laterality: N/A;    MINIMALLY INVASIVE FUSION OF SPINE N/A 11/18/2019    Procedure: FUSION, SPINE, MINIMALLY INVASIVE L3-L4;  Surgeon: Cleve Bowers MD;  Location: [facility] OR;  Service: Orthopedics;  Laterality: N/A;    TUBAL LIGATION N/A 1986     Social History[1]  Family History   Problem Relation Name Age of Onset    Cancer Mother      Hypertension Mother      Heart disease Father      Hypertension Father      Diabetes Father      No Known Problems Brother       Review of patient's allergies indicates:   Allergen Reactions    Aspirin Itching    Penicillins Hives    Adhesive Rash       Review of Systems   Review of Systems     Current Medications:  Current Outpatient Medications   Medication Instructions    celecoxib (CELEBREX) 200 mg, Oral, Daily    cholecalciferol, vitamin D3, (VITAMIN D3) 50 mcg (2,000 unit) Cap 1 capsule, Daily    famotidine (PEPCID) 40 mg, Oral, Every morning    levothyroxine (SYNTHROID) 50 mcg, Oral, Before breakfast    metoprolol succinate (TOPROL-XL) 25 mg, Oral    pantoprazole (PROTONIX) 40 mg, Oral    pregabalin (LYRICA) 50 mg, Oral, 3 times daily    RESTASIS 0.05 % ophthalmic emulsion 1 drop, 2 times daily    traMADoL (ULTRAM) 50 mg, Oral, Every 6 hours PRN         Objective:     Vitals:    07/31/25 1509   BP: 137/85   Pulse: 71   Weight: 80.3 kg (177 lb 0.5 oz)   Height: 5' 4.02" (1.626 m)   PainSc:   4   PainLoc: Back      Body mass index is 30.37 kg/m².     Physical Examinations:  Physical Exam   Constitutional: She is oriented to person, place, and time. No distress.   HENT:   Head: Normocephalic and atraumatic.   Eyes: Pupils are equal, round, and reactive to light. Right eye exhibits no discharge. Left eye exhibits no discharge.   Neck: No thyromegaly present.   Cardiovascular: Normal rate, regular rhythm and normal heart sounds. Exam reveals no " gallop and no friction rub.   No murmur heard.  Pulmonary/Chest: Breath sounds normal. She has no wheezes. She has no rales. She exhibits no tenderness.   Abdominal: There is no abdominal tenderness. There is no rebound and no guarding.   Musculoskeletal:         General: Tenderness and deformity present.      Right shoulder: Tenderness present.      Left shoulder: Tenderness present.      Right elbow: Normal.      Left elbow: Tenderness present.      Right wrist: Tenderness present.      Left wrist: Tenderness present.      Cervical back: Neck supple.      Right knee: Effusion present. Tenderness present.      Left knee: Effusion present. Tenderness present.   Lymphadenopathy:     She has no cervical adenopathy.   Neurological: She is alert and oriented to person, place, and time. Gait normal.   Skin: Skin is dry. No rash noted. No erythema. There is pallor.   Psychiatric: Mood and affect normal.   Vitals reviewed.      Right Side Rheumatological Exam     Examination finds the elbow normal.    The patient is tender to palpation of the shoulder, wrist, knee, 1st PIP, 1st MCP, 2nd PIP, 2nd MCP, 3rd PIP, 3rd MCP, 4th PIP, 4th MCP, 5th PIP and 5th MCP    She has swelling of the 1st PIP, 1st MCP, 2nd PIP, 2nd MCP, 3rd PIP, 3rd MCP, 4th PIP, 4th MCP, 5th PIP and 5th MCP    Shoulder Exam   Tenderness Location: no tenderness    Range of Motion   Active abduction:  abnormal   Adduction: abnormal  Sensation: normal    Knee Exam   Patellofemoral Crepitus: positive  Effusion: positive  Sensation: normal    Hip Exam   Tenderness Location: posterior  Sensation: normal    Elbow/Wrist Exam   Tenderness Location: no tenderness  Sensation: normal    Left Side Rheumatological Exam     The patient is tender to palpation of the shoulder, elbow, wrist, knee, 1st PIP, 1st MCP, 2nd PIP, 2nd MCP, 3rd PIP, 3rd MCP, 4th PIP, 4th MCP, 5th PIP and 5th MCP.    She has swelling of the 1st PIP, 1st MCP, 2nd PIP, 2nd MCP, 3rd PIP, 3rd MCP, 4th  PIP, 4th MCP, 5th PIP and 5th MCP    Shoulder Exam   Tenderness Location: no tenderness    Range of Motion   Active abduction:  abnormal   Sensation: normal    Knee Exam     Patellofemoral Crepitus: positive  Effusion: positive  Sensation: normal    Hip Exam   Tenderness Location: posterior  Sensation: normal    Elbow/Wrist Exam   Sensation: normal      Back/Neck Exam   General Inspection   Gait: normal            Disease Assessment Scores:  Patient's Global Assessment of arthritis (0-10): 1  Physician's Global Assessment of arthritis (0-10): 1  Number of Tender Joints (0-28): 1  Number of Swollen Joints (0-28): 1        5/27/2024     9:32 AM   Rapid3 Question Responses and Scores   MDHAQ Score 0.6   Psychologic Score 1.1   Pain Score 4.5   When you awakened in the morning OVER THE LAST WEEK, did you feel stiff? Yes   If Yes, please indicate the number of hours until you are as limber as you will be for the day 1   Fatigue Score 0.5   Global Health Score 8   RAPID3 Score 4.83       Monitoring Lab Results:  Lab Results   Component Value Date    WBC 6.19 01/17/2025    RBC 4.97 01/17/2025    HGB 14.1 01/17/2025    HCT 43.8 01/17/2025    MCV 88 01/17/2025    MCH 28.4 01/17/2025    MCHC 32.2 01/17/2025    RDW 12.7 01/17/2025     01/17/2025        Lab Results   Component Value Date     01/17/2025    K 5.0 01/17/2025     01/17/2025    CO2 28 01/17/2025    GLU 93 01/17/2025    BUN 17 01/17/2025    CREATININE 0.77 01/17/2025    CALCIUM 9.6 01/17/2025    PROT 6.7 01/17/2025    ALBUMIN 4.3 01/17/2025    BILITOT 0.4 01/17/2025    ALKPHOS 112 01/17/2025    AST 20 01/17/2025    ALT 16 01/17/2025    ANIONGAP 7 (L) 01/17/2025    EGFRNORACEVR >60 01/17/2025       Lab Results   Component Value Date    SEDRATE 22 01/17/2025    CRP 1.7 01/17/2025        Lab Results   Component Value Date    LUWENDCT49GH 37 10/13/2023        Lab Results   Component Value Date    CHOL 189 09/29/2023    HDL 93 (H) 09/29/2023    LDLCALC  "83.2 09/29/2023    TRIG 64 09/29/2023       Lab Results   Component Value Date    RF <8.6 03/19/2021    CCPANTIBODIE <0.5 03/19/2021     Lab Results   Component Value Date    ANASCREEN Negative <1:160 12/20/2017    DSDNA Negative 1:10 12/20/2017     Lab Results   Component Value Date    HLABB27 Negative 12/20/2017       Infectious Disease Screening:  Lab Results   Component Value Date    HEPBSAG Negative 11/17/2015    HEPBIGM Negative 11/17/2015     Lab Results   Component Value Date    HEPCAB Negative 11/17/2015     No results found for: "TBGOLDPLUS", "QUANTTBGDPL"  No results found for: "QUANTIFERON", "SVCMT", "QUANTAGVALUE", "QUANTNILVALU", "QUANTMITOGEN", "QFTTBAG", "QINT"     Imaging:  DEXA, Xrays, MRIs, CTs, etc    Old & Outside Medical Records:  Reviewed old and all outside medical records available in Care Everywhere     Assessment:     Encounter Diagnoses   Name Primary?    PMR (polymyalgia rheumatica) Yes    Change in facial mole     Hypothyroidism, unspecified type     Chronic pain syndrome     Inflammatory arthritis     Essential hypertension     Long-term use of Plaquenil     Gastroesophageal reflux disease with esophagitis, unspecified whether hemorrhage     Restless legs        Plan:      Encounter Diagnoses   Name Primary?    Change in facial mole     Hypothyroidism, unspecified type     PMR (polymyalgia rheumatica) Yes    Chronic pain syndrome     Inflammatory arthritis     Essential hypertension     Long-term use of Plaquenil     Gastroesophageal reflux disease with esophagitis, unspecified whether hemorrhage     Restless legs      Lashell was seen today for disease management.    Diagnoses and all orders for this visit:    PMR (polymyalgia rheumatica)  -     traMADoL (ULTRAM) 50 mg tablet; Take 1 tablet (50 mg total) by mouth every 6 (six) hours as needed for Pain.  -     celecoxib (CELEBREX) 200 MG capsule; Take 1 capsule (200 mg total) by mouth once daily.  -     Sedimentation rate; Future  -     " C-Reactive Protein; Future  -     Comprehensive Metabolic Panel; Future  -     CBC Auto Differential; Future  -     T4, Free; Future  -     TSH; Future  -     T3, Free; Future    Change in facial mole  -     Ambulatory referral/consult to Dermatology; Future    Hypothyroidism, unspecified type  -     traMADoL (ULTRAM) 50 mg tablet; Take 1 tablet (50 mg total) by mouth every 6 (six) hours as needed for Pain.  -     celecoxib (CELEBREX) 200 MG capsule; Take 1 capsule (200 mg total) by mouth once daily.  -     Sedimentation rate; Future  -     C-Reactive Protein; Future  -     Comprehensive Metabolic Panel; Future  -     CBC Auto Differential; Future  -     T4, Free; Future  -     TSH; Future  -     T3, Free; Future    Chronic pain syndrome  -     traMADoL (ULTRAM) 50 mg tablet; Take 1 tablet (50 mg total) by mouth every 6 (six) hours as needed for Pain.  -     Sedimentation rate; Future  -     C-Reactive Protein; Future  -     Comprehensive Metabolic Panel; Future  -     CBC Auto Differential; Future  -     T4, Free; Future  -     TSH; Future  -     T3, Free; Future    Inflammatory arthritis  -     traMADoL (ULTRAM) 50 mg tablet; Take 1 tablet (50 mg total) by mouth every 6 (six) hours as needed for Pain.  -     Sedimentation rate; Future  -     C-Reactive Protein; Future  -     Comprehensive Metabolic Panel; Future  -     CBC Auto Differential; Future  -     T4, Free; Future  -     TSH; Future  -     T3, Free; Future    Essential hypertension  -     traMADoL (ULTRAM) 50 mg tablet; Take 1 tablet (50 mg total) by mouth every 6 (six) hours as needed for Pain.  -     Sedimentation rate; Future  -     C-Reactive Protein; Future  -     Comprehensive Metabolic Panel; Future  -     CBC Auto Differential; Future  -     T4, Free; Future  -     TSH; Future  -     T3, Free; Future    Long-term use of Plaquenil  -     traMADoL (ULTRAM) 50 mg tablet; Take 1 tablet (50 mg total) by mouth every 6 (six) hours as needed for Pain.  -      Sedimentation rate; Future  -     C-Reactive Protein; Future  -     Comprehensive Metabolic Panel; Future  -     CBC Auto Differential; Future  -     T4, Free; Future  -     TSH; Future  -     T3, Free; Future    Gastroesophageal reflux disease with esophagitis, unspecified whether hemorrhage  -     celecoxib (CELEBREX) 200 MG capsule; Take 1 capsule (200 mg total) by mouth once daily.  -     famotidine (PEPCID) 40 MG tablet; Take 1 tablet (40 mg total) by mouth every morning.    Restless legs  -     pregabalin (LYRICA) 50 MG capsule; Take 1 capsule (50 mg total) by mouth 3 (three) times daily.          1. Continue celebrex, tramadol and lyrica  2. Consider adding tumeric   3. Labs prior     Follow-up 6 months    More than 50% of the  30 minute encounter was spent face to face counseling the patient regarding current status and future plan of care as well as side effects  of the medications. All questions were answered to patient's satisfaction also includes  non-face to face time preparing to see the patient (eg, review of tests), Obtaining and/or reviewing separately obtained history, Documenting clinical information in the electronic or other health record, Independently interpreting results            [1]   Social History  Tobacco Use    Smoking status: Never     Passive exposure: Never    Smokeless tobacco: Never   Substance Use Topics    Alcohol use: Not Currently    Drug use: No

## (undated) DEVICE — DRAPE U SPLIT SHEET 54X76IN

## (undated) DEVICE — ALCOHOL 70% ISOP RUBBING 4OZ

## (undated) DEVICE — GLOVE SURGICAL LATEX SZ 7

## (undated) DEVICE — PAD CAST SPECIALIST STRL 3

## (undated) DEVICE — STRAP OR TABLE 5IN X 72IN

## (undated) DEVICE — TOURNIQUET SB QC DP 18X4IN

## (undated) DEVICE — SUT VICRYL 4-0 RB1 27IN UD

## (undated) DEVICE — GLOVE 7.5 PROTEXIS PI MICRO

## (undated) DEVICE — Device

## (undated) DEVICE — SYR 10CC LUER LOCK

## (undated) DEVICE — DRAPE C ARM 42 X 120 10/BX

## (undated) DEVICE — SYR GLASS 5CC LUER LOK

## (undated) DEVICE — GLOVE PROTEXIS LTX MICRO 8

## (undated) DEVICE — SEE L#120831

## (undated) DEVICE — PENCIL ROCKER SWITCH 10FT CORD

## (undated) DEVICE — NDL TUOHY EPIDURAL 20G X 3.5

## (undated) DEVICE — SYR PLASTIC 8ML PERIFIX LL

## (undated) DEVICE — IMMOBILIZER CLIN SHOULDER LG

## (undated) DEVICE — PAD CAST SPECIALIST STRL 4

## (undated) DEVICE — APPLICATOR CHLORAPREP ORN 26ML

## (undated) DEVICE — DRESSING XEROFORM 1X8IN

## (undated) DEVICE — DRESSING TRANS 2X2 TEGADERM

## (undated) DEVICE — BANDAGE ELASTIC 3X5 VELCRO ST

## (undated) DEVICE — DRAPE HAND STERILE

## (undated) DEVICE — BLADE SURG #15 CARBON STEEL

## (undated) DEVICE — APPLICATOR CHLORAPREP CLR 10.5

## (undated) DEVICE — GLOVE SENSICARE PI MICRO 6

## (undated) DEVICE — DRESSING GAUZE XEROFORM 5X9

## (undated) DEVICE — DRAPE HALF SURGICAL 40X58IN

## (undated) DEVICE — RETRIEVER SUTURE HEWSON DISP

## (undated) DEVICE — GOWN X-LG STERILE BACK

## (undated) DEVICE — DRAPE THREE-QTR REINF 53X77IN

## (undated) DEVICE — TUBING SUC UNIV W/CONN 12FT

## (undated) DEVICE — SUT PROLENE 4-0 MONO 18IN

## (undated) DEVICE — BANDAGE ESMARK LATEX FREE 4INX

## (undated) DEVICE — BIT DRILL 3.5MM

## (undated) DEVICE — TRAY NERVE BLOCK

## (undated) DEVICE — MARKER SKIN RULER STERILE

## (undated) DEVICE — BOWL STERILE LARGE 32OZ

## (undated) DEVICE — TUBIGRIP 10M SZ E

## (undated) DEVICE — FORCEP STRAIGHT DISP

## (undated) DEVICE — SUT ETHIBOND 3-0 RB1 30IN

## (undated) DEVICE — KIT SAHARA DRAPE DRAW/LIFT

## (undated) DEVICE — MARKER SKIN STND TIP BLUE BARR

## (undated) DEVICE — CORD BIPOLAR 12 FOOT

## (undated) DEVICE — DRAPE STERI-DRAPE 1000 17X11IN

## (undated) DEVICE — GLOVE SENSICARE PI MICRO 7

## (undated) DEVICE — SUT 3-0 VICRYL / SH (J416)

## (undated) DEVICE — GAUZE SPONGE 4X4 12PLY

## (undated) DEVICE — BANDAGE MATRIX HK LOOP 4IN 5YD

## (undated) DEVICE — SUT ETHIBOND GRN 4-0 RB-1 30IN

## (undated) DEVICE — SPLINT PLASTER FAST SET 5X30IN

## (undated) DEVICE — BIT BRILL 2.5

## (undated) DEVICE — GLOVE PROTEXIS LTX MICRO  7.5

## (undated) DEVICE — SUT ETHILON 3-0 PS2 18 BLK

## (undated) DEVICE — ELECTRODE REM PLYHSV RETURN 9

## (undated) DEVICE — SOL SOD CHLORIDE 0.9% 10ML

## (undated) DEVICE — DRESSING TRANS 4X4 TEGADERM